# Patient Record
Sex: FEMALE | Race: WHITE | NOT HISPANIC OR LATINO | Employment: OTHER | URBAN - NONMETROPOLITAN AREA
[De-identification: names, ages, dates, MRNs, and addresses within clinical notes are randomized per-mention and may not be internally consistent; named-entity substitution may affect disease eponyms.]

---

## 2017-01-03 RX ORDER — ROSUVASTATIN CALCIUM 10 MG/1
TABLET, COATED ORAL
Qty: 30 TABLET | Refills: 0 | Status: SHIPPED | OUTPATIENT
Start: 2017-01-03 | End: 2017-01-12 | Stop reason: SDUPTHER

## 2017-01-04 RX ORDER — ERGOCALCIFEROL 1.25 MG/1
CAPSULE ORAL
Qty: 8 CAPSULE | Refills: 0 | OUTPATIENT
Start: 2017-01-04

## 2017-01-12 ENCOUNTER — OFFICE VISIT (OUTPATIENT)
Dept: FAMILY MEDICINE CLINIC | Facility: CLINIC | Age: 67
End: 2017-01-12

## 2017-01-12 ENCOUNTER — HOSPITAL ENCOUNTER (OUTPATIENT)
Dept: GENERAL RADIOLOGY | Facility: HOSPITAL | Age: 67
Discharge: HOME | End: 2017-01-12
Attending: INTERNAL MEDICINE

## 2017-01-12 VITALS
WEIGHT: 210 LBS | RESPIRATION RATE: 16 BRPM | OXYGEN SATURATION: 96 % | HEART RATE: 86 BPM | BODY MASS INDEX: 41.01 KG/M2 | SYSTOLIC BLOOD PRESSURE: 153 MMHG | DIASTOLIC BLOOD PRESSURE: 91 MMHG | TEMPERATURE: 98.5 F

## 2017-01-12 DIAGNOSIS — M54.50 LUMBOSACRAL PAIN: ICD-10-CM

## 2017-01-12 DIAGNOSIS — M54.6 CHRONIC MIDLINE THORACIC BACK PAIN: ICD-10-CM

## 2017-01-12 DIAGNOSIS — G89.29 CHRONIC MIDLINE THORACIC BACK PAIN: ICD-10-CM

## 2017-01-12 DIAGNOSIS — M25.50 ARTHRALGIA OF MULTIPLE JOINTS: ICD-10-CM

## 2017-01-12 DIAGNOSIS — I10 BENIGN ESSENTIAL HYPERTENSION: Primary | ICD-10-CM

## 2017-01-12 DIAGNOSIS — J20.9 ACUTE BRONCHITIS WITH BRONCHOSPASM: ICD-10-CM

## 2017-01-12 DIAGNOSIS — J01.01 ACUTE RECURRENT MAXILLARY SINUSITIS: ICD-10-CM

## 2017-01-12 DIAGNOSIS — E11.9 CONTROLLED TYPE 2 DIABETES MELLITUS WITHOUT COMPLICATION, WITHOUT LONG-TERM CURRENT USE OF INSULIN (HCC): ICD-10-CM

## 2017-01-12 DIAGNOSIS — H66.003 ACUTE SUPPURATIVE OTITIS MEDIA OF BOTH EARS WITHOUT SPONTANEOUS RUPTURE OF TYMPANIC MEMBRANES, RECURRENCE NOT SPECIFIED: ICD-10-CM

## 2017-01-12 PROCEDURE — 99214 OFFICE O/P EST MOD 30 MIN: CPT | Performed by: INTERNAL MEDICINE

## 2017-01-12 PROCEDURE — 96372 THER/PROPH/DIAG INJ SC/IM: CPT | Performed by: INTERNAL MEDICINE

## 2017-01-12 RX ORDER — AZITHROMYCIN 250 MG/1
TABLET, FILM COATED ORAL
Qty: 6 TABLET | Refills: 0 | Status: SHIPPED | OUTPATIENT
Start: 2017-01-12 | End: 2017-01-23

## 2017-01-12 RX ORDER — ROSUVASTATIN CALCIUM 10 MG/1
10 TABLET, COATED ORAL NIGHTLY
Qty: 90 TABLET | Refills: 3 | Status: SHIPPED | OUTPATIENT
Start: 2017-01-12 | End: 2018-02-20 | Stop reason: SDUPTHER

## 2017-01-12 RX ORDER — METHYLPREDNISOLONE SODIUM SUCCINATE 125 MG/2ML
125 INJECTION, POWDER, LYOPHILIZED, FOR SOLUTION INTRAMUSCULAR; INTRAVENOUS ONCE
Status: COMPLETED | OUTPATIENT
Start: 2017-01-12 | End: 2017-01-12

## 2017-01-12 RX ORDER — METHYLPREDNISOLONE 4 MG/1
TABLET ORAL
Qty: 21 TABLET | Refills: 0 | Status: SHIPPED | OUTPATIENT
Start: 2017-01-12 | End: 2017-05-16

## 2017-01-12 RX ADMIN — METHYLPREDNISOLONE SODIUM SUCCINATE 125 MG: 125 INJECTION, POWDER, LYOPHILIZED, FOR SOLUTION INTRAMUSCULAR; INTRAVENOUS at 11:18

## 2017-01-12 NOTE — PROGRESS NOTES
Subjective   Missy Mcgraw is a 66 y.o. female.     Chief Complaint   Patient presents with   • Cough   • Back Pain   • Leg Pain   • Joint Pain   • Earache   • Nasal Congestion       History of Present Illness   Patient complains of symptoms that started one week ago. Patient complains of pain between her shoulder blades. She also complains of bilateral leg pain.  She complains of back pain in the lumbar and thoracic area and multiple joints since the weather has become colder , patient is here complaining of sinus congestion, nasal discharge which started a few days ago.  patient  states now she has ear pain.    also complains of cough with sputum production which has been worsening since the past week.  patient has tried over-the-counter medication with no improvement in symptoms.  Patient is also to follow-up in the blood pressure.  She is been taking medication as prescribed.  She did not take it this morning is also to follow-up on her sugar which has been stable    The following portions of the patient's history were reviewed and updated as appropriate: allergies, current medications, past family history, past medical history, past social history, past surgical history and problem list.    Review of Systems   Constitutional: Negative for appetite change, fatigue and fever.   HENT: Positive for congestion, ear pain and sinus pressure. Negative for ear discharge and sore throat.    Eyes: Negative for pain and discharge.   Respiratory: Positive for cough and wheezing. Negative for chest tightness.    Cardiovascular: Negative for chest pain, palpitations and leg swelling.   Gastrointestinal: Negative for abdominal pain, blood in stool, constipation, diarrhea and nausea.   Endocrine: Negative for cold intolerance and heat intolerance.   Genitourinary: Negative for dysuria, flank pain and frequency.   Musculoskeletal: Positive for arthralgias and back pain. Negative for joint swelling.   Skin: Negative for color  change.   Allergic/Immunologic: Negative for environmental allergies and food allergies.   Neurological: Negative for dizziness, weakness, numbness and headaches.   Hematological: Negative for adenopathy. Does not bruise/bleed easily.   Psychiatric/Behavioral: Negative for behavioral problems and dysphoric mood. The patient is not nervous/anxious.          Current Outpatient Prescriptions:   •  aspirin (SANJU ASPIRIN EC LOW DOSE) 81 MG EC tablet, Take 1 tablet by mouth daily., Disp: , Rfl:   •  atenolol (TENORMIN) 25 MG tablet, Take 1 tablet by mouth daily., Disp: , Rfl:   •  Biotin 1 MG capsule, Take  by mouth., Disp: , Rfl:   •  carbidopa-levodopa CR (SINEMET CR)  MG per CR tablet, Take 1 tablet by mouth every night., Disp: , Rfl:   •  citalopram (CeleXA) 40 MG tablet, Take 1 tablet by mouth daily., Disp: , Rfl:   •  clobetasol (TEMOVATE) 0.05 % ointment, , Disp: , Rfl:   •  COCONUT OIL PO, Take  by mouth., Disp: , Rfl:   •  fluticasone (FLONASE) 50 MCG/ACT nasal spray, 1 spray into each nostril 2 (two) times a day., Disp: , Rfl:   •  gabapentin (NEURONTIN) 100 MG capsule, TAKE 1 CAPSULE 3 TIMES A   DAY, Disp: 270 capsule, Rfl: 0  •  glimepiride (AMARYL) 2 MG tablet, Take 1 tablet by mouth daily with breakfast., Disp: , Rfl:   •  imiquimod (ALDARA) 5 % cream, Apply  topically., Disp: , Rfl:   •  meclizine (ANTIVERT) 25 MG tablet, Take 1 tablet by mouth 3 (three) times a day as needed for dizziness., Disp: 90 tablet, Rfl: 0  •  moexipril-hydrochlorothiazide (UNIRETIC) 15-12.5 MG tablet per tablet, Take 1 tablet by mouth daily., Disp: , Rfl:   •  Multiple Vitamins-Minerals (KANNAN BASIC) tablet, Take  by mouth., Disp: , Rfl:   •  Omega-3 Krill Oil 1000 MG capsule, Take  by mouth., Disp: , Rfl:   •  pantoprazole (PROTONIX) 40 MG EC tablet, TAKE 1 TABLET DAILY, Disp: 90 tablet, Rfl: 3  •  rosuvastatin (CRESTOR) 10 MG tablet, Take 1 tablet by mouth Every Night., Disp: 90 tablet, Rfl: 3  •  SOOLANTRA 1 % cream, ,  Disp: , Rfl:   •  traZODone (DESYREL) 50 MG tablet, Take 1 tablet by mouth Every Night for 90 days., Disp: 90 tablet, Rfl: 3  •  triamcinolone (KENALOG) 0.1 % cream, , Disp: , Rfl:   •  vitamin B-12 (CYANOCOBALAMIN) 1000 MCG tablet, Take 1,000 mcg by mouth daily., Disp: , Rfl:   •  vitamin D (ERGOCALCIFEROL) 16906 UNITS capsule capsule, TAKE ONE CAPSULE BY MOUTH ONCE WEEKLY, Disp: 8 capsule, Rfl: 0  •  azithromycin (ZITHROMAX Z-PAM) 250 MG tablet, Take 2 tablets the first day, then 1 tablet daily for 4 days., Disp: 6 tablet, Rfl: 0  •  MethylPREDNISolone (MEDROL, PAM,) 4 MG tablet, Take as directed on package instructions., Disp: 21 tablet, Rfl: 0  No current facility-administered medications for this visit.     Objective     Blood pressure 153/91, pulse 86, temperature 98.5 °F (36.9 °C), temperature source Oral, resp. rate 16, weight 210 lb (95.3 kg), SpO2 96 %.    Physical Exam   Constitutional: She is oriented to person, place, and time. She appears well-developed and well-nourished. No distress.   HENT:   Head: Normocephalic and atraumatic.   Right Ear: External ear normal. Tympanic membrane is injected.   Left Ear: External ear normal. Tympanic membrane is injected.   Nose: Rhinorrhea present. Right sinus exhibits maxillary sinus tenderness. Left sinus exhibits maxillary sinus tenderness.   Mouth/Throat: Oropharynx is clear and moist.   Eyes: Conjunctivae and EOM are normal. Pupils are equal, round, and reactive to light.   Neck: Neck supple. No thyromegaly present.   Cardiovascular: Normal rate, regular rhythm and normal heart sounds.    Pulmonary/Chest: Effort normal. No respiratory distress. She has wheezes.   Abdominal: Soft. Bowel sounds are normal. She exhibits no distension. There is no tenderness. There is no rebound.   Musculoskeletal: She exhibits deformity. She exhibits no edema.   Lymphadenopathy:     She has no cervical adenopathy.   Neurological: She is alert and oriented to person, place, and  time.   No gross motor or sensory deficits   Skin: Skin is warm. She is not diaphoretic.   Psychiatric: She has a normal mood and affect.   Nursing note and vitals reviewed.      Results for orders placed or performed in visit on 11/03/16   Comprehensive metabolic panel   Result Value Ref Range    Glucose 117 (H) 70 - 100 mg/dL    BUN 16 9 - 23 mg/dL    Creatinine 0.70 0.60 - 1.30 mg/dL    Sodium 138 132 - 146 mmol/L    Potassium 4.5 3.5 - 5.5 mmol/L    Chloride 101 99 - 109 mmol/L    CO2 31.0 20.0 - 31.0 mmol/L    Calcium 10.0 8.7 - 10.4 mg/dL    Total Protein 7.1 5.7 - 8.2 g/dL    Albumin 4.10 3.20 - 4.80 g/dL    ALT (SGPT) 9 7 - 40 U/L    AST (SGOT) 15 0 - 33 U/L    Alkaline Phosphatase 78 25 - 100 U/L    Total Bilirubin 0.5 0.3 - 1.2 mg/dL    eGFR Non African Amer 85 >60 mL/min/1.73    Globulin 3.0 gm/dL    A/G Ratio 1.4 g/dL    BUN/Creatinine Ratio 22.9 7.0 - 25.0    Anion Gap 6.0 3.0 - 11.0 mmol/L   Lipid panel   Result Value Ref Range    Total Cholesterol 183 0 - 200 mg/dL    Triglycerides 225 (H) 0 - 150 mg/dL    HDL Cholesterol 56 40 - 60 mg/dL    LDL Cholesterol  97 0 - 130 mg/dL   Hemoglobin A1c   Result Value Ref Range    Hemoglobin A1C 6.20 (H) 4.00 - 6.00 %   Microalbumin / creatinine urine ratio   Result Value Ref Range    Creatinine, Urine 192.7 Not Estab. mg/dL    Microalbumin, Urine 68.5 Not Estab. ug/mL    Microalbumin/Creatinine Ratio Urine 35.5 (H) 0.0 - 30.0 mg/g creat   Vitamin D 25 hydroxy   Result Value Ref Range    25 Hydroxy, Vitamin D 24.9 ng/ml   CBC Auto Differential   Result Value Ref Range    WBC 9.05 3.50 - 10.80 10*3/mm3    RBC 5.01 3.89 - 5.14 10*6/mm3    Hemoglobin 14.5 11.5 - 15.5 g/dL    Hematocrit 43.6 34.5 - 44.0 %    MCV 87.0 80.0 - 99.0 fL    MCH 28.9 27.0 - 31.0 pg    MCHC 33.3 32.0 - 36.0 g/dL    RDW 13.0 11.3 - 14.5 %    RDW-SD 41.6 37.0 - 54.0 fl    MPV 12.2 (H) 6.0 - 12.0 fL    Platelets 231 150 - 450 10*3/mm3    Neutrophil % 66.3 41.0 - 71.0 %    Lymphocyte % 23.2 (L)  24.0 - 44.0 %    Monocyte % 7.3 0.0 - 12.0 %    Eosinophil % 2.1 0.0 - 3.0 %    Basophil % 0.3 0.0 - 1.0 %    Immature Grans % 0.8 (H) 0.0 - 0.6 %    Neutrophils, Absolute 6.00 1.50 - 8.30 10*3/mm3    Lymphocytes, Absolute 2.10 0.60 - 4.80 10*3/mm3    Monocytes, Absolute 0.66 0.00 - 1.00 10*3/mm3    Eosinophils, Absolute 0.19 0.10 - 0.30 10*3/mm3    Basophils, Absolute 0.03 0.00 - 0.20 10*3/mm3    Immature Grans, Absolute 0.07 (H) 0.00 - 0.03 10*3/mm3         Assessment/Plan   Missy was seen today for cough, back pain, leg pain, joint pain, earache and nasal congestion.    Diagnoses and all orders for this visit:    Benign essential hypertension    Controlled type 2 diabetes mellitus without complication, without long-term current use of insulin    Acute suppurative otitis media of both ears without spontaneous rupture of tympanic membranes, recurrence not specified    Acute recurrent maxillary sinusitis    Acute bronchitis with bronchospasm  -     XR Chest PA & Lateral; Future  -     methylPREDNISolone sodium succinate (SOLU-Medrol) injection 125 mg; Inject 2 mL into the shoulder, thigh, or buttocks 1 (One) Time.    Lumbosacral pain  -     XR Spine Lumbar 2 or 3 View; Future  -     methylPREDNISolone sodium succinate (SOLU-Medrol) injection 125 mg; Inject 2 mL into the shoulder, thigh, or buttocks 1 (One) Time.    Arthralgia of multiple joints  -     methylPREDNISolone sodium succinate (SOLU-Medrol) injection 125 mg; Inject 2 mL into the shoulder, thigh, or buttocks 1 (One) Time.    Chronic midline thoracic back pain  -     XR Spine Thoracic 2 View; Future  -     methylPREDNISolone sodium succinate (SOLU-Medrol) injection 125 mg; Inject 2 mL into the shoulder, thigh, or buttocks 1 (One) Time.    Other orders  -     rosuvastatin (CRESTOR) 10 MG tablet; Take 1 tablet by mouth Every Night.  -     azithromycin (ZITHROMAX Z-PAM) 250 MG tablet; Take 2 tablets the first day, then 1 tablet daily for 4 days.  -      MethylPREDNISolone (MEDROL, PAM,) 4 MG tablet; Take as directed on package instructions.      Discussion Summary:   1.Benign Essential Hypertension: will continue current medications, low sodium diet, patient to continue to monitor blood pressure.  2.  Diabetes Mellitus: patient is advised to continue all current medications and these have been reviewed today, current fasting blood sugar 135 , hba1c 6.2 ,  She will be getting steroids today which can increase her blood sugar level  3. Multiple joint pain:    im solumedrol And medrol Dose pack given and Informed that her sugar may go up secondary To the steroids  4. Thoracic pain : get xray today  And  im solumedrol And medrol Dose pack given and Informed that her sugar may go up secondary To the steroids  5. Lumbosacral pain : get xray today  And  im solumedrol And medrol Dose pack given and Informed that her sugar may go up secondary To the steroids  6. Acute bronchitis: will get chest xray and start oral antibiotics  7.  Acute suppurative otitis media: start oral antibiotics  8.acute maxillary sinusitis: start oral antibiotics         Emely Sheldon MD

## 2017-01-12 NOTE — MR AVS SNAPSHOT
Missy Mcgraw   1/12/2017 10:15 AM   Office Visit    Dept Phone:  [phone]   Encounter #:  [id number]    Provider:  Emely Sheldon MD   Department:  [facility] PRIMARY CARE                Your Full Care Plan              Today's Medication Changes          These changes are accurate as of: 1/12/17 11:26 AM.  If you have any questions, ask your nurse or doctor.               New Medication(s)Ordered:     azithromycin 250 MG tablet   Commonly known as:  ZITHROMAX Z-PAM   Take 2 tablets the first day, then 1 tablet daily for 4 days.   Started by:  Emely Sheldon MD       MethylPREDNISolone 4 MG tablet   Commonly known as:  MEDROL (PAM)   Take as directed on package instructions.   Started by:  Emely Sheldon MD         Medication(s)that have changed:     rosuvastatin 10 MG tablet   Commonly known as:  CRESTOR   Take 1 tablet by mouth Every Night.   What changed:  See the new instructions.   Changed by:  Emely Sheldon MD            Where to Get Your Medications      These medications were sent to [facility] [address] AT [facility] - [phone] [facility] [phone] FX  [address] [facility] [address]     Phone:  [phone]     rosuvastatin 10 MG tablet         These medications were sent to [address] AT [facility] - [phone] [facility] [phone] [address] [address]     Phone:  [phone]     azithromycin 250 MG tablet    MethylPREDNISolone 4 MG tablet                  Your Updated Medication List          This list is accurate as of: 1/12/17 11:26 AM.  Always use your most recent med list.                atenolol 25 MG tablet   Commonly known as:  TENORMIN       azithromycin 250 MG tablet   Commonly known as:  ZITHROMAX Z-PAM   Take 2 tablets the first day, then 1 tablet daily for 4 days.       SANJU ASPIRIN EC LOW  DOSE 81 MG EC tablet   Generic drug:  aspirin       Biotin 1 MG capsule       carbidopa-levodopa CR  MG per CR tablet   Commonly known as:  SINEMET CR       citalopram 40 MG tablet   Commonly known as:  CeleXA       clobetasol 0.05 % ointment   Commonly known as:  TEMOVATE       COCONUT OIL PO       fluticasone 50 MCG/ACT nasal spray   Commonly known as:  FLONASE       gabapentin 100 MG capsule   Commonly known as:  NEURONTIN   TAKE 1 CAPSULE 3 TIMES A   DAY       glimepiride 2 MG tablet   Commonly known as:  AMARYL       imiquimod 5 % cream   Commonly known as:  ALDARA       meclizine 25 MG tablet   Commonly known as:  ANTIVERT   Take 1 tablet by mouth 3 (three) times a day as needed for dizziness.       KANNAN BASIC tablet       MethylPREDNISolone 4 MG tablet   Commonly known as:  MEDROL (PAM)   Take as directed on package instructions.       moexipril-hydrochlorothiazide 15-12.5 MG tablet per tablet   Commonly known as:  UNIRETIC       Omega-3 Krill Oil 1000 MG capsule       pantoprazole 40 MG EC tablet   Commonly known as:  PROTONIX   TAKE 1 TABLET DAILY       rosuvastatin 10 MG tablet   Commonly known as:  CRESTOR   Take 1 tablet by mouth Every Night.       SOOLANTRA 1 % cream   Generic drug:  Ivermectin       traZODone 50 MG tablet   Commonly known as:  DESYREL   Take 1 tablet by mouth Every Night for 90 days.       triamcinolone 0.1 % cream   Commonly known as:  KENALOG       vitamin B-12 1000 MCG tablet   Commonly known as:  CYANOCOBALAMIN       vitamin D 91974 UNITS capsule capsule   Commonly known as:  ERGOCALCIFEROL   TAKE ONE CAPSULE BY MOUTH ONCE WEEKLY               You Were Diagnosed With        Codes Comments    Benign essential hypertension    -  Primary ICD-10-CM: I10  ICD-9-CM: 401.1     Controlled type 2 diabetes mellitus without complication, without long-term current use of insulin     ICD-10-CM: E11.9  ICD-9-CM: 250.00     Acute suppurative otitis media of both ears without spontaneous  rupture of tympanic membranes, recurrence not specified     ICD-10-CM: H66.003  ICD-9-CM: 382.00     Acute recurrent maxillary sinusitis     ICD-10-CM: J01.01  ICD-9-CM: 461.0     Acute bronchitis with bronchospasm     ICD-10-CM: J20.9  ICD-9-CM: 466.0     Lumbosacral pain     ICD-10-CM: M54.5, M54.89  ICD-9-CM: 724.2, 724.6     Arthralgia of multiple joints     ICD-10-CM: M25.50  ICD-9-CM: 719.49     Chronic midline thoracic back pain     ICD-10-CM: M54.6, G89.29  ICD-9-CM: 724.1, 338.29       Medications to be Given to You by a Medical Professional     Due       Frequency    2017 methylPREDNISolone sodium succinate (SOLU-Medrol) injection 125 mg  Once      Instructions     None    Patient Instructions History      Upcoming Appointments     Visit Type Date Time Department    OFFICE VISIT 2017 10:15 AM [facility]    OFFICE VISIT 3/8/2017  8:30 AM [facility]      [employer] Signup     [facility] [employer] allows you to send messages to your doctor, view your test results, renew your prescriptions, schedule appointments, and more. To sign up, go to [employer] and click on the Sign Up Now link in the New User? box. Enter your [employer] Activation Code exactly as it appears below along with the last four digits of your Social Security Number and your Date of Birth () to complete the sign-up process. If you do not sign up before the expiration date, you must request a new code.    [employer] Activation Code: [email or web address]  Expires: 2017  5:37 AM    If you have questions, you can email [email or web address] or call [phone] to talk to our [employer] staff. Remember, [employer] is NOT to be used for urgent needs. For medical emergencies, dial 911.               Other Info from Your Visit           Your Appointments     Mar 08, 2017  8:30 AM EST   Office Visit with Emely Sheldon MD   [facility] (--)    [address]  [place]  [place] [address] [phone]           Arrive 15 minutes prior to appointment.              Allergies     Atorvastatin      Fenofibrate      Fluoxetine      Gemfibrozil      Hydroxychloroquine      Metformin      Nsaids      Penicillins      Pravastatin      Sulfamethoxazole-trimethoprim        Reason for Visit     Cough     Shortness of Breath     Back Pain     Leg Pain     Joint Pain           Vital Signs     Blood Pressure Pulse Temperature Respirations Weight Oxygen Saturation    153/91 86 98.5 °F (36.9 °C) (Oral) 16 210 lb (95.3 kg) 96%    Body Mass Index Smoking Status                41.01 kg/m2 Never Smoker          Problems and Diagnoses Noted     Benign essential hypertension    Controlled type 2 diabetes mellitus without complication, without long-term current use of insulin        Middle ear infection        Acute maxillary sinusitis        Acute bronchitis with bronchospasm        Low back pain        Arthralgia of multiple joints        Chronic midline thoracic back pain

## 2017-01-13 DIAGNOSIS — M54.6 CHRONIC BILATERAL THORACIC BACK PAIN: Primary | ICD-10-CM

## 2017-01-13 DIAGNOSIS — M16.0 PRIMARY OSTEOARTHRITIS OF BOTH HIPS: Primary | ICD-10-CM

## 2017-01-13 DIAGNOSIS — G89.29 CHRONIC BILATERAL THORACIC BACK PAIN: Primary | ICD-10-CM

## 2017-01-16 RX ORDER — GABAPENTIN 100 MG/1
CAPSULE ORAL
Qty: 270 CAPSULE | Refills: 2 | Status: SHIPPED | OUTPATIENT
Start: 2017-01-16 | End: 2018-03-19

## 2017-01-20 ENCOUNTER — TRANSCRIBE ORDERS (OUTPATIENT)
Dept: ADMINISTRATIVE | Facility: HOSPITAL | Age: 67
End: 2017-01-20

## 2017-01-20 DIAGNOSIS — Z13.9 SCREENING: Primary | ICD-10-CM

## 2017-01-23 ENCOUNTER — OFFICE VISIT (OUTPATIENT)
Dept: FAMILY MEDICINE CLINIC | Facility: CLINIC | Age: 67
End: 2017-01-23

## 2017-01-23 VITALS
HEART RATE: 79 BPM | BODY MASS INDEX: 40.82 KG/M2 | DIASTOLIC BLOOD PRESSURE: 80 MMHG | SYSTOLIC BLOOD PRESSURE: 149 MMHG | RESPIRATION RATE: 16 BRPM | TEMPERATURE: 98 F | OXYGEN SATURATION: 97 % | WEIGHT: 209 LBS

## 2017-01-23 DIAGNOSIS — M54.6 CHRONIC BILATERAL THORACIC BACK PAIN: ICD-10-CM

## 2017-01-23 DIAGNOSIS — E11.9 DIABETES MELLITUS WITHOUT COMPLICATION (HCC): ICD-10-CM

## 2017-01-23 DIAGNOSIS — R10.11 RUQ ABDOMINAL PAIN: ICD-10-CM

## 2017-01-23 DIAGNOSIS — G89.29 CHRONIC BILATERAL THORACIC BACK PAIN: ICD-10-CM

## 2017-01-23 DIAGNOSIS — I10 BENIGN ESSENTIAL HYPERTENSION: Primary | ICD-10-CM

## 2017-01-23 PROCEDURE — 96372 THER/PROPH/DIAG INJ SC/IM: CPT | Performed by: INTERNAL MEDICINE

## 2017-01-23 PROCEDURE — 99213 OFFICE O/P EST LOW 20 MIN: CPT | Performed by: INTERNAL MEDICINE

## 2017-01-23 RX ORDER — METHYLPREDNISOLONE ACETATE 80 MG/ML
80 INJECTION, SUSPENSION INTRA-ARTICULAR; INTRALESIONAL; INTRAMUSCULAR; SOFT TISSUE ONCE
Status: COMPLETED | OUTPATIENT
Start: 2017-01-23 | End: 2017-01-23

## 2017-01-23 RX ADMIN — METHYLPREDNISOLONE ACETATE 80 MG: 80 INJECTION, SUSPENSION INTRA-ARTICULAR; INTRALESIONAL; INTRAMUSCULAR; SOFT TISSUE at 13:18

## 2017-01-23 NOTE — PROGRESS NOTES
Subjective   Missy Mcgraw is a 66 y.o. female.     Chief Complaint   Patient presents with   • Back Pain   • Hypertension   • Diabetes       History of Present Illness   Patient is here to follow-up on blood pressure.  She is been taking medication as prescribed.  She is also to follow-up on her sugar which has been stable.  She complains of pain in the right upper quadrant of her abdomen ,Patient complains of right upper back pain, as well as right side pain under her breast. Patient describes the pain in her back as a spasm, and the pain under her breast as a sharp, shooting pain.  She also complains of pain in the thoracic area and has MRI scheduled    The following portions of the patient's history were reviewed and updated as appropriate: allergies, current medications, past family history, past medical history, past social history, past surgical history and problem list.    Review of Systems   Constitutional: Negative for appetite change, fatigue and fever.   HENT: Negative for congestion, ear discharge, ear pain, sinus pressure and sore throat.    Eyes: Negative for pain and discharge.   Respiratory: Negative for cough, chest tightness, shortness of breath and wheezing.    Cardiovascular: Negative for chest pain, palpitations and leg swelling.   Gastrointestinal: Positive for abdominal pain. Negative for blood in stool, constipation, diarrhea and nausea.   Endocrine: Negative for cold intolerance and heat intolerance.   Genitourinary: Negative for dysuria, flank pain and frequency.   Musculoskeletal: Positive for back pain. Negative for joint swelling.   Skin: Negative for color change.   Allergic/Immunologic: Negative for environmental allergies and food allergies.   Neurological: Negative for dizziness, weakness, numbness and headaches.   Hematological: Negative for adenopathy. Does not bruise/bleed easily.   Psychiatric/Behavioral: Negative for behavioral problems and dysphoric mood. The patient is not  nervous/anxious.          Current Outpatient Prescriptions:   •  aspirin (SANJU ASPIRIN EC LOW DOSE) 81 MG EC tablet, Take 1 tablet by mouth daily., Disp: , Rfl:   •  atenolol (TENORMIN) 25 MG tablet, Take 1 tablet by mouth daily., Disp: , Rfl:   •  Biotin 1 MG capsule, Take  by mouth., Disp: , Rfl:   •  carbidopa-levodopa CR (SINEMET CR)  MG per CR tablet, Take 1 tablet by mouth every night., Disp: , Rfl:   •  citalopram (CeleXA) 40 MG tablet, Take 1 tablet by mouth daily., Disp: , Rfl:   •  clobetasol (TEMOVATE) 0.05 % ointment, , Disp: , Rfl:   •  COCONUT OIL PO, Take  by mouth., Disp: , Rfl:   •  fluticasone (FLONASE) 50 MCG/ACT nasal spray, 1 spray into each nostril 2 (two) times a day., Disp: , Rfl:   •  gabapentin (NEURONTIN) 100 MG capsule, TAKE 1 CAPSULE 3 TIMES A   DAY, Disp: 270 capsule, Rfl: 2  •  glimepiride (AMARYL) 2 MG tablet, Take 1 tablet by mouth daily with breakfast., Disp: , Rfl:   •  imiquimod (ALDARA) 5 % cream, Apply  topically., Disp: , Rfl:   •  meclizine (ANTIVERT) 25 MG tablet, Take 1 tablet by mouth 3 (three) times a day as needed for dizziness., Disp: 90 tablet, Rfl: 0  •  moexipril-hydrochlorothiazide (UNIRETIC) 15-12.5 MG tablet per tablet, Take 1 tablet by mouth daily., Disp: , Rfl:   •  Multiple Vitamins-Minerals (KANNAN BASIC) tablet, Take  by mouth., Disp: , Rfl:   •  pantoprazole (PROTONIX) 40 MG EC tablet, TAKE 1 TABLET DAILY, Disp: 90 tablet, Rfl: 3  •  rosuvastatin (CRESTOR) 10 MG tablet, Take 1 tablet by mouth Every Night., Disp: 90 tablet, Rfl: 3  •  SOOLANTRA 1 % cream, , Disp: , Rfl:   •  traZODone (DESYREL) 50 MG tablet, Take 1 tablet by mouth Every Night for 90 days., Disp: 90 tablet, Rfl: 3  •  triamcinolone (KENALOG) 0.1 % cream, , Disp: , Rfl:   •  vitamin B-12 (CYANOCOBALAMIN) 1000 MCG tablet, Take 1,000 mcg by mouth daily., Disp: , Rfl:   •  vitamin D (ERGOCALCIFEROL) 35580 UNITS capsule capsule, TAKE ONE CAPSULE BY MOUTH ONCE WEEKLY, Disp: 8 capsule, Rfl: 0  •   MethylPREDNISolone (MEDROL, PAM,) 4 MG tablet, Take as directed on package instructions., Disp: 21 tablet, Rfl: 0  •  Omega-3 Krill Oil 1000 MG capsule, Take  by mouth., Disp: , Rfl:     Objective     Blood pressure 149/80, pulse 79, temperature 98 °F (36.7 °C), temperature source Oral, resp. rate 16, weight 209 lb (94.8 kg), SpO2 97 %.    Physical Exam   Constitutional: She is oriented to person, place, and time. She appears well-developed and well-nourished. No distress.   HENT:   Head: Normocephalic and atraumatic.   Right Ear: External ear normal.   Left Ear: External ear normal.   Nose: Nose normal.   Mouth/Throat: Oropharynx is clear and moist.   Eyes: Conjunctivae and EOM are normal. Pupils are equal, round, and reactive to light.   Neck: Neck supple. No thyromegaly present.   Cardiovascular: Normal rate, regular rhythm and normal heart sounds.    Pulmonary/Chest: Effort normal and breath sounds normal. No respiratory distress.   Abdominal: Soft. Bowel sounds are normal. She exhibits no distension. There is tenderness. There is no rebound.   ruq   Musculoskeletal: She exhibits tenderness. She exhibits no edema.   Mid thoracic diffuse paraspinal tenderness   Lymphadenopathy:     She has no cervical adenopathy.   Neurological: She is alert and oriented to person, place, and time.   No gross motor or sensory deficits   Skin: Skin is warm. She is not diaphoretic.   Psychiatric: She has a normal mood and affect.   Nursing note and vitals reviewed.      Results for orders placed or performed in visit on 11/03/16   Comprehensive metabolic panel   Result Value Ref Range    Glucose 117 (H) 70 - 100 mg/dL    BUN 16 9 - 23 mg/dL    Creatinine 0.70 0.60 - 1.30 mg/dL    Sodium 138 132 - 146 mmol/L    Potassium 4.5 3.5 - 5.5 mmol/L    Chloride 101 99 - 109 mmol/L    CO2 31.0 20.0 - 31.0 mmol/L    Calcium 10.0 8.7 - 10.4 mg/dL    Total Protein 7.1 5.7 - 8.2 g/dL    Albumin 4.10 3.20 - 4.80 g/dL    ALT (SGPT) 9 7 - 40 U/L     AST (SGOT) 15 0 - 33 U/L    Alkaline Phosphatase 78 25 - 100 U/L    Total Bilirubin 0.5 0.3 - 1.2 mg/dL    eGFR Non African Amer 85 >60 mL/min/1.73    Globulin 3.0 gm/dL    A/G Ratio 1.4 g/dL    BUN/Creatinine Ratio 22.9 7.0 - 25.0    Anion Gap 6.0 3.0 - 11.0 mmol/L   Lipid panel   Result Value Ref Range    Total Cholesterol 183 0 - 200 mg/dL    Triglycerides 225 (H) 0 - 150 mg/dL    HDL Cholesterol 56 40 - 60 mg/dL    LDL Cholesterol  97 0 - 130 mg/dL   Hemoglobin A1c   Result Value Ref Range    Hemoglobin A1C 6.20 (H) 4.00 - 6.00 %   Microalbumin / creatinine urine ratio   Result Value Ref Range    Creatinine, Urine 192.7 Not Estab. mg/dL    Microalbumin, Urine 68.5 Not Estab. ug/mL    Microalbumin/Creatinine Ratio Urine 35.5 (H) 0.0 - 30.0 mg/g creat   Vitamin D 25 hydroxy   Result Value Ref Range    25 Hydroxy, Vitamin D 24.9 ng/ml   CBC Auto Differential   Result Value Ref Range    WBC 9.05 3.50 - 10.80 10*3/mm3    RBC 5.01 3.89 - 5.14 10*6/mm3    Hemoglobin 14.5 11.5 - 15.5 g/dL    Hematocrit 43.6 34.5 - 44.0 %    MCV 87.0 80.0 - 99.0 fL    MCH 28.9 27.0 - 31.0 pg    MCHC 33.3 32.0 - 36.0 g/dL    RDW 13.0 11.3 - 14.5 %    RDW-SD 41.6 37.0 - 54.0 fl    MPV 12.2 (H) 6.0 - 12.0 fL    Platelets 231 150 - 450 10*3/mm3    Neutrophil % 66.3 41.0 - 71.0 %    Lymphocyte % 23.2 (L) 24.0 - 44.0 %    Monocyte % 7.3 0.0 - 12.0 %    Eosinophil % 2.1 0.0 - 3.0 %    Basophil % 0.3 0.0 - 1.0 %    Immature Grans % 0.8 (H) 0.0 - 0.6 %    Neutrophils, Absolute 6.00 1.50 - 8.30 10*3/mm3    Lymphocytes, Absolute 2.10 0.60 - 4.80 10*3/mm3    Monocytes, Absolute 0.66 0.00 - 1.00 10*3/mm3    Eosinophils, Absolute 0.19 0.10 - 0.30 10*3/mm3    Basophils, Absolute 0.03 0.00 - 0.20 10*3/mm3    Immature Grans, Absolute 0.07 (H) 0.00 - 0.03 10*3/mm3         Assessment/Plan   Missy was seen today for back pain, hypertension and diabetes.    Diagnoses and all orders for this visit:    Benign essential hypertension    Diabetes mellitus  without complication    RUQ abdominal pain  -     US Gallbladder; Future  -     US Liver; Future  -     NM HIDA Scan With Pharmacological Intervention; Future    Chronic bilateral thoracic back pain  -     methylPREDNISolone acetate (DEPO-medrol) injection 80 mg; Inject 1 mL into the shoulder, thigh, or buttocks 1 (One) Time.     plan:  1.  Benign  Essential hypertension: Will continue current medication, low-sodium diet advised  2.Diabetes Mellitus: patient is advised to continue all current medications and these have been reviewed today  3.  Right upper quadrant abdominal pain: We will get HIDA scan and ultrasound of the liver and gallbladder  4.thoracic pain: Patient scheduled for MRI, IM Depo-Medrol given in office today             Emely Sheldon MD

## 2017-01-23 NOTE — MR AVS SNAPSHOT
Missy Mcgraw   1/23/2017 4:00 PM   Office Visit    Dept Phone:  [phone]   Encounter #:  [id number]    Provider:  Emely Sheldon MD   Department:  [facility]                Your Full Care Plan              Today's Medication Changes          These changes are accurate as of: 1/23/17  5:13 PM.  If you have any questions, ask your nurse or doctor.               Stop taking medication(s)listed here:     azithromycin 250 MG tablet   Commonly known as:  ZITHROMAX Z-PAM   Stopped by:  Emely Sheldon MD                      Your Updated Medication List          This list is accurate as of: 1/23/17  5:13 PM.  Always use your most recent med list.                atenolol 25 MG tablet   Commonly known as:  TENORMIN       SANJU ASPIRIN EC LOW DOSE 81 MG EC tablet   Generic drug:  aspirin       Biotin 1 MG capsule       carbidopa-levodopa CR  MG per CR tablet   Commonly known as:  SINEMET CR       citalopram 40 MG tablet   Commonly known as:  CeleXA       clobetasol 0.05 % ointment   Commonly known as:  TEMOVATE       COCONUT OIL PO       fluticasone 50 MCG/ACT nasal spray   Commonly known as:  FLONASE       gabapentin 100 MG capsule   Commonly known as:  NEURONTIN   TAKE 1 CAPSULE 3 TIMES A   DAY       glimepiride 2 MG tablet   Commonly known as:  AMARYL       imiquimod 5 % cream   Commonly known as:  ALDARA       meclizine 25 MG tablet   Commonly known as:  ANTIVERT   Take 1 tablet by mouth 3 (three) times a day as needed for dizziness.       KANNAN BASIC tablet       MethylPREDNISolone 4 MG tablet   Commonly known as:  MEDROL (PAM)   Take as directed on package instructions.       moexipril-hydrochlorothiazide 15-12.5 MG tablet per tablet   Commonly known as:  UNIRETIC       Omega-3 Krill Oil 1000 MG capsule       pantoprazole 40 MG EC tablet   Commonly known as:  PROTONIX   TAKE 1 TABLET DAILY       rosuvastatin 10 MG tablet   Commonly known as:  CRESTOR   Take  1 tablet by mouth Every Night.       SOOLANTRA 1 % cream   Generic drug:  Ivermectin       traZODone 50 MG tablet   Commonly known as:  DESYREL   Take 1 tablet by mouth Every Night for 90 days.       triamcinolone 0.1 % cream   Commonly known as:  KENALOG       vitamin B-12 1000 MCG tablet   Commonly known as:  CYANOCOBALAMIN       vitamin D 78962 UNITS capsule capsule   Commonly known as:  ERGOCALCIFEROL   TAKE ONE CAPSULE BY MOUTH ONCE WEEKLY               You Were Diagnosed With        Codes Comments    Benign essential hypertension    -  Primary ICD-10-CM: I10  ICD-9-CM: 401.1     Diabetes mellitus without complication     ICD-10-CM: E11.9  ICD-9-CM: 250.00     RUQ abdominal pain     ICD-10-CM: R10.11  ICD-9-CM: 789.01     Chronic bilateral thoracic back pain     ICD-10-CM: M54.6, G89.29  ICD-9-CM: 724.1, 338.29       Medications to be Given to You by a Medical Professional     Due       Frequency    (none) methylPREDNISolone acetate (DEPO-medrol) injection 80 mg  Once      Instructions     None    Patient Instructions History      Upcoming Appointments     Visit Type Date Time Department    OFFICE VISIT 2017  4:00 PM [facility]    MR [facility] TSPINE WO CONTRAST 2017  9:00 AM [facility] MRI    MAMMO [facility] SCREEN FLACA BILATERAL 2/10/2017  8:00 AM  [facility]    OFFICE VISIT 3/8/2017  8:30 AM [facility] R      MyChart Signup     [facility] [employer] allows you to send messages to your doctor, view your test results, renew your prescriptions, schedule appointments, and more. To sign up, go to [employer] and click on the Sign Up Now link in the New User? box. Enter your [employer] Activation Code exactly as it appears below along with the last four digits of your Social Security Number and your Date of Birth () to complete the sign-up process. If you do not sign up before the expiration date, you must request a new code.    [employer] Activation Code: [email or web address]  Expires:  2/6/2017  5:13 PM    If you have questions, you can email [email or web address] or call [phone] to talk to our [employer] staff. Remember, [email or web address] is NOT to be used for urgent needs. For medical emergencies, dial 911.               Other Info from Your Visit           Your Appointments     Jan 30, 2017  9:00 AM EST   MR v[facility]enzo tspine wo contrast with [facility] MRI 1   [facility] ([place])    [address] [address] [phone]           Arrive 30 minutes prior to exam time. Wear clothing without metal snaps, zippers, or other metalic artifacts. Do not wear jewelry.            Feb 10, 2017  8:00 AM EST   Mammo vincenzo screen geo bilateral with [facility] MAMM 1   [facility] MAMMO ([place])    [address] [address] [phone]           Bring any outside films/reports to your appointment. Do not apply any deodorant or powder prior to your appointment on the day of your exam.            Mar 08, 2017  8:30 AM EST   Office Visit with Emely Sheldon MD   [facility] (--)    [address]  [facility] [place] [address] [phone]           Arrive 15 minutes prior to appointment.              Allergies     Atorvastatin      Fenofibrate      Fluoxetine      Gemfibrozil      Hydroxychloroquine      Metformin      Nsaids      Penicillins      Pravastatin      Sulfamethoxazole-trimethoprim        Reason for Visit     Back Pain     Hypertension     Diabetes           Vital Signs     Blood Pressure Pulse Temperature Respirations Weight Oxygen Saturation    149/80 79 98 °F (36.7 °C) (Oral) 16 209 lb (94.8 kg) 97%    Body Mass Index Smoking Status                40.82 kg/m2 Never Smoker          Problems and Diagnoses Noted     Benign essential hypertension    Type II diabetes mellitus without control    Abdominal pain, right upper quadrant        Chronic bilateral thoracic back pain          Medications Administered      methylPREDNISolone acetate (DEPO-medrol) injection 80 mg

## 2017-01-27 ENCOUNTER — HOSPITAL ENCOUNTER (OUTPATIENT)
Dept: NUCLEAR MEDICINE | Facility: HOSPITAL | Age: 67
Discharge: HOME | End: 2017-01-27

## 2017-01-27 ENCOUNTER — HOSPITAL ENCOUNTER (OUTPATIENT)
Dept: ULTRASOUND IMAGING | Facility: HOSPITAL | Age: 67
Discharge: HOME | End: 2017-01-27
Admitting: INTERNAL MEDICINE

## 2017-01-27 DIAGNOSIS — R10.11 RUQ ABDOMINAL PAIN: ICD-10-CM

## 2017-01-27 PROCEDURE — A9537 TC99M MEBROFENIN: HCPCS | Performed by: INTERNAL MEDICINE

## 2017-01-27 PROCEDURE — 25010000002 SINCALIDE PER 5 MCG: Performed by: INTERNAL MEDICINE

## 2017-01-27 PROCEDURE — 0 TECHNETIUM TC 99M MEBROFENIN KIT: Performed by: INTERNAL MEDICINE

## 2017-01-27 PROCEDURE — 76705 ECHO EXAM OF ABDOMEN: CPT

## 2017-01-27 PROCEDURE — 78227 HEPATOBIL SYST IMAGE W/DRUG: CPT

## 2017-01-27 RX ORDER — KIT FOR THE PREPARATION OF TECHNETIUM TC 99M MEBROFENIN 45 MG/10ML
1 INJECTION, POWDER, LYOPHILIZED, FOR SOLUTION INTRAVENOUS
Status: COMPLETED | OUTPATIENT
Start: 2017-01-27 | End: 2017-01-27

## 2017-01-27 RX ADMIN — MEBROFENIN 1 DOSE: 45 INJECTION, POWDER, LYOPHILIZED, FOR SOLUTION INTRAVENOUS at 10:00

## 2017-01-27 RX ADMIN — SINCALIDE 1.9 MCG: 5 INJECTION, POWDER, LYOPHILIZED, FOR SOLUTION INTRAVENOUS at 11:10

## 2017-01-30 ENCOUNTER — HOSPITAL ENCOUNTER (OUTPATIENT)
Dept: MRI IMAGING | Facility: HOSPITAL | Age: 67
Discharge: HOME | End: 2017-01-30
Admitting: INTERNAL MEDICINE

## 2017-01-30 DIAGNOSIS — M54.6 CHRONIC BILATERAL THORACIC BACK PAIN: ICD-10-CM

## 2017-01-30 DIAGNOSIS — G89.29 CHRONIC BILATERAL THORACIC BACK PAIN: ICD-10-CM

## 2017-01-30 PROCEDURE — 72146 MRI CHEST SPINE W/O DYE: CPT

## 2017-02-06 RX ORDER — ROSUVASTATIN CALCIUM 10 MG/1
TABLET, COATED ORAL
Qty: 30 TABLET | Refills: 0 | Status: SHIPPED | OUTPATIENT
Start: 2017-02-06 | End: 2017-05-16

## 2017-02-10 ENCOUNTER — APPOINTMENT (OUTPATIENT)
Dept: MAMMOGRAPHY | Facility: HOSPITAL | Age: 67
End: 2017-02-10

## 2017-02-16 ENCOUNTER — TELEPHONE (OUTPATIENT)
Dept: FAMILY MEDICINE CLINIC | Facility: CLINIC | Age: 67
End: 2017-02-16

## 2017-02-16 NOTE — TELEPHONE ENCOUNTER
For both hips and lower back. She was referred to [facility] on 01/13 but the referral is over a month old and they need a new one. Informed that we can resend it.     ----- Message from Emely Sheldon MD sent at 2/16/2017 10:18 AM EST -----  Contact: PATIENT  For back pain ?  ----- Message -----     From: Virginia Hernandez MA     Sent: 2/15/2017  12:46 PM       To: Emely Sheldon MD        ----- Message -----     From: Lo Garcia     Sent: 2/15/2017  11:34 AM       To: Virginia Hernandez MA    PATIENT CALLED STATING THAT SHE NEEDS ANOTHER REFERRAL TO [facility] HERE IN [place]. PATIENTS PHONE NUMBER [phone]

## 2017-02-17 ENCOUNTER — HOSPITAL ENCOUNTER (OUTPATIENT)
Dept: MAMMOGRAPHY | Facility: HOSPITAL | Age: 67
Discharge: HOME | End: 2017-02-17
Admitting: INTERNAL MEDICINE

## 2017-02-17 DIAGNOSIS — Z13.9 SCREENING: ICD-10-CM

## 2017-02-17 DIAGNOSIS — M25.50 ARTHRALGIA OF MULTIPLE JOINTS: Primary | ICD-10-CM

## 2017-02-17 DIAGNOSIS — M54.50 LUMBOSACRAL PAIN: ICD-10-CM

## 2017-02-17 PROCEDURE — 77063 BREAST TOMOSYNTHESIS BI: CPT

## 2017-02-17 PROCEDURE — G0202 SCR MAMMO BI INCL CAD: HCPCS

## 2017-03-06 RX ORDER — GLIMEPIRIDE 2 MG/1
TABLET ORAL
Qty: 90 TABLET | Refills: 3 | Status: SHIPPED | OUTPATIENT
Start: 2017-03-06 | End: 2018-06-04 | Stop reason: SDUPTHER

## 2017-03-07 LAB
25(OH)D3+25(OH)D2 SERPL-MCNC: 29.8 NG/ML
ALBUMIN SERPL-MCNC: 4.6 G/DL (ref 3.5–5)
ALBUMIN/CREAT UR: 30.2 MG/G CREAT (ref 0–30)
ALBUMIN/GLOB SERPL: 1.6 G/DL (ref 1–2)
ALP SERPL-CCNC: 93 U/L (ref 38–126)
ALT SERPL-CCNC: 24 U/L (ref 13–69)
AST SERPL-CCNC: 17 U/L (ref 15–46)
BASOPHILS # BLD AUTO: 0.05 10*3/MM3 (ref 0–0.2)
BASOPHILS NFR BLD AUTO: 0.6 % (ref 0–2.5)
BILIRUB SERPL-MCNC: 0.8 MG/DL (ref 0.2–1.3)
BUN SERPL-MCNC: 19 MG/DL (ref 7–20)
BUN/CREAT SERPL: 21.1 (ref 7.1–23.5)
CALCIUM SERPL-MCNC: 9.9 MG/DL (ref 8.4–10.2)
CHLORIDE SERPL-SCNC: 99 MMOL/L (ref 98–107)
CHOLEST SERPL-MCNC: 182 MG/DL (ref 0–199)
CO2 SERPL-SCNC: 32 MMOL/L (ref 26–30)
CREAT SERPL-MCNC: 0.9 MG/DL (ref 0.6–1.3)
CREAT UR-MCNC: 60.6 MG/DL
EOSINOPHIL # BLD AUTO: 0.22 10*3/MM3 (ref 0–0.7)
EOSINOPHIL NFR BLD AUTO: 2.6 % (ref 0–7)
ERYTHROCYTE [DISTWIDTH] IN BLOOD BY AUTOMATED COUNT: 12.9 % (ref 11.5–14.5)
GLOBULIN SER CALC-MCNC: 2.9 GM/DL
GLUCOSE SERPL-MCNC: 135 MG/DL (ref 74–98)
HBA1C MFR BLD: 6.3 %
HCT VFR BLD AUTO: 46.2 % (ref 37–47)
HDLC SERPL-MCNC: 62 MG/DL (ref 40–60)
HGB BLD-MCNC: 14.7 G/DL (ref 12–16)
IMM GRANULOCYTES # BLD: 0.05 10*3/MM3 (ref 0–0.06)
IMM GRANULOCYTES NFR BLD: 0.6 % (ref 0–0.6)
LDLC SERPL CALC-MCNC: 69 MG/DL (ref 0–99)
LYMPHOCYTES # BLD AUTO: 2.39 10*3/MM3 (ref 0.6–3.4)
LYMPHOCYTES NFR BLD AUTO: 28.3 % (ref 10–50)
MCH RBC QN AUTO: 28.8 PG (ref 27–31)
MCHC RBC AUTO-ENTMCNC: 31.8 G/DL (ref 30–37)
MCV RBC AUTO: 90.6 FL (ref 81–99)
MICROALBUMIN UR-MCNC: 18.3 UG/ML
MONOCYTES # BLD AUTO: 0.65 10*3/MM3 (ref 0–0.9)
MONOCYTES NFR BLD AUTO: 7.7 % (ref 0–12)
NEUTROPHILS # BLD AUTO: 5.09 10*3/MM3 (ref 2–6.9)
NEUTROPHILS NFR BLD AUTO: 60.2 % (ref 37–80)
NRBC BLD AUTO-RTO: 0 /100 WBC (ref 0–0)
PLATELET # BLD AUTO: 223 10*3/MM3 (ref 130–400)
POTASSIUM SERPL-SCNC: 4.1 MMOL/L (ref 3.5–5.1)
PROT SERPL-MCNC: 7.5 G/DL (ref 6.3–8.2)
RBC # BLD AUTO: 5.1 10*6/MM3 (ref 4.2–5.4)
SODIUM SERPL-SCNC: 141 MMOL/L (ref 137–145)
TRIGL SERPL-MCNC: 257 MG/DL
VLDLC SERPL CALC-MCNC: 51.4 MG/DL
WBC # BLD AUTO: 8.45 10*3/MM3 (ref 4.8–10.8)

## 2017-03-08 ENCOUNTER — OFFICE VISIT (OUTPATIENT)
Dept: FAMILY MEDICINE CLINIC | Facility: CLINIC | Age: 67
End: 2017-03-08

## 2017-03-08 VITALS
HEART RATE: 68 BPM | SYSTOLIC BLOOD PRESSURE: 142 MMHG | DIASTOLIC BLOOD PRESSURE: 77 MMHG | RESPIRATION RATE: 16 BRPM | TEMPERATURE: 97.9 F | WEIGHT: 210 LBS | OXYGEN SATURATION: 94 % | BODY MASS INDEX: 41.01 KG/M2

## 2017-03-08 DIAGNOSIS — Z00.00 ROUTINE GENERAL MEDICAL EXAMINATION AT A HEALTH CARE FACILITY: Primary | ICD-10-CM

## 2017-03-08 PROCEDURE — 99396 PREV VISIT EST AGE 40-64: CPT | Performed by: INTERNAL MEDICINE

## 2017-03-08 RX ORDER — MOEXIPRIL HYDROCHLORIDE AND HYDROCHLOROTHIAZIDE 15; 12.5 MG/1; MG/1
1 TABLET, FILM COATED ORAL DAILY
Qty: 90 TABLET | Refills: 3 | Status: SHIPPED | OUTPATIENT
Start: 2017-03-08 | End: 2018-03-19 | Stop reason: ALTCHOICE

## 2017-03-08 RX ORDER — ATENOLOL 25 MG/1
25 TABLET ORAL DAILY
Qty: 90 TABLET | Refills: 3 | Status: SHIPPED | OUTPATIENT
Start: 2017-03-08 | End: 2018-06-04 | Stop reason: SDUPTHER

## 2017-03-08 NOTE — PROGRESS NOTES
Poli Mcgraw is a 66 y.o. female.     Chief Complaint   Patient presents with   • Annual Exam       History of Present Illness   Patient is here for a physical and needs paperwork to be completed  . she had a colonoscopy 2016 , mammogram 1/2017 and pap 2 years with dr dutton ,     Review of Systems   Constitutional: Negative for appetite change, fatigue and fever.   HENT: Negative for congestion, ear discharge, ear pain, sinus pressure and sore throat.    Eyes: Negative for pain and discharge.   Respiratory: Negative for cough, chest tightness, shortness of breath and wheezing.    Cardiovascular: Negative for chest pain, palpitations and leg swelling.   Gastrointestinal: Negative for abdominal pain, blood in stool, constipation, diarrhea and nausea.   Endocrine: Negative for cold intolerance and heat intolerance.   Genitourinary: Negative for dysuria, flank pain and frequency.   Musculoskeletal: Negative for back pain and joint swelling.   Skin: Negative for color change.   Allergic/Immunologic: Negative for environmental allergies and food allergies.   Neurological: Negative for dizziness, weakness, numbness and headaches.   Hematological: Negative for adenopathy. Does not bruise/bleed easily.   Psychiatric/Behavioral: Negative for behavioral problems and dysphoric mood. The patient is not nervous/anxious.        Past Medical History   Diagnosis Date   • Acute maxillary sinusitis    • Depression    • Esophagitis, reflux    • Fibromyositis    • H/O colonoscopy    • Herpes zoster    • History of mammogram    • Migraine headache    • Osteoporosis    • Pap smear for cervical cancer screening    • Restless legs syndrome    • Screening      DEXA body composition study   • Transient cerebral ischemia    • Vertigo        Past Surgical History   Procedure Laterality Date   • Appendectomy     • Shoulder arthroscopy Bilateral    • Knee surgery Right    • Laparoscopy diagnostic / biopsy / aspiration / lysis Right     • Colonoscopy  10/2016     Dr. Colmenares   • Endoscopy  10/2016     Dr. Colmenares       Family History   Problem Relation Age of Onset   • Heart disease Sister    • Arthritis Other    • Diabetes Other    • Cancer Other      lung   • Cancer Father      prostate,lung,colon        reports that she has never smoked. She has never used smokeless tobacco. She reports that she does not drink alcohol or use illicit drugs.    Allergies   Allergen Reactions   • Atorvastatin    • Fenofibrate    • Fluoxetine    • Gemfibrozil    • Hydroxychloroquine    • Metformin    • Nsaids    • Penicillins    • Pravastatin    • Sulfamethoxazole-Trimethoprim            Current Outpatient Prescriptions:   •  aspirin (SANJU ASPIRIN EC LOW DOSE) 81 MG EC tablet, Take 1 tablet by mouth daily., Disp: , Rfl:   •  atenolol (TENORMIN) 25 MG tablet, Take 1 tablet by mouth Daily., Disp: 90 tablet, Rfl: 3  •  Biotin 1 MG capsule, Take  by mouth., Disp: , Rfl:   •  carbidopa-levodopa CR (SINEMET CR)  MG per CR tablet, Take 1 tablet by mouth every night., Disp: , Rfl:   •  citalopram (CeleXA) 40 MG tablet, Take 1 tablet by mouth daily., Disp: , Rfl:   •  clobetasol (TEMOVATE) 0.05 % ointment, , Disp: , Rfl:   •  COCONUT OIL PO, Take  by mouth., Disp: , Rfl:   •  fluticasone (FLONASE) 50 MCG/ACT nasal spray, 1 spray into each nostril 2 (two) times a day., Disp: , Rfl:   •  gabapentin (NEURONTIN) 100 MG capsule, TAKE 1 CAPSULE 3 TIMES A   DAY, Disp: 270 capsule, Rfl: 2  •  glimepiride (AMARYL) 2 MG tablet, TAKE 1 TABLET DAILY WITH   BREAKFAST, Disp: 90 tablet, Rfl: 3  •  imiquimod (ALDARA) 5 % cream, Apply  topically., Disp: , Rfl:   •  meclizine (ANTIVERT) 25 MG tablet, Take 1 tablet by mouth 3 (three) times a day as needed for dizziness., Disp: 90 tablet, Rfl: 0  •  MethylPREDNISolone (MEDROL, PAM,) 4 MG tablet, Take as directed on package instructions., Disp: 21 tablet, Rfl: 0  •  moexipril-hydrochlorothiazide (UNIRETIC) 15-12.5 MG tablet per tablet, Take  1 tablet by mouth Daily., Disp: 90 tablet, Rfl: 3  •  Multiple Vitamins-Minerals (KANNAN BASIC) tablet, Take  by mouth., Disp: , Rfl:   •  Omega-3 Krill Oil 1000 MG capsule, Take  by mouth., Disp: , Rfl:   •  pantoprazole (PROTONIX) 40 MG EC tablet, TAKE 1 TABLET DAILY, Disp: 90 tablet, Rfl: 3  •  rosuvastatin (CRESTOR) 10 MG tablet, Take 1 tablet by mouth Every Night., Disp: 90 tablet, Rfl: 3  •  rosuvastatin (CRESTOR) 10 MG tablet, TAKE ONE TABLET BY MOUTH EVERY NIGHT AT BEDTIME, Disp: 30 tablet, Rfl: 0  •  SOOLANTRA 1 % cream, , Disp: , Rfl:   •  triamcinolone (KENALOG) 0.1 % cream, , Disp: , Rfl:   •  vitamin B-12 (CYANOCOBALAMIN) 1000 MCG tablet, Take 1,000 mcg by mouth daily., Disp: , Rfl:   •  vitamin D (ERGOCALCIFEROL) 18821 UNITS capsule capsule, TAKE ONE CAPSULE BY MOUTH ONCE WEEKLY, Disp: 8 capsule, Rfl: 0      Objective   Blood pressure 142/77, pulse 68, temperature 97.9 °F (36.6 °C), temperature source Oral, resp. rate 16, weight 210 lb (95.3 kg), SpO2 94 %.    Physical Exam   Constitutional: She is oriented to person, place, and time. She appears well-developed and well-nourished. No distress.   HENT:   Head: Normocephalic and atraumatic.   Right Ear: External ear normal.   Left Ear: External ear normal.   Nose: Nose normal.   Mouth/Throat: Oropharynx is clear and moist.   Eyes: Conjunctivae and EOM are normal. Pupils are equal, round, and reactive to light.   Neck: Neck supple. No thyromegaly present.   Cardiovascular: Normal rate, regular rhythm and normal heart sounds.    Pulmonary/Chest: Effort normal and breath sounds normal. No respiratory distress.   Abdominal: Soft. Bowel sounds are normal. She exhibits no distension. There is no tenderness. There is no rebound.   Musculoskeletal: Normal range of motion. She exhibits no edema.   Lymphadenopathy:     She has no cervical adenopathy.   Neurological: She is alert and oriented to person, place, and time.   No gross motor or sensory deficits   Skin:  Skin is warm. She is not diaphoretic.   Psychiatric: She has a normal mood and affect.   Nursing note and vitals reviewed.        Results for orders placed or performed in visit on 03/06/17   Comprehensive Metabolic Panel   Result Value Ref Range    Glucose 135 (H) 74 - 98 mg/dL    BUN 19 7 - 20 mg/dL    Creatinine 0.90 0.60 - 1.30 mg/dL    eGFR Non African Am 63 >60 mL/min/1.73    eGFR African Am 76 >60 mL/min/1.73    BUN/Creatinine Ratio 21.1 7.1 - 23.5    Sodium 141 137 - 145 mmol/L    Potassium 4.1 3.5 - 5.1 mmol/L    Chloride 99 98 - 107 mmol/L    Total CO2 32.0 (H) 26.0 - 30.0 mmol/L    Calcium 9.9 8.4 - 10.2 mg/dL    Total Protein 7.5 6.3 - 8.2 g/dL    Albumin 4.60 3.50 - 5.00 g/dL    Globulin 2.9 gm/dL    A/G Ratio 1.6 1.0 - 2.0 g/dL    Total Bilirubin 0.8 0.2 - 1.3 mg/dL    Alkaline Phosphatase 93 38 - 126 U/L    AST (SGOT) 17 15 - 46 U/L    ALT (SGPT) 24 13 - 69 U/L   Lipid Panel   Result Value Ref Range    Total Cholesterol 182 0 - 199 mg/dL    Triglycerides 257 (H) <150 mg/dL    HDL Cholesterol 62 (H) 40 - 60 mg/dL    VLDL Cholesterol 51.4 mg/dL    LDL Cholesterol  69 0 - 99 mg/dL   Microalbumin / Creatinine Urine Ratio   Result Value Ref Range    Creatinine, Urine 60.6 Not Estab. mg/dL    Microalbumin, Urine 18.3 Not Estab. ug/mL    Microalbumin/Creatinine Ratio Urine 30.2 (H) 0.0 - 30.0 mg/g creat   Hemoglobin A1c   Result Value Ref Range    Hemoglobin A1C 6.30 %   Vitamin D 25 Hydroxy   Result Value Ref Range    25 Hydroxy, Vitamin D 29.8 ng/mL   CBC & Differential   Result Value Ref Range    WBC 8.45 4.80 - 10.80 10*3/mm3    RBC 5.10 4.20 - 5.40 10*6/mm3    Hemoglobin 14.7 12.0 - 16.0 g/dL    Hematocrit 46.2 37.0 - 47.0 %    MCV 90.6 81.0 - 99.0 fL    MCH 28.8 27.0 - 31.0 pg    MCHC 31.8 30.0 - 37.0 g/dL    RDW 12.9 11.5 - 14.5 %    Platelets 223 130 - 400 10*3/mm3    Neutrophil Rel % 60.2 37.0 - 80.0 %    Lymphocyte Rel % 28.3 10.0 - 50.0 %    Monocyte Rel % 7.7 0.0 - 12.0 %    Eosinophil Rel % 2.6  0.0 - 7.0 %    Basophil Rel % 0.6 0.0 - 2.5 %    Neutrophils Absolute 5.09 2.00 - 6.90 10*3/mm3    Lymphocytes Absolute 2.39 0.60 - 3.40 10*3/mm3    Monocytes Absolute 0.65 0.00 - 0.90 10*3/mm3    Eosinophils Absolute 0.22 0.00 - 0.70 10*3/mm3    Basophils Absolute 0.05 0.00 - 0.20 10*3/mm3    Immature Granulocyte Rel % 0.6 0.0 - 0.6 %    Immature Grans Absolute 0.05 0.00 - 0.06 10*3/mm3    nRBC 0.0 0.0 - 0.0 /100 WBC         Assessment/Plan   Missy was seen today for annual exam.    Diagnoses and all orders for this visit:    Routine general medical examination at a health care facility    Other orders  -     moexipril-hydrochlorothiazide (UNIRETIC) 15-12.5 MG tablet per tablet; Take 1 tablet by mouth Daily.  -     atenolol (TENORMIN) 25 MG tablet; Take 1 tablet by mouth Daily.        1.physical: Physical exam conducted today, reviewed  lab work,   Impression: healthy adult Patient. Currently, patient eats a healthy diet. Screening lab work includes glucose, lipid profile and 25-hydroxyvitamin D. The risks and benefits of immunizations were discussed and immunizations are up to date. Advice and education were given regarding nutrition and aerobic exercise. Patient discussion: discussed with the patient.  Patient counseled on maintaining a healthy weight.  Physical activity and nutrition         Emely Sheldon MD

## 2017-03-17 ENCOUNTER — TELEPHONE (OUTPATIENT)
Dept: SURGERY | Facility: CLINIC | Age: 67
End: 2017-03-17

## 2017-03-17 NOTE — TELEPHONE ENCOUNTER
----- Message from Antony Coello sent at 3/17/2017  1:15 PM EDT -----    I contacted patient and she did not want to reschedule at this time.

## 2017-05-16 ENCOUNTER — OFFICE VISIT (OUTPATIENT)
Dept: FAMILY MEDICINE CLINIC | Facility: CLINIC | Age: 67
End: 2017-05-16

## 2017-05-16 VITALS
TEMPERATURE: 97.9 F | WEIGHT: 209 LBS | DIASTOLIC BLOOD PRESSURE: 71 MMHG | HEART RATE: 68 BPM | SYSTOLIC BLOOD PRESSURE: 143 MMHG | RESPIRATION RATE: 16 BRPM | BODY MASS INDEX: 40.82 KG/M2 | OXYGEN SATURATION: 97 %

## 2017-05-16 DIAGNOSIS — R42 DIZZINESS AND GIDDINESS: ICD-10-CM

## 2017-05-16 DIAGNOSIS — I10 BENIGN ESSENTIAL HYPERTENSION: Primary | ICD-10-CM

## 2017-05-16 DIAGNOSIS — R51.9 PERSISTENT HEADACHES: ICD-10-CM

## 2017-05-16 DIAGNOSIS — R06.02 SHORTNESS OF BREATH: ICD-10-CM

## 2017-05-16 PROCEDURE — 99214 OFFICE O/P EST MOD 30 MIN: CPT | Performed by: INTERNAL MEDICINE

## 2017-05-16 RX ORDER — TRAZODONE HYDROCHLORIDE 50 MG/1
TABLET ORAL
COMMUNITY
Start: 2017-05-04 | End: 2017-06-19 | Stop reason: HOSPADM

## 2017-05-16 RX ORDER — NAPROXEN SODIUM 220 MG
220 TABLET ORAL 2 TIMES DAILY PRN
COMMUNITY
End: 2020-02-05

## 2017-05-22 RX ORDER — CITALOPRAM 40 MG/1
40 TABLET ORAL DAILY
Qty: 90 TABLET | Refills: 3 | Status: SHIPPED | OUTPATIENT
Start: 2017-05-22 | End: 2018-06-04 | Stop reason: SDUPTHER

## 2017-05-30 ENCOUNTER — HOSPITAL ENCOUNTER (OUTPATIENT)
Dept: MRI IMAGING | Facility: HOSPITAL | Age: 67
Discharge: HOME | End: 2017-05-30
Admitting: INTERNAL MEDICINE

## 2017-05-30 DIAGNOSIS — R51.9 PERSISTENT HEADACHES: ICD-10-CM

## 2017-05-30 DIAGNOSIS — R42 DIZZINESS AND GIDDINESS: ICD-10-CM

## 2017-05-30 PROCEDURE — 70551 MRI BRAIN STEM W/O DYE: CPT

## 2017-06-02 ENCOUNTER — CONSULT (OUTPATIENT)
Dept: CARDIOLOGY | Facility: CLINIC | Age: 67
End: 2017-06-02

## 2017-06-02 VITALS
HEIGHT: 60 IN | DIASTOLIC BLOOD PRESSURE: 60 MMHG | SYSTOLIC BLOOD PRESSURE: 124 MMHG | OXYGEN SATURATION: 97 % | TEMPERATURE: 98 F | HEART RATE: 79 BPM | RESPIRATION RATE: 16 BRPM | WEIGHT: 209 LBS | BODY MASS INDEX: 41.03 KG/M2

## 2017-06-02 DIAGNOSIS — R06.02 SHORTNESS OF BREATH: ICD-10-CM

## 2017-06-02 DIAGNOSIS — I10 BENIGN ESSENTIAL HYPERTENSION: Primary | ICD-10-CM

## 2017-06-02 DIAGNOSIS — I20.8 ANGINAL EQUIVALENT (HCC): ICD-10-CM

## 2017-06-02 PROBLEM — I20.89 ANGINAL EQUIVALENT: Status: ACTIVE | Noted: 2017-06-02

## 2017-06-02 PROCEDURE — 93000 ELECTROCARDIOGRAM COMPLETE: CPT | Performed by: INTERNAL MEDICINE

## 2017-06-02 PROCEDURE — 99204 OFFICE O/P NEW MOD 45 MIN: CPT | Performed by: INTERNAL MEDICINE

## 2017-06-02 NOTE — PROGRESS NOTES
"    Subjective:     Encounter Date:06/02/2017      Patient ID: Missy Mcgraw is a 66 y.o. female.    Chief Complaint:Shortness of breath  HPI  This is a 66-year-old female patient who is been experiencing gradually worsening shortness of breath over the last 6 months.  Her shortness of breath occurs primarily with activity.  She indicates that if she lifts objects or walks for \"long distances\" she gets short of breath.  She indicates that she also experiences shortness of breath when doing moderate housework but not typically with activities of daily living such as bathing or dressing.  The shortness of breath is typically improved with rest over a few minutes.  There is no associated chest discomfort.  She denies exertional chest arm neck jaw shoulder or back discomfort.  She has no orthopnea or PND but has occasional swelling of her feet and ankles particularly if she's been on her feet for prolonged periods of time.  She also reports having dizziness for the last one to 2 years.  Her dizziness is essentially vertigo.  She describes a sensation of the room spinning which is worse if she tilts her head back and looks upward or if she turns her head in a certain position particularly with rapid motion of the head.  It also tends to occur if she changes posture rapidly.  She has been referred for vestibular rehabilitation which has shown some improvement.  She continues to use her head positioning exercises on occasion.  She reports that this does help.  She has no tinnitus but has noticed some decrease in her overall hearing.  She has had no syncopal episodes.  She has no personal history of myocardial infarction or coronary revascularization.  She has never done a stress test.  She thinks that she would be able to exercise on the treadmill for purposes of stress testing.  Her 12-lead electrocardiogram shows no ischemic ST-T wave changes or injury current.  There is no voltage criteria for left ventricular " hypertrophy or evidence of preexcitation.  She has a history of hypertension diabetes and hypercholesterolemia all of which are appropriately addressed.  She is significantly obese with a body mass index of 41.  She is a lifelong nonsmoker.  Her family history is negative for premature coronary disease.  The following portions of the patient's history were reviewed and updated as appropriate: allergies, current medications, past family history, past medical history, past social history, past surgical history and problem  Review of Systems   Constitution: Positive for diaphoresis and weakness. Negative for chills, decreased appetite, fever, malaise/fatigue, night sweats, weight gain and weight loss.   HENT: Positive for headaches. Negative for ear discharge, hearing loss, hoarse voice and nosebleeds.    Eyes: Negative for discharge, double vision, pain and photophobia.   Cardiovascular: Positive for dyspnea on exertion. Negative for chest pain, claudication, cyanosis, irregular heartbeat, leg swelling, near-syncope, orthopnea, palpitations, paroxysmal nocturnal dyspnea and syncope.   Respiratory: Positive for shortness of breath. Negative for cough, hemoptysis, sputum production and wheezing.    Endocrine: Negative for cold intolerance, heat intolerance, polydipsia, polyphagia and polyuria.   Hematologic/Lymphatic: Negative for adenopathy and bleeding problem. Does not bruise/bleed easily.   Skin: Negative for color change, flushing, itching and rash.   Musculoskeletal: Positive for back pain. Negative for muscle cramps, muscle weakness, myalgias, neck pain and stiffness.   Gastrointestinal: Positive for nausea. Negative for abdominal pain, bowel incontinence, diarrhea, hematemesis, hematochezia and vomiting.   Genitourinary: Negative for bladder incontinence, dysuria, frequency and nocturia.   Neurological: Positive for dizziness, focal weakness, light-headedness, loss of balance and vertigo. Negative for numbness,  paresthesias and seizures.   Psychiatric/Behavioral: Negative for altered mental status, hallucinations, memory loss and suicidal ideas.   Allergic/Immunologic: Negative for HIV exposure, hives and persistent infections.       Current Outpatient Prescriptions:   •  aspirin (SANJU ASPIRIN EC LOW DOSE) 81 MG EC tablet, Take 1 tablet by mouth daily., Disp: , Rfl:   •  atenolol (TENORMIN) 25 MG tablet, Take 1 tablet by mouth Daily., Disp: 90 tablet, Rfl: 3  •  carbidopa-levodopa CR (SINEMET CR)  MG per CR tablet, Take 1 tablet by mouth every night., Disp: , Rfl:   •  citalopram (CeleXA) 40 MG tablet, Take 1 tablet by mouth Daily., Disp: 90 tablet, Rfl: 3  •  clobetasol (TEMOVATE) 0.05 % ointment, , Disp: , Rfl:   •  COCONUT OIL PO, Take  by mouth., Disp: , Rfl:   •  fluticasone (FLONASE) 50 MCG/ACT nasal spray, 1 spray into each nostril 2 (two) times a day., Disp: , Rfl:   •  gabapentin (NEURONTIN) 100 MG capsule, TAKE 1 CAPSULE 3 TIMES A   DAY (Patient taking differently: TAKE 1 CAPSULE 3 TIMES A   DAY AS NEEDED), Disp: 270 capsule, Rfl: 2  •  glimepiride (AMARYL) 2 MG tablet, TAKE 1 TABLET DAILY WITH   BREAKFAST, Disp: 90 tablet, Rfl: 3  •  imiquimod (ALDARA) 5 % cream, Apply  topically., Disp: , Rfl:   •  meclizine (ANTIVERT) 25 MG tablet, Take 1 tablet by mouth 3 (three) times a day as needed for dizziness., Disp: 90 tablet, Rfl: 0  •  moexipril-hydrochlorothiazide (UNIRETIC) 15-12.5 MG tablet per tablet, Take 1 tablet by mouth Daily., Disp: 90 tablet, Rfl: 3  •  Multiple Vitamins-Minerals (KANNAN BASIC) tablet, Take  by mouth., Disp: , Rfl:   •  naproxen sodium (ALEVE) 220 MG tablet, Take 220 mg by mouth 2 (Two) Times a Day As Needed for Mild Pain (1-3)., Disp: , Rfl:   •  Omega-3 Krill Oil 1000 MG capsule, Take  by mouth., Disp: , Rfl:   •  pantoprazole (PROTONIX) 40 MG EC tablet, TAKE 1 TABLET DAILY, Disp: 90 tablet, Rfl: 3  •  rosuvastatin (CRESTOR) 10 MG tablet, Take 1 tablet by mouth Every Night., Disp: 90  "tablet, Rfl: 3  •  SOOLANTRA 1 % cream, , Disp: , Rfl:   •  traZODone (DESYREL) 50 MG tablet, , Disp: , Rfl:   •  triamcinolone (KENALOG) 0.1 % cream, , Disp: , Rfl:   •  vitamin B-12 (CYANOCOBALAMIN) 1000 MCG tablet, Take 1,000 mcg by mouth daily., Disp: , Rfl:   •  vitamin D (ERGOCALCIFEROL) 66876 UNITS capsule capsule, TAKE ONE CAPSULE BY MOUTH ONCE WEEKLY, Disp: 8 capsule, Rfl: 0     Objective:     Physical Exam   Constitutional: She is oriented to person, place, and time. She appears well-developed and well-nourished.   HENT:   Head: Normocephalic and atraumatic.   Mouth/Throat: Oropharynx is clear and moist.   Eyes: Conjunctivae and EOM are normal. Pupils are equal, round, and reactive to light. No scleral icterus.   Neck: Normal range of motion. Neck supple. No JVD present. No tracheal deviation present. No thyromegaly present.   Cardiovascular: Normal rate, regular rhythm, S1 normal, S2 normal, normal heart sounds, intact distal pulses and normal pulses.  PMI is not displaced.  Exam reveals no gallop and no friction rub.    No murmur heard.  Pulmonary/Chest: Effort normal and breath sounds normal. No respiratory distress. She has no wheezes. She has no rales.   Abdominal: Soft. Bowel sounds are normal. She exhibits no distension and no mass. There is no tenderness. There is no rebound and no guarding.   Musculoskeletal: Normal range of motion. She exhibits no edema or deformity.   Neurological: She is alert and oriented to person, place, and time. She displays normal reflexes. No cranial nerve deficit. Coordination normal.   Skin: Skin is warm and dry. No rash noted. No erythema.   Psychiatric: She has a normal mood and affect. Her behavior is normal. Thought content normal.     Blood pressure 124/60, pulse 79, temperature 98 °F (36.7 °C), resp. rate 16, height 60\" (152.4 cm), weight 209 lb (94.8 kg), SpO2 97 %.   Lab Review:       Assessment:         1. Benign essential hypertension  Excellent blood " pressure control.  - Adult Transthoracic Echo Complete    2. Shortness of breath  This is multifactorial in etiology.  Some is certainly related to her obesity and aerobic deconditioning.  She also has a history of hypertension and there may be an element of unrecognized hypertensive cardiac disease.  This could also represent an angina equivalent.  - Treadmill Stress Test  - Adult Transthoracic Echo Complete    3. Anginal equivalent  The patient's development of shortness of breath with activity which is relieved with rest and has been progressive over a time interval is highly suspicious for an angina equivalent.  In addition the patient has multiple risk factors for coronary artery disease.  She has never had a stress test.  - Treadmill Stress Test  - Adult Transthoracic Echo Complete    ECG 12 Lead  Date/Time: 6/2/2017 8:55 AM  Performed by: KYRIE RENE  Authorized by: KYRIE RENE   Rhythm: sinus rhythm  Rate: normal  QRS axis: normal  Clinical impression: normal ECG             Plan:       I have recommended a treadmill exercise stress test.  She has been counseled to hold her beta blocker the day before the stress test and the morning of the stress test.  She has been counseled regarding the importance of diet exercise and weight management.  I have also recommended an echocardiogram.  No change in her medication therapy is warranted at this time.  Further recommendations will be predicated on the outcome of her outpatient testing.  Her dizziness seems to represent classic positional vertigo.  She is instructed to continue using her [place] maneuvers as they were told to her in vestibular rehabilitation.  She is instructed that if her symptoms worsen to contact her ENT specialist.  The symptoms do not sound to be suspicious for a cardiac etiology.

## 2017-06-05 ENCOUNTER — TELEPHONE (OUTPATIENT)
Dept: CARDIOLOGY | Facility: CLINIC | Age: 67
End: 2017-06-05

## 2017-06-05 DIAGNOSIS — R07.9 CHEST PAIN IN ADULT: Primary | ICD-10-CM

## 2017-06-05 DIAGNOSIS — R06.00 DYSPNEA, UNSPECIFIED TYPE: ICD-10-CM

## 2017-06-05 NOTE — TELEPHONE ENCOUNTER
[place] [facility] [place] and Leg pain and is scheduled for a stress test on 6-12-17 here in the office.    What test would be best for her?  Mehgan Olivier MA

## 2017-06-07 ENCOUNTER — TELEPHONE (OUTPATIENT)
Dept: FAMILY MEDICINE CLINIC | Facility: CLINIC | Age: 67
End: 2017-06-07

## 2017-06-07 NOTE — TELEPHONE ENCOUNTER
Left message for patient to return call.     ----- Message from Lo Garcia sent at 6/6/2017  2:00 PM EDT -----  Contact: PATIENT  PATIENT CALLED REQUESTING A CALL BACK IN REGARDS TO HER BIOMETRIC SCREENING. PATIENTS PHONE NUMBER [phone]

## 2017-06-08 ENCOUNTER — PATIENT MESSAGE (OUTPATIENT)
Dept: FAMILY MEDICINE CLINIC | Facility: CLINIC | Age: 67
End: 2017-06-08

## 2017-06-12 LAB
BH CV ECHO MEAS - % IVS THICK: 30 %
BH CV ECHO MEAS - % LVPW THICK: 23.8 %
BH CV ECHO MEAS - AO MAX PG (FULL): 3.1 MMHG
BH CV ECHO MEAS - AO MAX PG: 6.6 MMHG
BH CV ECHO MEAS - AO MEAN PG (FULL): 2 MMHG
BH CV ECHO MEAS - AO MEAN PG: 4 MMHG
BH CV ECHO MEAS - AO ROOT AREA (BSA CORRECTED): 1.3
BH CV ECHO MEAS - AO ROOT AREA: 4.9 CM^2
BH CV ECHO MEAS - AO ROOT DIAM: 2.5 CM
BH CV ECHO MEAS - AO V2 MAX: 128.5 CM/SEC
BH CV ECHO MEAS - AO V2 MEAN: 93.9 CM/SEC
BH CV ECHO MEAS - AO V2 VTI: 24.6 CM
BH CV ECHO MEAS - AVA(I,A): 2.5 CM^2
BH CV ECHO MEAS - AVA(I,D): 2.5 CM^2
BH CV ECHO MEAS - AVA(V,A): 2.3 CM^2
BH CV ECHO MEAS - AVA(V,D): 2.3 CM^2
BH CV ECHO MEAS - BSA(HAYCOCK): 2.1 M^2
BH CV ECHO MEAS - BSA: 1.9 M^2
BH CV ECHO MEAS - BZI_BMI: 40.8 KILOGRAMS/M^2
BH CV ECHO MEAS - BZI_METRIC_HEIGHT: 152.4 CM
BH CV ECHO MEAS - BZI_METRIC_WEIGHT: 94.8 KG
BH CV ECHO MEAS - CONTRAST EF 4CH: 57.7 ML/M^2
BH CV ECHO MEAS - EDV(CUBED): 74.1 ML
BH CV ECHO MEAS - EDV(MOD-SP4): 156 ML
BH CV ECHO MEAS - EDV(TEICH): 78.6 ML
BH CV ECHO MEAS - EF(CUBED): 70.4 %
BH CV ECHO MEAS - EF(MOD-SP4): 57.7 %
BH CV ECHO MEAS - EF(TEICH): 62.4 %
BH CV ECHO MEAS - ESV(CUBED): 22 ML
BH CV ECHO MEAS - ESV(MOD-SP4): 66 ML
BH CV ECHO MEAS - ESV(TEICH): 29.6 ML
BH CV ECHO MEAS - FS: 33.3 %
BH CV ECHO MEAS - IVS/LVPW: 1.4
BH CV ECHO MEAS - IVSD: 1.2 CM
BH CV ECHO MEAS - IVSS: 2 CM
BH CV ECHO MEAS - LA DIMENSION: 3.6 CM
BH CV ECHO MEAS - LA/AO: 1.4
BH CV ECHO MEAS - LV DIASTOLIC VOL/BSA (35-75): 82 ML/M^2
BH CV ECHO MEAS - LV MASS(C)D: 194.8 GRAMS
BH CV ECHO MEAS - LV MASS(C)DI: 102.4 GRAMS/M^2
BH CV ECHO MEAS - LV MASS(C)S: 166.6 GRAMS
BH CV ECHO MEAS - LV MASS(C)SI: 87.6 GRAMS/M^2
BH CV ECHO MEAS - LV MAX PG: 3.5 MMHG
BH CV ECHO MEAS - LV MEAN PG: 2 MMHG
BH CV ECHO MEAS - LV SYSTOLIC VOL/BSA (12-30): 34.7 ML/M^2
BH CV ECHO MEAS - LV V1 MAX: 94 CM/SEC
BH CV ECHO MEAS - LV V1 MEAN: 54.3 CM/SEC
BH CV ECHO MEAS - LV V1 VTI: 19.8 CM
BH CV ECHO MEAS - LVIDD: 4.2 CM
BH CV ECHO MEAS - LVIDS: 2.8 CM
BH CV ECHO MEAS - LVLD AP4: 8.8 CM
BH CV ECHO MEAS - LVLS AP4: 7.2 CM
BH CV ECHO MEAS - LVOT AREA (M): 3.1 CM^2
BH CV ECHO MEAS - LVOT AREA: 3.1 CM^2
BH CV ECHO MEAS - LVOT DIAM: 2 CM
BH CV ECHO MEAS - LVPWD: 1.1 CM
BH CV ECHO MEAS - LVPWS: 1.3 CM
BH CV ECHO MEAS - MV A MAX VEL: 67.4 CM/SEC
BH CV ECHO MEAS - MV DEC SLOPE: 387 CM/SEC^2
BH CV ECHO MEAS - MV DEC TIME: 0.2 SEC
BH CV ECHO MEAS - MV E MAX VEL: 76.3 CM/SEC
BH CV ECHO MEAS - MV E/A: 1.1
BH CV ECHO MEAS - MV P1/2T MAX VEL: 76.3 CM/SEC
BH CV ECHO MEAS - MV P1/2T: 57.7 MSEC
BH CV ECHO MEAS - MVA P1/2T LCG: 2.9 CM^2
BH CV ECHO MEAS - MVA(P1/2T): 3.8 CM^2
BH CV ECHO MEAS - PA MAX PG: 2.6 MMHG
BH CV ECHO MEAS - PA V2 MAX: 81.1 CM/SEC
BH CV ECHO MEAS - RAP SYSTOLE: 10 MMHG
BH CV ECHO MEAS - RVSP: 23.1 MMHG
BH CV ECHO MEAS - SI(AO): 63.5 ML/M^2
BH CV ECHO MEAS - SI(CUBED): 27.4 ML/M^2
BH CV ECHO MEAS - SI(LVOT): 32.7 ML/M^2
BH CV ECHO MEAS - SI(MOD-SP4): 47.3 ML/M^2
BH CV ECHO MEAS - SI(TEICH): 25.8 ML/M^2
BH CV ECHO MEAS - SV(AO): 120.8 ML
BH CV ECHO MEAS - SV(CUBED): 52.1 ML
BH CV ECHO MEAS - SV(LVOT): 62.2 ML
BH CV ECHO MEAS - SV(MOD-SP4): 90 ML
BH CV ECHO MEAS - SV(TEICH): 49 ML
BH CV ECHO MEAS - TR MAX VEL: 181 CM/SEC
BH CV ECHO MEAS - TV MAX PG: 1.8 MMHG
BH CV ECHO MEAS - TV V2 MAX: 67.4 CM/SEC
BH CV VAS BP RIGHT ARM: NORMAL MMHG
LEFT ATRIUM VOLUME INDEX: 28 ML/M2
LV EF 2D ECHO EST: 60 %

## 2017-06-15 ENCOUNTER — HOSPITAL ENCOUNTER (OUTPATIENT)
Dept: NUCLEAR MEDICINE | Facility: HOSPITAL | Age: 67
Discharge: HOME | End: 2017-06-15
Attending: INTERNAL MEDICINE

## 2017-06-15 PROCEDURE — A9500 TC99M SESTAMIBI: HCPCS | Performed by: INTERNAL MEDICINE

## 2017-06-15 PROCEDURE — 0 TECHNETIUM SESTAMIBI: Performed by: INTERNAL MEDICINE

## 2017-06-15 PROCEDURE — 93017 CV STRESS TEST TRACING ONLY: CPT

## 2017-06-15 PROCEDURE — 93018 CV STRESS TEST I&R ONLY: CPT | Performed by: INTERNAL MEDICINE

## 2017-06-15 PROCEDURE — 78452 HT MUSCLE IMAGE SPECT MULT: CPT

## 2017-06-15 PROCEDURE — 78452 HT MUSCLE IMAGE SPECT MULT: CPT | Performed by: INTERNAL MEDICINE

## 2017-06-15 PROCEDURE — 25010000002 REGADENOSON 0.4 MG/5ML SOLUTION: Performed by: INTERNAL MEDICINE

## 2017-06-15 RX ADMIN — Medication 1 DOSE: at 09:15

## 2017-06-15 RX ADMIN — REGADENOSON 0.4 MG: 0.08 INJECTION, SOLUTION INTRAVENOUS at 09:15

## 2017-06-16 ENCOUNTER — HOSPITAL ENCOUNTER (OUTPATIENT)
Dept: GENERAL RADIOLOGY | Facility: HOSPITAL | Age: 67
Discharge: HOME | End: 2017-06-16
Attending: INTERNAL MEDICINE

## 2017-06-16 ENCOUNTER — HOSPITAL ENCOUNTER (OUTPATIENT)
Dept: NUCLEAR MEDICINE | Facility: HOSPITAL | Age: 67
Discharge: HOME | End: 2017-06-16
Attending: INTERNAL MEDICINE

## 2017-06-16 PROCEDURE — 0 TECHNETIUM SESTAMIBI: Performed by: INTERNAL MEDICINE

## 2017-06-16 PROCEDURE — A9500 TC99M SESTAMIBI: HCPCS | Performed by: INTERNAL MEDICINE

## 2017-06-16 RX ADMIN — Medication 1 DOSE: at 06:15

## 2017-06-17 LAB
BH CV NUCLEAR PRIOR STUDY: 3
BH CV STRESS COMMENTS STAGE 1: NORMAL
BH CV STRESS DOSE REGADENOSON STAGE 1: 0.4
BH CV STRESS DURATION MIN STAGE 1: 0
BH CV STRESS DURATION SEC STAGE 1: 15
BH CV STRESS PROTOCOL 1: NORMAL
BH CV STRESS RECOVERY BP: NORMAL MMHG
BH CV STRESS RECOVERY HR: 110 BPM
BH CV STRESS STAGE 1: 1
LV EF NUC BP: 65 %
MAXIMAL PREDICTED HEART RATE: 156 BPM
PERCENT MAX PREDICTED HR: 78.85 %
STRESS BASELINE BP: NORMAL MMHG
STRESS BASELINE HR: 87 BPM
STRESS PERCENT HR: 93 %
STRESS POST PEAK BP: NORMAL MMHG
STRESS POST PEAK HR: 123 BPM
STRESS TARGET HR: 133 BPM

## 2017-06-19 ENCOUNTER — OFFICE VISIT (OUTPATIENT)
Dept: CARDIOLOGY | Facility: CLINIC | Age: 67
End: 2017-06-19

## 2017-06-19 VITALS
DIASTOLIC BLOOD PRESSURE: 56 MMHG | RESPIRATION RATE: 16 BRPM | OXYGEN SATURATION: 98 % | BODY MASS INDEX: 41.19 KG/M2 | HEIGHT: 60 IN | SYSTOLIC BLOOD PRESSURE: 104 MMHG | WEIGHT: 209.8 LBS | HEART RATE: 84 BPM

## 2017-06-19 DIAGNOSIS — R06.02 SHORTNESS OF BREATH: ICD-10-CM

## 2017-06-19 DIAGNOSIS — I10 BENIGN ESSENTIAL HYPERTENSION: Primary | ICD-10-CM

## 2017-06-19 PROCEDURE — 99213 OFFICE O/P EST LOW 20 MIN: CPT | Performed by: INTERNAL MEDICINE

## 2017-06-19 RX ORDER — PANTOPRAZOLE SODIUM 40 MG/1
TABLET, DELAYED RELEASE ORAL
Qty: 90 TABLET | Refills: 3 | Status: SHIPPED | OUTPATIENT
Start: 2017-06-19 | End: 2018-06-04 | Stop reason: SDUPTHER

## 2017-06-19 NOTE — PROGRESS NOTES
Subjective:     Encounter Date:06/19/2017      Patient ID: Missy Mcgraw is a 66 y.o. female.    Chief Complaint:Shortness of breath, peripheral edema dizziness and hypertension  HPI  This is a 66-year-old female patient who recently underwent a battery of outpatient testing to evaluate a multitude of chest symptoms.  Since her initial evaluation her chest discomfort has resolved spontaneously.  The patient had a vasodilator nuclear stress test which showed no evidence of ischemia or infarction.  The patient underwent an echocardiogram which showed a normal ejection fraction and no regional wall motion abnormalities.  There was no significant valvular abnormalities evidence of pericardial effusion or pulmonary hypertension.  The patient reports having occasional shortness of breath which is no worse than her initial visit.  She reports some swelling in her feet and ankles if she is been sitting for prolonged periods of time.  She has no dizziness palpitations or syncope.  There is no orthopnea or PND.  The remainder of her past medical history, family history and social history are unchanged compared to her last visit.  The following portions of the patient's history were reviewed and updated as appropriate: allergies, current medications, past family history, past medical history, past social history, past surgical history and problem  Review of Systems   Constitution: Negative for chills, diaphoresis, fever, weakness, malaise/fatigue, night sweats, weight gain and weight loss.   HENT: Positive for headaches. Negative for ear discharge, hearing loss, hoarse voice and nosebleeds.    Eyes: Negative for discharge, double vision, pain and photophobia.   Cardiovascular: Positive for leg swelling. Negative for chest pain, claudication, cyanosis, dyspnea on exertion, irregular heartbeat, near-syncope, orthopnea, palpitations, paroxysmal nocturnal dyspnea and syncope.   Respiratory: Positive for shortness of breath.  Negative for cough, hemoptysis, sputum production and wheezing.    Endocrine: Negative for cold intolerance, heat intolerance, polydipsia, polyphagia and polyuria.   Hematologic/Lymphatic: Negative for adenopathy and bleeding problem. Does not bruise/bleed easily.   Skin: Negative for color change, flushing, itching and rash.   Musculoskeletal: Negative for muscle cramps, muscle weakness, myalgias and stiffness.   Gastrointestinal: Negative for abdominal pain, diarrhea, hematemesis, hematochezia, nausea and vomiting.   Genitourinary: Negative for dysuria, frequency and nocturia.   Neurological: Positive for dizziness. Negative for focal weakness, loss of balance, numbness, paresthesias and seizures.   Psychiatric/Behavioral: Negative for altered mental status, hallucinations and suicidal ideas.   Allergic/Immunologic: Negative for HIV exposure, hives and persistent infections.       Current Outpatient Prescriptions:   •  aspirin (SANJU ASPIRIN EC LOW DOSE) 81 MG EC tablet, Take 1 tablet by mouth daily., Disp: , Rfl:   •  atenolol (TENORMIN) 25 MG tablet, Take 1 tablet by mouth Daily., Disp: 90 tablet, Rfl: 3  •  carbidopa-levodopa CR (SINEMET CR)  MG per CR tablet, Take 1 tablet by mouth every night., Disp: , Rfl:   •  citalopram (CeleXA) 40 MG tablet, Take 1 tablet by mouth Daily. (Patient taking differently: Take 20 mg by mouth Daily.), Disp: 90 tablet, Rfl: 3  •  clobetasol (TEMOVATE) 0.05 % ointment, , Disp: , Rfl:   •  COCONUT OIL PO, Take  by mouth., Disp: , Rfl:   •  fluticasone (FLONASE) 50 MCG/ACT nasal spray, 1 spray into each nostril 2 (two) times a day., Disp: , Rfl:   •  gabapentin (NEURONTIN) 100 MG capsule, TAKE 1 CAPSULE 3 TIMES A   DAY (Patient taking differently: TAKE 1 CAPSULE  A   DAY), Disp: 270 capsule, Rfl: 2  •  glimepiride (AMARYL) 2 MG tablet, TAKE 1 TABLET DAILY WITH   BREAKFAST, Disp: 90 tablet, Rfl: 3  •  imiquimod (ALDARA) 5 % cream, Apply  topically., Disp: , Rfl:   •  meclizine  "(ANTIVERT) 25 MG tablet, Take 1 tablet by mouth 3 (three) times a day as needed for dizziness., Disp: 90 tablet, Rfl: 0  •  moexipril-hydrochlorothiazide (UNIRETIC) 15-12.5 MG tablet per tablet, Take 1 tablet by mouth Daily., Disp: 90 tablet, Rfl: 3  •  Multiple Vitamins-Minerals (KANNAN BASIC) tablet, Take  by mouth., Disp: , Rfl:   •  naproxen sodium (ALEVE) 220 MG tablet, Take 220 mg by mouth 2 (Two) Times a Day As Needed for Mild Pain (1-3)., Disp: , Rfl:   •  Omega-3 Krill Oil 1000 MG capsule, Take  by mouth., Disp: , Rfl:   •  pantoprazole (PROTONIX) 40 MG EC tablet, TAKE 1 TABLET DAILY, Disp: 90 tablet, Rfl: 3  •  rosuvastatin (CRESTOR) 10 MG tablet, Take 1 tablet by mouth Every Night., Disp: 90 tablet, Rfl: 3  •  SOOLANTRA 1 % cream, , Disp: , Rfl:   •  triamcinolone (KENALOG) 0.1 % cream, As Needed., Disp: , Rfl:   •  vitamin B-12 (CYANOCOBALAMIN) 1000 MCG tablet, Take 1,000 mcg by mouth daily., Disp: , Rfl:   •  vitamin D (ERGOCALCIFEROL) 56638 UNITS capsule capsule, TAKE ONE CAPSULE BY MOUTH ONCE WEEKLY, Disp: 8 capsule, Rfl: 0     Objective:     Physical Exam   Constitutional: She is oriented to person, place, and time. She appears well-developed and well-nourished.   HENT:   Head: Normocephalic and atraumatic.   Eyes: Conjunctivae are normal. No scleral icterus.   Cardiovascular: Normal rate, regular rhythm, normal heart sounds and intact distal pulses.  Exam reveals no gallop and no friction rub.    No murmur heard.  Pulmonary/Chest: Effort normal and breath sounds normal. No respiratory distress.   Abdominal: Soft. Bowel sounds are normal. There is no tenderness.   Musculoskeletal: She exhibits no edema.   Neurological: She is alert and oriented to person, place, and time.   Skin: Skin is warm and dry.   Psychiatric: She has a normal mood and affect. Her behavior is normal.     Blood pressure 104/56, pulse 84, resp. rate 16, height 60\" (152.4 cm), weight 209 lb 12.8 oz (95.2 kg), SpO2 98 %.   Lab " Review:       Assessment:         1. Benign essential hypertension  And blood pressure control.    2. Shortness of breath  No evidence of an ischemic etiology to her symptoms.  Vasodilator nuclear stress testing shows no evidence of ischemia or infarction.  Her echocardiogram shows evidence of hypertensive cardiac disease which is at least in part responsible for her shortness of breath.  Procedures     Plan:       No additional cardiovascular testing indicated at this time.  No changes to her medication profile have been made.  Diet exercise and weight management remain essential to her overall health.

## 2017-07-07 DIAGNOSIS — E11.65 UNCONTROLLED TYPE 2 DIABETES MELLITUS WITH CHRONIC KIDNEY DISEASE, WITHOUT LONG-TERM CURRENT USE OF INSULIN, UNSPECIFIED CKD STAGE: ICD-10-CM

## 2017-07-07 DIAGNOSIS — I10 ESSENTIAL HYPERTENSION, MALIGNANT: ICD-10-CM

## 2017-07-07 DIAGNOSIS — E78.2 MIXED HYPERLIPIDEMIA: Primary | ICD-10-CM

## 2017-07-07 DIAGNOSIS — E55.9 UNSPECIFIED VITAMIN D DEFICIENCY: ICD-10-CM

## 2017-07-07 DIAGNOSIS — E11.22 UNCONTROLLED TYPE 2 DIABETES MELLITUS WITH CHRONIC KIDNEY DISEASE, WITHOUT LONG-TERM CURRENT USE OF INSULIN, UNSPECIFIED CKD STAGE: ICD-10-CM

## 2017-07-08 LAB
25(OH)D3+25(OH)D2 SERPL-MCNC: 17.5 NG/ML
ALBUMIN SERPL-MCNC: 4.4 G/DL (ref 3.5–5)
ALBUMIN/CREAT UR: 56.3 MG/G CREAT (ref 0–30)
ALBUMIN/GLOB SERPL: 1.6 G/DL (ref 1–2)
ALP SERPL-CCNC: 78 U/L (ref 38–126)
ALT SERPL-CCNC: 26 U/L (ref 13–69)
AST SERPL-CCNC: 16 U/L (ref 15–46)
BASOPHILS # BLD AUTO: 0.06 10*3/MM3 (ref 0–0.2)
BASOPHILS NFR BLD AUTO: 0.7 % (ref 0–2.5)
BILIRUB SERPL-MCNC: 0.6 MG/DL (ref 0.2–1.3)
BUN SERPL-MCNC: 18 MG/DL (ref 7–20)
BUN/CREAT SERPL: 22.5 (ref 7.1–23.5)
CALCIUM SERPL-MCNC: 9.8 MG/DL (ref 8.4–10.2)
CHLORIDE SERPL-SCNC: 100 MMOL/L (ref 98–107)
CHOLEST SERPL-MCNC: 185 MG/DL (ref 0–199)
CO2 SERPL-SCNC: 28 MMOL/L (ref 26–30)
CREAT SERPL-MCNC: 0.8 MG/DL (ref 0.6–1.3)
CREAT UR-MCNC: 62.4 MG/DL
EOSINOPHIL # BLD AUTO: 0.43 10*3/MM3 (ref 0–0.7)
EOSINOPHIL NFR BLD AUTO: 5.2 % (ref 0–7)
ERYTHROCYTE [DISTWIDTH] IN BLOOD BY AUTOMATED COUNT: 12.7 % (ref 11.5–14.5)
GLOBULIN SER CALC-MCNC: 2.7 GM/DL
GLUCOSE SERPL-MCNC: 132 MG/DL (ref 74–98)
HBA1C MFR BLD: 6.3 %
HCT VFR BLD AUTO: 44.5 % (ref 37–47)
HDLC SERPL-MCNC: 52 MG/DL (ref 40–60)
HGB BLD-MCNC: 14.5 G/DL (ref 12–16)
IMM GRANULOCYTES # BLD: 0.05 10*3/MM3 (ref 0–0.06)
IMM GRANULOCYTES NFR BLD: 0.6 % (ref 0–0.6)
LDLC SERPL CALC-MCNC: 79 MG/DL (ref 0–99)
LYMPHOCYTES # BLD AUTO: 2.17 10*3/MM3 (ref 0.6–3.4)
LYMPHOCYTES NFR BLD AUTO: 26.2 % (ref 10–50)
MCH RBC QN AUTO: 29.4 PG (ref 27–31)
MCHC RBC AUTO-ENTMCNC: 32.6 G/DL (ref 30–37)
MCV RBC AUTO: 90.3 FL (ref 81–99)
MICROALBUMIN UR-MCNC: 35.1 UG/ML
MONOCYTES # BLD AUTO: 0.63 10*3/MM3 (ref 0–0.9)
MONOCYTES NFR BLD AUTO: 7.6 % (ref 0–12)
NEUTROPHILS # BLD AUTO: 4.93 10*3/MM3 (ref 2–6.9)
NEUTROPHILS NFR BLD AUTO: 59.7 % (ref 37–80)
NRBC BLD AUTO-RTO: 0 /100 WBC (ref 0–0)
PLATELET # BLD AUTO: 226 10*3/MM3 (ref 130–400)
POTASSIUM SERPL-SCNC: 4.2 MMOL/L (ref 3.5–5.1)
PROT SERPL-MCNC: 7.1 G/DL (ref 6.3–8.2)
RBC # BLD AUTO: 4.93 10*6/MM3 (ref 4.2–5.4)
SODIUM SERPL-SCNC: 141 MMOL/L (ref 137–145)
TRIGL SERPL-MCNC: 269 MG/DL
VLDLC SERPL CALC-MCNC: 53.8 MG/DL
WBC # BLD AUTO: 8.27 10*3/MM3 (ref 4.8–10.8)

## 2017-07-12 ENCOUNTER — OFFICE VISIT (OUTPATIENT)
Dept: FAMILY MEDICINE CLINIC | Facility: CLINIC | Age: 67
End: 2017-07-12

## 2017-07-12 VITALS
TEMPERATURE: 98.5 F | HEART RATE: 74 BPM | BODY MASS INDEX: 41.01 KG/M2 | DIASTOLIC BLOOD PRESSURE: 70 MMHG | OXYGEN SATURATION: 98 % | RESPIRATION RATE: 16 BRPM | SYSTOLIC BLOOD PRESSURE: 146 MMHG | WEIGHT: 210 LBS

## 2017-07-12 DIAGNOSIS — E78.00 PURE HYPERCHOLESTEROLEMIA: ICD-10-CM

## 2017-07-12 DIAGNOSIS — E11.9 CONTROLLED TYPE 2 DIABETES MELLITUS WITHOUT COMPLICATION, WITHOUT LONG-TERM CURRENT USE OF INSULIN (HCC): ICD-10-CM

## 2017-07-12 DIAGNOSIS — I10 BENIGN ESSENTIAL HYPERTENSION: Primary | ICD-10-CM

## 2017-07-12 DIAGNOSIS — M79.2 NEURALGIA: ICD-10-CM

## 2017-07-12 PROCEDURE — 99214 OFFICE O/P EST MOD 30 MIN: CPT | Performed by: INTERNAL MEDICINE

## 2017-07-12 RX ORDER — CARBIDOPA AND LEVODOPA 50; 200 MG/1; MG/1
1 TABLET, EXTENDED RELEASE ORAL NIGHTLY
Qty: 90 TABLET | Refills: 3 | Status: SHIPPED | OUTPATIENT
Start: 2017-07-12 | End: 2018-02-20 | Stop reason: SDUPTHER

## 2017-07-12 RX ORDER — CARBIDOPA AND LEVODOPA 50; 200 MG/1; MG/1
1 TABLET, EXTENDED RELEASE ORAL NIGHTLY
Qty: 30 TABLET | Refills: 0 | Status: SHIPPED | OUTPATIENT
Start: 2017-07-12 | End: 2017-07-12 | Stop reason: SDUPTHER

## 2017-07-12 NOTE — PROGRESS NOTES
Subjective   Missy Mcgraw is a 66 y.o. female.     Chief Complaint   Patient presents with   • Diabetes   • Hypertension   • Hyperlipidemia   • Numbness       History of Present Illness   Patient is here to follow up on the blood pressure and cholesterol and sugar And had lab work , the patient is doing better with the blood pressure , the medications are working and the patient has had no side effects and takes it as prescribed. The patient also needs refills on medications . The patient is also here to follow up on the cholesterol and is trying to follow a diet ,she is also here to follow up on stable neuropathy secondary to her diabetes and takes gabapentin 100mg po qhs    The following portions of the patient's history were reviewed and updated as appropriate: allergies, current medications, past family history, past medical history, past social history, past surgical history and problem list.    Review of Systems   Constitutional: Negative for appetite change, fatigue and fever.   HENT: Negative for congestion, ear discharge, ear pain, sinus pressure and sore throat.    Eyes: Negative for pain and discharge.   Respiratory: Negative for cough, chest tightness, shortness of breath and wheezing.    Cardiovascular: Negative for chest pain, palpitations and leg swelling.   Gastrointestinal: Negative for abdominal pain, blood in stool, constipation, diarrhea and nausea.   Endocrine: Negative for cold intolerance and heat intolerance.   Genitourinary: Negative for dysuria, flank pain and frequency.   Musculoskeletal: Negative for back pain and joint swelling.   Skin: Negative for color change.   Allergic/Immunologic: Negative for environmental allergies and food allergies.   Neurological: Positive for numbness. Negative for dizziness, weakness and headaches.   Hematological: Negative for adenopathy. Does not bruise/bleed easily.   Psychiatric/Behavioral: Negative for behavioral problems and dysphoric mood. The  patient is not nervous/anxious.          Current Outpatient Prescriptions:   •  aspirin (SANJU ASPIRIN EC LOW DOSE) 81 MG EC tablet, Take 1 tablet by mouth daily., Disp: , Rfl:   •  atenolol (TENORMIN) 25 MG tablet, Take 1 tablet by mouth Daily., Disp: 90 tablet, Rfl: 3  •  citalopram (CeleXA) 40 MG tablet, Take 1 tablet by mouth Daily. (Patient taking differently: Take 20 mg by mouth Daily.), Disp: 90 tablet, Rfl: 3  •  clobetasol (TEMOVATE) 0.05 % ointment, , Disp: , Rfl:   •  COCONUT OIL PO, Take  by mouth., Disp: , Rfl:   •  fluticasone (FLONASE) 50 MCG/ACT nasal spray, 1 spray into each nostril 2 (two) times a day., Disp: , Rfl:   •  gabapentin (NEURONTIN) 100 MG capsule, TAKE 1 CAPSULE 3 TIMES A   DAY (Patient taking differently: TAKE 1 CAPSULE  A   DAY), Disp: 270 capsule, Rfl: 2  •  glimepiride (AMARYL) 2 MG tablet, TAKE 1 TABLET DAILY WITH   BREAKFAST, Disp: 90 tablet, Rfl: 3  •  imiquimod (ALDARA) 5 % cream, Apply  topically., Disp: , Rfl:   •  meclizine (ANTIVERT) 25 MG tablet, Take 1 tablet by mouth 3 (three) times a day as needed for dizziness., Disp: 90 tablet, Rfl: 0  •  moexipril-hydrochlorothiazide (UNIRETIC) 15-12.5 MG tablet per tablet, Take 1 tablet by mouth Daily., Disp: 90 tablet, Rfl: 3  •  Multiple Vitamins-Minerals (KANNAN BASIC) tablet, Take  by mouth., Disp: , Rfl:   •  naproxen sodium (ALEVE) 220 MG tablet, Take 220 mg by mouth 2 (Two) Times a Day As Needed for Mild Pain (1-3)., Disp: , Rfl:   •  Omega-3 Krill Oil 1000 MG capsule, Take  by mouth., Disp: , Rfl:   •  pantoprazole (PROTONIX) 40 MG EC tablet, TAKE 1 TABLET DAILY, Disp: 90 tablet, Rfl: 3  •  rosuvastatin (CRESTOR) 10 MG tablet, Take 1 tablet by mouth Every Night., Disp: 90 tablet, Rfl: 3  •  SOOLANTRA 1 % cream, , Disp: , Rfl:   •  triamcinolone (KENALOG) 0.1 % cream, As Needed., Disp: , Rfl:   •  vitamin B-12 (CYANOCOBALAMIN) 1000 MCG tablet, Take 1,000 mcg by mouth daily., Disp: , Rfl:   •  carbidopa-levodopa CR (SINEMET CR)   MG per CR tablet, Take 1 tablet by mouth Every Night., Disp: 90 tablet, Rfl: 3    Objective     Blood pressure 146/70, pulse 74, temperature 98.5 °F (36.9 °C), temperature source Oral, resp. rate 16, weight 210 lb (95.3 kg), SpO2 98 %.    Physical Exam   Constitutional: She is oriented to person, place, and time. She appears well-developed and well-nourished. No distress.   HENT:   Head: Normocephalic and atraumatic.   Right Ear: External ear normal.   Left Ear: External ear normal.   Nose: Nose normal.   Mouth/Throat: Oropharynx is clear and moist.   Eyes: Conjunctivae and EOM are normal. Pupils are equal, round, and reactive to light.   Neck: Neck supple. No thyromegaly present.   Cardiovascular: Normal rate, regular rhythm and normal heart sounds.    Pulmonary/Chest: Effort normal and breath sounds normal. No respiratory distress.   Abdominal: Soft. Bowel sounds are normal. She exhibits no distension. There is no tenderness. There is no rebound.   Musculoskeletal: Normal range of motion. She exhibits no edema.   Lymphadenopathy:     She has no cervical adenopathy.   Neurological: She is alert and oriented to person, place, and time.   No gross motor or sensory deficits   Skin: Skin is warm. She is not diaphoretic.   Psychiatric: She has a normal mood and affect.   Nursing note and vitals reviewed.      Results for orders placed or performed in visit on 07/07/17   Comprehensive metabolic panel   Result Value Ref Range    Glucose 132 (H) 74 - 98 mg/dL    BUN 18 7 - 20 mg/dL    Creatinine 0.80 0.60 - 1.30 mg/dL    eGFR Non African Am 72 >60 mL/min/1.73    eGFR African Am 88 >60 mL/min/1.73    BUN/Creatinine Ratio 22.5 7.1 - 23.5    Sodium 141 137 - 145 mmol/L    Potassium 4.2 3.5 - 5.1 mmol/L    Chloride 100 98 - 107 mmol/L    Total CO2 28.0 26.0 - 30.0 mmol/L    Calcium 9.8 8.4 - 10.2 mg/dL    Total Protein 7.1 6.3 - 8.2 g/dL    Albumin 4.40 3.50 - 5.00 g/dL    Globulin 2.7 gm/dL    A/G Ratio 1.6 1.0 - 2.0  g/dL    Total Bilirubin 0.6 0.2 - 1.3 mg/dL    Alkaline Phosphatase 78 38 - 126 U/L    AST (SGOT) 16 15 - 46 U/L    ALT (SGPT) 26 13 - 69 U/L   Lipid panel   Result Value Ref Range    Total Cholesterol 185 0 - 199 mg/dL    Triglycerides 269 (H) <150 mg/dL    HDL Cholesterol 52 40 - 60 mg/dL    VLDL Cholesterol 53.8 mg/dL    LDL Cholesterol  79 0 - 99 mg/dL   Hemoglobin A1c   Result Value Ref Range    Hemoglobin A1C 6.30 %   Microalbumin / creatinine, urine ratio   Result Value Ref Range    Creatinine, Urine 62.4 Not Estab. mg/dL    Microalbumin, Urine 35.1 Not Estab. ug/mL    Microalbumin/Creatinine Ratio Urine 56.3 (H) 0.0 - 30.0 mg/g creat   Vitamin D 25 hydroxy   Result Value Ref Range    25 Hydroxy, Vitamin D 17.5 ng/mL   CBC w AUTO Differential   Result Value Ref Range    WBC 8.27 4.80 - 10.80 10*3/mm3    RBC 4.93 4.20 - 5.40 10*6/mm3    Hemoglobin 14.5 12.0 - 16.0 g/dL    Hematocrit 44.5 37.0 - 47.0 %    MCV 90.3 81.0 - 99.0 fL    MCH 29.4 27.0 - 31.0 pg    MCHC 32.6 30.0 - 37.0 g/dL    RDW 12.7 11.5 - 14.5 %    Platelets 226 130 - 400 10*3/mm3    Neutrophil Rel % 59.7 37.0 - 80.0 %    Lymphocyte Rel % 26.2 10.0 - 50.0 %    Monocyte Rel % 7.6 0.0 - 12.0 %    Eosinophil Rel % 5.2 0.0 - 7.0 %    Basophil Rel % 0.7 0.0 - 2.5 %    Neutrophils Absolute 4.93 2.00 - 6.90 10*3/mm3    Lymphocytes Absolute 2.17 0.60 - 3.40 10*3/mm3    Monocytes Absolute 0.63 0.00 - 0.90 10*3/mm3    Eosinophils Absolute 0.43 0.00 - 0.70 10*3/mm3    Basophils Absolute 0.06 0.00 - 0.20 10*3/mm3    Immature Granulocyte Rel % 0.6 0.0 - 0.6 %    Immature Grans Absolute 0.05 0.00 - 0.06 10*3/mm3    nRBC 0.0 0.0 - 0.0 /100 WBC         Assessment/Plan   Missy was seen today for diabetes, hypertension, hyperlipidemia and numbness.    Diagnoses and all orders for this visit:    Benign essential hypertension    Pure hypercholesterolemia    Controlled type 2 diabetes mellitus without complication, without long-term current use of  insulin    Neuralgia  -     Urine Drug Screen    Other orders  -     Discontinue: carbidopa-levodopa CR (SINEMET CR)  MG per CR tablet; Take 1 tablet by mouth Every Night.  -     carbidopa-levodopa CR (SINEMET CR)  MG per CR tablet; Take 1 tablet by mouth Every Night.        Discussion Summary:   1.Benign Essential Hypertension: will continue current medications, low sodium diet, patient to continue to monitor blood pressure.  2. Mixed hyperlipidemia: will continue current medications, cmp and fasting lipid panel reviewed today, total cholesterol 183, triglycerides 225 , hdl 56 , ldl  97, patient is advised to follow a low cholesterol diet  3.Diabetes Mellitus: patient is advised to continue all current medications and these have been reviewed today, current fasting blood sugar 132 , hba1c 6.3 , fasting sugar, urine microalbumin and hba1c to be obtained prior to return visit.  4 .neuralgia : labs reviewed, continue gabapentin         Emely Sheldon MD

## 2017-08-10 RX ORDER — CARBIDOPA AND LEVODOPA 50; 200 MG/1; MG/1
TABLET, EXTENDED RELEASE ORAL
Qty: 30 TABLET | Refills: 0 | OUTPATIENT
Start: 2017-08-10

## 2017-10-11 RX ORDER — CLOBETASOL PROPIONATE 0.5 MG/G
OINTMENT TOPICAL
Qty: 60 G | Refills: 5 | Status: SHIPPED | OUTPATIENT
Start: 2017-10-11 | End: 2018-11-21 | Stop reason: SDUPTHER

## 2017-10-26 ENCOUNTER — FLU SHOT (OUTPATIENT)
Dept: FAMILY MEDICINE CLINIC | Facility: CLINIC | Age: 67
End: 2017-10-26

## 2017-10-26 PROCEDURE — 90686 IIV4 VACC NO PRSV 0.5 ML IM: CPT | Performed by: INTERNAL MEDICINE

## 2017-10-26 PROCEDURE — 90471 IMMUNIZATION ADMIN: CPT | Performed by: INTERNAL MEDICINE

## 2017-11-10 RX ORDER — TRAZODONE HYDROCHLORIDE 50 MG/1
TABLET ORAL
Qty: 90 TABLET | Refills: 2 | Status: SHIPPED | OUTPATIENT
Start: 2017-11-10 | End: 2018-03-19 | Stop reason: SDDI

## 2017-11-27 DIAGNOSIS — E78.2 MIXED HYPERLIPIDEMIA: ICD-10-CM

## 2017-11-27 DIAGNOSIS — I10 BENIGN ESSENTIAL HYPERTENSION: Primary | ICD-10-CM

## 2017-11-27 DIAGNOSIS — E11.65 UNCONTROLLED TYPE 2 DIABETES MELLITUS WITH HYPERGLYCEMIA, WITHOUT LONG-TERM CURRENT USE OF INSULIN (HCC): ICD-10-CM

## 2017-11-27 LAB
ALBUMIN SERPL-MCNC: 4.3 G/DL (ref 3.5–5)
ALBUMIN/GLOB SERPL: 1.7 G/DL (ref 1–2)
ALP SERPL-CCNC: 95 U/L (ref 38–126)
ALT SERPL-CCNC: 25 U/L (ref 13–69)
AST SERPL-CCNC: 16 U/L (ref 15–46)
BASOPHILS # BLD AUTO: 0.06 10*3/MM3 (ref 0–0.2)
BASOPHILS NFR BLD AUTO: 0.8 % (ref 0–2.5)
BILIRUB SERPL-MCNC: 0.5 MG/DL (ref 0.2–1.3)
BUN SERPL-MCNC: 21 MG/DL (ref 7–20)
BUN/CREAT SERPL: 26.3 (ref 7.1–23.5)
CALCIUM SERPL-MCNC: 9.9 MG/DL (ref 8.4–10.2)
CHLORIDE SERPL-SCNC: 101 MMOL/L (ref 98–107)
CHOLEST SERPL-MCNC: 167 MG/DL (ref 0–199)
CO2 SERPL-SCNC: 29 MMOL/L (ref 26–30)
CREAT SERPL-MCNC: 0.8 MG/DL (ref 0.6–1.3)
EOSINOPHIL # BLD AUTO: 0.19 10*3/MM3 (ref 0–0.7)
EOSINOPHIL NFR BLD AUTO: 2.5 % (ref 0–7)
ERYTHROCYTE [DISTWIDTH] IN BLOOD BY AUTOMATED COUNT: 12.6 % (ref 11.5–14.5)
GFR SERPLBLD CREATININE-BSD FMLA CKD-EPI: 72 ML/MIN/1.73
GFR SERPLBLD CREATININE-BSD FMLA CKD-EPI: 88 ML/MIN/1.73
GLOBULIN SER CALC-MCNC: 2.6 GM/DL
GLUCOSE SERPL-MCNC: 137 MG/DL (ref 74–98)
HBA1C MFR BLD: 6.5 %
HCT VFR BLD AUTO: 43.4 % (ref 37–47)
HDLC SERPL-MCNC: 52 MG/DL (ref 40–60)
HGB BLD-MCNC: 14 G/DL (ref 12–16)
IMM GRANULOCYTES # BLD: 0.04 10*3/MM3 (ref 0–0.06)
IMM GRANULOCYTES NFR BLD: 0.5 % (ref 0–0.6)
LDLC SERPL CALC-MCNC: 77 MG/DL (ref 0–99)
LYMPHOCYTES # BLD AUTO: 2.29 10*3/MM3 (ref 0.6–3.4)
LYMPHOCYTES NFR BLD AUTO: 29.6 % (ref 10–50)
MCH RBC QN AUTO: 28.8 PG (ref 27–31)
MCHC RBC AUTO-ENTMCNC: 32.3 G/DL (ref 30–37)
MCV RBC AUTO: 89.3 FL (ref 81–99)
MONOCYTES # BLD AUTO: 0.61 10*3/MM3 (ref 0–0.9)
MONOCYTES NFR BLD AUTO: 7.9 % (ref 0–12)
NEUTROPHILS # BLD AUTO: 4.55 10*3/MM3 (ref 2–6.9)
NEUTROPHILS NFR BLD AUTO: 58.7 % (ref 37–80)
NRBC BLD AUTO-RTO: 0 /100 WBC (ref 0–0)
PLATELET # BLD AUTO: 210 10*3/MM3 (ref 130–400)
POTASSIUM SERPL-SCNC: 4.2 MMOL/L (ref 3.5–5.1)
PROT SERPL-MCNC: 6.9 G/DL (ref 6.3–8.2)
RBC # BLD AUTO: 4.86 10*6/MM3 (ref 4.2–5.4)
SODIUM SERPL-SCNC: 140 MMOL/L (ref 137–145)
TRIGL SERPL-MCNC: 190 MG/DL
VLDLC SERPL CALC-MCNC: 38 MG/DL
WBC # BLD AUTO: 7.74 10*3/MM3 (ref 4.8–10.8)

## 2017-11-29 ENCOUNTER — OFFICE VISIT (OUTPATIENT)
Dept: FAMILY MEDICINE CLINIC | Facility: CLINIC | Age: 67
End: 2017-11-29

## 2017-11-29 VITALS
SYSTOLIC BLOOD PRESSURE: 138 MMHG | TEMPERATURE: 98.2 F | HEART RATE: 80 BPM | OXYGEN SATURATION: 96 % | WEIGHT: 205 LBS | DIASTOLIC BLOOD PRESSURE: 63 MMHG | RESPIRATION RATE: 16 BRPM | BODY MASS INDEX: 40.04 KG/M2

## 2017-11-29 DIAGNOSIS — I10 BENIGN ESSENTIAL HYPERTENSION: Primary | ICD-10-CM

## 2017-11-29 DIAGNOSIS — E11.9 CONTROLLED TYPE 2 DIABETES MELLITUS WITHOUT COMPLICATION, WITHOUT LONG-TERM CURRENT USE OF INSULIN (HCC): ICD-10-CM

## 2017-11-29 DIAGNOSIS — M25.50 ARTHRALGIA OF MULTIPLE JOINTS: ICD-10-CM

## 2017-11-29 DIAGNOSIS — E78.00 PURE HYPERCHOLESTEROLEMIA: ICD-10-CM

## 2017-11-29 PROCEDURE — 99214 OFFICE O/P EST MOD 30 MIN: CPT | Performed by: INTERNAL MEDICINE

## 2017-11-29 RX ORDER — MELATONIN
1000 DAILY
COMMUNITY
End: 2022-01-13 | Stop reason: ALTCHOICE

## 2017-11-29 RX ORDER — LISINOPRIL AND HYDROCHLOROTHIAZIDE 20; 12.5 MG/1; MG/1
1 TABLET ORAL DAILY
COMMUNITY
End: 2018-06-04 | Stop reason: SDUPTHER

## 2017-11-29 NOTE — PROGRESS NOTES
Subjective   Missy Mcgraw is a 66 y.o. female.     Chief Complaint   Patient presents with   • Hypertension   • Hyperlipidemia   • Diabetes   • Joint Pain       History of Present Illness   Patient is here to follow-up on the blood pressure, takes her medications as prescribed. , the  [employer] is on back order-  She is currently on lisinopril/hctz, patient is also to follow-up on the cholesterol and sugar.  She had labwork done.  She complains of pain in several joints, especially her hips and thigh muscles . she had a mammogram  And flu shot    The following portions of the patient's history were reviewed and updated as appropriate: allergies, current medications, past family history, past medical history, past social history, past surgical history and problem list.    Review of Systems   Constitutional: Negative for appetite change, fatigue and fever.   HENT: Negative for congestion, ear discharge, ear pain, sinus pressure and sore throat.    Eyes: Negative for pain and discharge.   Respiratory: Negative for cough, chest tightness, shortness of breath and wheezing.    Cardiovascular: Negative for chest pain, palpitations and leg swelling.   Gastrointestinal: Negative for abdominal pain, blood in stool, constipation, diarrhea and nausea.   Endocrine: Negative for cold intolerance and heat intolerance.   Genitourinary: Negative for dysuria, flank pain and frequency.   Musculoskeletal: Positive for arthralgias and myalgias. Negative for back pain and joint swelling.   Skin: Negative for color change.   Allergic/Immunologic: Negative for environmental allergies and food allergies.   Neurological: Negative for dizziness, weakness, numbness and headaches.   Hematological: Negative for adenopathy. Does not bruise/bleed easily.   Psychiatric/Behavioral: Negative for behavioral problems and dysphoric mood. The patient is not nervous/anxious.          Current Outpatient Prescriptions:   •  aspirin (SANJU ASPIRIN EC LOW  DOSE) 81 MG EC tablet, Take 1 tablet by mouth daily., Disp: , Rfl:   •  atenolol (TENORMIN) 25 MG tablet, Take 1 tablet by mouth Daily., Disp: 90 tablet, Rfl: 3  •  carbidopa-levodopa CR (SINEMET CR)  MG per CR tablet, Take 1 tablet by mouth Every Night., Disp: 90 tablet, Rfl: 3  •  cholecalciferol (VITAMIN D3) 1000 units tablet, Take 1,000 Units by mouth Daily., Disp: , Rfl:   •  citalopram (CeleXA) 40 MG tablet, Take 1 tablet by mouth Daily. (Patient taking differently: Take 20 mg by mouth Daily.), Disp: 90 tablet, Rfl: 3  •  clobetasol (TEMOVATE) 0.05 % ointment, APPLY A THIN LAYER TWO TIMES A DAY FOR TWO WEEKS, THEN APPLY DAILY FOR ONE WEEK, THEN THREE TIMES WEEKLY, Disp: 60 g, Rfl: 5  •  COCONUT OIL PO, Take  by mouth., Disp: , Rfl:   •  gabapentin (NEURONTIN) 100 MG capsule, TAKE 1 CAPSULE 3 TIMES A   DAY (Patient taking differently: TAKE 1 CAPSULE  A   DAY), Disp: 270 capsule, Rfl: 2  •  glimepiride (AMARYL) 2 MG tablet, TAKE 1 TABLET DAILY WITH   BREAKFAST, Disp: 90 tablet, Rfl: 3  •  lisinopril-hydrochlorothiazide (PRINZIDE,ZESTORETIC) 20-12.5 MG per tablet, Take 1 tablet by mouth Daily., Disp: , Rfl:   •  moexipril-hydrochlorothiazide (UNIRETIC) 15-12.5 MG tablet per tablet, Take 1 tablet by mouth Daily., Disp: 90 tablet, Rfl: 3  •  Multiple Vitamins-Minerals (KANNAN BASIC) tablet, Take  by mouth., Disp: , Rfl:   •  naproxen sodium (ALEVE) 220 MG tablet, Take 220 mg by mouth 2 (Two) Times a Day As Needed for Mild Pain (1-3)., Disp: , Rfl:   •  Nutritional Supplements (JUICE PLUS FIBRE PO), Take  by mouth., Disp: , Rfl:   •  Omega-3 Krill Oil 1000 MG capsule, Take  by mouth., Disp: , Rfl:   •  pantoprazole (PROTONIX) 40 MG EC tablet, TAKE 1 TABLET DAILY, Disp: 90 tablet, Rfl: 3  •  rosuvastatin (CRESTOR) 10 MG tablet, Take 1 tablet by mouth Every Night., Disp: 90 tablet, Rfl: 3  •  fluticasone (FLONASE) 50 MCG/ACT nasal spray, 1 spray into each nostril 2 (two) times a day., Disp: , Rfl:   •  imiquimod  (ALDARA) 5 % cream, Apply  topically., Disp: , Rfl:   •  meclizine (ANTIVERT) 25 MG tablet, Take 1 tablet by mouth 3 (three) times a day as needed for dizziness., Disp: 90 tablet, Rfl: 0  •  SOOLANTRA 1 % cream, , Disp: , Rfl:   •  traZODone (DESYREL) 50 MG tablet, TAKE 1 TABLET BY MOUTH EVERY NIGHT, Disp: 90 tablet, Rfl: 2  •  triamcinolone (KENALOG) 0.1 % cream, As Needed., Disp: , Rfl:   •  vitamin B-12 (CYANOCOBALAMIN) 1000 MCG tablet, Take 1,000 mcg by mouth daily., Disp: , Rfl:     Objective     Blood pressure 138/63, pulse 80, temperature 98.2 °F (36.8 °C), temperature source Oral, resp. rate 16, weight 205 lb (93 kg), SpO2 96 %.    Physical Exam   Constitutional: She is oriented to person, place, and time. She appears well-developed and well-nourished. No distress.   HENT:   Head: Normocephalic and atraumatic.   Right Ear: External ear normal.   Left Ear: External ear normal.   Nose: Nose normal.   Mouth/Throat: Oropharynx is clear and moist.   Eyes: Conjunctivae and EOM are normal. Pupils are equal, round, and reactive to light.   Neck: Neck supple. No thyromegaly present.   Cardiovascular: Normal rate, regular rhythm and normal heart sounds.    Pulmonary/Chest: Effort normal and breath sounds normal. No respiratory distress.   Abdominal: Soft. Bowel sounds are normal. She exhibits no distension. There is no tenderness. There is no rebound.   Musculoskeletal: She exhibits tenderness and deformity. She exhibits no edema.   Lymphadenopathy:     She has no cervical adenopathy.   Neurological: She is alert and oriented to person, place, and time.   No gross motor or sensory deficits   Skin: Skin is warm. She is not diaphoretic.   Psychiatric: She has a normal mood and affect.   Nursing note and vitals reviewed.      Results for orders placed or performed in visit on 11/27/17   Comprehensive Metabolic Panel   Result Value Ref Range    Glucose 137 (H) 74 - 98 mg/dL    BUN 21 (H) 7 - 20 mg/dL    Creatinine 0.80  0.60 - 1.30 mg/dL    eGFR Non African Am 72 >60 mL/min/1.73    eGFR African Am 88 >60 mL/min/1.73    BUN/Creatinine Ratio 26.3 (H) 7.1 - 23.5    Sodium 140 137 - 145 mmol/L    Potassium 4.2 3.5 - 5.1 mmol/L    Chloride 101 98 - 107 mmol/L    Total CO2 29.0 26.0 - 30.0 mmol/L    Calcium 9.9 8.4 - 10.2 mg/dL    Total Protein 6.9 6.3 - 8.2 g/dL    Albumin 4.30 3.50 - 5.00 g/dL    Globulin 2.6 gm/dL    A/G Ratio 1.7 1.0 - 2.0 g/dL    Total Bilirubin 0.5 0.2 - 1.3 mg/dL    Alkaline Phosphatase 95 38 - 126 U/L    AST (SGOT) 16 15 - 46 U/L    ALT (SGPT) 25 13 - 69 U/L   Lipid Panel   Result Value Ref Range    Total Cholesterol 167 0 - 199 mg/dL    Triglycerides 190 (H) <150 mg/dL    HDL Cholesterol 52 40 - 60 mg/dL    VLDL Cholesterol 38 mg/dL    LDL Cholesterol  77 0 - 99 mg/dL   Hemoglobin A1c   Result Value Ref Range    Hemoglobin A1C 6.50 %   CBC & Differential   Result Value Ref Range    WBC 7.74 4.80 - 10.80 10*3/mm3    RBC 4.86 4.20 - 5.40 10*6/mm3    Hemoglobin 14.0 12.0 - 16.0 g/dL    Hematocrit 43.4 37.0 - 47.0 %    MCV 89.3 81.0 - 99.0 fL    MCH 28.8 27.0 - 31.0 pg    MCHC 32.3 30.0 - 37.0 g/dL    RDW 12.6 11.5 - 14.5 %    Platelets 210 130 - 400 10*3/mm3    Neutrophil Rel % 58.7 37.0 - 80.0 %    Lymphocyte Rel % 29.6 10.0 - 50.0 %    Monocyte Rel % 7.9 0.0 - 12.0 %    Eosinophil Rel % 2.5 0.0 - 7.0 %    Basophil Rel % 0.8 0.0 - 2.5 %    Neutrophils Absolute 4.55 2.00 - 6.90 10*3/mm3    Lymphocytes Absolute 2.29 0.60 - 3.40 10*3/mm3    Monocytes Absolute 0.61 0.00 - 0.90 10*3/mm3    Eosinophils Absolute 0.19 0.00 - 0.70 10*3/mm3    Basophils Absolute 0.06 0.00 - 0.20 10*3/mm3    Immature Granulocyte Rel % 0.5 0.0 - 0.6 %    Immature Grans Absolute 0.04 0.00 - 0.06 10*3/mm3    nRBC 0.0 0.0 - 0.0 /100 WBC         Assessment/Plan   Missy was seen today for hypertension, hyperlipidemia, diabetes and joint pain.    Diagnoses and all orders for this visit:    Benign essential hypertension    Pure  hypercholesterolemia    Controlled type 2 diabetes mellitus without complication, without long-term current use of insulin    Arthralgia of multiple joints  -     Ambulatory Referral to Rheumatology    Discussion Summary:   1.Benign Essential Hypertension: will continue current medications, low sodium diet, patient to continue to monitor blood pressure.  2. Mixed hyperlipidemia: will continue current medications, cmp and fasting lipid panel reviewed today, total cholesterol 167, triglycerides 190 , hdl 52 , ldl  77, patient is advised to follow a low cholesterol diet  3.Diabetes Mellitus: patient is advised to continue all current medications and these have been reviewed today, current fasting blood sugar 137 , hba1c 6.5 , fasting sugar, urine microalbumin and hba1c to be obtained prior to return visit.  4 . Multiple joint pain : refer to rheumatology             Emely Sheldon MD

## 2017-12-08 ENCOUNTER — PATIENT MESSAGE (OUTPATIENT)
Dept: FAMILY MEDICINE CLINIC | Facility: CLINIC | Age: 67
End: 2017-12-08

## 2017-12-11 ENCOUNTER — OFFICE VISIT (OUTPATIENT)
Dept: FAMILY MEDICINE CLINIC | Facility: CLINIC | Age: 67
End: 2017-12-11

## 2017-12-11 VITALS
HEART RATE: 70 BPM | SYSTOLIC BLOOD PRESSURE: 121 MMHG | DIASTOLIC BLOOD PRESSURE: 57 MMHG | OXYGEN SATURATION: 98 % | RESPIRATION RATE: 16 BRPM | WEIGHT: 208 LBS | TEMPERATURE: 98.8 F | BODY MASS INDEX: 40.62 KG/M2

## 2017-12-11 DIAGNOSIS — Z23 NEED FOR PNEUMOCOCCAL VACCINATION: ICD-10-CM

## 2017-12-11 DIAGNOSIS — Z00.00 ROUTINE GENERAL MEDICAL EXAMINATION AT A HEALTH CARE FACILITY: Primary | ICD-10-CM

## 2017-12-11 PROCEDURE — G0402 INITIAL PREVENTIVE EXAM: HCPCS | Performed by: INTERNAL MEDICINE

## 2017-12-11 PROCEDURE — 90732 PPSV23 VACC 2 YRS+ SUBQ/IM: CPT | Performed by: INTERNAL MEDICINE

## 2017-12-11 PROCEDURE — G0009 ADMIN PNEUMOCOCCAL VACCINE: HCPCS | Performed by: INTERNAL MEDICINE

## 2017-12-11 NOTE — PROGRESS NOTES
QUICK REFERENCE INFORMATION:  The ABCs of the Annual Wellness Visit    [facility]    HEALTH RISK ASSESSMENT    1950    Recent Hospitalizations:  No hospitalization(s) within the last year..      Current Medical Providers:  Patient Care Team:  Emely Sheldon MD as PCP - General      Smoking Status:  History   Smoking Status   • Never Smoker   Smokeless Tobacco   • Never Used       Alcohol Consumption:  History   Alcohol Use No       Depression Screen:   PHQ-2/PHQ-9 Depression Screening 12/11/2017   Little interest or pleasure in doing things 0   Feeling down, depressed, or hopeless 0   Total Score 0       Health Habits and Functional and Cognitive Screening:  Functional & Cognitive Status 12/11/2017   Do you have difficulty preparing food and eating? No   Do you have difficulty bathing yourself, getting dressed or grooming yourself? No   Do you have difficulty using the toilet? No   Do you have difficulty moving around from place to place? No   Do you have trouble with steps or getting out of a bed or a chair? No   In the past year have you fallen or experienced a near fall? Yes   Current Diet Unhealthy Diet   Dental Exam Up to date   Eye Exam Up to date   Exercise (times per week) 0 times per week   Current Exercise Activities Include None   Do you need help using the phone?  No   Are you deaf or do you have serious difficulty hearing?  No   Do you need help with transportation? No   Do you need help shopping? No   Do you need help preparing meals?  No   Do you need help with housework?  No   Do you need help with laundry? No   Do you need help taking your medications? No   Do you need help managing money? No   Have you felt unusual stress, anger or loneliness in the last month? No   Who do you live with? Alone   If you need help, do you have trouble finding someone available to you? No   Have you been bothered in the last four weeks by sexual problems? No   Do you have difficulty concentrating,  remembering or making decisions? Yes           Does the patient have evidence of cognitive impairment? No    Aspirin use counseling? Taking ASA appropriately as indicated      Recent Lab Results:  CMP:  Lab Results   Component Value Date     (H) 11/27/2017    BUN 21 (H) 11/27/2017    CREATININE 0.80 11/27/2017    EGFRIFNONA 72 11/27/2017    EGFRIFAFRI 88 11/27/2017    BCR 26.3 (H) 11/27/2017     11/27/2017    K 4.2 11/27/2017    CO2 29.0 11/27/2017    CALCIUM 9.9 11/27/2017    PROTENTOTREF 6.9 11/27/2017    ALBUMIN 4.30 11/27/2017    LABGLOBREF 2.6 11/27/2017    LABIL2 1.7 11/27/2017    BILITOT 0.5 11/27/2017    ALKPHOS 95 11/27/2017    AST 16 11/27/2017    ALT 25 11/27/2017     Lipid Panel:  Lab Results   Component Value Date    CHOL 183 11/03/2016    TRIG 190 (H) 11/27/2017    HDL 52 11/27/2017    VLDL 38 11/27/2017    LDLDIRECT 97 11/03/2016     HbA1c:  Lab Results   Component Value Date    HGBA1C 6.50 11/27/2017       Visual Acuity:  Dr. Woodson    Age-appropriate Screening Schedule:  Refer to the list below for future screening recommendations based on patient's age, sex and/or medical conditions. Orders for these recommended tests are listed in the plan section. The patient has been provided with a written plan.    Health Maintenance   Topic Date Due   • TDAP/TD VACCINES (1 - Tdap) 12/05/1971   • ZOSTER VACCINE  04/26/2016   • DXA SCAN  11/07/2016   • PNEUMOCOCCAL VACCINES (65+ LOW/MEDIUM RISK) (1 of 2 - PCV13) 12/05/2017   • PAP SMEAR  02/02/2018   • DIABETIC FOOT EXAM  03/08/2018   • DIABETIC EYE EXAM  03/08/2018   • HEMOGLOBIN A1C  05/27/2018   • URINE MICROALBUMIN  07/07/2018   • LIPID PANEL  11/27/2018   • MAMMOGRAM  02/17/2019   • COLONOSCOPY  10/18/2026   • INFLUENZA VACCINE  Completed        Subjective    Chief Complaint   Patient presents with   • Annual Exam       History of Present Illness    Missy Mcgraw is a 67 y.o. female an established patient presenting for a Welcome to Medicare  Visit.     The following portions of the patient's history were reviewed and updated as appropriate: allergies, current medications, past family history, past medical history, past social history, past surgical history and problem list.    Outpatient Medications Prior to Visit   Medication Sig Dispense Refill   • aspirin (SANJU ASPIRIN EC LOW DOSE) 81 MG EC tablet Take 1 tablet by mouth daily.     • atenolol (TENORMIN) 25 MG tablet Take 1 tablet by mouth Daily. 90 tablet 3   • carbidopa-levodopa CR (SINEMET CR)  MG per CR tablet Take 1 tablet by mouth Every Night. 90 tablet 3   • cholecalciferol (VITAMIN D3) 1000 units tablet Take 1,000 Units by mouth Daily.     • citalopram (CeleXA) 40 MG tablet Take 1 tablet by mouth Daily. (Patient taking differently: Take 20 mg by mouth Daily.) 90 tablet 3   • clobetasol (TEMOVATE) 0.05 % ointment APPLY A THIN LAYER TWO TIMES A DAY FOR TWO WEEKS, THEN APPLY DAILY FOR ONE WEEK, THEN THREE TIMES WEEKLY 60 g 5   • COCONUT OIL PO Take  by mouth.     • fluticasone (FLONASE) 50 MCG/ACT nasal spray 1 spray into each nostril 2 (two) times a day.     • gabapentin (NEURONTIN) 100 MG capsule TAKE 1 CAPSULE 3 TIMES A   DAY (Patient taking differently: TAKE 1 CAPSULE  A   DAY) 270 capsule 2   • glimepiride (AMARYL) 2 MG tablet TAKE 1 TABLET DAILY WITH   BREAKFAST 90 tablet 3   • imiquimod (ALDARA) 5 % cream Apply  topically.     • lisinopril-hydrochlorothiazide (PRINZIDE,ZESTORETIC) 20-12.5 MG per tablet Take 1 tablet by mouth Daily.     • meclizine (ANTIVERT) 25 MG tablet Take 1 tablet by mouth 3 (three) times a day as needed for dizziness. 90 tablet 0   • moexipril-hydrochlorothiazide (UNIRETIC) 15-12.5 MG tablet per tablet Take 1 tablet by mouth Daily. 90 tablet 3   • Multiple Vitamins-Minerals (KANNAN BASIC) tablet Take  by mouth.     • naproxen sodium (ALEVE) 220 MG tablet Take 220 mg by mouth 2 (Two) Times a Day As Needed for Mild Pain (1-3).     • Nutritional Supplements (JUICE  PLUS FIBRE PO) Take  by mouth.     • Omega-3 Krill Oil 1000 MG capsule Take  by mouth.     • pantoprazole (PROTONIX) 40 MG EC tablet TAKE 1 TABLET DAILY 90 tablet 3   • rosuvastatin (CRESTOR) 10 MG tablet Take 1 tablet by mouth Every Night. 90 tablet 3   • SOOLANTRA 1 % cream      • traZODone (DESYREL) 50 MG tablet TAKE 1 TABLET BY MOUTH EVERY NIGHT 90 tablet 2   • triamcinolone (KENALOG) 0.1 % cream As Needed.     • vitamin B-12 (CYANOCOBALAMIN) 1000 MCG tablet Take 1,000 mcg by mouth daily.       No facility-administered medications prior to visit.        Patient Active Problem List   Diagnosis   • Hay fever   • Benign essential hypertension   • Gastroesophageal reflux disease with esophagitis   • Major depressive disorder, recurrent episode   • Uncontrolled type 2 diabetes mellitus   • Abnormal liver enzymes   • Abnormal weight gain   • Cervicalgia   • Fibroid, uterine   • Hyperthyroidism   • Mixed hyperlipidemia   • Movement disorder   • Neuralgia   • Ulnar nerve palsy   • Vitamin B12 deficiency   • Vitamin D deficiency   • Osteoporosis   • Restless legs syndrome   • Shortness of breath   • Anginal equivalent       Advance Care Planning:  has an advance directive - a copy HAS NOT been provided. Have asked the patient to send this to us to add to record.    Identification of Risk Factors:  Risk factors include: weight , unhealthy diet, cardiovascular risk, inactivity and polypharmacy.    Review of Systems   Constitutional: Negative for appetite change, fatigue and fever.   HENT: Negative for congestion, ear discharge, ear pain, sinus pressure and sore throat.    Eyes: Negative for pain and discharge.   Respiratory: Negative for cough, chest tightness, shortness of breath and wheezing.    Cardiovascular: Negative for chest pain, palpitations and leg swelling.   Gastrointestinal: Negative for abdominal pain, blood in stool, constipation, diarrhea and nausea.   Endocrine: Negative for cold intolerance and heat  intolerance.   Genitourinary: Negative for dysuria, flank pain and frequency.   Musculoskeletal: Negative for back pain and joint swelling.   Skin: Negative for color change.   Allergic/Immunologic: Negative for environmental allergies and food allergies.   Neurological: Negative for dizziness, weakness, numbness and headaches.   Hematological: Negative for adenopathy. Does not bruise/bleed easily.   Psychiatric/Behavioral: Negative for behavioral problems and dysphoric mood. The patient is not nervous/anxious.        Compared to one year ago, the patient feels her physical health is the same.  Compared to one year ago, the patient feels her mental health is the same.    Objective    Physical Exam   Constitutional: She is oriented to person, place, and time. She appears well-developed and well-nourished. No distress.   HENT:   Head: Normocephalic and atraumatic.   Right Ear: External ear normal.   Left Ear: External ear normal.   Nose: Nose normal.   Mouth/Throat: Oropharynx is clear and moist.   Eyes: Conjunctivae and EOM are normal. Pupils are equal, round, and reactive to light.   Neck: Neck supple. No thyromegaly present.   Cardiovascular: Normal rate, regular rhythm and normal heart sounds.    Pulmonary/Chest: Effort normal and breath sounds normal. No respiratory distress.   Abdominal: Soft. Bowel sounds are normal. She exhibits no distension. There is no tenderness. There is no rebound.   Musculoskeletal: Normal range of motion. She exhibits no edema.   Lymphadenopathy:     She has no cervical adenopathy.   Neurological: She is alert and oriented to person, place, and time.   No gross motor or sensory deficits   Skin: Skin is warm. She is not diaphoretic.   Psychiatric: She has a normal mood and affect.   Nursing note and vitals reviewed.      Vitals:    12/11/17 1529   BP: 121/57   Pulse: 70   Resp: 16   Temp: 98.8 °F (37.1 °C)   TempSrc: Oral   SpO2: 98%   Weight: 94.3 kg (208 lb)       Body mass index is  40.62 kg/(m^2).  Discussed the patient's BMI with her. BMI is above normal parameters. Follow-up plan includes:  no follow-up required.    Procedure   Procedures       Assessment/Plan   Patient Self-Management and Personalized Health Advice  The patient has been provided with information about: diet, exercise, weight management, prevention of cardiac or vascular disease and the relationship between weight and GERD and preventive services including:   · Exercise counseling provided, Fall Risk assessment done, Nutrition counseling provided.    Visit Diagnoses:    ICD-10-CM ICD-9-CM   1. Routine general medical examination at a health care facility Z00.00 V70.0   2. Need for pneumococcal vaccination Z23 V03.82     1.physical: Physical exam conducted today, reviewed fasting lab work,   Impression: healthy adult Patient. Currently, patient eats a healthy diet. Screening lab work includes glucose, lipid profile and 25-hydroxyvitamin D. The risks and benefits of immunizations were discussed and immunizations are up to date. Advice and education were given regarding nutrition and aerobic exercise. Patient discussion: discussed with the patient. , Initial Annual Wellness Visit, with preventive exam as well as age and risk appropriate counseling completed.   Advance Directive Planning: paperwork and instructions were given to the patient.   Patient Discussion: plan discussed with the patient, follow-up visit needed in one year.   Medication Review and medication list updated   2.Need for pneumovax vaccine : given    pneumovax  vaccine in office today    No orders of the defined types were placed in this encounter.      Outpatient Encounter Prescriptions as of 12/11/2017   Medication Sig Dispense Refill   • aspirin (SANJU ASPIRIN EC LOW DOSE) 81 MG EC tablet Take 1 tablet by mouth daily.     • atenolol (TENORMIN) 25 MG tablet Take 1 tablet by mouth Daily. 90 tablet 3   • carbidopa-levodopa CR (SINEMET CR)  MG per CR  tablet Take 1 tablet by mouth Every Night. 90 tablet 3   • cholecalciferol (VITAMIN D3) 1000 units tablet Take 1,000 Units by mouth Daily.     • citalopram (CeleXA) 40 MG tablet Take 1 tablet by mouth Daily. (Patient taking differently: Take 20 mg by mouth Daily.) 90 tablet 3   • clobetasol (TEMOVATE) 0.05 % ointment APPLY A THIN LAYER TWO TIMES A DAY FOR TWO WEEKS, THEN APPLY DAILY FOR ONE WEEK, THEN THREE TIMES WEEKLY 60 g 5   • COCONUT OIL PO Take  by mouth.     • fluticasone (FLONASE) 50 MCG/ACT nasal spray 1 spray into each nostril 2 (two) times a day.     • gabapentin (NEURONTIN) 100 MG capsule TAKE 1 CAPSULE 3 TIMES A   DAY (Patient taking differently: TAKE 1 CAPSULE  A   DAY) 270 capsule 2   • glimepiride (AMARYL) 2 MG tablet TAKE 1 TABLET DAILY WITH   BREAKFAST 90 tablet 3   • imiquimod (ALDARA) 5 % cream Apply  topically.     • lisinopril-hydrochlorothiazide (PRINZIDE,ZESTORETIC) 20-12.5 MG per tablet Take 1 tablet by mouth Daily.     • meclizine (ANTIVERT) 25 MG tablet Take 1 tablet by mouth 3 (three) times a day as needed for dizziness. 90 tablet 0   • moexipril-hydrochlorothiazide (UNIRETIC) 15-12.5 MG tablet per tablet Take 1 tablet by mouth Daily. 90 tablet 3   • Multiple Vitamins-Minerals (KANNAN BASIC) tablet Take  by mouth.     • naproxen sodium (ALEVE) 220 MG tablet Take 220 mg by mouth 2 (Two) Times a Day As Needed for Mild Pain (1-3).     • Nutritional Supplements (JUICE PLUS FIBRE PO) Take  by mouth.     • Omega-3 Krill Oil 1000 MG capsule Take  by mouth.     • pantoprazole (PROTONIX) 40 MG EC tablet TAKE 1 TABLET DAILY 90 tablet 3   • rosuvastatin (CRESTOR) 10 MG tablet Take 1 tablet by mouth Every Night. 90 tablet 3   • SOOLANTRA 1 % cream      • traZODone (DESYREL) 50 MG tablet TAKE 1 TABLET BY MOUTH EVERY NIGHT 90 tablet 2   • triamcinolone (KENALOG) 0.1 % cream As Needed.     • vitamin B-12 (CYANOCOBALAMIN) 1000 MCG tablet Take 1,000 mcg by mouth daily.       Facility-Administered Encounter  Medications as of 12/11/2017   Medication Dose Route Frequency Provider Last Rate Last Dose   • pneumococcal polysaccharide 23-valent (PNEUMOVAX-23) vaccine 0.5 mL  0.5 mL Intramuscular Once Emely Sheldon MD           Reviewed use of high risk medication in the elderly: yes  Reviewed for potential of harmful drug interactions in the elderly: yes    Follow Up:  Return in 1 year (on 12/11/2018).     An After Visit Summary and PPPS with all of these plans were given to the patient.

## 2017-12-11 NOTE — PATIENT INSTRUCTIONS

## 2017-12-13 ENCOUNTER — CLINICAL SUPPORT (OUTPATIENT)
Dept: FAMILY MEDICINE CLINIC | Facility: CLINIC | Age: 67
End: 2017-12-13

## 2017-12-13 ENCOUNTER — TELEPHONE (OUTPATIENT)
Dept: FAMILY MEDICINE CLINIC | Facility: CLINIC | Age: 67
End: 2017-12-13

## 2017-12-13 DIAGNOSIS — R50.9 FEVER, UNSPECIFIED FEVER CAUSE: Primary | ICD-10-CM

## 2017-12-13 LAB
EXPIRATION DATE: NORMAL
FLUAV AG NPH QL: NEGATIVE
FLUBV AG NPH QL: NEGATIVE
INTERNAL CONTROL: NORMAL
Lab: NORMAL

## 2017-12-13 PROCEDURE — 87804 INFLUENZA ASSAY W/OPTIC: CPT | Performed by: INTERNAL MEDICINE

## 2017-12-13 RX ORDER — PROMETHAZINE HYDROCHLORIDE 25 MG/1
25 TABLET ORAL NIGHTLY PRN
Qty: 10 TABLET | Refills: 1 | Status: SHIPPED | OUTPATIENT
Start: 2017-12-13 | End: 2017-12-23

## 2017-12-13 RX ORDER — AZITHROMYCIN 250 MG/1
TABLET, FILM COATED ORAL
Qty: 6 TABLET | Refills: 0 | Status: SHIPPED | OUTPATIENT
Start: 2017-12-13 | End: 2018-03-19

## 2017-12-13 NOTE — TELEPHONE ENCOUNTER
Patient's flu test was negative but she needs something sent to [facility] and phenergan is not on her med list.

## 2018-02-20 ENCOUNTER — TELEPHONE (OUTPATIENT)
Dept: INTERNAL MEDICINE | Facility: CLINIC | Age: 68
End: 2018-02-20

## 2018-02-20 RX ORDER — ROSUVASTATIN CALCIUM 10 MG/1
10 TABLET, COATED ORAL NIGHTLY
Qty: 90 TABLET | Refills: 1 | Status: SHIPPED | OUTPATIENT
Start: 2018-02-20 | End: 2018-06-04 | Stop reason: SDUPTHER

## 2018-02-20 RX ORDER — CARBIDOPA AND LEVODOPA 50; 200 MG/1; MG/1
1 TABLET, EXTENDED RELEASE ORAL NIGHTLY
Qty: 90 TABLET | Refills: 1 | Status: SHIPPED | OUTPATIENT
Start: 2018-02-20 | End: 2018-06-04 | Stop reason: SDUPTHER

## 2018-02-20 NOTE — TELEPHONE ENCOUNTER
----- Message from Missy Mcgraw sent at 2/20/2018  1:11 PM EST -----  Regarding: Non-Urgent Medical Question  Contact: [phone]  I need two prescription re-fills called in to [facility]:   CARBIDOPA/LEVODOPA E.R. TABLET 50-200MG  ROSUVASTATIN CALCIUM TAB 10 MG

## 2018-02-28 ENCOUNTER — TRANSCRIBE ORDERS (OUTPATIENT)
Dept: MAMMOGRAPHY | Facility: HOSPITAL | Age: 68
End: 2018-02-28

## 2018-02-28 DIAGNOSIS — Z12.39 BREAST CANCER SCREENING: Primary | ICD-10-CM

## 2018-03-16 DIAGNOSIS — E78.2 HYPERLIPIDEMIA, MIXED: Primary | ICD-10-CM

## 2018-03-16 DIAGNOSIS — R73.01 IMPAIRED FASTING GLUCOSE: ICD-10-CM

## 2018-03-16 DIAGNOSIS — E05.90 HYPERTHYROIDISM: ICD-10-CM

## 2018-03-16 DIAGNOSIS — I10 BENIGN HYPERTENSION: ICD-10-CM

## 2018-03-16 LAB
ALBUMIN SERPL-MCNC: 4.5 G/DL (ref 3.5–5)
ALBUMIN/GLOB SERPL: 1.5 G/DL (ref 1–2)
ALP SERPL-CCNC: 107 U/L (ref 38–126)
ALT SERPL-CCNC: 15 U/L (ref 13–69)
AST SERPL-CCNC: 18 U/L (ref 15–46)
BILIRUB SERPL-MCNC: 0.6 MG/DL (ref 0.2–1.3)
BUN SERPL-MCNC: 19 MG/DL (ref 7–20)
BUN/CREAT SERPL: 23.8 (ref 7.1–23.5)
CALCIUM SERPL-MCNC: 10.6 MG/DL (ref 8.4–10.2)
CHLORIDE SERPL-SCNC: 99 MMOL/L (ref 98–107)
CO2 SERPL-SCNC: 29 MMOL/L (ref 26–30)
CREAT SERPL-MCNC: 0.8 MG/DL (ref 0.6–1.3)
ERYTHROCYTE [DISTWIDTH] IN BLOOD BY AUTOMATED COUNT: 12.9 % (ref 11.5–14.5)
GFR SERPLBLD CREATININE-BSD FMLA CKD-EPI: 72 ML/MIN/1.73
GFR SERPLBLD CREATININE-BSD FMLA CKD-EPI: 87 ML/MIN/1.73
GLOBULIN SER CALC-MCNC: 3.1 GM/DL
GLUCOSE SERPL-MCNC: 163 MG/DL (ref 74–98)
HBA1C MFR BLD: 6.7 %
HCT VFR BLD AUTO: 46.1 % (ref 37–47)
HGB BLD-MCNC: 15 G/DL (ref 12–16)
MCH RBC QN AUTO: 29.1 PG (ref 27–31)
MCHC RBC AUTO-ENTMCNC: 32.5 G/DL (ref 30–37)
MCV RBC AUTO: 89.5 FL (ref 81–99)
PLATELET # BLD AUTO: 254 10*3/MM3 (ref 130–400)
POTASSIUM SERPL-SCNC: 4.8 MMOL/L (ref 3.5–5.1)
PROT SERPL-MCNC: 7.6 G/DL (ref 6.3–8.2)
RBC # BLD AUTO: 5.15 10*6/MM3 (ref 4.2–5.4)
SODIUM SERPL-SCNC: 144 MMOL/L (ref 137–145)
TSH SERPL DL<=0.005 MIU/L-ACNC: 1.38 MIU/ML (ref 0.47–4.68)
WBC # BLD AUTO: 9.46 10*3/MM3 (ref 4.8–10.8)

## 2018-03-19 ENCOUNTER — OFFICE VISIT (OUTPATIENT)
Dept: INTERNAL MEDICINE | Facility: CLINIC | Age: 68
End: 2018-03-19

## 2018-03-19 VITALS
WEIGHT: 204 LBS | OXYGEN SATURATION: 98 % | SYSTOLIC BLOOD PRESSURE: 136 MMHG | TEMPERATURE: 98.5 F | HEART RATE: 60 BPM | RESPIRATION RATE: 16 BRPM | DIASTOLIC BLOOD PRESSURE: 60 MMHG | BODY MASS INDEX: 39.84 KG/M2

## 2018-03-19 DIAGNOSIS — E11.9 CONTROLLED TYPE 2 DIABETES MELLITUS WITHOUT COMPLICATION, WITHOUT LONG-TERM CURRENT USE OF INSULIN (HCC): ICD-10-CM

## 2018-03-19 DIAGNOSIS — E78.2 MIXED HYPERLIPIDEMIA: ICD-10-CM

## 2018-03-19 DIAGNOSIS — I10 BENIGN ESSENTIAL HYPERTENSION: Primary | ICD-10-CM

## 2018-03-19 PROCEDURE — 99214 OFFICE O/P EST MOD 30 MIN: CPT | Performed by: INTERNAL MEDICINE

## 2018-03-19 RX ORDER — PROMETHAZINE HYDROCHLORIDE 25 MG/1
25 TABLET ORAL EVERY 6 HOURS PRN
COMMUNITY
End: 2020-02-05

## 2018-03-19 RX ORDER — HYDROXYZINE HYDROCHLORIDE 25 MG/1
TABLET, FILM COATED ORAL
COMMUNITY
Start: 2018-02-28 | End: 2020-02-05

## 2018-03-19 NOTE — PROGRESS NOTES
Subjective   Missy Mcgraw is a 67 y.o. female.     Chief Complaint   Patient presents with   • Diabetes   • Hyperlipidemia   • Hypertension       History of Present Illness   Patient is here to follow up on the blood pressure and cholesterol and  had  lab work , the patient is doing better with the blood pressure ,  the medications are working and the patient has had no side effects and takes it as prescribed.   ,The patient also needs refills on medications .  The patient is also here to follow up on the cholesterol and is trying to follow a diet. Patient is also to follow-up on her sugar.  She needs refills on the medication . She stopped gabapentin, she is getting a mammogram next week    The following portions of the patient's history were reviewed and updated as appropriate: allergies, current medications, past family history, past medical history, past social history, past surgical history and problem list.    Review of Systems   Constitutional: Negative for appetite change, fatigue and fever.   HENT: Negative for congestion, ear discharge, ear pain, sinus pressure and sore throat.    Eyes: Negative for pain and discharge.   Respiratory: Negative for cough, chest tightness, shortness of breath and wheezing.    Cardiovascular: Negative for chest pain, palpitations and leg swelling.   Gastrointestinal: Negative for abdominal pain, blood in stool, constipation, diarrhea and nausea.   Endocrine: Negative for cold intolerance and heat intolerance.   Genitourinary: Negative for dysuria, flank pain and frequency.   Musculoskeletal: Negative for back pain and joint swelling.   Skin: Negative for color change.   Allergic/Immunologic: Negative for environmental allergies and food allergies.   Neurological: Negative for dizziness, weakness, numbness and headaches.   Hematological: Negative for adenopathy. Does not bruise/bleed easily.   Psychiatric/Behavioral: Negative for behavioral problems and dysphoric mood. The  patient is not nervous/anxious.          Current Outpatient Prescriptions:   •  aspirin (SANJU ASPIRIN EC LOW DOSE) 81 MG EC tablet, Take 1 tablet by mouth daily., Disp: , Rfl:   •  atenolol (TENORMIN) 25 MG tablet, Take 1 tablet by mouth Daily., Disp: 90 tablet, Rfl: 3  •  betamethasone valerate (VALISONE) 0.1 % cream, , Disp: , Rfl:   •  carbidopa-levodopa CR (SINEMET CR)  MG per CR tablet, Take 1 tablet by mouth Every Night., Disp: 90 tablet, Rfl: 1  •  cholecalciferol (VITAMIN D3) 1000 units tablet, Take 1,000 Units by mouth Daily., Disp: , Rfl:   •  citalopram (CeleXA) 40 MG tablet, Take 1 tablet by mouth Daily. (Patient taking differently: Take 20 mg by mouth Daily.), Disp: 90 tablet, Rfl: 3  •  clobetasol (TEMOVATE) 0.05 % ointment, APPLY A THIN LAYER TWO TIMES A DAY FOR TWO WEEKS, THEN APPLY DAILY FOR ONE WEEK, THEN THREE TIMES WEEKLY, Disp: 60 g, Rfl: 5  •  COCONUT OIL PO, Take  by mouth., Disp: , Rfl:   •  glimepiride (AMARYL) 2 MG tablet, TAKE 1 TABLET DAILY WITH   BREAKFAST, Disp: 90 tablet, Rfl: 3  •  hydrOXYzine (ATARAX) 25 MG tablet, , Disp: , Rfl:   •  imiquimod (ALDARA) 5 % cream, Apply  topically., Disp: , Rfl:   •  lisinopril-hydrochlorothiazide (PRINZIDE,ZESTORETIC) 20-12.5 MG per tablet, Take 1 tablet by mouth Daily., Disp: , Rfl:   •  meclizine (ANTIVERT) 25 MG tablet, Take 1 tablet by mouth 3 (three) times a day as needed for dizziness., Disp: 90 tablet, Rfl: 0  •  Multiple Vitamins-Minerals (KANNAN BASIC) tablet, Take  by mouth., Disp: , Rfl:   •  naproxen sodium (ALEVE) 220 MG tablet, Take 220 mg by mouth 2 (Two) Times a Day As Needed for Mild Pain (1-3)., Disp: , Rfl:   •  Nutritional Supplements (JUICE PLUS FIBRE PO), Take  by mouth., Disp: , Rfl:   •  Omega-3 Krill Oil 1000 MG capsule, Take  by mouth., Disp: , Rfl:   •  pantoprazole (PROTONIX) 40 MG EC tablet, TAKE 1 TABLET DAILY, Disp: 90 tablet, Rfl: 3  •  promethazine (PHENERGAN) 25 MG tablet, Take 25 mg by mouth Every 6 (Six)  Hours As Needed for Nausea or Vomiting., Disp: , Rfl:   •  rosuvastatin (CRESTOR) 10 MG tablet, Take 1 tablet by mouth Every Night., Disp: 90 tablet, Rfl: 1  •  SOOLANTRA 1 % cream, , Disp: , Rfl:   •  triamcinolone (KENALOG) 0.1 % cream, As Needed., Disp: , Rfl:   •  vitamin B-12 (CYANOCOBALAMIN) 1000 MCG tablet, Take 1,000 mcg by mouth daily., Disp: , Rfl:     Objective     Blood pressure 136/60, pulse 60, temperature 98.5 °F (36.9 °C), temperature source Oral, resp. rate 16, weight 92.5 kg (204 lb), SpO2 98 %.    Physical Exam   Constitutional: She is oriented to person, place, and time. She appears well-developed and well-nourished. No distress.   HENT:   Head: Normocephalic and atraumatic.   Right Ear: External ear normal.   Left Ear: External ear normal.   Nose: Nose normal.   Mouth/Throat: Oropharynx is clear and moist.   Eyes: Conjunctivae and EOM are normal. Pupils are equal, round, and reactive to light.   Neck: Neck supple. No thyromegaly present.   Cardiovascular: Normal rate, regular rhythm and normal heart sounds.    Pulmonary/Chest: Effort normal and breath sounds normal. No respiratory distress.   Abdominal: Soft. Bowel sounds are normal. She exhibits no distension. There is no tenderness. There is no rebound.   Musculoskeletal: Normal range of motion. She exhibits no edema.   Lymphadenopathy:     She has no cervical adenopathy.   Neurological: She is alert and oriented to person, place, and time.   No gross motor or sensory deficits   Skin: Skin is warm. She is not diaphoretic.   Psychiatric: She has a normal mood and affect.   Nursing note and vitals reviewed.      Results for orders placed or performed in visit on 03/16/18   CBC No Differential   Result Value Ref Range    WBC 9.46 4.80 - 10.80 10*3/mm3    RBC 5.15 4.20 - 5.40 10*6/mm3    Hemoglobin 15.0 12.0 - 16.0 g/dL    Hematocrit 46.1 37.0 - 47.0 %    MCV 89.5 81.0 - 99.0 fL    MCH 29.1 27.0 - 31.0 pg    MCHC 32.5 30.0 - 37.0 g/dL    RDW 12.9  11.5 - 14.5 %    Platelets 254 130 - 400 10*3/mm3   TSH   Result Value Ref Range    TSH 1.380 0.470 - 4.680 mIU/mL   Hemoglobin A1c   Result Value Ref Range    Hemoglobin A1C 6.70 %   Comprehensive Metabolic Panel   Result Value Ref Range    Glucose 163 (H) 74 - 98 mg/dL    BUN 19 7 - 20 mg/dL    Creatinine 0.80 0.60 - 1.30 mg/dL    eGFR Non African Am 72 >60 mL/min/1.73    eGFR African Am 87 >60 mL/min/1.73    BUN/Creatinine Ratio 23.8 (H) 7.1 - 23.5    Sodium 144 137 - 145 mmol/L    Potassium 4.8 3.5 - 5.1 mmol/L    Chloride 99 98 - 107 mmol/L    Total CO2 29.0 26.0 - 30.0 mmol/L    Calcium 10.6 (H) 8.4 - 10.2 mg/dL    Total Protein 7.6 6.3 - 8.2 g/dL    Albumin 4.50 3.50 - 5.00 g/dL    Globulin 3.1 gm/dL    A/G Ratio 1.5 1.0 - 2.0 g/dL    Total Bilirubin 0.6 0.2 - 1.3 mg/dL    Alkaline Phosphatase 107 38 - 126 U/L    AST (SGOT) 18 15 - 46 U/L    ALT (SGPT) 15 13 - 69 U/L         Assessment/Plan   Missy was seen today for diabetes, hyperlipidemia and hypertension.    Diagnoses and all orders for this visit:    Benign essential hypertension    Mixed hyperlipidemia  -     CBC & Differential  -     Comprehensive Metabolic Panel  -     Lipid Panel    Controlled type 2 diabetes mellitus without complication, without long-term current use of insulin  -     Hemoglobin A1c          plan:  1.Benign Essential Hypertension: will continue current medications   low sodium diet, patient to continue to monitor blood pressure.    2.  Diabetes mellitus  :  Reviewed   labs - cmp and hba1c   , diet and exercise counseled,  hba1c 6.7  3. Pure hypercholesterolemia: We will continue  low cholesterol diet advised,  Reviewed  fasting CMP and lipid panel today            Emely Sheldon MD

## 2018-03-29 ENCOUNTER — HOSPITAL ENCOUNTER (OUTPATIENT)
Dept: MAMMOGRAPHY | Facility: HOSPITAL | Age: 68
Discharge: HOME | End: 2018-03-29
Admitting: INTERNAL MEDICINE

## 2018-03-29 DIAGNOSIS — Z12.39 BREAST CANCER SCREENING: ICD-10-CM

## 2018-03-29 PROCEDURE — 77067 SCR MAMMO BI INCL CAD: CPT

## 2018-03-29 PROCEDURE — 77063 BREAST TOMOSYNTHESIS BI: CPT

## 2018-06-04 RX ORDER — GLIMEPIRIDE 2 MG/1
2 TABLET ORAL
Qty: 90 TABLET | Refills: 3 | Status: SHIPPED | OUTPATIENT
Start: 2018-06-04 | End: 2018-12-13

## 2018-06-04 RX ORDER — CARBIDOPA AND LEVODOPA 50; 200 MG/1; MG/1
1 TABLET, EXTENDED RELEASE ORAL NIGHTLY
Qty: 90 TABLET | Refills: 3 | Status: SHIPPED | OUTPATIENT
Start: 2018-06-04 | End: 2019-05-29 | Stop reason: SDUPTHER

## 2018-06-04 RX ORDER — LISINOPRIL AND HYDROCHLOROTHIAZIDE 20; 12.5 MG/1; MG/1
1 TABLET ORAL DAILY
Qty: 90 TABLET | Refills: 3 | Status: SHIPPED | OUTPATIENT
Start: 2018-06-04 | End: 2019-05-21 | Stop reason: SDUPTHER

## 2018-06-04 RX ORDER — PANTOPRAZOLE SODIUM 40 MG/1
40 TABLET, DELAYED RELEASE ORAL DAILY
Qty: 90 TABLET | Refills: 3 | Status: SHIPPED | OUTPATIENT
Start: 2018-06-04 | End: 2019-05-29 | Stop reason: SDUPTHER

## 2018-06-04 RX ORDER — ATENOLOL 25 MG/1
25 TABLET ORAL DAILY
Qty: 90 TABLET | Refills: 3 | Status: SHIPPED | OUTPATIENT
Start: 2018-06-04 | End: 2018-09-06 | Stop reason: DRUGHIGH

## 2018-06-04 RX ORDER — ROSUVASTATIN CALCIUM 10 MG/1
10 TABLET, COATED ORAL NIGHTLY
Qty: 90 TABLET | Refills: 3 | Status: SHIPPED | OUTPATIENT
Start: 2018-06-04 | End: 2018-12-13 | Stop reason: SDUPTHER

## 2018-06-04 RX ORDER — CITALOPRAM 40 MG/1
40 TABLET ORAL DAILY
Qty: 90 TABLET | Refills: 3 | Status: SHIPPED | OUTPATIENT
Start: 2018-06-04 | End: 2019-05-21 | Stop reason: SDUPTHER

## 2018-06-04 NOTE — TELEPHONE ENCOUNTER
Patient is out of pantoprazole and glimepiride, a 14 day supply needs to be sent to [facility] in [place].    The others selected plus the two from above [facility]. Also, Dr. Acevedo prescribed her the generic form of Claritin that she'd like to be sent as well.

## 2018-06-18 LAB
ALBUMIN SERPL-MCNC: 3.9 G/DL (ref 3.5–5)
ALBUMIN/GLOB SERPL: 1.4 G/DL (ref 1–2)
ALP SERPL-CCNC: 99 U/L (ref 38–126)
ALT SERPL-CCNC: 17 U/L (ref 13–69)
AST SERPL-CCNC: 18 U/L (ref 15–46)
BASOPHILS # BLD AUTO: 0.03 10*3/MM3 (ref 0–0.2)
BASOPHILS NFR BLD AUTO: 0.5 % (ref 0–2.5)
BILIRUB SERPL-MCNC: 0.9 MG/DL (ref 0.2–1.3)
BUN SERPL-MCNC: 32 MG/DL (ref 7–20)
BUN/CREAT SERPL: 45.7 (ref 7.1–23.5)
CALCIUM SERPL-MCNC: 9.7 MG/DL (ref 8.4–10.2)
CHLORIDE SERPL-SCNC: 102 MMOL/L (ref 98–107)
CHOLEST SERPL-MCNC: 111 MG/DL (ref 0–199)
CO2 SERPL-SCNC: 29 MMOL/L (ref 26–30)
CREAT SERPL-MCNC: 0.7 MG/DL (ref 0.6–1.3)
EOSINOPHIL # BLD AUTO: 0.14 10*3/MM3 (ref 0–0.7)
EOSINOPHIL NFR BLD AUTO: 2.1 % (ref 0–7)
ERYTHROCYTE [DISTWIDTH] IN BLOOD BY AUTOMATED COUNT: 11.9 % (ref 11.5–14.5)
GFR SERPLBLD CREATININE-BSD FMLA CKD-EPI: 102 ML/MIN/1.73
GFR SERPLBLD CREATININE-BSD FMLA CKD-EPI: 84 ML/MIN/1.73
GLOBULIN SER CALC-MCNC: 2.7 GM/DL
GLUCOSE SERPL-MCNC: 147 MG/DL (ref 74–98)
HBA1C MFR BLD: 6.6 %
HCT VFR BLD AUTO: 38.8 % (ref 37–47)
HDLC SERPL-MCNC: 38 MG/DL (ref 40–60)
HGB BLD-MCNC: 13.1 G/DL (ref 12–16)
IMM GRANULOCYTES # BLD: 0.04 10*3/MM3 (ref 0–0.06)
IMM GRANULOCYTES NFR BLD: 0.6 % (ref 0–0.6)
LDLC SERPL CALC-MCNC: 49 MG/DL (ref 0–99)
LYMPHOCYTES # BLD AUTO: 1.75 10*3/MM3 (ref 0.6–3.4)
LYMPHOCYTES NFR BLD AUTO: 26.6 % (ref 10–50)
MCH RBC QN AUTO: 29.6 PG (ref 27–31)
MCHC RBC AUTO-ENTMCNC: 33.8 G/DL (ref 30–37)
MCV RBC AUTO: 87.6 FL (ref 81–99)
MONOCYTES # BLD AUTO: 0.86 10*3/MM3 (ref 0–0.9)
MONOCYTES NFR BLD AUTO: 13.1 % (ref 0–12)
NEUTROPHILS # BLD AUTO: 3.77 10*3/MM3 (ref 2–6.9)
NEUTROPHILS NFR BLD AUTO: 57.1 % (ref 37–80)
NRBC BLD AUTO-RTO: 0 /100 WBC (ref 0–0)
PLATELET # BLD AUTO: 198 10*3/MM3 (ref 130–400)
POTASSIUM SERPL-SCNC: 4.4 MMOL/L (ref 3.5–5.1)
PROT SERPL-MCNC: 6.6 G/DL (ref 6.3–8.2)
RBC # BLD AUTO: 4.43 10*6/MM3 (ref 4.2–5.4)
SODIUM SERPL-SCNC: 139 MMOL/L (ref 137–145)
TRIGL SERPL-MCNC: 121 MG/DL
VLDLC SERPL CALC-MCNC: 24.2 MG/DL
WBC # BLD AUTO: 6.59 10*3/MM3 (ref 4.8–10.8)

## 2018-06-19 ENCOUNTER — OFFICE VISIT (OUTPATIENT)
Dept: CARDIOLOGY | Facility: CLINIC | Age: 68
End: 2018-06-19

## 2018-06-19 VITALS
WEIGHT: 199 LBS | OXYGEN SATURATION: 99 % | BODY MASS INDEX: 39.07 KG/M2 | RESPIRATION RATE: 16 BRPM | SYSTOLIC BLOOD PRESSURE: 120 MMHG | HEIGHT: 60 IN | HEART RATE: 85 BPM | DIASTOLIC BLOOD PRESSURE: 68 MMHG

## 2018-06-19 DIAGNOSIS — R06.02 SHORTNESS OF BREATH: ICD-10-CM

## 2018-06-19 DIAGNOSIS — I20.8 ANGINAL EQUIVALENT (HCC): Primary | ICD-10-CM

## 2018-06-19 DIAGNOSIS — I10 BENIGN ESSENTIAL HYPERTENSION: ICD-10-CM

## 2018-06-19 PROCEDURE — 99214 OFFICE O/P EST MOD 30 MIN: CPT | Performed by: INTERNAL MEDICINE

## 2018-06-19 RX ORDER — LORATADINE 10 MG/1
10 TABLET ORAL 2 TIMES DAILY
COMMUNITY

## 2018-06-19 NOTE — PROGRESS NOTES
Subjective:     Encounter Date:06/19/2018      Patient ID: Missy Mcgraw is a 67 y.o. female.    Chief Complaint:Shortness of breath  HPI  This is a 67-year-old female patient who presents to cardiology clinic for routine follow-up.  The patient indicates that since her last visit she is has developed shortness of breath occurring both at rest and with activity.  She describes a tightness in her central chest which radiates into her neck and jaw bilaterally.  This discomfort has a 5-6/10 in intensity and occurs on a variable basis.  It will generally last around 10 minutes.  The discomfort does not radiate.  There is no orthopnea PND or lower extremity edema.  She has no dizziness palpitations or syncope.  The following portions of the patient's history were reviewed and updated as appropriate: allergies, current medications, past family history, past medical history, past social history, past surgical history and problem  Review of Systems   Constitution: Negative for chills, diaphoresis, fever, weakness, malaise/fatigue, weight gain and weight loss.   HENT: Negative for ear discharge, hearing loss, hoarse voice and nosebleeds.    Eyes: Negative for discharge, double vision, pain and photophobia.   Cardiovascular: Positive for chest pain and dyspnea on exertion. Negative for claudication, cyanosis, irregular heartbeat, leg swelling, near-syncope, orthopnea, palpitations, paroxysmal nocturnal dyspnea and syncope.   Respiratory: Positive for shortness of breath. Negative for cough, hemoptysis, sputum production and wheezing.    Endocrine: Negative for cold intolerance, heat intolerance, polydipsia, polyphagia and polyuria.   Hematologic/Lymphatic: Negative for adenopathy and bleeding problem. Does not bruise/bleed easily.   Skin: Negative for color change, flushing, itching and rash.   Musculoskeletal: Negative for muscle cramps, muscle weakness, myalgias and stiffness.   Gastrointestinal: Negative for  abdominal pain, diarrhea, hematemesis, hematochezia, nausea and vomiting.   Genitourinary: Negative for dysuria, frequency and nocturia.   Neurological: Negative for focal weakness, loss of balance, numbness, paresthesias and seizures.   Psychiatric/Behavioral: Negative for altered mental status, hallucinations and suicidal ideas.   Allergic/Immunologic: Negative for HIV exposure, hives and persistent infections.           Current Outpatient Prescriptions:   •  aspirin (SANJU ASPIRIN EC LOW DOSE) 81 MG EC tablet, Take 1 tablet by mouth daily., Disp: , Rfl:   •  atenolol (TENORMIN) 25 MG tablet, Take 1 tablet by mouth Daily., Disp: 90 tablet, Rfl: 3  •  betamethasone valerate (VALISONE) 0.1 % cream, , Disp: , Rfl:   •  carbidopa-levodopa CR (SINEMET CR)  MG per CR tablet, Take 1 tablet by mouth Every Night., Disp: 90 tablet, Rfl: 3  •  cholecalciferol (VITAMIN D3) 1000 units tablet, Take 1,000 Units by mouth Daily., Disp: , Rfl:   •  citalopram (CeleXA) 40 MG tablet, Take 1 tablet by mouth Daily. (Patient taking differently: Take 20 mg by mouth Daily.), Disp: 90 tablet, Rfl: 3  •  clobetasol (TEMOVATE) 0.05 % ointment, APPLY A THIN LAYER TWO TIMES A DAY FOR TWO WEEKS, THEN APPLY DAILY FOR ONE WEEK, THEN THREE TIMES WEEKLY, Disp: 60 g, Rfl: 5  •  COCONUT OIL PO, Take  by mouth., Disp: , Rfl:   •  glimepiride (AMARYL) 2 MG tablet, Take 1 tablet by mouth Daily With Breakfast., Disp: 90 tablet, Rfl: 3  •  hydrOXYzine (ATARAX) 25 MG tablet, , Disp: , Rfl:   •  lisinopril-hydrochlorothiazide (PRINZIDE,ZESTORETIC) 20-12.5 MG per tablet, Take 1 tablet by mouth Daily., Disp: 90 tablet, Rfl: 3  •  loratadine (CLARITIN) 10 MG tablet, Take 10 mg by mouth 2 (Two) Times a Day., Disp: , Rfl:   •  meclizine (ANTIVERT) 25 MG tablet, Take 1 tablet by mouth 3 (three) times a day as needed for dizziness., Disp: 90 tablet, Rfl: 0  •  naproxen sodium (ALEVE) 220 MG tablet, Take 220 mg by mouth 2 (Two) Times a Day As Needed for Mild  Pain (1-3)., Disp: , Rfl:   •  Nutritional Supplements (JUICE PLUS FIBRE PO), Take  by mouth., Disp: , Rfl:   •  Omega-3 Krill Oil 1000 MG capsule, Take  by mouth., Disp: , Rfl:   •  pantoprazole (PROTONIX) 40 MG EC tablet, Take 1 tablet by mouth Daily., Disp: 90 tablet, Rfl: 3  •  promethazine (PHENERGAN) 25 MG tablet, Take 25 mg by mouth Every 6 (Six) Hours As Needed for Nausea or Vomiting., Disp: , Rfl:   •  rosuvastatin (CRESTOR) 10 MG tablet, Take 1 tablet by mouth Every Night., Disp: 90 tablet, Rfl: 3  •  SOOLANTRA 1 % cream, , Disp: , Rfl:   •  triamcinolone (KENALOG) 0.1 % cream, As Needed., Disp: , Rfl:   •  vitamin B-12 (CYANOCOBALAMIN) 1000 MCG tablet, Take 1,000 mcg by mouth daily., Disp: , Rfl:   •  imiquimod (ALDARA) 5 % cream, Apply  topically., Disp: , Rfl:   •  Multiple Vitamins-Minerals (KANNAN BASIC) tablet, Take  by mouth., Disp: , Rfl:      Objective:     Physical Exam   Constitutional: She is oriented to person, place, and time. She appears well-developed and well-nourished.   HENT:   Head: Normocephalic and atraumatic.   Mouth/Throat: Oropharynx is clear and moist.   Eyes: Conjunctivae and EOM are normal. Pupils are equal, round, and reactive to light. No scleral icterus.   Neck: Normal range of motion. Neck supple. No JVD present. No tracheal deviation present. No thyromegaly present.   Cardiovascular: Normal rate, regular rhythm, S1 normal, S2 normal, normal heart sounds, intact distal pulses and normal pulses.  PMI is not displaced.  Exam reveals no gallop and no friction rub.    No murmur heard.  Pulmonary/Chest: Effort normal and breath sounds normal. No respiratory distress. She has no wheezes. She has no rales.   Abdominal: Soft. Bowel sounds are normal. She exhibits no distension and no mass. There is no tenderness. There is no rebound and no guarding.   Musculoskeletal: Normal range of motion. She exhibits no edema or deformity.   Neurological: She is alert and oriented to person,  "place, and time. She displays normal reflexes. No cranial nerve deficit. Coordination normal.   Skin: Skin is warm and dry. No rash noted. No erythema.   Psychiatric: She has a normal mood and affect. Her behavior is normal. Thought content normal.     Blood pressure 120/68, pulse 85, resp. rate 16, height 152.4 cm (60\"), weight 90.3 kg (199 lb), SpO2 99 %.   Lab Review:       Assessment:         1. Anginal equivalent  Shortness of breath with activity that is relieved with rest is typical of angina pectoris.  The complaint of chest tightness radiating into her jaw bilaterally is worrisome.  It is been over a year since her last ischemic evaluation.  She is unable to do treadmill stress testing.  - Stress Test With Myocardial Perfusion Two Day  - Adult Transthoracic Echo Complete W/ Cont if Necessary Per Protocol    2. Benign essential hypertension  Excellent blood pressure control.  - Adult Transthoracic Echo Complete W/ Cont if Necessary Per Protocol    3. Shortness of breath  I am concerned this may be related to a cardiac issue which has developed over the last one year.  The patient is still considerably overweight but has lost 5 pounds.  There may be an element related to her obesity and aerobic deconditioning.  Fortunately she is a nonsmoker.  - Stress Test With Myocardial Perfusion Two Day  - Adult Transthoracic Echo Complete W/ Cont if Necessary Per Protocol    Procedures     Plan:       I have recommended a vasodilator nuclear stress test as well as an echocardiogram.  No changes to her medication therapy have been made at today's visit.  Further recommendations will be predicated on the results of her outpatient testing.         "

## 2018-06-20 ENCOUNTER — OFFICE VISIT (OUTPATIENT)
Dept: INTERNAL MEDICINE | Facility: CLINIC | Age: 68
End: 2018-06-20

## 2018-06-20 VITALS
RESPIRATION RATE: 17 BRPM | TEMPERATURE: 98.2 F | WEIGHT: 200 LBS | OXYGEN SATURATION: 98 % | HEART RATE: 83 BPM | DIASTOLIC BLOOD PRESSURE: 54 MMHG | SYSTOLIC BLOOD PRESSURE: 133 MMHG | BODY MASS INDEX: 39.06 KG/M2

## 2018-06-20 DIAGNOSIS — E78.2 MIXED HYPERLIPIDEMIA: ICD-10-CM

## 2018-06-20 DIAGNOSIS — I10 BENIGN ESSENTIAL HYPERTENSION: Primary | ICD-10-CM

## 2018-06-20 DIAGNOSIS — E11.9 CONTROLLED TYPE 2 DIABETES MELLITUS WITHOUT COMPLICATION, WITHOUT LONG-TERM CURRENT USE OF INSULIN (HCC): ICD-10-CM

## 2018-06-20 PROCEDURE — 99214 OFFICE O/P EST MOD 30 MIN: CPT | Performed by: INTERNAL MEDICINE

## 2018-06-20 NOTE — PROGRESS NOTES
Subjective   iMssy Mcgraw is a 67 y.o. female.     Chief Complaint   Patient presents with   • Diabetes   • Hyperlipidemia   • Hypertension       History of Present Illness   HPI: Patient is here to follow up on the blood pressure  The patient is taking the blood pressure medications as prescribed and has had no side effects. The patient also needs refills on medications . The patient is also here to follow up on the cholesterol and is trying to follow a diet. The patient is also here to follow on sugar and had lab work done .  She has an appointment to see cardiology with stress test next month  Diabetes   Pertinent negatives for hypoglycemia include no dizziness, nervousness/anxiousness or pallor. Pertinent negatives for diabetes include no chest pain, no fatigue and no weakness.   Hyperlipidemia   Pertinent negatives include no chest pain or shortness of breath.   Hypertension   Pertinent negatives include no chest pain, palpitations or shortness of breath.     The following portions of the patient's history were reviewed and updated as appropriate: allergies, current medications, past family history, past medical history, past social history, past surgical history and problem list.    Review of Systems   Constitutional: Negative for appetite change, fatigue and fever.   HENT: Negative for congestion, ear discharge, ear pain, sinus pressure and sore throat.    Eyes: Negative for pain and discharge.   Respiratory: Negative for cough, chest tightness, shortness of breath and wheezing.    Cardiovascular: Negative for chest pain, palpitations and leg swelling.   Gastrointestinal: Negative for abdominal pain, blood in stool, constipation, diarrhea and nausea.   Endocrine: Negative for cold intolerance and heat intolerance.   Genitourinary: Negative for dysuria, flank pain and frequency.   Musculoskeletal: Negative for back pain and joint swelling.   Skin: Negative for color change.   Allergic/Immunologic:  Negative for environmental allergies and food allergies.   Neurological: Negative for dizziness, weakness, numbness and headaches.   Hematological: Negative for adenopathy. Does not bruise/bleed easily.   Psychiatric/Behavioral: Negative for behavioral problems and dysphoric mood. The patient is not nervous/anxious.          Current Outpatient Prescriptions:   •  aspirin (SANJU ASPIRIN EC LOW DOSE) 81 MG EC tablet, Take 1 tablet by mouth daily., Disp: , Rfl:   •  atenolol (TENORMIN) 25 MG tablet, Take 1 tablet by mouth Daily., Disp: 90 tablet, Rfl: 3  •  betamethasone valerate (VALISONE) 0.1 % cream, , Disp: , Rfl:   •  carbidopa-levodopa CR (SINEMET CR)  MG per CR tablet, Take 1 tablet by mouth Every Night., Disp: 90 tablet, Rfl: 3  •  cholecalciferol (VITAMIN D3) 1000 units tablet, Take 1,000 Units by mouth Daily., Disp: , Rfl:   •  citalopram (CeleXA) 40 MG tablet, Take 1 tablet by mouth Daily. (Patient taking differently: Take 20 mg by mouth Daily.), Disp: 90 tablet, Rfl: 3  •  clobetasol (TEMOVATE) 0.05 % ointment, APPLY A THIN LAYER TWO TIMES A DAY FOR TWO WEEKS, THEN APPLY DAILY FOR ONE WEEK, THEN THREE TIMES WEEKLY, Disp: 60 g, Rfl: 5  •  COCONUT OIL PO, Take  by mouth., Disp: , Rfl:   •  glimepiride (AMARYL) 2 MG tablet, Take 1 tablet by mouth Daily With Breakfast., Disp: 90 tablet, Rfl: 3  •  hydrOXYzine (ATARAX) 25 MG tablet, , Disp: , Rfl:   •  imiquimod (ALDARA) 5 % cream, Apply  topically., Disp: , Rfl:   •  lisinopril-hydrochlorothiazide (PRINZIDE,ZESTORETIC) 20-12.5 MG per tablet, Take 1 tablet by mouth Daily., Disp: 90 tablet, Rfl: 3  •  loratadine (CLARITIN) 10 MG tablet, Take 10 mg by mouth 2 (Two) Times a Day., Disp: , Rfl:   •  meclizine (ANTIVERT) 25 MG tablet, Take 1 tablet by mouth 3 (three) times a day as needed for dizziness., Disp: 90 tablet, Rfl: 0  •  Multiple Vitamins-Minerals (KANNAN BASIC) tablet, Take  by mouth., Disp: , Rfl:   •  naproxen sodium (ALEVE) 220 MG tablet, Take 220  mg by mouth 2 (Two) Times a Day As Needed for Mild Pain (1-3)., Disp: , Rfl:   •  Nutritional Supplements (JUICE PLUS FIBRE PO), Take  by mouth., Disp: , Rfl:   •  Omega-3 Krill Oil 1000 MG capsule, Take  by mouth., Disp: , Rfl:   •  pantoprazole (PROTONIX) 40 MG EC tablet, Take 1 tablet by mouth Daily., Disp: 90 tablet, Rfl: 3  •  promethazine (PHENERGAN) 25 MG tablet, Take 25 mg by mouth Every 6 (Six) Hours As Needed for Nausea or Vomiting., Disp: , Rfl:   •  rosuvastatin (CRESTOR) 10 MG tablet, Take 1 tablet by mouth Every Night., Disp: 90 tablet, Rfl: 3  •  SOOLANTRA 1 % cream, , Disp: , Rfl:   •  triamcinolone (KENALOG) 0.1 % cream, As Needed., Disp: , Rfl:   •  vitamin B-12 (CYANOCOBALAMIN) 1000 MCG tablet, Take 1,000 mcg by mouth daily., Disp: , Rfl:     Objective     Blood pressure 133/54, pulse 83, temperature 98.2 °F (36.8 °C), temperature source Oral, resp. rate 17, weight 90.7 kg (200 lb), SpO2 98 %.    Physical Exam   Constitutional: She is oriented to person, place, and time. She appears well-developed and well-nourished. No distress.   HENT:   Head: Normocephalic and atraumatic.   Right Ear: External ear normal.   Left Ear: External ear normal.   Nose: Nose normal.   Mouth/Throat: Oropharynx is clear and moist.   Eyes: Conjunctivae and EOM are normal. Pupils are equal, round, and reactive to light.   Neck: Neck supple. No thyromegaly present.   Cardiovascular: Normal rate, regular rhythm and normal heart sounds.    Pulmonary/Chest: Effort normal and breath sounds normal. No respiratory distress.   Abdominal: Soft. Bowel sounds are normal. She exhibits no distension. There is no tenderness. There is no rebound.   Musculoskeletal: Normal range of motion. She exhibits no edema.   Lymphadenopathy:     She has no cervical adenopathy.   Neurological: She is alert and oriented to person, place, and time.   No gross motor or sensory deficits   Skin: Skin is warm. She is not diaphoretic.   Psychiatric: She has  a normal mood and affect.   Nursing note and vitals reviewed.      Results for orders placed or performed in visit on 03/19/18   Comprehensive Metabolic Panel   Result Value Ref Range    Glucose 147 (H) 74 - 98 mg/dL    BUN 32 (H) 7 - 20 mg/dL    Creatinine 0.70 0.60 - 1.30 mg/dL    eGFR Non African Am 84 >60 mL/min/1.73    eGFR African Am 102 >60 mL/min/1.73    BUN/Creatinine Ratio 45.7 (H) 7.1 - 23.5    Sodium 139 137 - 145 mmol/L    Potassium 4.4 3.5 - 5.1 mmol/L    Chloride 102 98 - 107 mmol/L    Total CO2 29.0 26.0 - 30.0 mmol/L    Calcium 9.7 8.4 - 10.2 mg/dL    Total Protein 6.6 6.3 - 8.2 g/dL    Albumin 3.90 3.50 - 5.00 g/dL    Globulin 2.7 gm/dL    A/G Ratio 1.4 1.0 - 2.0 g/dL    Total Bilirubin 0.9 0.2 - 1.3 mg/dL    Alkaline Phosphatase 99 38 - 126 U/L    AST (SGOT) 18 15 - 46 U/L    ALT (SGPT) 17 13 - 69 U/L   Lipid Panel   Result Value Ref Range    Total Cholesterol 111 0 - 199 mg/dL    Triglycerides 121 <150 mg/dL    HDL Cholesterol 38 (L) 40 - 60 mg/dL    VLDL Cholesterol 24.2 mg/dL    LDL Cholesterol  49 0 - 99 mg/dL   Hemoglobin A1c   Result Value Ref Range    Hemoglobin A1C 6.60 %   CBC & Differential   Result Value Ref Range    WBC 6.59 4.80 - 10.80 10*3/mm3    RBC 4.43 4.20 - 5.40 10*6/mm3    Hemoglobin 13.1 12.0 - 16.0 g/dL    Hematocrit 38.8 37.0 - 47.0 %    MCV 87.6 81.0 - 99.0 fL    MCH 29.6 27.0 - 31.0 pg    MCHC 33.8 30.0 - 37.0 g/dL    RDW 11.9 11.5 - 14.5 %    Platelets 198 130 - 400 10*3/mm3    Neutrophil Rel % 57.1 37.0 - 80.0 %    Lymphocyte Rel % 26.6 10.0 - 50.0 %    Monocyte Rel % 13.1 (H) 0.0 - 12.0 %    Eosinophil Rel % 2.1 0.0 - 7.0 %    Basophil Rel % 0.5 0.0 - 2.5 %    Neutrophils Absolute 3.77 2.00 - 6.90 10*3/mm3    Lymphocytes Absolute 1.75 0.60 - 3.40 10*3/mm3    Monocytes Absolute 0.86 0.00 - 0.90 10*3/mm3    Eosinophils Absolute 0.14 0.00 - 0.70 10*3/mm3    Basophils Absolute 0.03 0.00 - 0.20 10*3/mm3    Immature Granulocyte Rel % 0.6 0.0 - 0.6 %    Immature Grans  Absolute 0.04 0.00 - 0.06 10*3/mm3    nRBC 0.0 0.0 - 0.0 /100 WBC         Assessment/Plan   Missy was seen today for diabetes, hyperlipidemia and hypertension.    Diagnoses and all orders for this visit:    Benign essential hypertension    Mixed hyperlipidemia  -     CBC & Differential  -     Comprehensive Metabolic Panel  -     Lipid Panel    Controlled type 2 diabetes mellitus without complication, without long-term current use of insulin  -     Hemoglobin A1c  -     Microalbumin / Creatinine Urine Ratio - Urine, Clean Catch          Plan:  1.  Benign essential hypertension: Will continue current medication, low-sodium diet advised  2.mixed hyperlipidemia: reviewed  fasting CMP and lipid panel.  Diet and excise counseled,  On crestor 10 mg qhs , ldl 49  3. Diabetes mellitus  : reviewed  fasting CMP  and hba1c 6.6  , diet and exercise counseled , continue amaryl       Emely Sheldon MD

## 2018-06-26 LAB
BH CV ECHO MEAS - % IVS THICK: 50 %
BH CV ECHO MEAS - AO MAX PG (FULL): 3.2 MMHG
BH CV ECHO MEAS - AO MAX PG: 9 MMHG
BH CV ECHO MEAS - AO MEAN PG (FULL): 2 MMHG
BH CV ECHO MEAS - AO MEAN PG: 5 MMHG
BH CV ECHO MEAS - AO ROOT AREA: 5.3 CM^2
BH CV ECHO MEAS - AO ROOT DIAM: 2.6 CM
BH CV ECHO MEAS - AO V2 MAX: 151 CM/SEC
BH CV ECHO MEAS - AO V2 MEAN: 97.1 CM/SEC
BH CV ECHO MEAS - AO V2 VTI: 24.8 CM
BH CV ECHO MEAS - AVA(I,A): 3.4 CM^2
BH CV ECHO MEAS - AVA(I,D): 3.4 CM^2
BH CV ECHO MEAS - AVA(V,A): 2.8 CM^2
BH CV ECHO MEAS - AVA(V,D): 2.8 CM^2
BH CV ECHO MEAS - CONTRAST EF 4CH: 61.8 ML/M^2
BH CV ECHO MEAS - EDV(CUBED): 103.8 ML
BH CV ECHO MEAS - EDV(MOD-SP4): 131 ML
BH CV ECHO MEAS - EDV(TEICH): 102.4 ML
BH CV ECHO MEAS - EF(CUBED): 68.4 %
BH CV ECHO MEAS - EF(MOD-SP4): 61.8 %
BH CV ECHO MEAS - EF(TEICH): 60 %
BH CV ECHO MEAS - ESV(CUBED): 32.8 ML
BH CV ECHO MEAS - ESV(MOD-SP4): 50 ML
BH CV ECHO MEAS - ESV(TEICH): 41 ML
BH CV ECHO MEAS - FS: 31.9 %
BH CV ECHO MEAS - IVS/LVPW: 1.3
BH CV ECHO MEAS - IVSD: 0.8 CM
BH CV ECHO MEAS - IVSS: 1.5 CM
BH CV ECHO MEAS - LA DIMENSION(2D): 3.1 CM
BH CV ECHO MEAS - LV IVRT: 0.1 SEC
BH CV ECHO MEAS - LV MASS(C)D: 142.7 GRAMS
BH CV ECHO MEAS - LV MAX PG: 5.8 MMHG
BH CV ECHO MEAS - LV MEAN PG: 3 MMHG
BH CV ECHO MEAS - LV V1 MAX: 120 CM/SEC
BH CV ECHO MEAS - LV V1 MEAN: 83.5 CM/SEC
BH CV ECHO MEAS - LV V1 VTI: 24.4 CM
BH CV ECHO MEAS - LVIDD: 4.7 CM
BH CV ECHO MEAS - LVIDS: 3.2 CM
BH CV ECHO MEAS - LVLD AP4: 8.6 CM
BH CV ECHO MEAS - LVLS AP4: 7.9 CM
BH CV ECHO MEAS - LVOT AREA (M): 3.5 CM^2
BH CV ECHO MEAS - LVOT AREA: 3.5 CM^2
BH CV ECHO MEAS - LVOT DIAM: 2.1 CM
BH CV ECHO MEAS - LVPWD: 0.8 CM
BH CV ECHO MEAS - MV A MAX VEL: 67.4 CM/SEC
BH CV ECHO MEAS - MV DEC SLOPE: 577 CM/SEC^2
BH CV ECHO MEAS - MV DEC TIME: 0.2 SEC
BH CV ECHO MEAS - MV E MAX VEL: 99 CM/SEC
BH CV ECHO MEAS - MV E/A: 1.5
BH CV ECHO MEAS - MV MAX PG: 4.2 MMHG
BH CV ECHO MEAS - MV MEAN PG: 1 MMHG
BH CV ECHO MEAS - MV P1/2T MAX VEL: 105 CM/SEC
BH CV ECHO MEAS - MV P1/2T: 53.3 MSEC
BH CV ECHO MEAS - MV V2 MAX: 102 CM/SEC
BH CV ECHO MEAS - MV V2 MEAN: 53.5 CM/SEC
BH CV ECHO MEAS - MV V2 VTI: 21.2 CM
BH CV ECHO MEAS - MVA P1/2T LCG: 2.1 CM^2
BH CV ECHO MEAS - MVA(P1/2T): 4.1 CM^2
BH CV ECHO MEAS - MVA(VTI): 4 CM^2
BH CV ECHO MEAS - PA MAX PG: 2.5 MMHG
BH CV ECHO MEAS - PA V2 MAX: 79.8 CM/SEC
BH CV ECHO MEAS - RAP SYSTOLE: 10 MMHG
BH CV ECHO MEAS - RVSP: 36 MMHG
BH CV ECHO MEAS - SV(AO): 131.7 ML
BH CV ECHO MEAS - SV(CUBED): 71.1 ML
BH CV ECHO MEAS - SV(LVOT): 84.5 ML
BH CV ECHO MEAS - SV(MOD-SP4): 81 ML
BH CV ECHO MEAS - SV(TEICH): 61.4 ML
BH CV ECHO MEAS - TR MAX VEL: 190 CM/SEC
BH CV ECHO MEAS - TV MAX PG: 2 MMHG
BH CV ECHO MEAS - TV V2 MAX: 71.5 CM/SEC
LV EF 2D ECHO EST: 60 %

## 2018-07-09 ENCOUNTER — HOSPITAL ENCOUNTER (OUTPATIENT)
Dept: NUCLEAR MEDICINE | Facility: HOSPITAL | Age: 68
Discharge: HOME | End: 2018-07-09
Attending: INTERNAL MEDICINE

## 2018-07-09 PROCEDURE — 0 TECHNETIUM SESTAMIBI: Performed by: INTERNAL MEDICINE

## 2018-07-09 PROCEDURE — 25010000002 REGADENOSON 0.4 MG/5ML SOLUTION: Performed by: INTERNAL MEDICINE

## 2018-07-09 PROCEDURE — 78452 HT MUSCLE IMAGE SPECT MULT: CPT | Performed by: INTERNAL MEDICINE

## 2018-07-09 PROCEDURE — A9500 TC99M SESTAMIBI: HCPCS | Performed by: INTERNAL MEDICINE

## 2018-07-09 PROCEDURE — 78452 HT MUSCLE IMAGE SPECT MULT: CPT

## 2018-07-09 PROCEDURE — 93018 CV STRESS TEST I&R ONLY: CPT | Performed by: INTERNAL MEDICINE

## 2018-07-09 PROCEDURE — 93017 CV STRESS TEST TRACING ONLY: CPT

## 2018-07-09 RX ADMIN — REGADENOSON 0.4 MG: 0.08 INJECTION, SOLUTION INTRAVENOUS at 08:02

## 2018-07-09 RX ADMIN — TECHNETIUM TC 99M SESTAMIBI 1 DOSE: 1 INJECTION INTRAVENOUS at 08:02

## 2018-07-10 ENCOUNTER — HOSPITAL ENCOUNTER (OUTPATIENT)
Dept: NUCLEAR MEDICINE | Facility: HOSPITAL | Age: 68
Discharge: HOME | End: 2018-07-10
Attending: INTERNAL MEDICINE

## 2018-07-10 PROCEDURE — 0 TECHNETIUM SESTAMIBI: Performed by: INTERNAL MEDICINE

## 2018-07-10 PROCEDURE — A9500 TC99M SESTAMIBI: HCPCS | Performed by: INTERNAL MEDICINE

## 2018-07-10 RX ADMIN — TECHNETIUM TC 99M SESTAMIBI 1 DOSE: 1 INJECTION INTRAVENOUS at 07:29

## 2018-07-11 LAB
BH CV NUCLEAR PRIOR STUDY: 1
BH CV STRESS COMMENTS STAGE 1: ABNORMAL
BH CV STRESS DOSE REGADENOSON STAGE 1: 0.4
BH CV STRESS DURATION MIN STAGE 1: 0
BH CV STRESS DURATION SEC STAGE 1: 10
BH CV STRESS PROTOCOL 1: ABNORMAL
BH CV STRESS RECOVERY BP: ABNORMAL MMHG
BH CV STRESS RECOVERY HR: 117 BPM
BH CV STRESS STAGE 1: 1
LV EF NUC BP: 65 %
MAXIMAL PREDICTED HEART RATE: 155 BPM
PERCENT MAX PREDICTED HR: 91.61 %
STRESS BASELINE BP: ABNORMAL MMHG
STRESS BASELINE HR: 92 BPM
STRESS PERCENT HR: 108 %
STRESS POST PEAK BP: ABNORMAL MMHG
STRESS POST PEAK HR: 142 BPM
STRESS TARGET HR: 132 BPM

## 2018-07-31 ENCOUNTER — OFFICE VISIT (OUTPATIENT)
Dept: CARDIOLOGY | Facility: CLINIC | Age: 68
End: 2018-07-31

## 2018-07-31 VITALS
HEIGHT: 60 IN | DIASTOLIC BLOOD PRESSURE: 68 MMHG | OXYGEN SATURATION: 97 % | SYSTOLIC BLOOD PRESSURE: 136 MMHG | BODY MASS INDEX: 37.03 KG/M2 | HEART RATE: 89 BPM | WEIGHT: 188.6 LBS

## 2018-07-31 DIAGNOSIS — I10 BENIGN ESSENTIAL HYPERTENSION: Primary | ICD-10-CM

## 2018-07-31 DIAGNOSIS — R94.39 ABNORMAL NUCLEAR STRESS TEST: ICD-10-CM

## 2018-07-31 DIAGNOSIS — F33.0 MILD EPISODE OF RECURRENT MAJOR DEPRESSIVE DISORDER (HCC): ICD-10-CM

## 2018-07-31 DIAGNOSIS — I20.8 ANGINAL EQUIVALENT (HCC): ICD-10-CM

## 2018-07-31 DIAGNOSIS — R07.9 CHEST PAIN IN ADULT: ICD-10-CM

## 2018-07-31 DIAGNOSIS — R06.02 SHORTNESS OF BREATH: ICD-10-CM

## 2018-07-31 PROCEDURE — 99214 OFFICE O/P EST MOD 30 MIN: CPT | Performed by: INTERNAL MEDICINE

## 2018-07-31 NOTE — PROGRESS NOTES
Subjective:     Encounter Date:07/31/2018      Patient ID: Missy Mcgraw is a 67 y.o. female.    Chief Complaint:Exertional chest discomfort  HPI  This is a 67-year-old female patient who is been experiencing chest tightness and shortness of breath with activity that is relieved with rest.  The patient underwent an echocardiogram which showed no significant abnormalities. The echocardiogram showed no evidence of ventricular hypertrophy or cardiac chamber enlargement.  Left ventricular systolic and diastolic function was normal.  There was no evidence of valvular pathology of significance, pericardial disease, pulmonary hypertension or intracardiac shunting.  The left ventricular ejection fraction was normal and there were no regional wall motion abnormalities.  The patient also underwent a vasodilator nuclear stress test which indicated anterior and apical ischemia.  The patient indicates that she is continued to have exertional symptoms as previously described which have remained largely unchanged in frequency duration and intensity.  She also has ongoing shortness of breath with activity which is relieved with rest.  There is no orthopnea PND or lower extremity edema.  There is no dizziness palpitations or syncope.    The following portions of the patient's history were reviewed and updated as appropriate: allergies, current medications, past family history, past medical history, past social history, past surgical history and problem  Review of Systems   Constitution: Negative for chills, diaphoresis, fever, weakness, malaise/fatigue, weight gain and weight loss.   HENT: Negative for ear discharge, hearing loss, hoarse voice and nosebleeds.    Eyes: Negative for discharge, double vision, pain and photophobia.   Cardiovascular: Positive for chest pain and dyspnea on exertion. Negative for claudication, cyanosis, irregular heartbeat, leg swelling, near-syncope, orthopnea, palpitations, paroxysmal nocturnal  dyspnea and syncope.   Respiratory: Negative for cough, hemoptysis, sputum production and wheezing.    Endocrine: Negative for cold intolerance, heat intolerance, polydipsia, polyphagia and polyuria.   Hematologic/Lymphatic: Negative for adenopathy and bleeding problem. Does not bruise/bleed easily.   Skin: Negative for color change, flushing, itching and rash.   Musculoskeletal: Negative for muscle cramps, muscle weakness, myalgias and stiffness.   Gastrointestinal: Negative for abdominal pain, diarrhea, hematemesis, hematochezia, nausea and vomiting.   Genitourinary: Negative for dysuria, frequency and nocturia.   Neurological: Negative for focal weakness, loss of balance, numbness, paresthesias and seizures.   Psychiatric/Behavioral: Negative for altered mental status, hallucinations and suicidal ideas.   Allergic/Immunologic: Negative for HIV exposure, hives and persistent infections.           Current Outpatient Prescriptions:   •  aspirin (SANJU ASPIRIN EC LOW DOSE) 81 MG EC tablet, Take 1 tablet by mouth daily., Disp: , Rfl:   •  atenolol (TENORMIN) 25 MG tablet, Take 1 tablet by mouth Daily., Disp: 90 tablet, Rfl: 3  •  betamethasone valerate (VALISONE) 0.1 % cream, , Disp: , Rfl:   •  carbidopa-levodopa CR (SINEMET CR)  MG per CR tablet, Take 1 tablet by mouth Every Night., Disp: 90 tablet, Rfl: 3  •  cholecalciferol (VITAMIN D3) 1000 units tablet, Take 1,000 Units by mouth Daily., Disp: , Rfl:   •  citalopram (CeleXA) 40 MG tablet, Take 1 tablet by mouth Daily. (Patient taking differently: Take 20 mg by mouth Daily.), Disp: 90 tablet, Rfl: 3  •  clobetasol (TEMOVATE) 0.05 % ointment, APPLY A THIN LAYER TWO TIMES A DAY FOR TWO WEEKS, THEN APPLY DAILY FOR ONE WEEK, THEN THREE TIMES WEEKLY, Disp: 60 g, Rfl: 5  •  COCONUT OIL PO, Take  by mouth., Disp: , Rfl:   •  glimepiride (AMARYL) 2 MG tablet, Take 1 tablet by mouth Daily With Breakfast., Disp: 90 tablet, Rfl: 3  •  hydrOXYzine (ATARAX) 25 MG tablet,  , Disp: , Rfl:   •  imiquimod (ALDARA) 5 % cream, Apply  topically., Disp: , Rfl:   •  lisinopril-hydrochlorothiazide (PRINZIDE,ZESTORETIC) 20-12.5 MG per tablet, Take 1 tablet by mouth Daily., Disp: 90 tablet, Rfl: 3  •  loratadine (CLARITIN) 10 MG tablet, Take 10 mg by mouth 2 (Two) Times a Day., Disp: , Rfl:   •  meclizine (ANTIVERT) 25 MG tablet, Take 1 tablet by mouth 3 (three) times a day as needed for dizziness., Disp: 90 tablet, Rfl: 0  •  Multiple Vitamins-Minerals (KANNAN BASIC) tablet, Take  by mouth., Disp: , Rfl:   •  naproxen sodium (ALEVE) 220 MG tablet, Take 220 mg by mouth 2 (Two) Times a Day As Needed for Mild Pain (1-3)., Disp: , Rfl:   •  Nutritional Supplements (JUICE PLUS FIBRE PO), Take  by mouth., Disp: , Rfl:   •  Omega-3 Krill Oil 1000 MG capsule, Take  by mouth., Disp: , Rfl:   •  pantoprazole (PROTONIX) 40 MG EC tablet, Take 1 tablet by mouth Daily., Disp: 90 tablet, Rfl: 3  •  promethazine (PHENERGAN) 25 MG tablet, Take 25 mg by mouth Every 6 (Six) Hours As Needed for Nausea or Vomiting., Disp: , Rfl:   •  rosuvastatin (CRESTOR) 10 MG tablet, Take 1 tablet by mouth Every Night., Disp: 90 tablet, Rfl: 3  •  SOOLANTRA 1 % cream, , Disp: , Rfl:   •  triamcinolone (KENALOG) 0.1 % cream, As Needed., Disp: , Rfl:   •  vitamin B-12 (CYANOCOBALAMIN) 1000 MCG tablet, Take 1,000 mcg by mouth daily., Disp: , Rfl:      Objective:     Physical Exam   Constitutional: She is oriented to person, place, and time. She appears well-developed and well-nourished.   HENT:   Head: Normocephalic and atraumatic.   Mouth/Throat: Oropharynx is clear and moist.   Eyes: Pupils are equal, round, and reactive to light. Conjunctivae and EOM are normal. No scleral icterus.   Neck: Normal range of motion. Neck supple. No JVD present. No tracheal deviation present. No thyromegaly present.   Cardiovascular: Normal rate, regular rhythm, S1 normal, S2 normal, normal heart sounds, intact distal pulses and normal pulses.  PMI is  "not displaced.  Exam reveals no gallop and no friction rub.    No murmur heard.  Pulmonary/Chest: Effort normal and breath sounds normal. No respiratory distress. She has no wheezes. She has no rales.   Abdominal: Soft. Bowel sounds are normal. She exhibits no distension and no mass. There is no tenderness. There is no rebound and no guarding.   Musculoskeletal: Normal range of motion. She exhibits no edema or deformity.   Neurological: She is alert and oriented to person, place, and time. She displays normal reflexes. No cranial nerve deficit. Coordination normal.   Skin: Skin is warm and dry. No rash noted. No erythema.   Psychiatric: She has a normal mood and affect. Her behavior is normal. Thought content normal.     Blood pressure 136/68, pulse 89, height 152.4 cm (60\"), weight 85.5 kg (188 lb 9.6 oz), SpO2 97 %.   Lab Review:       Assessment:         1. Benign essential hypertension  Acceptable blood pressure control.  On the day of the patient's stress test her systolic blood pressure was greater than 200 mmHg.  She had not taken her blood pressure medication that morning.    2. Mild episode of recurrent major depressive disorder (CMS/HCC)  This is followed closely by her primary care provider.    3. Shortness of breath  Interval light of her abnormal nuclear stress test there is concern that her shortness of breath with activity represents an angina equivalent.  This could also be related to her body habitus and aerobic deconditioning.  I am also concerned that there is a component of hypertensive related cardiac disease.  Even though her echocardiogram does not show left ventricular hypertrophy or evidence of diastolic dysfunction, her extreme elevations in blood pressure could easily precipitate her current symptoms.    4. Chest pain in adult  Her chest discomfort with activity that is relieved with rest is consistent with the diagnosis of angina pectoris.    5. Anginal equivalent (CMS/HCC)  Shortness of " breath with activity that is relieved with rest is typical of angina pectoris.  This is particularly common in a middle-aged female patient.  - Case Request Cath Lab: Stent REBECCA coronary    6. Abnormal nuclear stress test  Nuclear stress testing suggested a large area of anterior and apical ischemia.  - Case Request Cath Lab: Stent REBECCA coronary    Procedures     Plan:       I have recommended coronary angiography with interventional standby.  The procedure has been explained in detail to the patient including risks benefits and alternatives.  The patient expresses understanding and a desire to proceed.  No changes in her medication therapy have been made at today's visit.  The patient has been congratulated on her weight loss and encouraged to continue these efforts.  The importance of diet exercise and weight management have been reinforced with the patient.  The patient has been counseled regarding the importance of dietary sodium restriction.  She has been given a list of specific foods and beverages that are high in sodium content with instructions to avoid these.  Further recommendations will be predicated on the results of her catheterization findings.

## 2018-08-01 ENCOUNTER — HOSPITAL ENCOUNTER (OUTPATIENT)
Facility: HOSPITAL | Age: 68
Setting detail: HOSPITAL OUTPATIENT SURGERY
Discharge: HOME | End: 2018-08-01
Attending: INTERNAL MEDICINE | Admitting: INTERNAL MEDICINE

## 2018-08-01 VITALS
TEMPERATURE: 97.1 F | WEIGHT: 188 LBS | DIASTOLIC BLOOD PRESSURE: 50 MMHG | BODY MASS INDEX: 36.91 KG/M2 | HEART RATE: 78 BPM | OXYGEN SATURATION: 98 % | HEIGHT: 60 IN | RESPIRATION RATE: 18 BRPM | SYSTOLIC BLOOD PRESSURE: 134 MMHG

## 2018-08-01 DIAGNOSIS — R94.39 ABNORMAL NUCLEAR STRESS TEST: ICD-10-CM

## 2018-08-01 DIAGNOSIS — I20.8 ANGINAL EQUIVALENT (HCC): ICD-10-CM

## 2018-08-01 LAB
ANION GAP SERPL CALCULATED.3IONS-SCNC: 12.1 MMOL/L (ref 10–20)
BASOPHILS # BLD AUTO: 0.03 10*3/MM3 (ref 0–0.2)
BASOPHILS NFR BLD AUTO: 0.3 % (ref 0–2.5)
BUN BLD-MCNC: 17 MG/DL (ref 7–20)
BUN/CREAT SERPL: 24.3 (ref 7.1–23.5)
CALCIUM SPEC-SCNC: 10.2 MG/DL (ref 8.4–10.2)
CHLORIDE SERPL-SCNC: 105 MMOL/L (ref 98–107)
CO2 SERPL-SCNC: 29 MMOL/L (ref 26–30)
CREAT BLD-MCNC: 0.7 MG/DL (ref 0.6–1.3)
DEPRECATED RDW RBC AUTO: 38.5 FL (ref 37–54)
EOSINOPHIL # BLD AUTO: 0.26 10*3/MM3 (ref 0–0.7)
EOSINOPHIL NFR BLD AUTO: 2.9 % (ref 0–7)
ERYTHROCYTE [DISTWIDTH] IN BLOOD BY AUTOMATED COUNT: 12.5 % (ref 11.5–14.5)
GFR SERPL CREATININE-BSD FRML MDRD: 84 ML/MIN/1.73
GLUCOSE BLD-MCNC: 122 MG/DL (ref 74–98)
HCT VFR BLD AUTO: 34 % (ref 37–47)
HGB BLD-MCNC: 11.1 G/DL (ref 12–16)
IMM GRANULOCYTES # BLD: 0.07 10*3/MM3 (ref 0–0.06)
IMM GRANULOCYTES NFR BLD: 0.8 % (ref 0–0.6)
LYMPHOCYTES # BLD AUTO: 2.14 10*3/MM3 (ref 0.6–3.4)
LYMPHOCYTES NFR BLD AUTO: 23.8 % (ref 10–50)
MCH RBC QN AUTO: 27.8 PG (ref 27–31)
MCHC RBC AUTO-ENTMCNC: 32.6 G/DL (ref 30–37)
MCV RBC AUTO: 85 FL (ref 81–99)
MONOCYTES # BLD AUTO: 1.01 10*3/MM3 (ref 0–0.9)
MONOCYTES NFR BLD AUTO: 11.2 % (ref 0–12)
NEUTROPHILS # BLD AUTO: 5.49 10*3/MM3 (ref 2–6.9)
NEUTROPHILS NFR BLD AUTO: 61 % (ref 37–80)
NRBC BLD MANUAL-RTO: 0 /100 WBC (ref 0–0)
PLATELET # BLD AUTO: 220 10*3/MM3 (ref 130–400)
PMV BLD AUTO: 11.4 FL (ref 6–12)
POTASSIUM BLD-SCNC: 4.1 MMOL/L (ref 3.5–5.1)
RBC # BLD AUTO: 4 10*6/MM3 (ref 4.2–5.4)
SODIUM BLD-SCNC: 142 MMOL/L (ref 137–145)
WBC NRBC COR # BLD: 9 10*3/MM3 (ref 4.8–10.8)

## 2018-08-01 PROCEDURE — 0 IOPAMIDOL PER 1 ML: Performed by: INTERNAL MEDICINE

## 2018-08-01 PROCEDURE — 25010000002 FENTANYL CITRATE (PF) 100 MCG/2ML SOLUTION: Performed by: INTERNAL MEDICINE

## 2018-08-01 PROCEDURE — 93005 ELECTROCARDIOGRAM TRACING: CPT | Performed by: INTERNAL MEDICINE

## 2018-08-01 PROCEDURE — 25010000002 MIDAZOLAM PER 1 MG: Performed by: INTERNAL MEDICINE

## 2018-08-01 PROCEDURE — 85025 COMPLETE CBC W/AUTO DIFF WBC: CPT | Performed by: INTERNAL MEDICINE

## 2018-08-01 PROCEDURE — C1769 GUIDE WIRE: HCPCS | Performed by: INTERNAL MEDICINE

## 2018-08-01 PROCEDURE — 25010000002 HEPARIN (PORCINE) PER 1000 UNITS: Performed by: INTERNAL MEDICINE

## 2018-08-01 PROCEDURE — 80048 BASIC METABOLIC PNL TOTAL CA: CPT | Performed by: INTERNAL MEDICINE

## 2018-08-01 PROCEDURE — 93454 CORONARY ARTERY ANGIO S&I: CPT | Performed by: INTERNAL MEDICINE

## 2018-08-01 PROCEDURE — 93010 ELECTROCARDIOGRAM REPORT: CPT | Performed by: INTERNAL MEDICINE

## 2018-08-01 PROCEDURE — C1894 INTRO/SHEATH, NON-LASER: HCPCS | Performed by: INTERNAL MEDICINE

## 2018-08-01 RX ORDER — LIDOCAINE HYDROCHLORIDE 10 MG/ML
INJECTION, SOLUTION INFILTRATION; PERINEURAL AS NEEDED
Status: DISCONTINUED | OUTPATIENT
Start: 2018-08-01 | End: 2018-08-01 | Stop reason: HOSPADM

## 2018-08-01 RX ORDER — SODIUM CHLORIDE 9 MG/ML
100 INJECTION, SOLUTION INTRAVENOUS CONTINUOUS
Status: DISCONTINUED | OUTPATIENT
Start: 2018-08-01 | End: 2018-08-02 | Stop reason: HOSPADM

## 2018-08-01 RX ORDER — SODIUM CHLORIDE 9 MG/ML
100 INJECTION, SOLUTION INTRAVENOUS CONTINUOUS
Status: CANCELLED | OUTPATIENT
Start: 2018-08-01

## 2018-08-01 RX ORDER — MIDAZOLAM HYDROCHLORIDE 1 MG/ML
INJECTION INTRAMUSCULAR; INTRAVENOUS AS NEEDED
Status: DISCONTINUED | OUTPATIENT
Start: 2018-08-01 | End: 2018-08-01 | Stop reason: HOSPADM

## 2018-08-01 RX ORDER — SODIUM CHLORIDE 9 MG/ML
INJECTION, SOLUTION INTRAVENOUS CONTINUOUS PRN
Status: COMPLETED | OUTPATIENT
Start: 2018-08-01 | End: 2018-08-01

## 2018-08-01 RX ORDER — FENTANYL CITRATE 50 UG/ML
INJECTION, SOLUTION INTRAMUSCULAR; INTRAVENOUS AS NEEDED
Status: DISCONTINUED | OUTPATIENT
Start: 2018-08-01 | End: 2018-08-01 | Stop reason: HOSPADM

## 2018-08-01 RX ADMIN — SODIUM CHLORIDE 100 ML/HR: 9 INJECTION, SOLUTION INTRAVENOUS at 06:38

## 2018-08-01 NOTE — INTERVAL H&P NOTE
H&P reviewed. The patient was examined and there are no changes to the H&P.      Addendum  Physical examination  Ear  Nose and throat: Normal gag reflex  Neck: Normal central venous pressure  Peripheral pulses: Left and right radial pulses are present with normal Hossein testing  Lungs: Clear to auscultation  Heart: Regular rate and rhythm.  Normal S1.  Normal S2.  No murmurs rubs or gallops.

## 2018-08-13 RX ORDER — MECLIZINE HYDROCHLORIDE 25 MG/1
25 TABLET ORAL 3 TIMES DAILY PRN
Qty: 90 TABLET | Refills: 0 | Status: SHIPPED | OUTPATIENT
Start: 2018-08-13 | End: 2018-08-13 | Stop reason: SDUPTHER

## 2018-08-13 RX ORDER — MECLIZINE HYDROCHLORIDE 25 MG/1
25 TABLET ORAL 3 TIMES DAILY PRN
Qty: 90 TABLET | Refills: 0 | Status: SHIPPED | OUTPATIENT
Start: 2018-08-13 | End: 2021-09-08 | Stop reason: SDUPTHER

## 2018-09-06 ENCOUNTER — OFFICE VISIT (OUTPATIENT)
Dept: CARDIOLOGY | Facility: CLINIC | Age: 68
End: 2018-09-06

## 2018-09-06 VITALS
HEIGHT: 60 IN | OXYGEN SATURATION: 98 % | BODY MASS INDEX: 35.14 KG/M2 | HEART RATE: 76 BPM | SYSTOLIC BLOOD PRESSURE: 140 MMHG | WEIGHT: 179 LBS | DIASTOLIC BLOOD PRESSURE: 80 MMHG

## 2018-09-06 DIAGNOSIS — R94.39 ABNORMAL NUCLEAR STRESS TEST: ICD-10-CM

## 2018-09-06 DIAGNOSIS — I10 BENIGN ESSENTIAL HYPERTENSION: Primary | ICD-10-CM

## 2018-09-06 DIAGNOSIS — I25.10 CORONARY ARTERY DISEASE INVOLVING NATIVE CORONARY ARTERY OF NATIVE HEART WITHOUT ANGINA PECTORIS: ICD-10-CM

## 2018-09-06 PROCEDURE — 99214 OFFICE O/P EST MOD 30 MIN: CPT | Performed by: INTERNAL MEDICINE

## 2018-09-06 RX ORDER — ATENOLOL 25 MG/1
25 TABLET ORAL
Qty: 180 TABLET | Refills: 4 | Status: SHIPPED | OUTPATIENT
Start: 2018-09-06 | End: 2018-09-06 | Stop reason: DRUGHIGH

## 2018-09-06 RX ORDER — ATENOLOL 25 MG/1
25 TABLET ORAL 2 TIMES DAILY
Qty: 180 TABLET | Refills: 4 | Status: SHIPPED | OUTPATIENT
Start: 2018-09-06 | End: 2019-02-08 | Stop reason: SDUPTHER

## 2018-09-06 RX ORDER — ATENOLOL 25 MG/1
50 TABLET ORAL 2 TIMES DAILY
Qty: 30 TABLET | Refills: 11 | Status: SHIPPED | OUTPATIENT
Start: 2018-09-06 | End: 2018-09-06

## 2018-09-06 RX ORDER — AMLODIPINE BESYLATE 2.5 MG/1
2.5 TABLET ORAL DAILY
Qty: 90 TABLET | Refills: 3 | Status: SHIPPED | OUTPATIENT
Start: 2018-09-06 | End: 2018-09-06

## 2018-09-06 NOTE — PROGRESS NOTES
Subjective:     Encounter Date:09/06/2018      Patient ID: Missy Mcgraw is a 67 y.o. female.    Chief Complaint: Hypertension  HPI  This is a 67-year-old female patient who follows up in cardiology clinic after having an uncomplicated coronary angiogram last month.  The patient had coronary angiography to follow-up in abnormal vasodilator nuclear stress test.  Her coronary angiograms demonstrated angiographically near normal coronary arteries.  Her ejection fraction is normal.  Her chest discomfort and shortness of breath have resolved spontaneously without specific intervention.  She is essentially asymptomatic from a cardiovascular standpoint.  The patient has no chest discomfort at rest or with activity.  There is no exertional chest arm neck jaw shoulder or back discomfort.  There is no orthopnea PND or lower extremity edema.  There is no dizziness palpitations or syncope.  She remains a nonsmoker.  She has brought her home blood pressure recordings to clinic today and she has significant hypertension with frequent systolic blood pressures between 150 mmHg and 180 mmHg.  She reports compliance with her medications.  The following portions of the patient's history were reviewed and updated as appropriate: allergies, current medications, past family history, past medical history, past social history, past surgical history and problem  Review of Systems   Constitution: Negative for chills, diaphoresis, fever, weakness, malaise/fatigue, weight gain and weight loss.   HENT: Negative for ear discharge, hearing loss, hoarse voice and nosebleeds.    Eyes: Negative for discharge, double vision, pain and photophobia.   Cardiovascular: Negative for chest pain, claudication, cyanosis, dyspnea on exertion, irregular heartbeat, leg swelling, near-syncope, orthopnea, palpitations, paroxysmal nocturnal dyspnea and syncope.   Respiratory: Negative for cough, hemoptysis, shortness of breath, sputum production and  wheezing.    Endocrine: Negative for cold intolerance, heat intolerance, polydipsia, polyphagia and polyuria.   Hematologic/Lymphatic: Negative for adenopathy and bleeding problem. Does not bruise/bleed easily.   Skin: Negative for color change, flushing, itching and rash.   Musculoskeletal: Negative for muscle cramps, muscle weakness, myalgias and stiffness.   Gastrointestinal: Negative for abdominal pain, diarrhea, hematemesis, hematochezia, nausea and vomiting.   Genitourinary: Negative for dysuria, frequency and nocturia.   Neurological: Negative for focal weakness, loss of balance, numbness, paresthesias and seizures.   Psychiatric/Behavioral: Negative for altered mental status, hallucinations and suicidal ideas.   Allergic/Immunologic: Negative for HIV exposure, hives and persistent infections.           Current Outpatient Prescriptions:   •  aspirin (SANJU ASPIRIN EC LOW DOSE) 81 MG EC tablet, Take 1 tablet by mouth daily., Disp: , Rfl:   •  atenolol (TENORMIN) 25 MG tablet, Take 1 tablet by mouth Daily., Disp: 90 tablet, Rfl: 3  •  betamethasone valerate (VALISONE) 0.1 % cream, , Disp: , Rfl:   •  carbidopa-levodopa CR (SINEMET CR)  MG per CR tablet, Take 1 tablet by mouth Every Night., Disp: 90 tablet, Rfl: 3  •  cholecalciferol (VITAMIN D3) 1000 units tablet, Take 1,000 Units by mouth Daily., Disp: , Rfl:   •  citalopram (CeleXA) 40 MG tablet, Take 1 tablet by mouth Daily. (Patient taking differently: Take 20 mg by mouth Daily.), Disp: 90 tablet, Rfl: 3  •  clobetasol (TEMOVATE) 0.05 % ointment, APPLY A THIN LAYER TWO TIMES A DAY FOR TWO WEEKS, THEN APPLY DAILY FOR ONE WEEK, THEN THREE TIMES WEEKLY, Disp: 60 g, Rfl: 5  •  COCONUT OIL PO, Take  by mouth., Disp: , Rfl:   •  glimepiride (AMARYL) 2 MG tablet, Take 1 tablet by mouth Daily With Breakfast., Disp: 90 tablet, Rfl: 3  •  hydrOXYzine (ATARAX) 25 MG tablet, , Disp: , Rfl:   •  imiquimod (ALDARA) 5 % cream, Apply  topically., Disp: , Rfl:   •   lisinopril-hydrochlorothiazide (PRINZIDE,ZESTORETIC) 20-12.5 MG per tablet, Take 1 tablet by mouth Daily., Disp: 90 tablet, Rfl: 3  •  loratadine (CLARITIN) 10 MG tablet, Take 10 mg by mouth 2 (Two) Times a Day., Disp: , Rfl:   •  meclizine (ANTIVERT) 25 MG tablet, Take 1 tablet by mouth 3 (Three) Times a Day As Needed for dizziness., Disp: 90 tablet, Rfl: 0  •  Multiple Vitamins-Minerals (KANNAN BASIC) tablet, Take  by mouth., Disp: , Rfl:   •  naproxen sodium (ALEVE) 220 MG tablet, Take 220 mg by mouth 2 (Two) Times a Day As Needed for Mild Pain (1-3)., Disp: , Rfl:   •  Nutritional Supplements (JUICE PLUS FIBRE PO), Take  by mouth., Disp: , Rfl:   •  Omega-3 Krill Oil 1000 MG capsule, Take  by mouth., Disp: , Rfl:   •  pantoprazole (PROTONIX) 40 MG EC tablet, Take 1 tablet by mouth Daily., Disp: 90 tablet, Rfl: 3  •  promethazine (PHENERGAN) 25 MG tablet, Take 25 mg by mouth Every 6 (Six) Hours As Needed for Nausea or Vomiting., Disp: , Rfl:   •  rosuvastatin (CRESTOR) 10 MG tablet, Take 1 tablet by mouth Every Night., Disp: 90 tablet, Rfl: 3  •  SOOLANTRA 1 % cream, , Disp: , Rfl:   •  triamcinolone (KENALOG) 0.1 % cream, As Needed., Disp: , Rfl:   •  vitamin B-12 (CYANOCOBALAMIN) 1000 MCG tablet, Take 1,000 mcg by mouth daily., Disp: , Rfl:      Objective:     Physical Exam   Constitutional: She is oriented to person, place, and time. She appears well-developed and well-nourished. No distress.   HENT:   Head: Normocephalic and atraumatic.   Mouth/Throat: Oropharynx is clear and moist.   Eyes: Pupils are equal, round, and reactive to light. Conjunctivae and EOM are normal. No scleral icterus.   Neck: Normal range of motion. Neck supple. No JVD present. No tracheal deviation present. No thyromegaly present.   Cardiovascular: Normal rate, regular rhythm, S1 normal, S2 normal, normal heart sounds, intact distal pulses and normal pulses.  PMI is not displaced.  Exam reveals no gallop and no friction rub.    No murmur  "heard.  Pulmonary/Chest: Effort normal and breath sounds normal. No respiratory distress. She has no wheezes. She has no rales.   Abdominal: Soft. Bowel sounds are normal. She exhibits no distension and no mass. There is no tenderness. There is no rebound and no guarding.   Musculoskeletal: Normal range of motion. She exhibits no edema or deformity.   Neurological: She is alert and oriented to person, place, and time. She displays normal reflexes. No cranial nerve deficit. Coordination normal.   Skin: Skin is warm and dry. No rash noted. She is not diaphoretic. No erythema.   Psychiatric: She has a normal mood and affect. Her behavior is normal. Thought content normal.     Blood pressure 140/80, pulse 76, height 152.4 cm (60\"), weight 81.2 kg (179 lb), SpO2 98 %.   Lab Review:       Assessment:         1. Benign essential hypertension  Blood pressure control is less than ideal.    2. Abnormal nuclear stress test  This appears to benefit is positive abnormality due to her body habitus.  I'll chest symptoms and potential angina equivalents have spontaneously resolved.    3. Coronary artery disease involving native coronary artery of native heart without angina pectoris  Angiographically near normal coronary arteries with evidence of minor coronary atherosclerotic plaque.    Procedures     Plan:       Emphasis on aggressive secondary risk factor modification.  The patient needs better blood pressure control.  To that end, I have recommended increasing her Toprol-XL to 25 mg orally twice per day.  If necessary she can start amlodipine 5 mg orally once per day.  She is instructed to continue her lisinopril and hydrochlorothiazide unchanged.  She has been congratulated on her weight loss and encouraged to continue these efforts.  She has been counseled regarding the importance of dietary sodium restriction.  She has been given a list of specific foods and beverages that are high in sodium content with instructions to " avoid these.  No additional cardiac testing is necessary from my standpoint.

## 2018-10-12 ENCOUNTER — FLU SHOT (OUTPATIENT)
Dept: INTERNAL MEDICINE | Facility: CLINIC | Age: 68
End: 2018-10-12

## 2018-10-12 PROCEDURE — 90662 IIV NO PRSV INCREASED AG IM: CPT | Performed by: INTERNAL MEDICINE

## 2018-10-12 PROCEDURE — G0008 ADMIN INFLUENZA VIRUS VAC: HCPCS | Performed by: INTERNAL MEDICINE

## 2018-11-21 ENCOUNTER — TELEPHONE (OUTPATIENT)
Dept: OBSTETRICS AND GYNECOLOGY | Facility: CLINIC | Age: 68
End: 2018-11-21

## 2018-11-21 RX ORDER — CLOBETASOL PROPIONATE 0.5 MG/G
OINTMENT TOPICAL 2 TIMES DAILY
Qty: 60 G | Refills: 0 | Status: SHIPPED | OUTPATIENT
Start: 2018-11-21 | End: 2021-12-09 | Stop reason: SDUPTHER

## 2018-11-21 NOTE — TELEPHONE ENCOUNTER
----- Message from Bee Dillard sent at 11/20/2018  4:14 PM EST -----  Contact: patient  Pt scheduled for an appt on 12/12/18. She has not been here since 10/2015. She needs a refill on clobetasol cream. She said that she received 3 large tubes the last time she filled and has not needed any since.     rickie dutton

## 2018-12-12 ENCOUNTER — OFFICE VISIT (OUTPATIENT)
Dept: OBSTETRICS AND GYNECOLOGY | Facility: CLINIC | Age: 68
End: 2018-12-12

## 2018-12-12 VITALS
WEIGHT: 173.8 LBS | DIASTOLIC BLOOD PRESSURE: 72 MMHG | SYSTOLIC BLOOD PRESSURE: 140 MMHG | BODY MASS INDEX: 34.12 KG/M2 | HEIGHT: 60 IN

## 2018-12-12 DIAGNOSIS — Z12.4 SCREENING FOR MALIGNANT NEOPLASM OF CERVIX: ICD-10-CM

## 2018-12-12 DIAGNOSIS — N95.2 VAGINAL ATROPHY: ICD-10-CM

## 2018-12-12 DIAGNOSIS — Z01.419 ENCOUNTER FOR GYNECOLOGICAL EXAMINATION WITHOUT ABNORMAL FINDING: Primary | ICD-10-CM

## 2018-12-12 PROCEDURE — G0101 CA SCREEN;PELVIC/BREAST EXAM: HCPCS | Performed by: PHYSICIAN ASSISTANT

## 2018-12-12 RX ORDER — HEPATITIS A VACCINE, INACTIVATED 50 [IU]/ML
INJECTION, SUSPENSION INTRAMUSCULAR
COMMUNITY
Start: 2018-10-26 | End: 2020-02-05

## 2018-12-12 RX ORDER — AMLODIPINE BESYLATE 2.5 MG/1
TABLET ORAL
COMMUNITY
Start: 2018-12-05 | End: 2019-09-23 | Stop reason: SDUPTHER

## 2018-12-12 NOTE — PROGRESS NOTES
Subjective   Chief Complaint   Patient presents with   • Gynecologic Exam     Last pap done in ; MMG done 3/29/18-WNL; C/O raw spot in vaginal area and using clobetosol cream       Missy Mcgraw is a 68 y.o. year old  presenting to be seen for her annual gynecological exam.   She had a normal screening mammogram 2018  She reports issues with vulvar irritation for a few days and has been using clobetasol cream previously prescribed per Dr. Dow which has improved irritation. Has not had to use clobetasol for quite some time  She is having vaginal dryness and irritation. Has some urinary frequency and urgency and occasional stress incontinence. No dysuria     She is taking vit D 3 daily.     Past Medical History:   Diagnosis Date   • Acute maxillary sinusitis    • Arthritis    • Cancer (CMS/HCC) Skin   • Cataract    • Depression    • Diabetes mellitus (CMS/HCC)    • Esophagitis, reflux    • Fibromyositis    • GERD (gastroesophageal reflux disease)    • H/O colonoscopy    • Herpes zoster    • History of mammogram    • Migraine headache    • Osteoporosis    • Pap smear for cervical cancer screening    • Restless legs syndrome    • Screening     DEXA body composition study   • Transient cerebral ischemia    • Urinary tract infection    • Vertigo         Current Outpatient Medications:   •  amLODIPine (NORVASC) 2.5 MG tablet, , Disp: , Rfl:   •  aspirin (SANJU ASPIRIN EC LOW DOSE) 81 MG EC tablet, Take 1 tablet by mouth daily., Disp: , Rfl:   •  atenolol (TENORMIN) 25 MG tablet, Take 1 tablet by mouth 2 (Two) Times a Day., Disp: 180 tablet, Rfl: 4  •  betamethasone valerate (VALISONE) 0.1 % cream, , Disp: , Rfl:   •  carbidopa-levodopa CR (SINEMET CR)  MG per CR tablet, Take 1 tablet by mouth Every Night., Disp: 90 tablet, Rfl: 3  •  cholecalciferol (VITAMIN D3) 1000 units tablet, Take 1,000 Units by mouth Daily., Disp: , Rfl:   •  citalopram (CeleXA) 40 MG tablet, Take 1 tablet by mouth Daily.  (Patient taking differently: Take 20 mg by mouth Daily.), Disp: 90 tablet, Rfl: 3  •  clobetasol (TEMOVATE) 0.05 % ointment, Apply  topically to the appropriate area as directed 2 (Two) Times a Day., Disp: 60 g, Rfl: 0  •  COCONUT OIL PO, Take  by mouth., Disp: , Rfl:   •  glimepiride (AMARYL) 2 MG tablet, Take 1 tablet by mouth Daily With Breakfast., Disp: 90 tablet, Rfl: 3  •  hydrOXYzine (ATARAX) 25 MG tablet, , Disp: , Rfl:   •  imiquimod (ALDARA) 5 % cream, Apply  topically., Disp: , Rfl:   •  lisinopril-hydrochlorothiazide (PRINZIDE,ZESTORETIC) 20-12.5 MG per tablet, Take 1 tablet by mouth Daily., Disp: 90 tablet, Rfl: 3  •  loratadine (CLARITIN) 10 MG tablet, Take 10 mg by mouth 2 (Two) Times a Day., Disp: , Rfl:   •  meclizine (ANTIVERT) 25 MG tablet, Take 1 tablet by mouth 3 (Three) Times a Day As Needed for dizziness., Disp: 90 tablet, Rfl: 0  •  Multiple Vitamins-Minerals (KANNAN BASIC) tablet, Take  by mouth., Disp: , Rfl:   •  naproxen sodium (ALEVE) 220 MG tablet, Take 220 mg by mouth 2 (Two) Times a Day As Needed for Mild Pain (1-3)., Disp: , Rfl:   •  Nutritional Supplements (JUICE PLUS FIBRE PO), Take  by mouth., Disp: , Rfl:   •  Omega-3 Krill Oil 1000 MG capsule, Take  by mouth., Disp: , Rfl:   •  pantoprazole (PROTONIX) 40 MG EC tablet, Take 1 tablet by mouth Daily., Disp: 90 tablet, Rfl: 3  •  rosuvastatin (CRESTOR) 10 MG tablet, Take 1 tablet by mouth Every Night., Disp: 90 tablet, Rfl: 3  •  SOOLANTRA 1 % cream, , Disp: , Rfl:   •  triamcinolone (KENALOG) 0.1 % cream, As Needed., Disp: , Rfl:   •  VAQTA 50 UNIT/ML injection, , Disp: , Rfl:   •  conjugated estrogens (PREMARIN) 0.625 MG/GM vaginal cream, Use 0.5  grams intravaginally 2 times per week to control symptoms., Disp: 30 g, Rfl: 3  •  promethazine (PHENERGAN) 25 MG tablet, Take 25 mg by mouth Every 6 (Six) Hours As Needed for Nausea or Vomiting., Disp: , Rfl:   •  vitamin B-12 (CYANOCOBALAMIN) 1000 MCG tablet, Take 1,000 mcg by mouth  "daily., Disp: , Rfl:    Allergies   Allergen Reactions   • Penicillins Other (See Comments)     Not sure of reaction   • Atorvastatin    • Fenofibrate    • Fluoxetine    • Gemfibrozil    • Hydroxychloroquine    • Metformin    • Nsaids    • Pravastatin    • Sulfamethoxazole-Trimethoprim       Past Surgical History:   Procedure Laterality Date   • APPENDECTOMY     • COLONOSCOPY  10/2016    Dr. Colmenares   • ENDOSCOPY  10/2016    Dr. Colmenares   • KNEE SURGERY Right    • LAPAROSCOPY DIAGNOSTIC / BIOPSY / ASPIRATION / LYSIS Right    • SHOULDER ARTHROSCOPY Bilateral       Social History     Socioeconomic History   • Marital status: Single     Spouse name: Not on file   • Number of children: Not on file   • Years of education: Not on file   • Highest education level: Not on file   Social Needs   • Financial resource strain: Not on file   • Food insecurity - worry: Not on file   • Food insecurity - inability: Not on file   • Transportation needs - medical: Not on file   • Transportation needs - non-medical: Not on file   Occupational History     Employer: UNKNOWN EMPLOYER   Tobacco Use   • Smoking status: Never Smoker   • Smokeless tobacco: Never Used   Substance and Sexual Activity   • Alcohol use: No   • Drug use: No   • Sexual activity: No   Other Topics Concern   • Not on file   Social History Narrative   • Not on file      Family History   Problem Relation Age of Onset   • Heart disease Sister    • Arthritis Other    • Diabetes Other    • Cancer Other         lung   • Cancer Father         prostate,lung,colon   • Kidney failure Mother        Review of Systems   Constitutional: Negative.    Gastrointestinal: Negative.    Genitourinary: Negative for difficulty urinating, dysuria, pelvic pain and vaginal bleeding.           Objective   /72   Ht 152.4 cm (60\")   Wt 78.8 kg (173 lb 12.8 oz)   Breastfeeding? No   BMI 33.94 kg/m²     Physical Exam   Constitutional: She appears well-developed and well-nourished. She is " cooperative.   Pulmonary/Chest: Right breast exhibits no inverted nipple, no mass, no nipple discharge, no skin change and no tenderness. Left breast exhibits no inverted nipple, no mass, no nipple discharge, no skin change and no tenderness.   Abdominal: Soft. Normal appearance. There is no tenderness.   Genitourinary: Uterus normal. There is no tenderness or lesion on the right labia. There is no tenderness or lesion on the left labia. Cervix exhibits no motion tenderness and no discharge. Right adnexum displays no mass and no tenderness. Left adnexum displays no mass and no tenderness.   Genitourinary Comments: Pap done  Vaginal tissue atrophic  No cystocele or rectocele demonstrated   Neurological: She is alert.   Skin: Skin is warm and dry.   Psychiatric: She has a normal mood and affect. Her behavior is normal.            Assessment and Plan  Missy was seen today for gynecologic exam.    Diagnoses and all orders for this visit:    Encounter for gynecological examination without abnormal finding    Screening for malignant neoplasm of cervix    Vaginal atrophy    Other orders  -     conjugated estrogens (PREMARIN) 0.625 MG/GM vaginal cream; Use 0.5  grams intravaginally 2 times per week to control symptoms.  -     Liquid-based Pap Smear, Screening; Future      Patient Instructions   Self breast exam monthly  Annual mammogram  Calcium 1200 mg daily and vit D 2000 mg daily  Regular exercise  Patient offered option of trying vaginal estrogen cream and she would like to try this  Premarin cream 0.5 gram at hs 2 x weekly  Follow up 3 months or prn              This note was electronically signed.    Brittany Calvin PA-C   December 12, 2018

## 2018-12-12 NOTE — PATIENT INSTRUCTIONS
Self breast exam monthly  Annual mammogram  Calcium 1200 mg daily and vit D 2000 mg daily  Regular exercise  Patient offered option of trying vaginal estrogen cream and she would like to try this  Premarin cream 0.5 gram at  2 x weekly  Follow up 3 months or prn

## 2018-12-13 ENCOUNTER — OFFICE VISIT (OUTPATIENT)
Dept: INTERNAL MEDICINE | Facility: CLINIC | Age: 68
End: 2018-12-13

## 2018-12-13 VITALS
WEIGHT: 174.75 LBS | BODY MASS INDEX: 34.13 KG/M2 | OXYGEN SATURATION: 98 % | DIASTOLIC BLOOD PRESSURE: 47 MMHG | SYSTOLIC BLOOD PRESSURE: 157 MMHG | TEMPERATURE: 98.9 F | RESPIRATION RATE: 17 BRPM | HEART RATE: 72 BPM

## 2018-12-13 DIAGNOSIS — Z23 NEED FOR VACCINATION: ICD-10-CM

## 2018-12-13 DIAGNOSIS — E78.2 MIXED HYPERLIPIDEMIA: ICD-10-CM

## 2018-12-13 DIAGNOSIS — M85.9 DISORDER OF BONE DENSITY AND STRUCTURE, UNSPECIFIED: ICD-10-CM

## 2018-12-13 DIAGNOSIS — I10 BENIGN ESSENTIAL HYPERTENSION: ICD-10-CM

## 2018-12-13 DIAGNOSIS — E11.9 CONTROLLED TYPE 2 DIABETES MELLITUS WITHOUT COMPLICATION, WITHOUT LONG-TERM CURRENT USE OF INSULIN (HCC): ICD-10-CM

## 2018-12-13 DIAGNOSIS — Z13.820 OSTEOPOROSIS SCREENING: ICD-10-CM

## 2018-12-13 DIAGNOSIS — R63.4 WEIGHT LOSS: ICD-10-CM

## 2018-12-13 DIAGNOSIS — Z00.00 ROUTINE GENERAL MEDICAL EXAMINATION AT A HEALTH CARE FACILITY: Primary | ICD-10-CM

## 2018-12-13 LAB
ALBUMIN SERPL-MCNC: 4.2 G/DL (ref 3.5–5)
ALBUMIN/CREAT UR: 88.9 MG/G CREAT (ref 0–30)
ALBUMIN/GLOB SERPL: 1.4 G/DL (ref 1–2)
ALP SERPL-CCNC: 127 U/L (ref 38–126)
ALT SERPL-CCNC: 24 U/L (ref 13–69)
AST SERPL-CCNC: 23 U/L (ref 15–46)
BASOPHILS # BLD AUTO: 0.05 10*3/MM3 (ref 0–0.2)
BASOPHILS NFR BLD AUTO: 0.5 % (ref 0–2.5)
BILIRUB SERPL-MCNC: 0.7 MG/DL (ref 0.2–1.3)
BUN SERPL-MCNC: 20 MG/DL (ref 7–20)
BUN/CREAT SERPL: 40 (ref 7.1–23.5)
CALCIUM SERPL-MCNC: 10.6 MG/DL (ref 8.4–10.2)
CHLORIDE SERPL-SCNC: 104 MMOL/L (ref 98–107)
CHOLEST SERPL-MCNC: 134 MG/DL (ref 0–199)
CO2 SERPL-SCNC: 28 MMOL/L (ref 26–30)
CREAT SERPL-MCNC: 0.5 MG/DL (ref 0.6–1.3)
CREAT UR-MCNC: 42.2 MG/DL
EOSINOPHIL # BLD AUTO: 0.23 10*3/MM3 (ref 0–0.7)
EOSINOPHIL NFR BLD AUTO: 2.3 % (ref 0–7)
ERYTHROCYTE [DISTWIDTH] IN BLOOD BY AUTOMATED COUNT: 13.7 % (ref 11.5–14.5)
GLOBULIN SER CALC-MCNC: 2.9 GM/DL
GLUCOSE SERPL-MCNC: 115 MG/DL (ref 74–98)
HBA1C MFR BLD: 5.6 %
HCT VFR BLD AUTO: 41.1 % (ref 37–47)
HDLC SERPL-MCNC: 60 MG/DL (ref 40–60)
HGB BLD-MCNC: 13.3 G/DL (ref 12–16)
IMM GRANULOCYTES # BLD: 0.04 10*3/MM3 (ref 0–0.06)
IMM GRANULOCYTES NFR BLD: 0.4 % (ref 0–0.6)
LDLC SERPL CALC-MCNC: 58 MG/DL (ref 0–99)
LYMPHOCYTES # BLD AUTO: 2.41 10*3/MM3 (ref 0.6–3.4)
LYMPHOCYTES NFR BLD AUTO: 24 % (ref 10–50)
MCH RBC QN AUTO: 27 PG (ref 27–31)
MCHC RBC AUTO-ENTMCNC: 32.4 G/DL (ref 30–37)
MCV RBC AUTO: 83.5 FL (ref 81–99)
MICROALBUMIN UR-MCNC: 37.5 UG/ML
MONOCYTES # BLD AUTO: 0.8 10*3/MM3 (ref 0–0.9)
MONOCYTES NFR BLD AUTO: 8 % (ref 0–12)
NEUTROPHILS # BLD AUTO: 6.5 10*3/MM3 (ref 2–6.9)
NEUTROPHILS NFR BLD AUTO: 64.8 % (ref 37–80)
NRBC BLD AUTO-RTO: 0 /100 WBC (ref 0–0)
PLATELET # BLD AUTO: 229 10*3/MM3 (ref 130–400)
POTASSIUM SERPL-SCNC: 4.2 MMOL/L (ref 3.5–5.1)
PROT SERPL-MCNC: 7.1 G/DL (ref 6.3–8.2)
RBC # BLD AUTO: 4.92 10*6/MM3 (ref 4.2–5.4)
SODIUM SERPL-SCNC: 140 MMOL/L (ref 137–145)
TRIGL SERPL-MCNC: 82 MG/DL
VLDLC SERPL CALC-MCNC: 16.4 MG/DL
WBC # BLD AUTO: 10.03 10*3/MM3 (ref 4.8–10.8)

## 2018-12-13 PROCEDURE — 99397 PER PM REEVAL EST PAT 65+ YR: CPT | Performed by: INTERNAL MEDICINE

## 2018-12-13 PROCEDURE — 90732 PPSV23 VACC 2 YRS+ SUBQ/IM: CPT | Performed by: INTERNAL MEDICINE

## 2018-12-13 PROCEDURE — G0402 INITIAL PREVENTIVE EXAM: HCPCS | Performed by: INTERNAL MEDICINE

## 2018-12-13 PROCEDURE — G0009 ADMIN PNEUMOCOCCAL VACCINE: HCPCS | Performed by: INTERNAL MEDICINE

## 2018-12-13 RX ORDER — ROSUVASTATIN CALCIUM 5 MG/1
5 TABLET, COATED ORAL NIGHTLY
Qty: 90 TABLET | Refills: 3 | Status: SHIPPED | OUTPATIENT
Start: 2018-12-13 | End: 2019-02-08 | Stop reason: SDUPTHER

## 2018-12-13 NOTE — PATIENT INSTRUCTIONS
Exercising to Stay Healthy  Exercising regularly is important. It has many health benefits, such as:  · Improving your overall fitness, flexibility, and endurance.  · Increasing your bone density.  · Helping with weight control.  · Decreasing your body fat.  · Increasing your muscle strength.  · Reducing stress and tension.  · Improving your overall health.    In order to become healthy and stay healthy, it is recommended that you do moderate-intensity and vigorous-intensity exercise. You can tell that you are exercising at a moderate intensity if you have a higher heart rate and faster breathing, but you are still able to hold a conversation. You can tell that you are exercising at a vigorous intensity if you are breathing much harder and faster and cannot hold a conversation while exercising.  How often should I exercise?  Choose an activity that you enjoy and set realistic goals. Your health care provider can help you to make an activity plan that works for you. Exercise regularly as directed by your health care provider. This may include:  · Doing resistance training twice each week, such as:  ? Push-ups.  ? Sit-ups.  ? Lifting weights.  ? Using resistance bands.  · Doing a given intensity of exercise for a given amount of time. Choose from these options:  ? 150 minutes of moderate-intensity exercise every week.  ? 75 minutes of vigorous-intensity exercise every week.  ? A mix of moderate-intensity and vigorous-intensity exercise every week.    Children, pregnant women, people who are out of shape, people who are overweight, and older adults may need to consult a health care provider for individual recommendations. If you have any sort of medical condition, be sure to consult your health care provider before starting a new exercise program.  What are some exercise ideas?  Some moderate-intensity exercise ideas include:  · Walking at a rate of 1 mile in 15  minutes.  · Biking.  · Hiking.  · Golfing.  · Dancing.    Some vigorous-intensity exercise ideas include:  · Walking at a rate of at least 4.5 miles per hour.  · Jogging or running at a rate of 5 miles per hour.  · Biking at a rate of at least 10 miles per hour.  · Lap swimming.  · Roller-skating or in-line skating.  · Cross-country skiing.  · Vigorous competitive sports, such as football, basketball, and soccer.  · Jumping rope.  · Aerobic dancing.    What are some everyday activities that can help me to get exercise?  · Yard work, such as:  ? Pushing a .  ? Raking and bagging leaves.  · Washing and waxing your car.  · Pushing a stroller.  · Shoveling snow.  · Gardening.  · Washing windows or floors.  How can I be more active in my day-to-day activities?  · Use the stairs instead of the elevator.  · Take a walk during your lunch break.  · If you drive, park your car farther away from work or school.  · If you take public transportation, get off one stop early and walk the rest of the way.  · Make all of your phone calls while standing up and walking around.  · Get up, stretch, and walk around every 30 minutes throughout the day.  What guidelines should I follow while exercising?  · Do not exercise so much that you hurt yourself, feel dizzy, or get very short of breath.  · Consult your health care provider before starting a new exercise program.  · Wear comfortable clothes and shoes with good support.  · Drink plenty of water while you exercise to prevent dehydration or heat stroke. Body water is lost during exercise and must be replaced.  · Work out until you breathe faster and your heart beats faster.  This information is not intended to replace advice given to you by your health care provider. Make sure you discuss any questions you have with your health care provider.  Document Released: 01/20/2012 Document Revised: 05/25/2017 Document Reviewed: 05/21/2015  [employer] Interactive Patient Education © 2018  [employer]    Medicare Wellness  Personal Prevention Plan of Service     Date of Office Visit:  2018  Encounter Provider:  Emely Sheldon MD  Place of Service:  [facility]  Patient Name: Missy Mcgraw  :  1950    As part of the Medicare Wellness portion of your visit today, we are providing you with this personalized preventive plan of services (PPPS). This plan is based upon recommendations of the [place] Preventive Services Task Force (USPSTF) and the Advisory Committee on Immunization Practices (ACIP).    This lists the preventive care services that should be considered, and provides dates of when you are due. Items listed as completed are up-to-date and do not require any further intervention.    Health Maintenance   Topic Date Due   • HEPATITIS A VACCINE ADULT (1 of 2) 1970   • TDAP/TD VACCINES (1 - Tdap) 1971   • ZOSTER VACCINE (1 of 2) 2002   • DXA SCAN  2016   • DIABETIC FOOT EXAM  2018   • DIABETIC EYE EXAM  2018   • URINE MICROALBUMIN  2018   • MEDICARE ANNUAL WELLNESS  2018   • PNEUMOCOCCAL VACCINES (65+ LOW/MEDIUM RISK) (2 of 2 - PCV13) 2018   • HEMOGLOBIN A1C  2018   • LIPID PANEL  2019   • MAMMOGRAM  2020   • COLONOSCOPY  10/18/2026   • HEPATITIS C SCREENING  Completed   • INFLUENZA VACCINE  Completed       No orders of the defined types were placed in this encounter.      Return in 1 year (on 2019).

## 2018-12-13 NOTE — PROGRESS NOTES
QUICK REFERENCE INFORMATION:  The ABCs of the Annual Wellness Visit    Initial Medicare Wellness Visit    HEALTH RISK ASSESSMENT    1950    Recent Hospitalizations:  No hospitalization(s) within the last year..        Current Medical Providers:  Patient Care Team:  Emely Sheldon MD as PCP - General        Smoking Status:  Social History     Tobacco Use   Smoking Status Never Smoker   Smokeless Tobacco Never Used       Alcohol Consumption:  Social History     Substance and Sexual Activity   Alcohol Use No       Depression Screen:   PHQ-2/PHQ-9 Depression Screening 12/13/2018   Little interest or pleasure in doing things 0   Feeling down, depressed, or hopeless 0   Total Score 0       Health Habits and Functional and Cognitive Screening:  Functional & Cognitive Status 12/13/2018   Do you have difficulty preparing food and eating? No   Do you have difficulty bathing yourself, getting dressed or grooming yourself? No   Do you have difficulty using the toilet? No   Do you have difficulty moving around from place to place? No   Do you have trouble with steps or getting out of a bed or a chair? Yes   In the past year have you fallen or experienced a near fall? Yes   Current Diet Unhealthy Diet   Dental Exam Not up to date   Eye Exam Up to date   Exercise (times per week) 0 times per week   Current Exercise Activities Include None   Do you need help using the phone?  No   Are you deaf or do you have serious difficulty hearing?  No   Do you need help with transportation? No   Do you need help shopping? No   Do you need help preparing meals?  No   Do you need help with housework?  No   Do you need help with laundry? No   Do you need help taking your medications? No   Do you need help managing money? No   Do you ever drive or ride in a car without wearing a seat belt? No   Have you felt unusual stress, anger or loneliness in the last month? No   Who do you live with? Alone   If you need help, do you have trouble finding  someone available to you? No   Have you been bothered in the last four weeks by sexual problems? No   Do you have difficulty concentrating, remembering or making decisions? No           Does the patient have evidence of cognitive impairment? No    Asiprin use counseling: Taking ASA appropriately as indicated      Recent Lab Results: reviewed    Visual Acuity:  No exam data present   Sees eye doctor       Age-appropriate Screening Schedule:  Refer to the list below for future screening recommendations based on patient's age, sex and/or medical conditions. Orders for these recommended tests are listed in the plan section. The patient has been provided with a written plan.    Health Maintenance   Topic Date Due   • TDAP/TD VACCINES (1 - Tdap) 12/05/1971   • ZOSTER VACCINE (1 of 2) 12/05/2002   • DXA SCAN  11/07/2016   • DIABETIC FOOT EXAM  03/08/2018   • DIABETIC EYE EXAM  03/08/2018   • HEMOGLOBIN A1C  06/12/2019   • LIPID PANEL  12/12/2019   • URINE MICROALBUMIN  12/12/2019   • PNEUMOCOCCAL VACCINES (65+ LOW/MEDIUM RISK) (2 of 2 - PCV13) 12/13/2019   • MAMMOGRAM  03/29/2020   • COLONOSCOPY  10/18/2026   • INFLUENZA VACCINE  Completed        Chief Complaint   Patient presents with   • Medicare Wellness-subsequent   • Hypertension   • Hyperlipidemia   • Diabetes   • Weight Loss       Subjective   History of Present Illness    Missy Mcgraw is a 68 y.o. female who presents for an Annual Wellness Visit.  Patient is here to follow up on the blood pressure  The patient is taking the blood pressure medications as prescribed and has had no side effects. The patient is also here to follow up on the cholesterol and is trying to follow a diet. The patient is also here to follow on sugar and  hadwork done . The patient also needs refills on medications .  She is noted to have lost weight , she needs  dexa scan and  pneumovax - previously  got before age 67   Hypertension   Pertinent negatives include no chest pain,  palpitations or shortness of breath.   Hyperlipidemia   Pertinent negatives include no chest pain or shortness of breath.   Diabetes   Pertinent negatives for hypoglycemia include no dizziness, nervousness/anxiousness or pallor. Pertinent negatives for diabetes include no chest pain, no shortness of breath  Patient is on acei/arb       The following portions of the patient's history were reviewed and updated as appropriate: allergies, current medications, past family history, past medical history, past social history, past surgical history and problem list.    Outpatient Medications Prior to Visit   Medication Sig Dispense Refill   • amLODIPine (NORVASC) 2.5 MG tablet      • aspirin (SANJU ASPIRIN EC LOW DOSE) 81 MG EC tablet Take 1 tablet by mouth daily.     • atenolol (TENORMIN) 25 MG tablet Take 1 tablet by mouth 2 (Two) Times a Day. 180 tablet 4   • betamethasone valerate (VALISONE) 0.1 % cream      • carbidopa-levodopa CR (SINEMET CR)  MG per CR tablet Take 1 tablet by mouth Every Night. 90 tablet 3   • cholecalciferol (VITAMIN D3) 1000 units tablet Take 1,000 Units by mouth Daily.     • citalopram (CeleXA) 40 MG tablet Take 1 tablet by mouth Daily. (Patient taking differently: Take 20 mg by mouth Daily.) 90 tablet 3   • clobetasol (TEMOVATE) 0.05 % ointment Apply  topically to the appropriate area as directed 2 (Two) Times a Day. 60 g 0   • COCONUT OIL PO Take  by mouth.     • conjugated estrogens (PREMARIN) 0.625 MG/GM vaginal cream Use 0.5  grams intravaginally 2 times per week to control symptoms. 30 g 3   • hydrOXYzine (ATARAX) 25 MG tablet      • imiquimod (ALDARA) 5 % cream Apply  topically.     • lisinopril-hydrochlorothiazide (PRINZIDE,ZESTORETIC) 20-12.5 MG per tablet Take 1 tablet by mouth Daily. 90 tablet 3   • loratadine (CLARITIN) 10 MG tablet Take 10 mg by mouth 2 (Two) Times a Day.     • meclizine (ANTIVERT) 25 MG tablet Take 1 tablet by mouth 3 (Three) Times a Day As Needed for dizziness.  90 tablet 0   • Multiple Vitamins-Minerals (KANNAN BASIC) tablet Take  by mouth.     • naproxen sodium (ALEVE) 220 MG tablet Take 220 mg by mouth 2 (Two) Times a Day As Needed for Mild Pain (1-3).     • Nutritional Supplements (JUICE PLUS FIBRE PO) Take  by mouth.     • Omega-3 Krill Oil 1000 MG capsule Take  by mouth.     • pantoprazole (PROTONIX) 40 MG EC tablet Take 1 tablet by mouth Daily. 90 tablet 3   • promethazine (PHENERGAN) 25 MG tablet Take 25 mg by mouth Every 6 (Six) Hours As Needed for Nausea or Vomiting.     • SOOLANTRA 1 % cream      • triamcinolone (KENALOG) 0.1 % cream As Needed.     • VAQTA 50 UNIT/ML injection      • vitamin B-12 (CYANOCOBALAMIN) 1000 MCG tablet Take 1,000 mcg by mouth daily.     • glimepiride (AMARYL) 2 MG tablet Take 1 tablet by mouth Daily With Breakfast. 90 tablet 3   • rosuvastatin (CRESTOR) 10 MG tablet Take 1 tablet by mouth Every Night. 90 tablet 3     No facility-administered medications prior to visit.        Patient Active Problem List   Diagnosis   • Hay fever   • Benign essential hypertension   • Gastroesophageal reflux disease with esophagitis   • Major depressive disorder, recurrent episode (CMS/HCC)   • Uncontrolled type 2 diabetes mellitus (CMS/HCC)   • Abnormal liver enzymes   • Abnormal weight gain   • Cervicalgia   • Fibroid, uterine   • Hyperthyroidism   • Mixed hyperlipidemia   • Movement disorder   • Neuralgia   • Ulnar nerve palsy   • Vitamin B12 deficiency   • Vitamin D deficiency   • Osteoporosis   • Restless legs syndrome   • Shortness of breath   • Anginal equivalent (CMS/HCC)   • Chest pain in adult   • Abnormal nuclear stress test   • Coronary artery disease involving native coronary artery of native heart without angina pectoris       Advance Care Planning:  has an advance directive - a copy HAS NOT been provided. Have asked the patient to send this to us to add to record.    Identification of Risk Factors:  Risk factors include: weight , unhealthy  diet, cardiovascular risk, inactivity and polypharmacy.    Review of Systems   Constitutional: Positive for unexpected weight change. Negative for appetite change, fatigue and fever.   HENT: Negative for congestion, ear discharge, ear pain, sinus pressure and sore throat.    Eyes: Negative for pain and discharge.   Respiratory: Negative for cough, chest tightness, shortness of breath and wheezing.    Cardiovascular: Negative for chest pain, palpitations and leg swelling.   Gastrointestinal: Negative for abdominal pain, blood in stool, constipation, diarrhea and nausea.   Endocrine: Negative for cold intolerance and heat intolerance.   Genitourinary: Negative for dysuria, flank pain and frequency.   Musculoskeletal: Negative for back pain and joint swelling.   Skin: Negative for color change.   Allergic/Immunologic: Negative for environmental allergies and food allergies.   Neurological: Negative for dizziness, weakness, numbness and headaches.   Hematological: Negative for adenopathy. Does not bruise/bleed easily.   Psychiatric/Behavioral: Negative for behavioral problems and dysphoric mood. The patient is not nervous/anxious.        Compared to one year ago, the patient feels her physical health is better.  Compared to one year ago, the patient feels her mental health is the same.    Objective     Physical Exam   Constitutional: She is oriented to person, place, and time. She appears well-developed and well-nourished. No distress.   HENT:   Head: Normocephalic and atraumatic.   Right Ear: External ear normal.   Left Ear: External ear normal.   Nose: Nose normal.   Mouth/Throat: Oropharynx is clear and moist.   Eyes: Conjunctivae and EOM are normal. Pupils are equal, round, and reactive to light.   Neck: Neck supple. No thyromegaly present.   Cardiovascular: Normal rate, regular rhythm and normal heart sounds.   Pulmonary/Chest: Effort normal and breath sounds normal. No respiratory distress.   Abdominal: Soft.  Bowel sounds are normal. She exhibits no distension. There is no tenderness. There is no rebound.   Musculoskeletal: Normal range of motion. She exhibits no edema.   Lymphadenopathy:     She has no cervical adenopathy.   Neurological: She is alert and oriented to person, place, and time.   No gross motor or sensory deficits   Skin: Skin is warm. She is not diaphoretic.   Psychiatric: She has a normal mood and affect.   Nursing note and vitals reviewed.      Vitals:    12/13/18 1530   BP: 157/47   Pulse: 72   Resp: 17   Temp: 98.9 °F (37.2 °C)   TempSrc: Temporal   SpO2: 98%   Weight: 79.3 kg (174 lb 12 oz)       Patient's Body mass index is 34.13 kg/m². BMI is within normal parameters. No follow-up required.      Assessment/Plan   Patient Self-Management and Personalized Health Advice  The patient has been provided with information about: diet, exercise, weight management and prevention of cardiac or vascular disease and preventive services including:   · Exercise counseling provided, Fall Risk assessment done, Nutrition counseling provided, Pneumococcal vaccine .    Visit Diagnoses:    ICD-10-CM ICD-9-CM   1. Routine general medical examination at a health care facility Z00.00 V70.0   2. Benign essential hypertension I10 401.1   3. Mixed hyperlipidemia E78.2 272.2   4. Controlled type 2 diabetes mellitus without complication, without long-term current use of insulin (CMS/[facility]) E11.9 250.00   5. Weight loss R63.4 783.21   6. Need for vaccination Z23 V05.9   7. Osteoporosis screening Z13.820 V82.81   8. Disorder of bone density and structure, unspecified  M85.9 733.90     Plan:  1.physical: Physical exam conducted today, reviewed fasting lab work,   Impression: healthy adult Patient. Currently, patient eats a healthy diet. Screening lab work includes glucose, lipid profile and cbc . The risks and benefits of immunizations were discussed and immunizations are up to date. Advice and education were given regarding  nutrition and aerobic exercise. Patient discussion: discussed with the patient.  Annual Wellness Visit  with preventive exam as well as age and risk appropriate counseling completed.   Advance Directive Planning: paperwork and instructions were given to the patient.   Patient Discussion: plan discussed with the patient, follow-up visit needed in one year.   Medication Review and medication list updated  2.  Benign essential hypertension: Will continue current medication, low-sodium diet advised  3.mixed hyperlipidemia:  reviewed  fasting CMP and lipid panel.  Diet and exercise counseled,     4.  Diabetes mellitus :   reviewed  fasting CMP  and hba1c   5.6 , diet and exercise counseled , stop medications  5. Weight loss : refer to surgery ,  Labs reviewed, get chest xray  6. Need for pneumovax : given today  7. Osteoporosis screening : order dexa scan         Orders Placed This Encounter   Procedures   • DEXA Bone Density Axial     Order Specific Question:   Reason for Exam:     Answer:   screening   • XR Chest PA & Lateral     Order Specific Question:   Reason for Exam:     Answer:   weight loss   • Ambulatory Referral to General Surgery     Referral Priority:   Routine     Referral Type:   Consultation     Referral Reason:   Patient Preference     Requested Specialty:   General Surgery     Number of Visits Requested:   1       Outpatient Encounter Medications as of 12/13/2018   Medication Sig Dispense Refill   • amLODIPine (NORVASC) 2.5 MG tablet      • aspirin (SANJU ASPIRIN EC LOW DOSE) 81 MG EC tablet Take 1 tablet by mouth daily.     • atenolol (TENORMIN) 25 MG tablet Take 1 tablet by mouth 2 (Two) Times a Day. 180 tablet 4   • betamethasone valerate (VALISONE) 0.1 % cream      • carbidopa-levodopa CR (SINEMET CR)  MG per CR tablet Take 1 tablet by mouth Every Night. 90 tablet 3   • cholecalciferol (VITAMIN D3) 1000 units tablet Take 1,000 Units by mouth Daily.     • citalopram (CeleXA) 40 MG tablet Take  1 tablet by mouth Daily. (Patient taking differently: Take 20 mg by mouth Daily.) 90 tablet 3   • clobetasol (TEMOVATE) 0.05 % ointment Apply  topically to the appropriate area as directed 2 (Two) Times a Day. 60 g 0   • COCONUT OIL PO Take  by mouth.     • conjugated estrogens (PREMARIN) 0.625 MG/GM vaginal cream Use 0.5  grams intravaginally 2 times per week to control symptoms. 30 g 3   • hydrOXYzine (ATARAX) 25 MG tablet      • imiquimod (ALDARA) 5 % cream Apply  topically.     • lisinopril-hydrochlorothiazide (PRINZIDE,ZESTORETIC) 20-12.5 MG per tablet Take 1 tablet by mouth Daily. 90 tablet 3   • loratadine (CLARITIN) 10 MG tablet Take 10 mg by mouth 2 (Two) Times a Day.     • meclizine (ANTIVERT) 25 MG tablet Take 1 tablet by mouth 3 (Three) Times a Day As Needed for dizziness. 90 tablet 0   • Multiple Vitamins-Minerals (KANNAN BASIC) tablet Take  by mouth.     • naproxen sodium (ALEVE) 220 MG tablet Take 220 mg by mouth 2 (Two) Times a Day As Needed for Mild Pain (1-3).     • Nutritional Supplements (JUICE PLUS FIBRE PO) Take  by mouth.     • Omega-3 Krill Oil 1000 MG capsule Take  by mouth.     • pantoprazole (PROTONIX) 40 MG EC tablet Take 1 tablet by mouth Daily. 90 tablet 3   • promethazine (PHENERGAN) 25 MG tablet Take 25 mg by mouth Every 6 (Six) Hours As Needed for Nausea or Vomiting.     • rosuvastatin (CRESTOR) 5 MG tablet Take 1 tablet by mouth Every Night. 90 tablet 3   • SOOLANTRA 1 % cream      • triamcinolone (KENALOG) 0.1 % cream As Needed.     • VAQTA 50 UNIT/ML injection      • vitamin B-12 (CYANOCOBALAMIN) 1000 MCG tablet Take 1,000 mcg by mouth daily.     • [DISCONTINUED] glimepiride (AMARYL) 2 MG tablet Take 1 tablet by mouth Daily With Breakfast. 90 tablet 3   • [DISCONTINUED] rosuvastatin (CRESTOR) 10 MG tablet Take 1 tablet by mouth Every Night. 90 tablet 3   • [] pneumococcal polysaccharide 23-valent (PNEUMOVAX-23) vaccine 0.5 mL        No facility-administered encounter  medications on file as of 12/13/2018.        Reviewed use of high risk medication in the elderly: yes  Reviewed for potential of harmful drug interactions in the elderly: yes    Follow Up:  Return in 1 year (on 12/13/2019).     An After Visit Summary and PPPS with all of these plans were given to the patient.

## 2018-12-18 ENCOUNTER — APPOINTMENT (OUTPATIENT)
Dept: BONE DENSITY | Facility: HOSPITAL | Age: 68
End: 2018-12-18

## 2018-12-18 DIAGNOSIS — Z12.4 SCREENING FOR MALIGNANT NEOPLASM OF CERVIX: ICD-10-CM

## 2018-12-18 PROCEDURE — 77080 DXA BONE DENSITY AXIAL: CPT

## 2018-12-18 NOTE — PROGRESS NOTES
Bone density result shows osteopenia , Continue to use weight bearing exercise like walking  5-7 days a week for 30 minutes per session. Please use a product like Citrical D or Caltrate D 600/400  twice a day with meals. (Total calcium is 1200 mg/day and total vitamin D3 should be at least 800 IU daily).  Please avoid caffeine and avoid alcohol if this applies.

## 2019-01-10 DIAGNOSIS — M25.571 RIGHT ANKLE PAIN, UNSPECIFIED CHRONICITY: Primary | ICD-10-CM

## 2019-01-11 DIAGNOSIS — G89.29 CHRONIC PAIN OF RIGHT ANKLE: Primary | ICD-10-CM

## 2019-01-11 DIAGNOSIS — M25.571 CHRONIC PAIN OF RIGHT ANKLE: Primary | ICD-10-CM

## 2019-01-18 ENCOUNTER — OFFICE VISIT (OUTPATIENT)
Dept: SURGERY | Facility: CLINIC | Age: 69
End: 2019-01-18

## 2019-01-18 VITALS
HEART RATE: 66 BPM | BODY MASS INDEX: 35.34 KG/M2 | TEMPERATURE: 97.7 F | HEIGHT: 60 IN | DIASTOLIC BLOOD PRESSURE: 72 MMHG | OXYGEN SATURATION: 98 % | WEIGHT: 180 LBS | SYSTOLIC BLOOD PRESSURE: 138 MMHG

## 2019-01-18 DIAGNOSIS — R63.4 UNINTENTIONAL WEIGHT LOSS: Primary | ICD-10-CM

## 2019-01-18 DIAGNOSIS — R10.13 EPIGASTRIC ABDOMINAL PAIN: ICD-10-CM

## 2019-01-18 DIAGNOSIS — K21.9 GASTROESOPHAGEAL REFLUX DISEASE, ESOPHAGITIS PRESENCE NOT SPECIFIED: ICD-10-CM

## 2019-01-18 PROBLEM — Z98.890 PONV (POSTOPERATIVE NAUSEA AND VOMITING): Status: ACTIVE | Noted: 2019-01-18

## 2019-01-18 PROBLEM — R11.2 PONV (POSTOPERATIVE NAUSEA AND VOMITING): Status: ACTIVE | Noted: 2019-01-18

## 2019-01-18 PROCEDURE — 99213 OFFICE O/P EST LOW 20 MIN: CPT | Performed by: SURGERY

## 2019-01-18 RX ORDER — POLYETHYLENE GLYCOL 3350 17 G/17G
238 POWDER, FOR SOLUTION ORAL ONCE
Qty: 238 G | Refills: 0 | Status: SHIPPED | OUTPATIENT
Start: 2019-01-18 | End: 2019-01-18

## 2019-01-18 RX ORDER — SODIUM CHLORIDE 9 MG/ML
50 INJECTION, SOLUTION INTRAVENOUS CONTINUOUS
Status: CANCELLED | OUTPATIENT
Start: 2019-01-18

## 2019-01-18 RX ORDER — BISACODYL 5 MG/1
TABLET, DELAYED RELEASE ORAL
Qty: 4 TABLET | Refills: 0 | Status: SHIPPED | OUTPATIENT
Start: 2019-01-18 | End: 2019-02-01 | Stop reason: HOSPADM

## 2019-01-18 NOTE — PROGRESS NOTES
Subjective   Missy Mcgraw is a 68 y.o. female.   Chief Complaint   Patient presents with   • Weight Loss       History of Present Illness   Ms. Mcgraw is a 69 yo female who presents for evaluation of unintentional weight loss.  She reports losing 32 lbs over the last 6 months without dieting.  She reports her highest weight at 202.  Her lowest weight was 170.  Today, she is 180lbs.  She describes being ill in June with subsequent loss of appetite.  This has improved somewhat and she has gain 10 lbs back.  She complains of reflux.  She has occasional epigastric cramping.  She is on protonix without complete control of her symptoms.  She denies blood in the stool or melena.  She denies abdominal pain.  She has a family history of colon cancer in her father.  Her last colonoscopy was in 2016 by Dr. Colmenares.       The following portions of the patient's history were reviewed and updated as appropriate: allergies, current medications, past family history, past medical history, past social history, past surgical history and problem list.     Patient Active Problem List   Diagnosis   • Hay fever   • Benign essential hypertension   • Gastroesophageal reflux disease with esophagitis   • Major depressive disorder, recurrent episode (CMS/HCC)   • Uncontrolled type 2 diabetes mellitus (CMS/HCC)   • Abnormal liver enzymes   • Abnormal weight gain   • Cervicalgia   • Fibroid, uterine   • Hyperthyroidism   • Mixed hyperlipidemia   • Movement disorder   • Neuralgia   • Ulnar nerve palsy   • Vitamin B12 deficiency   • Vitamin D deficiency   • Osteoporosis   • Restless legs syndrome   • Shortness of breath   • Anginal equivalent (CMS/HCC)   • Chest pain in adult   • Abnormal nuclear stress test   • Coronary artery disease involving native coronary artery of native heart without angina pectoris       Past Medical History:   Diagnosis Date   • Acute maxillary sinusitis    • Arthritis    • Cancer (CMS/HCC) Skin   • Cataract    •  Depression    • Diabetes mellitus (CMS/HCC)    • Esophagitis, reflux    • Fibromyositis    • GERD (gastroesophageal reflux disease)    • H/O colonoscopy    • Herpes zoster    • History of mammogram    • Migraine headache    • Osteoporosis    • Pap smear for cervical cancer screening    • Restless legs syndrome    • Screening     DEXA body composition study   • Transient cerebral ischemia    • Urinary tract infection    • Vertigo        Past Surgical History:   Procedure Laterality Date   • APPENDECTOMY     • CARDIAC CATHETERIZATION N/A 8/1/2018    Procedure: Coronary angiography;  Surgeon: Wali Núñez MD;  Location: [facility] INVASIVE LOCATION;  Service: Cardiology   • COLONOSCOPY  10/2016    Dr. Colmenares   • ENDOSCOPY  10/2016    Dr. Colmenares   • KNEE SURGERY Right    • LAPAROSCOPY DIAGNOSTIC / BIOPSY / ASPIRATION / LYSIS Right    • SHOULDER ARTHROSCOPY Bilateral        Medications:     Current Outpatient Medications:   •  amLODIPine (NORVASC) 2.5 MG tablet, , Disp: , Rfl:   •  aspirin (SANJU ASPIRIN EC LOW DOSE) 81 MG EC tablet, Take 1 tablet by mouth daily., Disp: , Rfl:   •  atenolol (TENORMIN) 25 MG tablet, Take 1 tablet by mouth 2 (Two) Times a Day., Disp: 180 tablet, Rfl: 4  •  betamethasone valerate (VALISONE) 0.1 % cream, , Disp: , Rfl:   •  carbidopa-levodopa CR (SINEMET CR)  MG per CR tablet, Take 1 tablet by mouth Every Night., Disp: 90 tablet, Rfl: 3  •  cholecalciferol (VITAMIN D3) 1000 units tablet, Take 1,000 Units by mouth Daily., Disp: , Rfl:   •  citalopram (CeleXA) 40 MG tablet, Take 1 tablet by mouth Daily. (Patient taking differently: Take 20 mg by mouth Daily.), Disp: 90 tablet, Rfl: 3  •  clobetasol (TEMOVATE) 0.05 % ointment, Apply  topically to the appropriate area as directed 2 (Two) Times a Day., Disp: 60 g, Rfl: 0  •  COCONUT OIL PO, Take  by mouth., Disp: , Rfl:   •  conjugated estrogens (PREMARIN) 0.625 MG/GM vaginal cream, Use 0.5  grams intravaginally 2 times per week to  control symptoms., Disp: 30 g, Rfl: 3  •  hydrOXYzine (ATARAX) 25 MG tablet, , Disp: , Rfl:   •  imiquimod (ALDARA) 5 % cream, Apply  topically., Disp: , Rfl:   •  lisinopril-hydrochlorothiazide (PRINZIDE,ZESTORETIC) 20-12.5 MG per tablet, Take 1 tablet by mouth Daily., Disp: 90 tablet, Rfl: 3  •  loratadine (CLARITIN) 10 MG tablet, Take 10 mg by mouth 2 (Two) Times a Day., Disp: , Rfl:   •  meclizine (ANTIVERT) 25 MG tablet, Take 1 tablet by mouth 3 (Three) Times a Day As Needed for dizziness., Disp: 90 tablet, Rfl: 0  •  Multiple Vitamins-Minerals (KANNAN BASIC) tablet, Take  by mouth., Disp: , Rfl:   •  naproxen sodium (ALEVE) 220 MG tablet, Take 220 mg by mouth 2 (Two) Times a Day As Needed for Mild Pain (1-3)., Disp: , Rfl:   •  Nutritional Supplements (JUICE PLUS FIBRE PO), Take  by mouth., Disp: , Rfl:   •  Omega-3 Krill Oil 1000 MG capsule, Take  by mouth., Disp: , Rfl:   •  pantoprazole (PROTONIX) 40 MG EC tablet, Take 1 tablet by mouth Daily., Disp: 90 tablet, Rfl: 3  •  promethazine (PHENERGAN) 25 MG tablet, Take 25 mg by mouth Every 6 (Six) Hours As Needed for Nausea or Vomiting., Disp: , Rfl:   •  rosuvastatin (CRESTOR) 5 MG tablet, Take 1 tablet by mouth Every Night., Disp: 90 tablet, Rfl: 3  •  SOOLANTRA 1 % cream, , Disp: , Rfl:   •  triamcinolone (KENALOG) 0.1 % cream, As Needed., Disp: , Rfl:   •  VAQTA 50 UNIT/ML injection, , Disp: , Rfl:   •  vitamin B-12 (CYANOCOBALAMIN) 1000 MCG tablet, Take 1,000 mcg by mouth daily., Disp: , Rfl:     Allergies:   Allergies   Allergen Reactions   • Penicillins Other (See Comments)     Not sure of reaction   • Atorvastatin    • Fenofibrate    • Fluoxetine    • Gemfibrozil    • Hydroxychloroquine    • Metformin    • Nsaids    • Pravastatin    • Sulfamethoxazole-Trimethoprim          Family History   Problem Relation Age of Onset   • Heart disease Sister    • Arthritis Other    • Diabetes Other    • Cancer Other         lung   • Cancer Father          "prostate,lung,colon   • Kidney failure Mother        Social History     Socioeconomic History   • Marital status: Single     Spouse name: Not on file   • Number of children: Not on file   • Years of education: Not on file   • Highest education level: Not on file   Occupational History     Employer: UNKNOWN EMPLOYER   Tobacco Use   • Smoking status: Never Smoker   • Smokeless tobacco: Never Used   Substance and Sexual Activity   • Alcohol use: No   • Drug use: No   • Sexual activity: No         Review of Systems   Constitutional: Positive for appetite change and unexpected weight change. Negative for chills and fever.   HENT: Negative for hearing loss, trouble swallowing and voice change.    Eyes: Negative for visual disturbance.   Respiratory: Negative for apnea, cough, chest tightness, shortness of breath and wheezing.    Cardiovascular: Negative for chest pain, palpitations and leg swelling.   Gastrointestinal: Positive for abdominal pain. Negative for abdominal distention, anal bleeding, blood in stool, constipation, diarrhea, nausea, rectal pain and vomiting.   Endocrine: Negative for cold intolerance and heat intolerance.   Genitourinary: Negative for difficulty urinating, dysuria and flank pain.   Musculoskeletal: Negative for back pain and gait problem.   Skin: Negative for color change, rash and wound.   Neurological: Negative for dizziness, syncope, speech difficulty, weakness, light-headedness, numbness and headaches.   Hematological: Negative for adenopathy. Does not bruise/bleed easily.   Psychiatric/Behavioral: Negative for confusion. The patient is not nervous/anxious.        Objective    /72   Pulse 66   Temp 97.7 °F (36.5 °C)   Ht 152.4 cm (60\")   Wt 81.6 kg (180 lb)   SpO2 98%   BMI 35.15 kg/m²     Physical Exam   Constitutional: She is oriented to person, place, and time. She appears well-developed and well-nourished.   HENT:   Head: Normocephalic and atraumatic.   Eyes: No scleral " icterus.   Neck: Neck supple.   Cardiovascular: Regular rhythm.   Pulmonary/Chest: Effort normal.   Abdominal: Soft. She exhibits no distension. There is no tenderness.   Neurological: She is alert and oriented to person, place, and time.   Skin: Skin is warm and dry.   Psychiatric: She has a normal mood and affect. Her behavior is normal.     Outside Records:  I have taken the opportunity to review the patient's available outside records in paper format, scanned format and those available on Care Everywhere    Results Review:  I have reviewed the entirety of the patient's clinical lab results.  I have also personally reviewed the relevant patient imaging.         Assessment/Plan   Missy was seen today for weight loss.    Diagnoses and all orders for this visit:    Unintentional weight loss  -     Case Request; Standing  -     sodium chloride 0.9 % infusion; Infuse 50 mL/hr into a venous catheter Continuous.  -     Case Request    Gastroesophageal reflux disease, esophagitis presence not specified  -     Case Request; Standing  -     sodium chloride 0.9 % infusion; Infuse 50 mL/hr into a venous catheter Continuous.  -     Case Request    Epigastric abdominal pain  -     Case Request; Standing  -     sodium chloride 0.9 % infusion; Infuse 50 mL/hr into a venous catheter Continuous.  -     Case Request    Other orders  -     Follow Anesthesia Guidelines / Standing Orders; Future  -     Provide NPO Instructions to Patient; Future  -     Follow Anesthesia Guidelines / Standing Orders; Standing  -     Verify NPO Status; Standing  -     Obtain Informed Consent; Standing  -     Verify / Perform Chlorhexidine Skin Prep if Indicated (If Not Already Completed); Standing  -     Verify Bowel Prep Was Successful; Standing  -     Give Tap Water Enema If Bowel Prep Insufficient; Standing  -     Notify Physician - Standard; Standing  -     polyethylene glycol (MIRALAX) powder; Take 238 g by mouth 1 (One) Time for 1 dose. Mix 238  grams of miralax with 64 oz garotade and drink at 4 pm day before colonoscopy  -     bisacodyl (DULCOLAX) 5 MG EC tablet; Take 4 tablets once by mouth at 1 pm on day before colonoscopy.        I have recommended EGD and colonoscopy for evaluation of unintentional weight loss, epigastric pain, and GERD.   We discussed EGD for diagnostic purposes..  We discussed the indications for upper endoscopy as well as the risks, benefits and alternatives to this procedure.   The patient was given an opportunity to ask questions.  The patient verbalized understanding of these recommendations and the plan of care. The patient is willing to proceed with endoscopy and has signed informed consent in the office today.  My office will arrange scheduling for the EGD procedure and pre-admission testing.      We also discussed colonoscopy for both colon cancer screening and diagnostic purposes.  We discussed the indications for colonoscopy as well as the risks, benefits and alternatives to this procedure. Risks including but not limited to perforation, bleeding,need for blood transfusion or emergent surgery ,and missed neoplasm were reviewed in detail with the patient. The necessary bowel preparation and pre-procedure clear liquid diet was explained in detail.  A written instructional handout was also provided.  Electronic prescriptions for miralax and dulcolax were sent to the patient's pharmacy.  The patient was given an opportunity to ask questions.  The patient verbalized understanding of these recommendations and the plan of care. The patient is willing to proceed with colonoscopy and has signed informed consent in the office today.  My office will arrange scheduling for the colonoscopy procedure and pre-admission testing.

## 2019-01-28 PROBLEM — R63.4 UNINTENTIONAL WEIGHT LOSS: Status: ACTIVE | Noted: 2019-01-28

## 2019-01-28 PROBLEM — R10.13 EPIGASTRIC ABDOMINAL PAIN: Status: ACTIVE | Noted: 2019-01-28

## 2019-01-28 PROBLEM — K21.9 GASTROESOPHAGEAL REFLUX DISEASE: Status: ACTIVE | Noted: 2019-01-28

## 2019-02-01 ENCOUNTER — HOSPITAL ENCOUNTER (OUTPATIENT)
Facility: HOSPITAL | Age: 69
Setting detail: HOSPITAL OUTPATIENT SURGERY
Discharge: HOME | End: 2019-02-01
Attending: SURGERY | Admitting: SURGERY

## 2019-02-01 ENCOUNTER — ANESTHESIA (OUTPATIENT)
Dept: GASTROENTEROLOGY | Facility: HOSPITAL | Age: 69
End: 2019-02-01

## 2019-02-01 ENCOUNTER — ANESTHESIA EVENT (OUTPATIENT)
Dept: GASTROENTEROLOGY | Facility: HOSPITAL | Age: 69
End: 2019-02-01

## 2019-02-01 VITALS
HEART RATE: 69 BPM | TEMPERATURE: 98.8 F | RESPIRATION RATE: 18 BRPM | DIASTOLIC BLOOD PRESSURE: 69 MMHG | SYSTOLIC BLOOD PRESSURE: 120 MMHG | OXYGEN SATURATION: 99 %

## 2019-02-01 DIAGNOSIS — R63.4 UNINTENTIONAL WEIGHT LOSS: ICD-10-CM

## 2019-02-01 DIAGNOSIS — R10.13 EPIGASTRIC ABDOMINAL PAIN: ICD-10-CM

## 2019-02-01 DIAGNOSIS — K21.9 GASTROESOPHAGEAL REFLUX DISEASE, ESOPHAGITIS PRESENCE NOT SPECIFIED: ICD-10-CM

## 2019-02-01 LAB — GLUCOSE BLDC GLUCOMTR-MCNC: 104 MG/DL (ref 70–130)

## 2019-02-01 PROCEDURE — 25010000002 ONDANSETRON PER 1 MG: Performed by: NURSE ANESTHETIST, CERTIFIED REGISTERED

## 2019-02-01 PROCEDURE — 82962 GLUCOSE BLOOD TEST: CPT

## 2019-02-01 PROCEDURE — G0105 COLORECTAL SCRN; HI RISK IND: HCPCS | Performed by: SURGERY

## 2019-02-01 PROCEDURE — 25010000002 PROPOFOL 200 MG/20ML EMULSION: Performed by: NURSE ANESTHETIST, CERTIFIED REGISTERED

## 2019-02-01 PROCEDURE — 43239 EGD BIOPSY SINGLE/MULTIPLE: CPT | Performed by: SURGERY

## 2019-02-01 RX ORDER — SODIUM CHLORIDE 9 MG/ML
50 INJECTION, SOLUTION INTRAVENOUS CONTINUOUS
Status: DISCONTINUED | OUTPATIENT
Start: 2019-02-01 | End: 2019-02-01 | Stop reason: HOSPADM

## 2019-02-01 RX ORDER — PROPOFOL 10 MG/ML
INJECTION, EMULSION INTRAVENOUS AS NEEDED
Status: DISCONTINUED | OUTPATIENT
Start: 2019-02-01 | End: 2019-02-01 | Stop reason: SURG

## 2019-02-01 RX ORDER — ONDANSETRON 2 MG/ML
INJECTION INTRAMUSCULAR; INTRAVENOUS AS NEEDED
Status: DISCONTINUED | OUTPATIENT
Start: 2019-02-01 | End: 2019-02-01 | Stop reason: SURG

## 2019-02-01 RX ORDER — MAGNESIUM HYDROXIDE 1200 MG/15ML
LIQUID ORAL AS NEEDED
Status: DISCONTINUED | OUTPATIENT
Start: 2019-02-01 | End: 2019-02-01 | Stop reason: HOSPADM

## 2019-02-01 RX ADMIN — SODIUM CHLORIDE 50 ML/HR: 9 INJECTION, SOLUTION INTRAVENOUS at 06:29

## 2019-02-01 RX ADMIN — ONDANSETRON 4 MG: 2 INJECTION INTRAMUSCULAR; INTRAVENOUS at 07:36

## 2019-02-01 RX ADMIN — PROPOFOL 50 MG: 10 INJECTION, EMULSION INTRAVENOUS at 07:57

## 2019-02-01 RX ADMIN — PROPOFOL 50 MG: 10 INJECTION, EMULSION INTRAVENOUS at 07:41

## 2019-02-01 RX ADMIN — LIDOCAINE HYDROCHLORIDE 60 MG: 20 INJECTION, SOLUTION INTRAVENOUS at 07:36

## 2019-02-01 RX ADMIN — SODIUM CHLORIDE: 9 INJECTION, SOLUTION INTRAVENOUS at 08:21

## 2019-02-01 RX ADMIN — PROPOFOL 50 MG: 10 INJECTION, EMULSION INTRAVENOUS at 08:11

## 2019-02-01 RX ADMIN — PROPOFOL 50 MG: 10 INJECTION, EMULSION INTRAVENOUS at 08:02

## 2019-02-01 RX ADMIN — PROPOFOL 50 MG: 10 INJECTION, EMULSION INTRAVENOUS at 07:47

## 2019-02-01 RX ADMIN — PROPOFOL 50 MG: 10 INJECTION, EMULSION INTRAVENOUS at 07:52

## 2019-02-01 RX ADMIN — PROPOFOL 100 MG: 10 INJECTION, EMULSION INTRAVENOUS at 07:36

## 2019-02-01 RX ADMIN — PROPOFOL 50 MG: 10 INJECTION, EMULSION INTRAVENOUS at 08:07

## 2019-02-01 NOTE — ANESTHESIA PREPROCEDURE EVALUATION
Anesthesia Evaluation     Patient summary reviewed and Nursing notes reviewed   history of anesthetic complications: PONV  NPO Solid Status: > 8 hours  NPO Liquid Status: > 8 hours           Airway   Mallampati: II  TM distance: >3 FB  Neck ROM: full  No difficulty expected  Dental - normal exam     Pulmonary - normal exam   (+) shortness of breath,   (-) not a smoker  Cardiovascular - normal exam    ECG reviewed    (+) hypertension well controlled 2 medications or greater, CAD, angina with exertion, hyperlipidemia,     ROS comment: EKG: NSR     Neuro/Psych  (+) TIA, headaches (migraines ), dizziness/light headedness (vertigo ), numbness (neuralgia), psychiatric history Depression,     GI/Hepatic/Renal/Endo    (+)  GERD poorly controlled,  liver disease history of elevated LFT, diabetes mellitus type 2 well controlled, hyperthyroidism    Musculoskeletal     (+) myalgias, neck pain,   Abdominal    Substance History      OB/GYN          Other   (+) arthritis     ROS/Med Hx Other: osteoporosis                   Anesthesia Plan    ASA 3     MAC   (Risks and benefits discussed including risk of aspiration, recall and dental damage. All patient questions answered. Will continue with POC.)  intravenous induction   Anesthetic plan, all risks, benefits, and alternatives have been provided, discussed and informed consent has been obtained with: patient.

## 2019-02-01 NOTE — ANESTHESIA POSTPROCEDURE EVALUATION
Patient: Missy Mcgraw    Procedure Summary     Date:  02/01/19 Room / Location:  [facility] ENDOSCOPY 3 / [facility] ENDOSCOPY    Anesthesia Start:  0730 Anesthesia Stop:  0820    Procedures:       COLONOSCOPY (N/A )      ESOPHAGOGASTRODUODENOSCOPY WITH COLD FORCEP BIOPSY AND POLYPECTOMY (N/A Esophagus) Diagnosis:       Unintentional weight loss      Gastroesophageal reflux disease, esophagitis presence not specified      Epigastric abdominal pain      (Unintentional weight loss [R63.4])      (Gastroesophageal reflux disease, esophagitis presence not specified [K21.9])      (Epigastric abdominal pain [R10.13])    Surgeon:  Marie De Leon MD Provider:  Daisha Kim CRNA    Anesthesia Type:  MAC ASA Status:  3          Anesthesia Type: MAC  Last vitals  BP   120/69 (02/01/19 0845)   Temp   98.8 °F (37.1 °C) (02/01/19 0822)   Pulse   69 (02/01/19 0845)   Resp   18 (02/01/19 0845)     SpO2   99 % (02/01/19 0845)     Post Anesthesia Care and Evaluation    Patient location during evaluation: PHASE II  Patient participation: complete - patient participated  Level of consciousness: awake and alert  Pain score: 0  Pain management: satisfactory to patient  Airway patency: patent  Anesthetic complications: No anesthetic complications  PONV Status: none  Cardiovascular status: acceptable and stable  Respiratory status: acceptable  Hydration status: acceptable

## 2019-02-05 LAB
LAB AP CASE REPORT: NORMAL
PATH REPORT.FINAL DX SPEC: NORMAL

## 2019-02-07 ENCOUNTER — PATIENT MESSAGE (OUTPATIENT)
Dept: INTERNAL MEDICINE | Facility: CLINIC | Age: 69
End: 2019-02-07

## 2019-02-08 RX ORDER — ROSUVASTATIN CALCIUM 5 MG/1
5 TABLET, COATED ORAL NIGHTLY
Qty: 90 TABLET | Refills: 2 | Status: SHIPPED | OUTPATIENT
Start: 2019-02-08 | End: 2019-12-15 | Stop reason: SDUPTHER

## 2019-02-08 RX ORDER — ATENOLOL 25 MG/1
25 TABLET ORAL 2 TIMES DAILY
Qty: 180 TABLET | Refills: 2 | Status: SHIPPED | OUTPATIENT
Start: 2019-02-08 | End: 2019-12-15 | Stop reason: SDUPTHER

## 2019-02-08 NOTE — TELEPHONE ENCOUNTER
From: Missy Mcgraw  To: mEely Sheldon MD  Sent: 2/7/2019 11:06 AM EST  Subject: Prescription Question    Good morning! I need a new prescription for rouvastatin 5 mg and atenolol 25 mg [facility] prescription canceled please.

## 2019-02-12 ENCOUNTER — OFFICE VISIT (OUTPATIENT)
Dept: SURGERY | Facility: CLINIC | Age: 69
End: 2019-02-12

## 2019-02-12 VITALS
OXYGEN SATURATION: 98 % | TEMPERATURE: 98.2 F | WEIGHT: 184.4 LBS | BODY MASS INDEX: 36.2 KG/M2 | HEIGHT: 60 IN | HEART RATE: 83 BPM | SYSTOLIC BLOOD PRESSURE: 168 MMHG | DIASTOLIC BLOOD PRESSURE: 98 MMHG

## 2019-02-12 DIAGNOSIS — Z80.0 FAMILY HISTORY OF COLON CANCER IN FATHER: ICD-10-CM

## 2019-02-12 DIAGNOSIS — K29.70 GASTRITIS DETERMINED BY BIOPSY: ICD-10-CM

## 2019-02-12 DIAGNOSIS — K21.00 GASTROESOPHAGEAL REFLUX DISEASE WITH ESOPHAGITIS: Primary | ICD-10-CM

## 2019-02-12 PROCEDURE — 99212 OFFICE O/P EST SF 10 MIN: CPT | Performed by: SURGERY

## 2019-02-12 NOTE — PROGRESS NOTES
Subjective   Missy Mcgraw is a 68 y.o. female.   Chief Complaint   Patient presents with   • Follow-up     follow up egd/colon       History of Present Illness   Ms. Mcgraw returns the office today to discuss the results of her recent EGD and colonoscopy.  EGD revealed multiple small polyps within the gastric body.  Erythematous mucosa was found within the gastric antrum, and was biopsied.  The duodenum was normal.  The Z line was also normal.  There were no endoscopic abnormalities of the esophagus.  Colonoscopy revealed nonbleeding internal hemorrhoids, and no other abnormalities.  Biopsies of the gastric antrum revealed mild, chronic inactive gastritis without evidence of H. pylori.  Several of the gastric polyps, which were removed, revealed fundic type mucosa with chronic inactive gastritis as well as features suggestive of proton pump inhibitor effect versus early fundic gland polyps.  Distal esophageal biopsies showed mild, nonspecific reactive changes.  Duodenal biopsies were normal.  She continues to take a daily proton pump inhibitor.  She does feel that her symptoms have improved.      The following portions of the patient's history were reviewed and updated as appropriate: allergies, current medications, past family history, past medical history, past social history, past surgical history and problem list.    Review of Systems   Constitutional: Negative for chills, fever and unexpected weight change.   HENT: Positive for trouble swallowing. Negative for hearing loss and voice change.    Eyes: Negative for visual disturbance.   Respiratory: Negative for apnea, cough, chest tightness, shortness of breath and wheezing.    Cardiovascular: Negative for chest pain, palpitations and leg swelling.   Gastrointestinal: Negative for abdominal distention, abdominal pain (epigastric pain), anal bleeding, blood in stool, constipation, diarrhea, nausea, rectal pain and vomiting.   Endocrine: Negative for cold  "intolerance and heat intolerance.   Genitourinary: Negative for difficulty urinating, dysuria and flank pain.   Musculoskeletal: Negative for back pain and gait problem.   Skin: Negative for color change, rash and wound.   Neurological: Negative for dizziness, syncope, speech difficulty, weakness, light-headedness, numbness and headaches.   Hematological: Negative for adenopathy. Does not bruise/bleed easily.   Psychiatric/Behavioral: Negative for confusion. The patient is not nervous/anxious.        Objective    /98   Pulse 83   Temp 98.2 °F (36.8 °C)   Ht 152.4 cm (60\")   Wt 83.6 kg (184 lb 6.4 oz)   SpO2 98%   BMI 36.01 kg/m²     Physical Exam   Constitutional: She is oriented to person, place, and time. She appears well-developed and well-nourished.   HENT:   Head: Normocephalic and atraumatic.   Eyes: No scleral icterus.   Neck: Neck supple.   Cardiovascular: Regular rhythm.   Pulmonary/Chest: Effort normal.   Abdominal: Soft. She exhibits no distension. There is no tenderness.   Neurological: She is alert and oriented to person, place, and time.   Skin: Skin is warm and dry.   Psychiatric: She has a normal mood and affect. Her behavior is normal.     Pathology: Reviewed.      Assessment/Plan   Missy was seen today for follow-up.    Diagnoses and all orders for this visit:    Gastroesophageal reflux disease with esophagitis    Gastritis determined by biopsy    Family history of colon cancer in father        I discussed the results in detail with the patient in the office today.  We reviewed the endoscopic photographs.  I have recommended that she continue with proton pump inhibitor therapy.  We also discussed antireflux precautions as detailed below.  All of her questions were answered to her satisfaction.  I did recommend repeat surveillance colonoscopy in 5 years given her family history.  She may follow-up with me as needed.    Reflux precautions    Trigger foods and drinks to avoid: Fatty " foods, alcohol, chocolate, coffee, tea, caffeinated soft drinks (decaffeinated coffee still has some caffeine), peppermint and spearmint, spices and vinegar, citrus fruits and juices, tomatoes and tomato sauces, and smoking. Other antireflux measures include weight reduction if overweight, avoidance of tight clothing around the abdomen, elevate the head of the bed to 45 degrees (May use a bed wedge which is placed between the mattress and boxsprings or blocks under the head of the bed), don't drink or eat for 2 hours before going to bed and avoid lying down immediately after meals.

## 2019-02-18 DIAGNOSIS — M25.571 ARTHRALGIA OF RIGHT ANKLE: Primary | ICD-10-CM

## 2019-02-19 ENCOUNTER — OFFICE VISIT (OUTPATIENT)
Dept: ORTHOPEDIC SURGERY | Facility: CLINIC | Age: 69
End: 2019-02-19

## 2019-02-19 VITALS — WEIGHT: 184 LBS | BODY MASS INDEX: 36.12 KG/M2 | RESPIRATION RATE: 18 BRPM | HEIGHT: 60 IN

## 2019-02-19 DIAGNOSIS — M25.571 ARTHRALGIA OF RIGHT ANKLE: Primary | ICD-10-CM

## 2019-02-19 PROCEDURE — 99204 OFFICE O/P NEW MOD 45 MIN: CPT | Performed by: ORTHOPAEDIC SURGERY

## 2019-02-19 NOTE — PROGRESS NOTES
"Subjective   Patient ID: Missy Mcgraw is a 68 y.o. female  Pain of the Right Ankle (Patient is here today for right ankle pain, she states the ankle has hurt since the summer but she does not recall any injury to the ankle. She says it feels like there is a catch in it. She denies any pain today but her pain can be 2/10.)             History of Present Illness  Anteromedial right ankle mechanical symptoms of snapping denies history of recent injury or fall, used to play second base in softball, has generalized arthritis feels relief at times taking Aleve.          Review of Systems   Constitutional: Negative for fever.   HENT: Negative for voice change.    Eyes: Negative for visual disturbance.   Respiratory: Negative for shortness of breath.    Cardiovascular: Negative for chest pain.   Gastrointestinal: Negative for abdominal distention and abdominal pain.   Genitourinary: Negative for dysuria.   Musculoskeletal: Positive for arthralgias. Negative for gait problem and joint swelling.   Skin: Negative for rash.   Neurological: Negative for speech difficulty.   Hematological: Does not bruise/bleed easily.   Psychiatric/Behavioral: Negative for confusion.       Past Medical History:   Diagnosis Date   • Acute maxillary sinusitis    • Arthritis    • Cancer (CMS/HCC) Skin   • Cataract    • Depression    • Diabetes mellitus (CMS/HCC)    • Esophagitis, reflux    • Fibromyositis    • GERD (gastroesophageal reflux disease)    • H/O colonoscopy    • Herpes zoster    • History of mammogram    • Migraine headache    • Osteoporosis    • Pap smear for cervical cancer screening    • PONV (postoperative nausea and vomiting)    • Restless legs syndrome    • Screening     DEXA body composition study   • TIA (transient ischemic attack)     \"at least 10 years ago\"   • Transient cerebral ischemia    • Urinary tract infection    • Vertigo         Past Surgical History:   Procedure Laterality Date   • APPENDECTOMY     • CARDIAC " CATHETERIZATION N/A 8/1/2018    Procedure: Coronary angiography;  Surgeon: Wali Núñez MD;  Location: [facility] CATH INVASIVE LOCATION;  Service: Cardiology   • COLONOSCOPY  10/2016    Dr. Colmenares   • COLONOSCOPY N/A 2/1/2019    Procedure: COLONOSCOPY;  Surgeon: Marie De Leon MD;  Location: [facility] ENDOSCOPY;  Service: Gastroenterology   • ENDOSCOPY  10/2016    Dr. Colmenares   • ENDOSCOPY N/A 2/1/2019    Procedure: ESOPHAGOGASTRODUODENOSCOPY WITH COLD FORCEP BIOPSY AND POLYPECTOMY;  Surgeon: Marie De Leon MD;  Location: [facility] ENDOSCOPY;  Service: Gastroenterology   • KNEE SURGERY Right    • LAPAROSCOPY DIAGNOSTIC / BIOPSY / ASPIRATION / LYSIS Right    • SHOULDER ARTHROSCOPY Bilateral        Family History   Problem Relation Age of Onset   • Heart disease Sister    • Arthritis Other    • Diabetes Other    • Cancer Other         lung   • Cancer Father         prostate,lung,colon   • Kidney failure Mother        Social History     Socioeconomic History   • Marital status: Single     Spouse name: Not on file   • Number of children: Not on file   • Years of education: Not on file   • Highest education level: Not on file   Social Needs   • Financial resource strain: Not on file   • Food insecurity - worry: Not on file   • Food insecurity - inability: Not on file   • Transportation needs - medical: Not on file   • Transportation needs - non-medical: Not on file   Occupational History     Employer: UNKNOWN EMPLOYER   Tobacco Use   • Smoking status: Never Smoker   • Smokeless tobacco: Never Used   Substance and Sexual Activity   • Alcohol use: No   • Drug use: No   • Sexual activity: No   Other Topics Concern   • Not on file   Social History Narrative   • Not on file       I have reviewed all of the above social hx, family hx, surgical hx, medications, allergies & ROS and confirm that it is accurate.    Allergies   Allergen Reactions   • Penicillins Other (See Comments)     Not sure of reaction (childhood)   •  Atorvastatin Unknown (See Comments)     Pt cannot remember   • Fenofibrate Unknown (See Comments)     Pt cannot remember   • Fluoxetine Unknown (See Comments)     Pt cannot remember   • Gemfibrozil Unknown (See Comments)     Pt cannot remember   • Hydroxychloroquine    • Metformin Diarrhea   • Nsaids GI Intolerance   • Pravastatin Unknown (See Comments)     Pt cannot remember   • Sulfamethoxazole-Trimethoprim Unknown (See Comments)     Pt cannot remember         Current Outpatient Medications:   •  amLODIPine (NORVASC) 2.5 MG tablet, , Disp: , Rfl:   •  aspirin (SANJU ASPIRIN EC LOW DOSE) 81 MG EC tablet, Take 1 tablet by mouth daily., Disp: , Rfl:   •  atenolol (TENORMIN) 25 MG tablet, Take 1 tablet by mouth 2 (Two) Times a Day., Disp: 180 tablet, Rfl: 2  •  betamethasone valerate (VALISONE) 0.1 % cream, , Disp: , Rfl:   •  carbidopa-levodopa CR (SINEMET CR)  MG per CR tablet, Take 1 tablet by mouth Every Night., Disp: 90 tablet, Rfl: 3  •  cholecalciferol (VITAMIN D3) 1000 units tablet, Take 1,000 Units by mouth Daily., Disp: , Rfl:   •  citalopram (CeleXA) 40 MG tablet, Take 1 tablet by mouth Daily. (Patient taking differently: Take 20 mg by mouth Daily.), Disp: 90 tablet, Rfl: 3  •  clobetasol (TEMOVATE) 0.05 % ointment, Apply  topically to the appropriate area as directed 2 (Two) Times a Day., Disp: 60 g, Rfl: 0  •  COCONUT OIL PO, Take  by mouth., Disp: , Rfl:   •  conjugated estrogens (PREMARIN) 0.625 MG/GM vaginal cream, Use 0.5  grams intravaginally 2 times per week to control symptoms., Disp: 30 g, Rfl: 3  •  hydrOXYzine (ATARAX) 25 MG tablet, , Disp: , Rfl:   •  imiquimod (ALDARA) 5 % cream, Apply  topically., Disp: , Rfl:   •  lisinopril-hydrochlorothiazide (PRINZIDE,ZESTORETIC) 20-12.5 MG per tablet, Take 1 tablet by mouth Daily., Disp: 90 tablet, Rfl: 3  •  loratadine (CLARITIN) 10 MG tablet, Take 10 mg by mouth 2 (Two) Times a Day., Disp: , Rfl:   •  meclizine (ANTIVERT) 25 MG tablet, Take 1  "tablet by mouth 3 (Three) Times a Day As Needed for dizziness., Disp: 90 tablet, Rfl: 0  •  Multiple Vitamins-Minerals (KANNAN BASIC) tablet, Take  by mouth., Disp: , Rfl:   •  naproxen sodium (ALEVE) 220 MG tablet, Take 220 mg by mouth 2 (Two) Times a Day As Needed for Mild Pain (1-3)., Disp: , Rfl:   •  Nutritional Supplements (JUICE PLUS FIBRE PO), Take  by mouth., Disp: , Rfl:   •  Omega-3 Krill Oil 1000 MG capsule, Take  by mouth., Disp: , Rfl:   •  pantoprazole (PROTONIX) 40 MG EC tablet, Take 1 tablet by mouth Daily., Disp: 90 tablet, Rfl: 3  •  promethazine (PHENERGAN) 25 MG tablet, Take 25 mg by mouth Every 6 (Six) Hours As Needed for Nausea or Vomiting., Disp: , Rfl:   •  rosuvastatin (CRESTOR) 5 MG tablet, Take 1 tablet by mouth Every Night., Disp: 90 tablet, Rfl: 2  •  SOOLANTRA 1 % cream, , Disp: , Rfl:   •  triamcinolone (KENALOG) 0.1 % cream, As Needed., Disp: , Rfl:   •  VAQTA 50 UNIT/ML injection, , Disp: , Rfl:   •  vitamin B-12 (CYANOCOBALAMIN) 1000 MCG tablet, Take 1,000 mcg by mouth daily., Disp: , Rfl:     Objective   Resp 18   Ht 152.4 cm (60\")   Wt 83.5 kg (184 lb)   BMI 35.94 kg/m²    Physical Exam  Constitutional: Patient is oriented to person, place, and time. Patient appears well-developed and well-nourished.   HENT:Head: Normocephalic and atraumatic.   Eyes: EOM are normal. Pupils are equal, round, and reactive to light.   Neck: Normal range of motion. Neck supple.   Cardiovascular: Normal rate.    Pulmonary/Chest: Effort normal and breath sounds normal.   Abdominal: Soft.   Neurological: Patient is alert and oriented to person, place, and time.   Skin: Skin is warm and dry.   Psychiatric: Patient has a normal mood and affect.   Nursing note and vitals reviewed.       [unfilled]   Right ankle full range of motion very minimal tenderness at the anteromedial tibiotalar area, no mechanical instability no tenderness of the perineal posterior tibial or Achilles tendon areas.  Fore- foot " neurovascularly intact with normal longitudinal alignment no midfoot tenderness or swelling.    Assessment/Plan   Review of Radiographic Studies:    Radiographic images today of affected area I personally viewed and showed no sign of acute fracture or dislocation.      Procedures     Missy was seen today for pain.    Diagnoses and all orders for this visit:    Arthralgia of right ankle  -     Ambulatory Referral to Physical Therapy Evaluate and treat       Physical therapy referral given      Recommendations/Plan:   Work/Activity Status: May perform usual activities as tolerated    Patient agreeable to call or return sooner for any concerns.             Impression:  Generalized osteoarthritis, anteromedial right ankle pain possible impingement and/or osteochondral issue related to osteoarthritis of the ankle  Plan:  PT referral recheck 6 weeks if not improved at that time order MR right ankle

## 2019-02-25 PROBLEM — Z80.0 FAMILY HISTORY OF COLON CANCER IN FATHER: Status: ACTIVE | Noted: 2019-02-25

## 2019-02-25 PROBLEM — K29.70 GASTRITIS DETERMINED BY BIOPSY: Status: ACTIVE | Noted: 2019-02-25

## 2019-03-12 ENCOUNTER — OFFICE VISIT (OUTPATIENT)
Dept: OBSTETRICS AND GYNECOLOGY | Facility: CLINIC | Age: 69
End: 2019-03-12

## 2019-03-12 VITALS
DIASTOLIC BLOOD PRESSURE: 74 MMHG | BODY MASS INDEX: 37.26 KG/M2 | HEIGHT: 60 IN | WEIGHT: 189.8 LBS | SYSTOLIC BLOOD PRESSURE: 130 MMHG

## 2019-03-12 DIAGNOSIS — N95.2 VAGINAL ATROPHY: Primary | ICD-10-CM

## 2019-03-12 PROCEDURE — 99212 OFFICE O/P EST SF 10 MIN: CPT | Performed by: PHYSICIAN ASSISTANT

## 2019-03-12 NOTE — PROGRESS NOTES
"Subjective   Chief Complaint   Patient presents with   • Follow-up     Following up on premarin cream and raw spot in vagina. Patient states the area does well while using premarin cream       Missy Mcgraw is a 68 y.o. year old  presenting to be seen for follow up vaginal atrophy and irritation  She was seen in December for annual exam and had complaints of vulvar and vaginal irritation itching and dryness.  She had been previously diagnosed with lichen sclerosis and had been treated with clobetasol in the past.  She reports using the clobetasol typically does resolve her external vulvar irritation and itching.  At her previous visit she was noted to have some extensive vaginal atrophy and dryness.  She had not previously been using any vaginal estrogen cream but did desire a trial of this at her last visit.  She reports she is doing better using the Premarin cream and has been consistent with the Premarin cream twice weekly.  She reports she is comfortable with the current regimen and desires to continue at this time.  Past Medical History:   Diagnosis Date   • Acute maxillary sinusitis    • Arthritis    • Cancer (CMS/HCC) Skin   • Cataract    • Depression    • Diabetes mellitus (CMS/HCC)    • Esophagitis, reflux    • Fibromyositis    • GERD (gastroesophageal reflux disease)    • H/O colonoscopy    • Herpes zoster    • History of mammogram    • Migraine headache    • Osteoporosis    • Pap smear for cervical cancer screening    • PONV (postoperative nausea and vomiting)    • Restless legs syndrome    • Screening     DEXA body composition study   • TIA (transient ischemic attack)     \"at least 10 years ago\"   • Transient cerebral ischemia    • Urinary tract infection    • Vertigo         Current Outpatient Medications:   •  amLODIPine (NORVASC) 2.5 MG tablet, , Disp: , Rfl:   •  aspirin (SANJU ASPIRIN EC LOW DOSE) 81 MG EC tablet, Take 1 tablet by mouth daily., Disp: , Rfl:   •  atenolol (TENORMIN) 25 MG " tablet, Take 1 tablet by mouth 2 (Two) Times a Day., Disp: 180 tablet, Rfl: 2  •  betamethasone valerate (VALISONE) 0.1 % cream, , Disp: , Rfl:   •  carbidopa-levodopa CR (SINEMET CR)  MG per CR tablet, Take 1 tablet by mouth Every Night., Disp: 90 tablet, Rfl: 3  •  cholecalciferol (VITAMIN D3) 1000 units tablet, Take 1,000 Units by mouth Daily., Disp: , Rfl:   •  citalopram (CeleXA) 40 MG tablet, Take 1 tablet by mouth Daily. (Patient taking differently: Take 20 mg by mouth Daily.), Disp: 90 tablet, Rfl: 3  •  clobetasol (TEMOVATE) 0.05 % ointment, Apply  topically to the appropriate area as directed 2 (Two) Times a Day., Disp: 60 g, Rfl: 0  •  COCONUT OIL PO, Take  by mouth., Disp: , Rfl:   •  conjugated estrogens (PREMARIN) 0.625 MG/GM vaginal cream, Use 0.5  grams intravaginally 2 times per week to control symptoms., Disp: 30 g, Rfl: 3  •  hydrOXYzine (ATARAX) 25 MG tablet, , Disp: , Rfl:   •  imiquimod (ALDARA) 5 % cream, Apply  topically., Disp: , Rfl:   •  lisinopril-hydrochlorothiazide (PRINZIDE,ZESTORETIC) 20-12.5 MG per tablet, Take 1 tablet by mouth Daily., Disp: 90 tablet, Rfl: 3  •  loratadine (CLARITIN) 10 MG tablet, Take 10 mg by mouth 2 (Two) Times a Day., Disp: , Rfl:   •  meclizine (ANTIVERT) 25 MG tablet, Take 1 tablet by mouth 3 (Three) Times a Day As Needed for dizziness., Disp: 90 tablet, Rfl: 0  •  Multiple Vitamins-Minerals (KANNAN BASIC) tablet, Take  by mouth., Disp: , Rfl:   •  naproxen sodium (ALEVE) 220 MG tablet, Take 220 mg by mouth 2 (Two) Times a Day As Needed for Mild Pain (1-3)., Disp: , Rfl:   •  Nutritional Supplements (JUICE PLUS FIBRE PO), Take  by mouth., Disp: , Rfl:   •  Omega-3 Krill Oil 1000 MG capsule, Take  by mouth., Disp: , Rfl:   •  pantoprazole (PROTONIX) 40 MG EC tablet, Take 1 tablet by mouth Daily., Disp: 90 tablet, Rfl: 3  •  promethazine (PHENERGAN) 25 MG tablet, Take 25 mg by mouth Every 6 (Six) Hours As Needed for Nausea or Vomiting., Disp: , Rfl:   •   rosuvastatin (CRESTOR) 5 MG tablet, Take 1 tablet by mouth Every Night., Disp: 90 tablet, Rfl: 2  •  SOOLANTRA 1 % cream, , Disp: , Rfl:   •  triamcinolone (KENALOG) 0.1 % cream, As Needed., Disp: , Rfl:   •  VAQTA 50 UNIT/ML injection, , Disp: , Rfl:   •  vitamin B-12 (CYANOCOBALAMIN) 1000 MCG tablet, Take 1,000 mcg by mouth daily., Disp: , Rfl:    Allergies   Allergen Reactions   • Penicillins Other (See Comments)     Not sure of reaction (childhood)   • Atorvastatin Unknown (See Comments)     Pt cannot remember   • Fenofibrate Unknown (See Comments)     Pt cannot remember   • Fluoxetine Unknown (See Comments)     Pt cannot remember   • Gemfibrozil Unknown (See Comments)     Pt cannot remember   • Hydroxychloroquine    • Metformin Diarrhea   • Nsaids GI Intolerance   • Pravastatin Unknown (See Comments)     Pt cannot remember   • Sulfamethoxazole-Trimethoprim Unknown (See Comments)     Pt cannot remember      Past Surgical History:   Procedure Laterality Date   • APPENDECTOMY     • CARDIAC CATHETERIZATION N/A 8/1/2018    Procedure: Coronary angiography;  Surgeon: Wali Núñez MD;  Location: [facility] CATH INVASIVE LOCATION;  Service: Cardiology   • COLONOSCOPY  10/2016    Dr. Colmenares   • COLONOSCOPY N/A 2/1/2019    Procedure: COLONOSCOPY;  Surgeon: Marie De Leon MD;  Location: [facility] ENDOSCOPY;  Service: Gastroenterology   • ENDOSCOPY  10/2016    Dr. Colmenares   • ENDOSCOPY N/A 2/1/2019    Procedure: ESOPHAGOGASTRODUODENOSCOPY WITH COLD FORCEP BIOPSY AND POLYPECTOMY;  Surgeon: Marie De Leon MD;  Location: [facility] ENDOSCOPY;  Service: Gastroenterology   • KNEE SURGERY Right    • LAPAROSCOPY DIAGNOSTIC / BIOPSY / ASPIRATION / LYSIS Right    • SHOULDER ARTHROSCOPY Bilateral       Social History     Socioeconomic History   • Marital status: Single     Spouse name: Not on file   • Number of children: Not on file   • Years of education: Not on file   • Highest education level: Not on file   Social Needs   •  "Financial resource strain: Not on file   • Food insecurity - worry: Not on file   • Food insecurity - inability: Not on file   • Transportation needs - medical: Not on file   • Transportation needs - non-medical: Not on file   Occupational History     Employer: UNKNOWN EMPLOYER   Tobacco Use   • Smoking status: Never Smoker   • Smokeless tobacco: Never Used   Substance and Sexual Activity   • Alcohol use: No   • Drug use: No   • Sexual activity: No   Other Topics Concern   • Not on file   Social History Narrative   • Not on file      Family History   Problem Relation Age of Onset   • Heart disease Sister    • Arthritis Other    • Diabetes Other    • Cancer Other         lung   • Cancer Father         prostate,lung,colon   • Kidney failure Mother        Review of Systems   Constitutional: Negative.    Gastrointestinal: Negative.    Genitourinary: Negative for difficulty urinating, dysuria, pelvic pain and vaginal bleeding.           Objective   /74   Ht 152.4 cm (60\")   Wt 86.1 kg (189 lb 12.8 oz)   Breastfeeding? No   BMI 37.07 kg/m²     Physical Exam   Constitutional: She appears well-developed and well-nourished. She is cooperative. No distress.   Genitourinary: There is no tenderness or lesion on the right labia. There is no tenderness or lesion on the left labia.   Genitourinary Comments: Vaginal tissue still somewhat atrophic in appearance however tissue has improved and is much more lubricated     Skin: Skin is warm and dry.   Psychiatric: She has a normal mood and affect. Her behavior is normal.            Assessment and Plan  Missy was seen today for follow-up.    Diagnoses and all orders for this visit:    Vaginal atrophy      Patient Instructions   Continue premarin cream 2 x weekly  May use clobetasol prn if she thinks lichen sclerosis is flaring up             This note was electronically signed.    Brittany Calvin PA-C   March 13, 2019  "

## 2019-03-13 NOTE — PATIENT INSTRUCTIONS
Continue premarin cream 2 x weekly  May use clobetasol prn if she thinks lichen sclerosis is flaring up

## 2019-04-02 ENCOUNTER — OFFICE VISIT (OUTPATIENT)
Dept: ORTHOPEDIC SURGERY | Facility: CLINIC | Age: 69
End: 2019-04-02

## 2019-04-02 VITALS — HEIGHT: 60 IN | BODY MASS INDEX: 37.5 KG/M2 | RESPIRATION RATE: 18 BRPM | WEIGHT: 191 LBS

## 2019-04-02 DIAGNOSIS — M17.10 ARTHRITIS OF KNEE: ICD-10-CM

## 2019-04-02 DIAGNOSIS — M25.562 ARTHRALGIA OF LEFT KNEE: ICD-10-CM

## 2019-04-02 DIAGNOSIS — M25.571 ARTHRALGIA OF RIGHT ANKLE: Primary | ICD-10-CM

## 2019-04-02 PROCEDURE — 20610 DRAIN/INJ JOINT/BURSA W/O US: CPT | Performed by: ORTHOPAEDIC SURGERY

## 2019-04-02 PROCEDURE — 99214 OFFICE O/P EST MOD 30 MIN: CPT | Performed by: ORTHOPAEDIC SURGERY

## 2019-04-02 RX ORDER — TRIAMCINOLONE ACETONIDE 40 MG/ML
40 INJECTION, SUSPENSION INTRA-ARTICULAR; INTRAMUSCULAR
Status: COMPLETED | OUTPATIENT
Start: 2019-04-02 | End: 2019-04-02

## 2019-04-02 RX ORDER — LIDOCAINE HYDROCHLORIDE 10 MG/ML
2 INJECTION, SOLUTION INFILTRATION; PERINEURAL
Status: COMPLETED | OUTPATIENT
Start: 2019-04-02 | End: 2019-04-02

## 2019-04-02 RX ADMIN — TRIAMCINOLONE ACETONIDE 40 MG: 40 INJECTION, SUSPENSION INTRA-ARTICULAR; INTRAMUSCULAR at 09:38

## 2019-04-02 RX ADMIN — LIDOCAINE HYDROCHLORIDE 2 ML: 10 INJECTION, SOLUTION INFILTRATION; PERINEURAL at 09:38

## 2019-04-02 NOTE — PROGRESS NOTES
"Subjective   Patient ID: Missy Mcgraw is a 68 y.o. female  Follow-up and Pain of the Right Ankle (Patient is here today for a follow up on her right ankle, she states she has not been able to do physical therapist yet. She states her pain level is 5/10 when it hurts.)             History of Present Illness  Here for follow-up of her right ankle but also complaining of sharp left posterior lateral knee pain with weightbearing stiffness and loss of motion chronic does recall injuring the knee many years ago has not had x-rays of the left knee in many years.  Is followed by rheumatology offered a hip injection for her left posterior hip pain also has history of sciatica.  She does not feel her left knee pain is related to her hip or back pain.      Review of Systems   Constitutional: Negative for fever.   HENT: Negative for voice change.    Eyes: Negative for visual disturbance.   Respiratory: Negative for shortness of breath.    Cardiovascular: Negative for chest pain.   Gastrointestinal: Negative for abdominal distention and abdominal pain.   Genitourinary: Negative for dysuria.   Musculoskeletal: Positive for arthralgias. Negative for gait problem and joint swelling.   Skin: Negative for rash.   Neurological: Negative for speech difficulty.   Hematological: Does not bruise/bleed easily.   Psychiatric/Behavioral: Negative for confusion.       Past Medical History:   Diagnosis Date   • Acute maxillary sinusitis    • Arthritis    • Cancer (CMS/HCC) Skin   • Cataract    • Depression    • Diabetes mellitus (CMS/HCC)    • Esophagitis, reflux    • Fibromyositis    • GERD (gastroesophageal reflux disease)    • H/O colonoscopy    • Herpes zoster    • History of mammogram    • Migraine headache    • Osteoporosis    • Pap smear for cervical cancer screening    • PONV (postoperative nausea and vomiting)    • Restless legs syndrome    • Screening     DEXA body composition study   • TIA (transient ischemic attack)     \"at " "least 10 years ago\"   • Transient cerebral ischemia    • Urinary tract infection    • Vertigo         Past Surgical History:   Procedure Laterality Date   • APPENDECTOMY     • CARDIAC CATHETERIZATION N/A 8/1/2018    Procedure: Coronary angiography;  Surgeon: Wali Núñez MD;  Location: [facility] CATH INVASIVE LOCATION;  Service: Cardiology   • COLONOSCOPY  10/2016    Dr. Colmenares   • COLONOSCOPY N/A 2/1/2019    Procedure: COLONOSCOPY;  Surgeon: Marie De Leon MD;  Location: [facility] ENDOSCOPY;  Service: Gastroenterology   • ENDOSCOPY  10/2016    Dr. Colmenares   • ENDOSCOPY N/A 2/1/2019    Procedure: ESOPHAGOGASTRODUODENOSCOPY WITH COLD FORCEP BIOPSY AND POLYPECTOMY;  Surgeon: Marie De Leon MD;  Location: [facility] ENDOSCOPY;  Service: Gastroenterology   • KNEE SURGERY Right    • LAPAROSCOPY DIAGNOSTIC / BIOPSY / ASPIRATION / LYSIS Right    • SHOULDER ARTHROSCOPY Bilateral        Family History   Problem Relation Age of Onset   • Heart disease Sister    • Arthritis Other    • Diabetes Other    • Cancer Other         lung   • Cancer Father         prostate,lung,colon   • Kidney failure Mother        Social History     Socioeconomic History   • Marital status: Single     Spouse name: Not on file   • Number of children: Not on file   • Years of education: Not on file   • Highest education level: Not on file   Occupational History     Employer: UNKNOWN EMPLOYER   Tobacco Use   • Smoking status: Never Smoker   • Smokeless tobacco: Never Used   Substance and Sexual Activity   • Alcohol use: No   • Drug use: No   • Sexual activity: No       I have reviewed all of the above social hx, family hx, surgical hx, medications, allergies & ROS and confirm that it is accurate.    Allergies   Allergen Reactions   • Penicillins Other (See Comments)     Not sure of reaction (childhood)   • Atorvastatin Unknown (See Comments)     Pt cannot remember   • Fenofibrate Unknown (See Comments)     Pt cannot remember   • Fluoxetine Unknown (See " Comments)     Pt cannot remember   • Gemfibrozil Unknown (See Comments)     Pt cannot remember   • Hydroxychloroquine    • Metformin Diarrhea   • Nsaids GI Intolerance   • Pravastatin Unknown (See Comments)     Pt cannot remember   • Sulfamethoxazole-Trimethoprim Unknown (See Comments)     Pt cannot remember         Current Outpatient Medications:   •  amLODIPine (NORVASC) 2.5 MG tablet, , Disp: , Rfl:   •  aspirin (SANJU ASPIRIN EC LOW DOSE) 81 MG EC tablet, Take 1 tablet by mouth daily., Disp: , Rfl:   •  atenolol (TENORMIN) 25 MG tablet, Take 1 tablet by mouth 2 (Two) Times a Day., Disp: 180 tablet, Rfl: 2  •  betamethasone valerate (VALISONE) 0.1 % cream, , Disp: , Rfl:   •  carbidopa-levodopa CR (SINEMET CR)  MG per CR tablet, Take 1 tablet by mouth Every Night., Disp: 90 tablet, Rfl: 3  •  cholecalciferol (VITAMIN D3) 1000 units tablet, Take 1,000 Units by mouth Daily., Disp: , Rfl:   •  citalopram (CeleXA) 40 MG tablet, Take 1 tablet by mouth Daily. (Patient taking differently: Take 20 mg by mouth Daily.), Disp: 90 tablet, Rfl: 3  •  clobetasol (TEMOVATE) 0.05 % ointment, Apply  topically to the appropriate area as directed 2 (Two) Times a Day., Disp: 60 g, Rfl: 0  •  COCONUT OIL PO, Take  by mouth., Disp: , Rfl:   •  conjugated estrogens (PREMARIN) 0.625 MG/GM vaginal cream, Use 0.5  grams intravaginally 2 times per week to control symptoms., Disp: 30 g, Rfl: 3  •  hydrOXYzine (ATARAX) 25 MG tablet, , Disp: , Rfl:   •  imiquimod (ALDARA) 5 % cream, Apply  topically., Disp: , Rfl:   •  lisinopril-hydrochlorothiazide (PRINZIDE,ZESTORETIC) 20-12.5 MG per tablet, Take 1 tablet by mouth Daily., Disp: 90 tablet, Rfl: 3  •  loratadine (CLARITIN) 10 MG tablet, Take 10 mg by mouth 2 (Two) Times a Day., Disp: , Rfl:   •  meclizine (ANTIVERT) 25 MG tablet, Take 1 tablet by mouth 3 (Three) Times a Day As Needed for dizziness., Disp: 90 tablet, Rfl: 0  •  Multiple Vitamins-Minerals (KANNAN BASIC) tablet, Take  by  "mouth., Disp: , Rfl:   •  naproxen sodium (ALEVE) 220 MG tablet, Take 220 mg by mouth 2 (Two) Times a Day As Needed for Mild Pain (1-3)., Disp: , Rfl:   •  Nutritional Supplements (JUICE PLUS FIBRE PO), Take  by mouth., Disp: , Rfl:   •  Omega-3 Krill Oil 1000 MG capsule, Take  by mouth., Disp: , Rfl:   •  pantoprazole (PROTONIX) 40 MG EC tablet, Take 1 tablet by mouth Daily., Disp: 90 tablet, Rfl: 3  •  promethazine (PHENERGAN) 25 MG tablet, Take 25 mg by mouth Every 6 (Six) Hours As Needed for Nausea or Vomiting., Disp: , Rfl:   •  rosuvastatin (CRESTOR) 5 MG tablet, Take 1 tablet by mouth Every Night., Disp: 90 tablet, Rfl: 2  •  SOOLANTRA 1 % cream, , Disp: , Rfl:   •  triamcinolone (KENALOG) 0.1 % cream, As Needed., Disp: , Rfl:   •  VAQTA 50 UNIT/ML injection, , Disp: , Rfl:   •  vitamin B-12 (CYANOCOBALAMIN) 1000 MCG tablet, Take 1,000 mcg by mouth daily., Disp: , Rfl:     Objective   Resp 18   Ht 152.4 cm (60\")   Wt 86.6 kg (191 lb)   BMI 37.30 kg/m²    Physical Exam  Constitutional: Patient is oriented to person, place, and time. Patient appears well-developed and well-nourished.   HENT:Head: Normocephalic and atraumatic.   Eyes: EOM are normal. Pupils are equal, round, and reactive to light.   Neck: Normal range of motion. Neck supple.   Cardiovascular: Normal rate.    Pulmonary/Chest: Effort normal and breath sounds normal.   Abdominal: Soft.   Neurological: Patient is alert and oriented to person, place, and time.   Skin: Skin is warm and dry.   Psychiatric: Patient has a normal mood and affect.   Nursing note and vitals reviewed.       [unfilled]   Left knee: Loss of extension 10 degrees flexion only to 70 with pain at the posterolateral area slight effusion positive Keri's finding negative Lockman sign no instability with varus valgus stress.  Extensor mechanism intact nontender minimal patellofemoral crepitus, calf is supple, no palpable Baker's cyst.    .    Assessment/Plan   Review of " Radiographic Studies:    Radiographic images today of affected area I personally viewed and showed no sign of acute fracture or dislocation.      Large Joint Arthrocentesis: L knee  Date/Time: 4/2/2019 9:38 AM  Consent given by: patient  Site marked: site marked  Timeout: Immediately prior to procedure a time out was called to verify the correct patient, procedure, equipment, support staff and site/side marked as required   Supporting Documentation  Indications: pain   Procedure Details  Location: knee - L knee  Preparation: Patient was prepped and draped in the usual sterile fashion  Needle size: 22 G  Medications administered: 40 mg triamcinolone acetonide 40 MG/ML; 2 mL lidocaine 1 %  Patient tolerance: patient tolerated the procedure well with no immediate complications              Orthopedic activities reviewed and patient expressed appreciation and Risk, benefits, and merits of treatment alternatives reviewed with the patient and questions answered      Recommendations/Plan:   Work/Activity Status: May perform usual activities as tolerated    Patient agreeable to call or return sooner for any concerns.      I reviewed the patient's x-rays in detail and explained the nature of the condition using models and descriptive illustrations demonstrated the presence of arthritis, its treatment options were reviewed, surgical and nonsurgical treatment options were reviewed discussed and explained along with risks and complications of each and natural history.  The patient had opportunity to ask questions which were all answered satisfactorily.    Impression:  Osteoarthritis left worse than right knees, right ankle osteoarthritis, left hip pain history of sciatica  Plan:  Therapy progressive mobilization and strengthening home exercise recheck 3 months repeat steroid injection if necessary

## 2019-04-10 ENCOUNTER — TREATMENT (OUTPATIENT)
Dept: PHYSICAL THERAPY | Facility: CLINIC | Age: 69
End: 2019-04-10

## 2019-04-10 DIAGNOSIS — M17.12 OSTEOARTHRITIS OF LEFT KNEE, UNSPECIFIED OSTEOARTHRITIS TYPE: Primary | ICD-10-CM

## 2019-04-10 DIAGNOSIS — M25.571 CHRONIC PAIN OF RIGHT ANKLE: ICD-10-CM

## 2019-04-10 DIAGNOSIS — M25.552 PAIN OF LEFT HIP JOINT: ICD-10-CM

## 2019-04-10 DIAGNOSIS — G89.29 CHRONIC PAIN OF RIGHT ANKLE: ICD-10-CM

## 2019-04-10 PROCEDURE — 97110 THERAPEUTIC EXERCISES: CPT | Performed by: PHYSICAL THERAPIST

## 2019-04-10 PROCEDURE — 97162 PT EVAL MOD COMPLEX 30 MIN: CPT | Performed by: PHYSICAL THERAPIST

## 2019-04-10 NOTE — PROGRESS NOTES
Physical Therapy Initial Evaluation and Plan of Care      Patient: Missy Mcgraw   : 1950  Diagnosis/ICD-10 Code:  No primary diagnosis found.  Referring practitioner: Damien Irving MD    Subjective Evaluation    History of Present Illness  Mechanism of injury: R ankle and L knee pain.    R ankle catching and getting stuck occasionally.  This has been happening for year.  Catching and pain on the anterior medial side of the ankle.      L knee OA for years.  She has recently had elevation of pain in the L knee and she has a history of sciatica in the L LE.  The Sciatica in the L LE has been for years.           Patient Occupation: Retired,  . Pain  Current pain ratin  At worst pain ratin  Location: L hip and knee,  R ankle.   Aggravating factors: stairs, ambulation and repetitive movement (inclines and driving)  Progression: worsening    Treatments  Previous treatment: physical therapy (PT 3 years ago.  L Hip. )  Patient Goals  Patient goal: Driving,  walking.             Objective       Palpation     Right   Hypertonic in the anterior tibialis, gluteus tadeo, gluteus medius and piriformis. Tenderness of the anterior tibialis, gluteus tadeo, gluteus medius and piriformis.     Right Ankle/Foot Comments  Right anterior tibialis: Tendon  .     Tenderness     Right Hip   Tenderness in the greater trochanter.     Active Range of Motion   Left Hip   Flexion: 95 (L posterior hip pain and guarding.) degrees with pain  Left Knee   Flexion: 108 (pain in the knee and hip.  but more the hip with ROM testing) degrees with pain  Extension: with pain    Right Knee   Flexion: 122 degrees     Passive Range of Motion     Right Ankle/Foot    Dorsiflexion (ke): with pain  Dorsiflexion (kf): with pain     Additional Passive Range of Motion Details  L HS Length minimally restricted.       Limited DF due to pain in the anterior medial ankle.     Joint Play     Right Ankle/Foot  Hypomobile in the  talocrural joint and subtalar joint.     Additional Joint Play Details  Guarded       Strength/Myotome Testing     Left Hip   Planes of Motion   Extension: 3  Abduction: 2+ (pain reproduced)    Right Hip   Planes of Motion   Extension: 4  Abduction: 4+    Left Knee   Flexion: 3  Extension: 3+  Quadriceps contraction: fair    Right Ankle/Foot   Dorsiflexion: 3+ (pain noted)         Assessment & Plan     Assessment  Impairments: abnormal gait, abnormal muscle tone, abnormal or restricted ROM, activity intolerance, impaired balance, impaired physical strength, lacks appropriate home exercise program and pain with function  Assessment details: Patient is a 68 year old female who comes to physical therapy with L knee and R ankle pain.  Upon further evaluation she seems to be having greater trochanteric bursitis and hip weakness that is causing her L knee pain and lumbar spine pain.  She also has some R ankle stiffness and tendon irritation on the anterior R ankle.     The patient currently has pain, decreased ROM, decreased strength, and inability to perform all essential functional activities. Pt will benefit from skilled PT services to address the above issues.     Prognosis: good  Prognosis details:   SHORT TERM GOALS:  2 weeks       1. Pt independent with HEP  2. Pt to demonstrate frank hip strength 4/5 or greater to improve stability with ambulation  3. Pt to report being able to walk for 10 minutes without increasing pain in the left knee    LONG TERM GOALS:   6 weeks  1. Pt to demonstrate ability to perform full functional squat with good form and control of the knees and without increasing pain  2. Pt to demonstrate frank hip strength to 4+/5 or greater to improve safety with ambulation on uneven surfaces  3. Pt to return to work full duty without increased pain in the left knee   4. Pt to demonstrate ability to perform step up/down 8 inch step x10 safely and without pain in the left knee   Functional Limitations:  carrying objects, walking, uncomfortable because of pain, standing and unable to perform repetitive tasks  Plan  Therapy options: will be seen for skilled physical therapy services  Planned modality interventions: cryotherapy, electrical stimulation/Russian stimulation, thermotherapy (hydrocollator packs) and ultrasound  Planned therapy interventions: stretching, strengthening, soft tissue mobilization, manual therapy, motor coordination training, neuromuscular re-education, ADL retraining, balance/weight-bearing training, flexibility, functional ROM exercises, gait training and home exercise program  Treatment plan discussed with: patient  Plan details: Pt will be seen 2-3 times per week for skilled PT.         Manual Therapy:    5     mins  36063;  Therapeutic Exercise:    12     mins  69286;     Neuromuscular Susi:        mins  96652;    Therapeutic Activity:          mins  57124;     Gait Training:           mins  40697;     Ultrasound:          mins  70149;    Electrical Stimulation:         mins  72408 ( );  Dry Needling          mins self-pay    Timed Treatment:   17   mins   Total Treatment:     49   mins    PT SIGNATURE: Michel Sawant, PT   DATE TREATMENT INITIATED: 4/10/2019    Medicare Initial Certification  Certification Period: 7/9/2019  I certify that the therapy services are furnished while this patient is under my care.  The services outlined above are required by this patient, and will be reviewed every 90 days.     PHYSICIAN: Damien Irving MD      DATE:     Please sign and return via fax to  .. Thank you, [facility].

## 2019-04-12 ENCOUNTER — TREATMENT (OUTPATIENT)
Dept: PHYSICAL THERAPY | Facility: CLINIC | Age: 69
End: 2019-04-12

## 2019-04-12 DIAGNOSIS — M25.571 CHRONIC PAIN OF RIGHT ANKLE: ICD-10-CM

## 2019-04-12 DIAGNOSIS — M25.552 PAIN OF LEFT HIP JOINT: ICD-10-CM

## 2019-04-12 DIAGNOSIS — M17.12 OSTEOARTHRITIS OF LEFT KNEE, UNSPECIFIED OSTEOARTHRITIS TYPE: Primary | ICD-10-CM

## 2019-04-12 DIAGNOSIS — G89.29 CHRONIC PAIN OF RIGHT ANKLE: ICD-10-CM

## 2019-04-12 PROCEDURE — 97140 MANUAL THERAPY 1/> REGIONS: CPT | Performed by: PHYSICAL THERAPIST

## 2019-04-12 PROCEDURE — 97110 THERAPEUTIC EXERCISES: CPT | Performed by: PHYSICAL THERAPIST

## 2019-04-12 NOTE — PROGRESS NOTES
Physical Therapy Daily Progress Note      Visit # : 2    Missy Mcgraw reports 0/10 pain today at rest.  Pt with soreness after last PT session.         Objective Pt presents to PT today with no distress noted at rest.     L hip and knee flexion limited actively to tightness and discomfort.     L hip MM guarding noted with PROM and AROM.       See Exercise, Manual, and Modality Logs for complete treatment.     Assessment/Plan  Pt with L hip the primary area of pain and irritation today.  She is doing well at rest and with normal walking.       Progress per Plan of Care  Check hip response to ice and exercises.            Manual Therapy:    11     mins  58563;  Therapeutic Exercise:    18     mins  43970;     Neuromuscular Susi:        mins  29338;    Therapeutic Activity:          mins  72655;     Gait Training:        ___  mins  39068;     Ultrasound:          mins  51124;    Electrical Stimulation:         mins  67086 ( );  Dry Needling          mins self-pay    Timed Treatment:   29   mins   Total Treatment:     61   mins    Michel Sawant, PT  Physical Therapist

## 2019-04-15 ENCOUNTER — TREATMENT (OUTPATIENT)
Dept: PHYSICAL THERAPY | Facility: CLINIC | Age: 69
End: 2019-04-15

## 2019-04-15 DIAGNOSIS — G89.29 CHRONIC PAIN OF RIGHT ANKLE: ICD-10-CM

## 2019-04-15 DIAGNOSIS — M25.571 CHRONIC PAIN OF RIGHT ANKLE: ICD-10-CM

## 2019-04-15 DIAGNOSIS — M25.552 PAIN OF LEFT HIP JOINT: ICD-10-CM

## 2019-04-15 DIAGNOSIS — M17.12 OSTEOARTHRITIS OF LEFT KNEE, UNSPECIFIED OSTEOARTHRITIS TYPE: Primary | ICD-10-CM

## 2019-04-15 PROCEDURE — 97110 THERAPEUTIC EXERCISES: CPT | Performed by: PHYSICAL THERAPIST

## 2019-04-15 PROCEDURE — 97140 MANUAL THERAPY 1/> REGIONS: CPT | Performed by: PHYSICAL THERAPIST

## 2019-04-15 NOTE — PROGRESS NOTES
Physical Therapy Daily Progress Note      Visit # : 3    Missy Mcgraw reports 0/10 pain today at rest.  Pt reports she is doing better overall.  Less soreness noted in the hip with the use of the ice.         Objective Pt presents to PT today with no distress noted.     L hip PROM less guarded and less painful once relaxed.       See Exercise, Manual, and Modality Logs for complete treatment.     Assessment/Plan  Pt is having less pain overall.  She still has some guarding and pain in the hip with activity but normal function is pain free.        Progress per Plan of Care             Manual Therapy:    11     mins  64183;  Therapeutic Exercise:    14     mins  01714;     Neuromuscular Susi:        mins  44661;    Therapeutic Activity:          mins  99217;     Gait Training:        ___  mins  61764;     Ultrasound:          mins  92880;    Electrical Stimulation:         mins  24001 ( );  Dry Needling          mins self-pay    Timed Treatment:   25   mins   Total Treatment:     54   mins    Michel Sawant, PT  Physical Therapist

## 2019-04-17 ENCOUNTER — TREATMENT (OUTPATIENT)
Dept: PHYSICAL THERAPY | Facility: CLINIC | Age: 69
End: 2019-04-17

## 2019-04-17 DIAGNOSIS — G89.29 CHRONIC PAIN OF RIGHT ANKLE: ICD-10-CM

## 2019-04-17 DIAGNOSIS — M25.552 PAIN OF LEFT HIP JOINT: ICD-10-CM

## 2019-04-17 DIAGNOSIS — M25.571 CHRONIC PAIN OF RIGHT ANKLE: ICD-10-CM

## 2019-04-17 DIAGNOSIS — M17.12 OSTEOARTHRITIS OF LEFT KNEE, UNSPECIFIED OSTEOARTHRITIS TYPE: Primary | ICD-10-CM

## 2019-04-17 PROCEDURE — 97140 MANUAL THERAPY 1/> REGIONS: CPT | Performed by: PHYSICAL THERAPIST

## 2019-04-17 PROCEDURE — 97110 THERAPEUTIC EXERCISES: CPT | Performed by: PHYSICAL THERAPIST

## 2019-05-21 RX ORDER — CITALOPRAM 40 MG/1
40 TABLET ORAL DAILY
Qty: 90 TABLET | Refills: 1 | Status: SHIPPED | OUTPATIENT
Start: 2019-05-21 | End: 2019-05-29 | Stop reason: SDUPTHER

## 2019-05-21 RX ORDER — LISINOPRIL AND HYDROCHLOROTHIAZIDE 20; 12.5 MG/1; MG/1
1 TABLET ORAL DAILY
Qty: 90 TABLET | Refills: 3 | Status: SHIPPED | OUTPATIENT
Start: 2019-05-21 | End: 2020-04-15 | Stop reason: SDUPTHER

## 2019-05-28 DIAGNOSIS — E11.65 UNCONTROLLED TYPE 2 DIABETES MELLITUS WITH HYPERGLYCEMIA (HCC): ICD-10-CM

## 2019-05-28 DIAGNOSIS — E78.2 MIXED HYPERLIPIDEMIA: Primary | ICD-10-CM

## 2019-05-29 ENCOUNTER — OFFICE VISIT (OUTPATIENT)
Dept: INTERNAL MEDICINE | Facility: CLINIC | Age: 69
End: 2019-05-29

## 2019-05-29 VITALS
TEMPERATURE: 98.3 F | WEIGHT: 198 LBS | HEART RATE: 65 BPM | SYSTOLIC BLOOD PRESSURE: 138 MMHG | OXYGEN SATURATION: 99 % | BODY MASS INDEX: 38.67 KG/M2 | RESPIRATION RATE: 16 BRPM | DIASTOLIC BLOOD PRESSURE: 64 MMHG

## 2019-05-29 DIAGNOSIS — E78.2 MIXED HYPERLIPIDEMIA: ICD-10-CM

## 2019-05-29 DIAGNOSIS — E11.9 CONTROLLED TYPE 2 DIABETES MELLITUS WITHOUT COMPLICATION, WITHOUT LONG-TERM CURRENT USE OF INSULIN (HCC): ICD-10-CM

## 2019-05-29 DIAGNOSIS — I10 BENIGN ESSENTIAL HYPERTENSION: Primary | ICD-10-CM

## 2019-05-29 DIAGNOSIS — F41.9 ANXIETY: ICD-10-CM

## 2019-05-29 DIAGNOSIS — F33.0 MILD EPISODE OF RECURRENT MAJOR DEPRESSIVE DISORDER (HCC): ICD-10-CM

## 2019-05-29 LAB
ALBUMIN SERPL-MCNC: 3.9 G/DL (ref 3.5–5)
ALBUMIN/CREAT UR: 105.9 MG/G CREAT (ref 0–30)
ALBUMIN/GLOB SERPL: 1.4 G/DL (ref 1–2)
ALP SERPL-CCNC: 128 U/L (ref 38–126)
ALT SERPL-CCNC: 20 U/L (ref 13–69)
AST SERPL-CCNC: 18 U/L (ref 15–46)
BASOPHILS # BLD AUTO: 0.06 10*3/MM3 (ref 0–0.2)
BASOPHILS NFR BLD AUTO: 0.7 % (ref 0–1.5)
BILIRUB SERPL-MCNC: 0.6 MG/DL (ref 0.2–1.3)
BUN SERPL-MCNC: 21 MG/DL (ref 7–20)
BUN/CREAT SERPL: 35 (ref 7.1–23.5)
CALCIUM SERPL-MCNC: 9.8 MG/DL (ref 8.4–10.2)
CHLORIDE SERPL-SCNC: 102 MMOL/L (ref 98–107)
CHOLEST SERPL-MCNC: 183 MG/DL (ref 0–199)
CO2 SERPL-SCNC: 31 MMOL/L (ref 26–30)
CREAT SERPL-MCNC: 0.6 MG/DL (ref 0.6–1.3)
CREAT UR-MCNC: 50.7 MG/DL
EOSINOPHIL # BLD AUTO: 0.22 10*3/MM3 (ref 0–0.4)
EOSINOPHIL NFR BLD AUTO: 2.7 % (ref 0.3–6.2)
ERYTHROCYTE [DISTWIDTH] IN BLOOD BY AUTOMATED COUNT: 12.7 % (ref 12.3–15.4)
GLOBULIN SER CALC-MCNC: 2.7 GM/DL
GLUCOSE SERPL-MCNC: 131 MG/DL (ref 74–98)
HBA1C MFR BLD: 6.2 % (ref 4.8–5.6)
HCT VFR BLD AUTO: 42.1 % (ref 34–46.6)
HDLC SERPL-MCNC: 69 MG/DL (ref 40–60)
HGB BLD-MCNC: 13.9 G/DL (ref 12–15.9)
IMM GRANULOCYTES # BLD AUTO: 0.07 10*3/MM3 (ref 0–0.05)
IMM GRANULOCYTES NFR BLD AUTO: 0.9 % (ref 0–0.5)
LDLC SERPL CALC-MCNC: 85 MG/DL (ref 0–99)
LYMPHOCYTES # BLD AUTO: 2.28 10*3/MM3 (ref 0.7–3.1)
LYMPHOCYTES NFR BLD AUTO: 27.8 % (ref 19.6–45.3)
MCH RBC QN AUTO: 29.4 PG (ref 26.6–33)
MCHC RBC AUTO-ENTMCNC: 33 G/DL (ref 31.5–35.7)
MCV RBC AUTO: 89 FL (ref 79–97)
MICROALBUMIN UR-MCNC: 53.7 UG/ML
MONOCYTES # BLD AUTO: 0.7 10*3/MM3 (ref 0.1–0.9)
MONOCYTES NFR BLD AUTO: 8.5 % (ref 5–12)
NEUTROPHILS # BLD AUTO: 4.86 10*3/MM3 (ref 1.7–7)
NEUTROPHILS NFR BLD AUTO: 59.4 % (ref 42.7–76)
NRBC BLD AUTO-RTO: 0 /100 WBC (ref 0–0.2)
PLATELET # BLD AUTO: 193 10*3/MM3 (ref 140–450)
POTASSIUM SERPL-SCNC: 4.5 MMOL/L (ref 3.5–5.1)
PROT SERPL-MCNC: 6.6 G/DL (ref 6.3–8.2)
RBC # BLD AUTO: 4.73 10*6/MM3 (ref 3.77–5.28)
SODIUM SERPL-SCNC: 139 MMOL/L (ref 137–145)
TRIGL SERPL-MCNC: 143 MG/DL
VLDLC SERPL CALC-MCNC: 28.6 MG/DL
WBC # BLD AUTO: 8.19 10*3/MM3 (ref 3.4–10.8)

## 2019-05-29 PROCEDURE — 99214 OFFICE O/P EST MOD 30 MIN: CPT | Performed by: INTERNAL MEDICINE

## 2019-05-29 RX ORDER — PANTOPRAZOLE SODIUM 40 MG/1
40 TABLET, DELAYED RELEASE ORAL DAILY
Qty: 90 TABLET | Refills: 3 | Status: SHIPPED | OUTPATIENT
Start: 2019-05-29 | End: 2020-04-15 | Stop reason: SDUPTHER

## 2019-05-29 RX ORDER — CITALOPRAM 40 MG/1
40 TABLET ORAL DAILY
Qty: 90 TABLET | Refills: 3 | Status: SHIPPED | OUTPATIENT
Start: 2019-05-29 | End: 2020-04-15 | Stop reason: SDUPTHER

## 2019-05-29 RX ORDER — CARBIDOPA AND LEVODOPA 50; 200 MG/1; MG/1
1 TABLET, EXTENDED RELEASE ORAL NIGHTLY
Qty: 90 TABLET | Refills: 3 | Status: SHIPPED | OUTPATIENT
Start: 2019-05-29 | End: 2020-04-15 | Stop reason: SDUPTHER

## 2019-05-29 NOTE — PROGRESS NOTES
Subjective   Missy Mcgraw is a 68 y.o. female.     Chief Complaint   Patient presents with   • Hypertension   • Hyperlipidemia   • Diabetes   • Anxiety   • Depression   • GI Problem       History of Present Illness   HPI: Patient is here to follow up on the blood pressure  The patient is taking the blood pressure medications as prescribed and has had no side effects. The patient is also here to follow up on the cholesterol and is trying to follow a diet. The patient is also here to follow on sugar and   Had  lab work done . The patient also  Complains of anxiety and depression and needs refills on medications .  She lost nearly 40 lbs and gained 14 lbs, she complains of reflux  Hypertension   Pertinent negatives include no chest pain, palpitations or shortness of breath.   Hyperlipidemia   Pertinent negatives include no chest pain or shortness of breath.   Diabetes   Pertinent negatives for hypoglycemia include no dizziness, nervousness/anxiousness or pallor. Pertinent negatives for diabetes include no chest pain, no shortness of breath  Patient is on acei/arb       The following portions of the patient's history were reviewed and updated as appropriate: allergies, current medications, past family history, past medical history, past social history, past surgical history and problem list.    Review of Systems   Constitutional: Negative for appetite change, fatigue and fever.   HENT: Negative for congestion, ear discharge, ear pain, sinus pressure and sore throat.    Eyes: Negative for pain and discharge.   Respiratory: Negative for cough, chest tightness, shortness of breath and wheezing.    Cardiovascular: Negative for chest pain, palpitations and leg swelling.   Gastrointestinal: Negative for abdominal pain, blood in stool, constipation, diarrhea and nausea.        Gerd   Endocrine: Negative for cold intolerance and heat intolerance.   Genitourinary: Negative for dysuria, flank pain and frequency.    Musculoskeletal: Negative for back pain and joint swelling.   Skin: Negative for color change.   Allergic/Immunologic: Negative for environmental allergies and food allergies.   Neurological: Negative for dizziness, weakness, numbness and headaches.   Hematological: Negative for adenopathy. Does not bruise/bleed easily.   Psychiatric/Behavioral: Positive for dysphoric mood. Negative for behavioral problems. The patient is nervous/anxious.          Current Outpatient Medications:   •  amLODIPine (NORVASC) 2.5 MG tablet, , Disp: , Rfl:   •  aspirin (SANJU ASPIRIN EC LOW DOSE) 81 MG EC tablet, Take 1 tablet by mouth daily., Disp: , Rfl:   •  atenolol (TENORMIN) 25 MG tablet, Take 1 tablet by mouth 2 (Two) Times a Day., Disp: 180 tablet, Rfl: 2  •  betamethasone valerate (VALISONE) 0.1 % cream, , Disp: , Rfl:   •  carbidopa-levodopa CR (SINEMET CR)  MG per CR tablet, Take 1 tablet by mouth Every Night., Disp: 90 tablet, Rfl: 3  •  cholecalciferol (VITAMIN D3) 1000 units tablet, Take 1,000 Units by mouth Daily., Disp: , Rfl:   •  citalopram (CeleXA) 40 MG tablet, Take 1 tablet by mouth Daily., Disp: 90 tablet, Rfl: 3  •  clobetasol (TEMOVATE) 0.05 % ointment, Apply  topically to the appropriate area as directed 2 (Two) Times a Day., Disp: 60 g, Rfl: 0  •  COCONUT OIL PO, Take  by mouth., Disp: , Rfl:   •  conjugated estrogens (PREMARIN) 0.625 MG/GM vaginal cream, Use 0.5  grams intravaginally 2 times per week to control symptoms., Disp: 30 g, Rfl: 3  •  hydrOXYzine (ATARAX) 25 MG tablet, , Disp: , Rfl:   •  imiquimod (ALDARA) 5 % cream, Apply  topically., Disp: , Rfl:   •  lisinopril-hydrochlorothiazide (PRINZIDE,ZESTORETIC) 20-12.5 MG per tablet, Take 1 tablet by mouth Daily., Disp: 90 tablet, Rfl: 3  •  loratadine (CLARITIN) 10 MG tablet, Take 10 mg by mouth 2 (Two) Times a Day., Disp: , Rfl:   •  meclizine (ANTIVERT) 25 MG tablet, Take 1 tablet by mouth 3 (Three) Times a Day As Needed for dizziness., Disp: 90  tablet, Rfl: 0  •  Multiple Vitamins-Minerals (KANNAN BASIC) tablet, Take  by mouth., Disp: , Rfl:   •  naproxen sodium (ALEVE) 220 MG tablet, Take 220 mg by mouth 2 (Two) Times a Day As Needed for Mild Pain (1-3)., Disp: , Rfl:   •  Nutritional Supplements (JUICE PLUS FIBRE PO), Take  by mouth., Disp: , Rfl:   •  Omega-3 Krill Oil 1000 MG capsule, Take  by mouth., Disp: , Rfl:   •  pantoprazole (PROTONIX) 40 MG EC tablet, Take 1 tablet by mouth Daily., Disp: 90 tablet, Rfl: 3  •  promethazine (PHENERGAN) 25 MG tablet, Take 25 mg by mouth Every 6 (Six) Hours As Needed for Nausea or Vomiting., Disp: , Rfl:   •  rosuvastatin (CRESTOR) 5 MG tablet, Take 1 tablet by mouth Every Night., Disp: 90 tablet, Rfl: 2  •  SOOLANTRA 1 % cream, , Disp: , Rfl:   •  triamcinolone (KENALOG) 0.1 % cream, As Needed., Disp: , Rfl:   •  VAQTA 50 UNIT/ML injection, , Disp: , Rfl:   •  vitamin B-12 (CYANOCOBALAMIN) 1000 MCG tablet, Take 1,000 mcg by mouth daily., Disp: , Rfl:     Objective     Blood pressure 138/64, pulse 65, temperature 98.3 °F (36.8 °C), resp. rate 16, weight 89.8 kg (198 lb), SpO2 99 %, not currently breastfeeding.    Physical Exam   Constitutional: She is oriented to person, place, and time. She appears well-developed and well-nourished. No distress.   HENT:   Head: Normocephalic and atraumatic.   Right Ear: External ear normal.   Left Ear: External ear normal.   Nose: Nose normal.   Mouth/Throat: Oropharynx is clear and moist.   Eyes: Conjunctivae and EOM are normal. Pupils are equal, round, and reactive to light.   Neck: Neck supple. No thyromegaly present.   Cardiovascular: Normal rate, regular rhythm and normal heart sounds.   Pulmonary/Chest: Effort normal and breath sounds normal. No respiratory distress.   Abdominal: Soft. Bowel sounds are normal. She exhibits no distension. There is no tenderness. There is no rebound.   Musculoskeletal: Normal range of motion. She exhibits no edema.   Lymphadenopathy:     She has  no cervical adenopathy.   Neurological: She is alert and oriented to person, place, and time.   No gross motor or sensory deficits   Skin: Skin is warm. She is not diaphoretic.   Psychiatric: She has a normal mood and affect.   Nursing note and vitals reviewed.    Patient's Body mass index is 38.67 kg/m². BMI is above normal parameters. Recommendations include: educational material, exercise counseling and nutrition counseling.      Results for orders placed or performed in visit on 05/28/19   Comprehensive Metabolic Panel   Result Value Ref Range    Glucose 131 (H) 74 - 98 mg/dL    BUN 21 (H) 7 - 20 mg/dL    Creatinine 0.60 0.60 - 1.30 mg/dL    eGFR Non African Am 100 >60 mL/min/1.73    eGFR African Am 121 >60 mL/min/1.73    BUN/Creatinine Ratio 35.0 (H) 7.1 - 23.5    Sodium 139 137 - 145 mmol/L    Potassium 4.5 3.5 - 5.1 mmol/L    Chloride 102 98 - 107 mmol/L    Total CO2 31.0 (H) 26.0 - 30.0 mmol/L    Calcium 9.8 8.4 - 10.2 mg/dL    Total Protein 6.6 6.3 - 8.2 g/dL    Albumin 3.90 3.50 - 5.00 g/dL    Globulin 2.7 gm/dL    A/G Ratio 1.4 1.0 - 2.0 g/dL    Total Bilirubin 0.6 0.2 - 1.3 mg/dL    Alkaline Phosphatase 128 (H) 38 - 126 U/L    AST (SGOT) 18 15 - 46 U/L    ALT (SGPT) 20 13 - 69 U/L   Lipid Panel   Result Value Ref Range    Total Cholesterol 183 0 - 199 mg/dL    Triglycerides 143 <150 mg/dL    HDL Cholesterol 69 (H) 40 - 60 mg/dL    VLDL Cholesterol 28.6 mg/dL    LDL Cholesterol  85 0 - 99 mg/dL   Hemoglobin A1c   Result Value Ref Range    Hemoglobin A1C 6.20 (H) 4.80 - 5.60 %   Microalbumin / Creatinine Urine Ratio - Urine, Clean Catch   Result Value Ref Range    Creatinine, Urine      Microalbumin, Urine      Microalbumin/Creatinine Ratio     CBC & Differential   Result Value Ref Range    WBC 8.19 3.40 - 10.80 10*3/mm3    RBC 4.73 3.77 - 5.28 10*6/mm3    Hemoglobin 13.9 12.0 - 15.9 g/dL    Hematocrit 42.1 34.0 - 46.6 %    MCV 89.0 79.0 - 97.0 fL    MCH 29.4 26.6 - 33.0 pg    MCHC 33.0 31.5 - 35.7 g/dL     RDW 12.7 12.3 - 15.4 %    Platelets 193 140 - 450 10*3/mm3    Neutrophil Rel % 59.4 42.7 - 76.0 %    Lymphocyte Rel % 27.8 19.6 - 45.3 %    Monocyte Rel % 8.5 5.0 - 12.0 %    Eosinophil Rel % 2.7 0.3 - 6.2 %    Basophil Rel % 0.7 0.0 - 1.5 %    Neutrophils Absolute 4.86 1.70 - 7.00 10*3/mm3    Lymphocytes Absolute 2.28 0.70 - 3.10 10*3/mm3    Monocytes Absolute 0.70 0.10 - 0.90 10*3/mm3    Eosinophils Absolute 0.22 0.00 - 0.40 10*3/mm3    Basophils Absolute 0.06 0.00 - 0.20 10*3/mm3    Immature Granulocyte Rel % 0.9 (H) 0.0 - 0.5 %    Immature Grans Absolute 0.07 (H) 0.00 - 0.05 10*3/mm3    nRBC 0.0 0.0 - 0.2 /100 WBC         Assessment/Plan   Missy was seen today for hypertension, hyperlipidemia, diabetes, anxiety, depression and gi problem.    Diagnoses and all orders for this visit:    Benign essential hypertension    Mixed hyperlipidemia  -     CBC & Differential  -     Comprehensive Metabolic Panel  -     Lipid Panel    Controlled type 2 diabetes mellitus without complication, without long-term current use of insulin (CMS/[facility])  -     Hemoglobin A1c  -     Microalbumin / Creatinine Urine Ratio - Urine, Clean Catch    Anxiety    Mild episode of recurrent major depressive disorder (CMS/[facility])    Other orders  -     carbidopa-levodopa CR (SINEMET CR)  MG per CR tablet; Take 1 tablet by mouth Every Night.  -     pantoprazole (PROTONIX) 40 MG EC tablet; Take 1 tablet by mouth Daily.  -     citalopram (CeleXA) 40 MG tablet; Take 1 tablet by mouth Daily.        Plan:  1.  Benign essential hypertension: Will continue current medication, low-sodium diet advised  2.mixed hyperlipidemia:   Reviewed  fasting CMP and lipid panel.  Diet and exercise counseled,  Will continue current medications  3. Diabetes  Mellitus  :    Reviewed  fasting CMP  and hba1c  6.2 , diet and exercise counseled , Will continue current medications   4. gerd : refilled pantoprazole  5. Anxiety : refilled celexa  6. Major depression : refilled  meera Sheldon MD

## 2019-07-22 ENCOUNTER — TRANSCRIBE ORDERS (OUTPATIENT)
Dept: ADMINISTRATIVE | Facility: HOSPITAL | Age: 69
End: 2019-07-22

## 2019-07-22 DIAGNOSIS — Z12.39 ENCOUNTER FOR SCREENING FOR MALIGNANT NEOPLASM OF BREAST: Primary | ICD-10-CM

## 2019-07-24 ENCOUNTER — HOSPITAL ENCOUNTER (OUTPATIENT)
Dept: GENERAL RADIOLOGY | Facility: HOSPITAL | Age: 69
Discharge: HOME | End: 2019-07-24
Admitting: INTERNAL MEDICINE

## 2019-07-24 ENCOUNTER — OFFICE VISIT (OUTPATIENT)
Dept: INTERNAL MEDICINE | Facility: CLINIC | Age: 69
End: 2019-07-24

## 2019-07-24 VITALS
RESPIRATION RATE: 16 BRPM | TEMPERATURE: 97.7 F | WEIGHT: 198 LBS | HEIGHT: 60 IN | OXYGEN SATURATION: 97 % | DIASTOLIC BLOOD PRESSURE: 82 MMHG | BODY MASS INDEX: 38.87 KG/M2 | SYSTOLIC BLOOD PRESSURE: 147 MMHG | HEART RATE: 63 BPM

## 2019-07-24 DIAGNOSIS — M79.2 NEURALGIA: ICD-10-CM

## 2019-07-24 DIAGNOSIS — M54.50 LUMBOSACRAL PAIN: ICD-10-CM

## 2019-07-24 DIAGNOSIS — M62.830 MUSCLE SPASM OF BACK: ICD-10-CM

## 2019-07-24 DIAGNOSIS — I10 BENIGN ESSENTIAL HYPERTENSION: Primary | ICD-10-CM

## 2019-07-24 PROCEDURE — 96372 THER/PROPH/DIAG INJ SC/IM: CPT | Performed by: INTERNAL MEDICINE

## 2019-07-24 PROCEDURE — 99214 OFFICE O/P EST MOD 30 MIN: CPT | Performed by: INTERNAL MEDICINE

## 2019-07-24 PROCEDURE — 72100 X-RAY EXAM L-S SPINE 2/3 VWS: CPT

## 2019-07-24 RX ORDER — BACLOFEN 10 MG/1
10 TABLET ORAL NIGHTLY PRN
Qty: 30 TABLET | Refills: 3 | Status: SHIPPED | OUTPATIENT
Start: 2019-07-24 | End: 2019-07-26

## 2019-07-24 RX ORDER — METHYLPREDNISOLONE SODIUM SUCCINATE 125 MG/2ML
125 INJECTION, POWDER, LYOPHILIZED, FOR SOLUTION INTRAMUSCULAR; INTRAVENOUS ONCE
Status: COMPLETED | OUTPATIENT
Start: 2019-07-24 | End: 2019-07-24

## 2019-07-24 RX ADMIN — METHYLPREDNISOLONE SODIUM SUCCINATE 125 MG: 125 INJECTION, POWDER, LYOPHILIZED, FOR SOLUTION INTRAMUSCULAR; INTRAVENOUS at 12:38

## 2019-07-24 NOTE — PROGRESS NOTES
Subjective   Missy Mcgraw is a 68 y.o. female.     Chief Complaint   Patient presents with   • Back Pain     left side   • Hypertension   • Spasms   • Pain       History of Present Illness   HPI: Patient is here to follow up on the blood pressure  The patient is taking the blood pressure medications as prescribed and has had no side effects.   The patient is also here complaining of left-sided back pain which has worsened in the past few days, which radiates down her left leg  With muscle spasm and pain / numbness, she states she was sitting on the lawnmower for several hours yesterday after which her symptoms have worsened  ,now she states she cannot sleep and  Sit , she prefers to stand due to the pain , she sees orthopedist at [facility] in [place] and would prefer to see the specialist for her back there too    The following portions of the patient's history were reviewed and updated as appropriate: allergies, current medications, past family history, past medical history, past social history, past surgical history and problem list.    Review of Systems   Constitutional: Negative for appetite change, fatigue and fever.   HENT: Negative for congestion, ear discharge, ear pain, sinus pressure and sore throat.    Eyes: Negative for pain and discharge.   Respiratory: Negative for cough, chest tightness, shortness of breath and wheezing.    Cardiovascular: Negative for chest pain, palpitations and leg swelling.   Gastrointestinal: Negative for abdominal pain, blood in stool, constipation, diarrhea and nausea.   Endocrine: Negative for cold intolerance and heat intolerance.   Genitourinary: Negative for dysuria, flank pain and frequency.   Musculoskeletal: Positive for back pain. Negative for joint swelling.        Spasm   Skin: Negative for color change.   Allergic/Immunologic: Negative for environmental allergies and food allergies.   Neurological: Positive for numbness. Negative for dizziness,  weakness and headaches.   Hematological: Negative for adenopathy. Does not bruise/bleed easily.   Psychiatric/Behavioral: Negative for behavioral problems and dysphoric mood. The patient is not nervous/anxious.          Current Outpatient Medications:   •  amLODIPine (NORVASC) 2.5 MG tablet, , Disp: , Rfl:   •  aspirin (SANJU ASPIRIN EC LOW DOSE) 81 MG EC tablet, Take 1 tablet by mouth daily., Disp: , Rfl:   •  atenolol (TENORMIN) 25 MG tablet, Take 1 tablet by mouth 2 (Two) Times a Day., Disp: 180 tablet, Rfl: 2  •  betamethasone valerate (VALISONE) 0.1 % cream, , Disp: , Rfl:   •  carbidopa-levodopa CR (SINEMET CR)  MG per CR tablet, Take 1 tablet by mouth Every Night., Disp: 90 tablet, Rfl: 3  •  cholecalciferol (VITAMIN D3) 1000 units tablet, Take 1,000 Units by mouth Daily., Disp: , Rfl:   •  citalopram (CeleXA) 40 MG tablet, Take 1 tablet by mouth Daily., Disp: 90 tablet, Rfl: 3  •  clobetasol (TEMOVATE) 0.05 % ointment, Apply  topically to the appropriate area as directed 2 (Two) Times a Day., Disp: 60 g, Rfl: 0  •  COCONUT OIL PO, Take  by mouth., Disp: , Rfl:   •  conjugated estrogens (PREMARIN) 0.625 MG/GM vaginal cream, Use 0.5  grams intravaginally 2 times per week to control symptoms., Disp: 30 g, Rfl: 3  •  diclofenac (VOLTAREN) 50 MG EC tablet, Take 1 tablet by mouth 2 (Two) Times a Day., Disp: 30 tablet, Rfl: 0  •  diclofenac-misoprostol (ARTHROTEC 50) 50-0.2 MG EC tablet, Take 1 tablet by mouth 2 (Two) Times a Day., Disp: , Rfl:   •  hydrOXYzine (ATARAX) 25 MG tablet, , Disp: , Rfl:   •  imiquimod (ALDARA) 5 % cream, Apply  topically., Disp: , Rfl:   •  lisinopril-hydrochlorothiazide (PRINZIDE,ZESTORETIC) 20-12.5 MG per tablet, Take 1 tablet by mouth Daily., Disp: 90 tablet, Rfl: 3  •  loratadine (CLARITIN) 10 MG tablet, Take 10 mg by mouth 2 (Two) Times a Day., Disp: , Rfl:   •  meclizine (ANTIVERT) 25 MG tablet, Take 1 tablet by mouth 3 (Three) Times a Day As Needed for dizziness., Disp: 90  "tablet, Rfl: 0  •  methylPREDNISolone (MEDROL, PAM,) 4 MG tablet, Take as directed on package instructions., Disp: 21 tablet, Rfl: 0  •  Multiple Vitamins-Minerals (KANNAN BASIC) tablet, Take  by mouth., Disp: , Rfl:   •  naproxen sodium (ALEVE) 220 MG tablet, Take 220 mg by mouth 2 (Two) Times a Day As Needed for Mild Pain (1-3)., Disp: , Rfl:   •  Nutritional Supplements (JUICE PLUS FIBRE PO), Take  by mouth., Disp: , Rfl:   •  Omega-3 Krill Oil 1000 MG capsule, Take  by mouth., Disp: , Rfl:   •  pantoprazole (PROTONIX) 40 MG EC tablet, Take 1 tablet by mouth Daily., Disp: 90 tablet, Rfl: 3  •  promethazine (PHENERGAN) 25 MG tablet, Take 25 mg by mouth Every 6 (Six) Hours As Needed for Nausea or Vomiting., Disp: , Rfl:   •  rosuvastatin (CRESTOR) 5 MG tablet, Take 1 tablet by mouth Every Night., Disp: 90 tablet, Rfl: 2  •  SOOLANTRA 1 % cream, , Disp: , Rfl:   •  triamcinolone (KENALOG) 0.1 % cream, As Needed., Disp: , Rfl:   •  VAQTA 50 UNIT/ML injection, , Disp: , Rfl:   •  vitamin B-12 (CYANOCOBALAMIN) 1000 MCG tablet, Take 1,000 mcg by mouth daily., Disp: , Rfl:   •  baclofen (LIORESAL) 10 MG tablet, Take 1 tablet by mouth At Night As Needed for Muscle Spasms., Disp: 30 tablet, Rfl: 3    Objective     Blood pressure 147/82, pulse 63, temperature 97.7 °F (36.5 °C), resp. rate 16, height 152.4 cm (60\"), weight 89.8 kg (198 lb), SpO2 97 %, not currently breastfeeding.    Physical Exam   Constitutional: She is oriented to person, place, and time. She appears well-developed and well-nourished. No distress.   HENT:   Head: Normocephalic and atraumatic.   Right Ear: External ear normal.   Left Ear: External ear normal.   Nose: Nose normal.   Mouth/Throat: Oropharynx is clear and moist.   Eyes: Conjunctivae and EOM are normal. Pupils are equal, round, and reactive to light.   Neck: Neck supple. No thyromegaly present.   Cardiovascular: Normal rate, regular rhythm and normal heart sounds.   Pulmonary/Chest: Effort normal " and breath sounds normal. No respiratory distress.   Abdominal: Soft. Bowel sounds are normal. She exhibits no distension. There is no tenderness. There is no rebound.   Musculoskeletal: Normal range of motion. She exhibits tenderness. She exhibits no edema.   Left lumbar paraspinal   Lymphadenopathy:     She has no cervical adenopathy.   Neurological: She is alert and oriented to person, place, and time.   No gross motor or sensory deficits   Skin: Skin is warm. She is not diaphoretic.   Psychiatric: She has a normal mood and affect.   Nursing note and vitals reviewed.    Patient's Body mass index is 38.67 kg/m². BMI is within normal parameters. No follow-up required..      Results for orders placed or performed in visit on 05/28/19   Comprehensive Metabolic Panel   Result Value Ref Range    Glucose 131 (H) 74 - 98 mg/dL    BUN 21 (H) 7 - 20 mg/dL    Creatinine 0.60 0.60 - 1.30 mg/dL    eGFR Non African Am 100 >60 mL/min/1.73    eGFR African Am 121 >60 mL/min/1.73    BUN/Creatinine Ratio 35.0 (H) 7.1 - 23.5    Sodium 139 137 - 145 mmol/L    Potassium 4.5 3.5 - 5.1 mmol/L    Chloride 102 98 - 107 mmol/L    Total CO2 31.0 (H) 26.0 - 30.0 mmol/L    Calcium 9.8 8.4 - 10.2 mg/dL    Total Protein 6.6 6.3 - 8.2 g/dL    Albumin 3.90 3.50 - 5.00 g/dL    Globulin 2.7 gm/dL    A/G Ratio 1.4 1.0 - 2.0 g/dL    Total Bilirubin 0.6 0.2 - 1.3 mg/dL    Alkaline Phosphatase 128 (H) 38 - 126 U/L    AST (SGOT) 18 15 - 46 U/L    ALT (SGPT) 20 13 - 69 U/L   Lipid Panel   Result Value Ref Range    Total Cholesterol 183 0 - 199 mg/dL    Triglycerides 143 <150 mg/dL    HDL Cholesterol 69 (H) 40 - 60 mg/dL    VLDL Cholesterol 28.6 mg/dL    LDL Cholesterol  85 0 - 99 mg/dL   Hemoglobin A1c   Result Value Ref Range    Hemoglobin A1C 6.20 (H) 4.80 - 5.60 %   Microalbumin / Creatinine Urine Ratio - Urine, Clean Catch   Result Value Ref Range    Creatinine, Urine 50.7 Not Estab. mg/dL    Microalbumin, Urine 53.7 Not Estab. ug/mL     Microalbumin/Creatinine Ratio 105.9 (H) 0.0 - 30.0 mg/g creat   CBC & Differential   Result Value Ref Range    WBC 8.19 3.40 - 10.80 10*3/mm3    RBC 4.73 3.77 - 5.28 10*6/mm3    Hemoglobin 13.9 12.0 - 15.9 g/dL    Hematocrit 42.1 34.0 - 46.6 %    MCV 89.0 79.0 - 97.0 fL    MCH 29.4 26.6 - 33.0 pg    MCHC 33.0 31.5 - 35.7 g/dL    RDW 12.7 12.3 - 15.4 %    Platelets 193 140 - 450 10*3/mm3    Neutrophil Rel % 59.4 42.7 - 76.0 %    Lymphocyte Rel % 27.8 19.6 - 45.3 %    Monocyte Rel % 8.5 5.0 - 12.0 %    Eosinophil Rel % 2.7 0.3 - 6.2 %    Basophil Rel % 0.7 0.0 - 1.5 %    Neutrophils Absolute 4.86 1.70 - 7.00 10*3/mm3    Lymphocytes Absolute 2.28 0.70 - 3.10 10*3/mm3    Monocytes Absolute 0.70 0.10 - 0.90 10*3/mm3    Eosinophils Absolute 0.22 0.00 - 0.40 10*3/mm3    Basophils Absolute 0.06 0.00 - 0.20 10*3/mm3    Immature Granulocyte Rel % 0.9 (H) 0.0 - 0.5 %    Immature Grans Absolute 0.07 (H) 0.00 - 0.05 10*3/mm3    nRBC 0.0 0.0 - 0.2 /100 WBC         Assessment/Plan   Missy was seen today for back pain, hypertension, spasms and pain.    Diagnoses and all orders for this visit:    Benign essential hypertension    Lumbosacral pain  -     XR Spine Lumbar 2 or 3 View  -     MRI Lumbar Spine Without Contrast  -     Ambulatory Referral to Orthopedic Surgery  -     methylPREDNISolone sodium succinate (SOLU-Medrol) injection 125 mg    Neuralgia    Muscle spasm of back    Other orders  -     baclofen (LIORESAL) 10 MG tablet; Take 1 tablet by mouth At Night As Needed for Muscle Spasms.      Plan:  1.  Benign essential hypertension: Will continue current medication, low-sodium diet advised  2 lumbosacral pain : will get xray , and mri lumbar spine - refer to orthopedist - dr gayle per patient preference at [facility] in [place] , im solumedrol given today  3. Neuralgia : will get mri  4. Muscle spasm : prn muscle relaxant         Emely Sheldon MD

## 2019-07-26 ENCOUNTER — TELEPHONE (OUTPATIENT)
Dept: INTERNAL MEDICINE | Facility: CLINIC | Age: 69
End: 2019-07-26

## 2019-07-26 ENCOUNTER — OFFICE VISIT (OUTPATIENT)
Dept: INTERNAL MEDICINE | Facility: CLINIC | Age: 69
End: 2019-07-26

## 2019-07-26 VITALS
HEIGHT: 60 IN | TEMPERATURE: 97.9 F | WEIGHT: 199 LBS | HEART RATE: 69 BPM | DIASTOLIC BLOOD PRESSURE: 76 MMHG | BODY MASS INDEX: 39.07 KG/M2 | SYSTOLIC BLOOD PRESSURE: 150 MMHG | OXYGEN SATURATION: 99 %

## 2019-07-26 DIAGNOSIS — I10 BENIGN ESSENTIAL HYPERTENSION: ICD-10-CM

## 2019-07-26 DIAGNOSIS — M62.830 LUMBAR PARASPINAL MUSCLE SPASM: Primary | ICD-10-CM

## 2019-07-26 PROCEDURE — 99213 OFFICE O/P EST LOW 20 MIN: CPT | Performed by: NURSE PRACTITIONER

## 2019-07-26 RX ORDER — CYCLOBENZAPRINE HCL 5 MG
5 TABLET ORAL 2 TIMES DAILY PRN
Qty: 9 TABLET | Refills: 0 | Status: SHIPPED | OUTPATIENT
Start: 2019-07-26 | End: 2019-07-29 | Stop reason: SDUPTHER

## 2019-07-26 NOTE — PROGRESS NOTES
"Date: 2019    Name: Missy Mcgraw  : 1950    Chief Complaint:   Chief Complaint   Patient presents with   • Back Pain     hip pain, left side       HPI:  Ms. Missy Mcgraw is a 68 y.o. female presents for follow up of back pain. Seen in clinic on 19 for back pain, given a steroid shot; effective until today.  Took 15 minute nap on left side, has had severe lower left back/hip pain w/associated left leg numbness since then. Prefers standing to sitting or lying down.  Has taken acetaminophen, aleve, and used a heating pad without relief.  States baclofen provided no relief.  Denies bowel, bladder incontinence.  She had a medrol dose pack starting 19 for contusion of her foot.      History:  The following portions of the patient's history were reviewed and updated as appropriate: allergies, current medications, past medical history, family history, surgical history, social history and problem list.      ROS:  Review of Systems   Respiratory: Negative for cough, shortness of breath and wheezing.    Cardiovascular: Negative for palpitations and leg swelling.   Musculoskeletal: Positive for gait problem. Negative for neck pain and neck stiffness.   Neurological: Negative for headache.       VS:  Vitals:    19 1315 19 1325   BP: (!) 190/81 150/76   Pulse: 69    Temp: 97.9 °F (36.6 °C)    TempSrc: Temporal    SpO2: 99%    Weight: 90.3 kg (199 lb)    Height: 152.4 cm (60\")    PainSc:   8        PE:  Physical Exam   Constitutional: She is oriented to person, place, and time. She appears well-developed and well-nourished. She appears distressed.   Cardiovascular: Normal rate, regular rhythm and normal heart sounds.   Pulmonary/Chest: Effort normal and breath sounds normal.   Musculoskeletal:   Left lumbar spasm, paraspinal muscle spasm; decreased flexion & extension of lower back, tender to touch; no swelling, effusion, or deformity   Neurological: She is alert and oriented to " person, place, and time.     Assessment/Plan:  Missy was seen today for back pain.    Diagnoses and all orders for this visit:    Lumbar paraspinal muscle spasm  -     cyclobenzaprine (FLEXERIL) 5 MG tablet; Take 1 tablet by mouth 2 (Two) Times a Day As Needed for Muscle Spasms for up to 3 days. Discontinue baclofen, patient verbalizes and understands.  - Continue to use heat as needed for spasm  - Rest back as much as possible  Benign essential hypertension         - Take medications as prescribed         - Advised BP is elevated today, monitor at home if possible, keep a log and bring to next appointment    Return if symptoms worsen or fail to improve.

## 2019-07-26 NOTE — TELEPHONE ENCOUNTER
Pt stated that she would rather not go to E.R. And she wasn't sure about going to urgent care, I advised her that if she felt the need to go to E.R., she stated that she would rather come in to our office first

## 2019-07-29 ENCOUNTER — OFFICE VISIT (OUTPATIENT)
Dept: INTERNAL MEDICINE | Facility: CLINIC | Age: 69
End: 2019-07-29

## 2019-07-29 VITALS
SYSTOLIC BLOOD PRESSURE: 159 MMHG | OXYGEN SATURATION: 95 % | RESPIRATION RATE: 16 BRPM | DIASTOLIC BLOOD PRESSURE: 83 MMHG | BODY MASS INDEX: 38.68 KG/M2 | TEMPERATURE: 97.8 F | HEART RATE: 83 BPM | HEIGHT: 60 IN | WEIGHT: 197 LBS

## 2019-07-29 DIAGNOSIS — I10 BENIGN ESSENTIAL HYPERTENSION: Primary | ICD-10-CM

## 2019-07-29 DIAGNOSIS — M62.830 LUMBAR PARASPINAL MUSCLE SPASM: ICD-10-CM

## 2019-07-29 DIAGNOSIS — M54.50 LUMBOSACRAL PAIN: ICD-10-CM

## 2019-07-29 DIAGNOSIS — M79.2 NEURALGIA: ICD-10-CM

## 2019-07-29 PROCEDURE — 99214 OFFICE O/P EST MOD 30 MIN: CPT | Performed by: INTERNAL MEDICINE

## 2019-07-29 RX ORDER — TRAMADOL HYDROCHLORIDE 50 MG/1
50 TABLET ORAL EVERY 8 HOURS PRN
Qty: 30 TABLET | Refills: 0 | Status: SHIPPED | OUTPATIENT
Start: 2019-07-29 | End: 2020-02-05

## 2019-07-29 RX ORDER — CYCLOBENZAPRINE HCL 10 MG
10 TABLET ORAL 2 TIMES DAILY PRN
Qty: 30 TABLET | Refills: 0 | Status: SHIPPED | OUTPATIENT
Start: 2019-07-29 | End: 2021-01-04

## 2019-07-31 ENCOUNTER — HOSPITAL ENCOUNTER (OUTPATIENT)
Dept: MRI IMAGING | Facility: HOSPITAL | Age: 69
Discharge: HOME | End: 2019-07-31
Admitting: INTERNAL MEDICINE

## 2019-07-31 PROCEDURE — 72148 MRI LUMBAR SPINE W/O DYE: CPT

## 2019-08-22 ENCOUNTER — HOSPITAL ENCOUNTER (OUTPATIENT)
Dept: MAMMOGRAPHY | Facility: HOSPITAL | Age: 69
Discharge: HOME | End: 2019-08-22
Admitting: INTERNAL MEDICINE

## 2019-08-22 DIAGNOSIS — Z12.39 ENCOUNTER FOR SCREENING FOR MALIGNANT NEOPLASM OF BREAST: ICD-10-CM

## 2019-08-22 PROCEDURE — 77067 SCR MAMMO BI INCL CAD: CPT

## 2019-08-22 PROCEDURE — 77063 BREAST TOMOSYNTHESIS BI: CPT

## 2019-09-10 ENCOUNTER — PATIENT MESSAGE (OUTPATIENT)
Dept: CARDIOLOGY | Facility: CLINIC | Age: 69
End: 2019-09-10

## 2019-09-16 RX ORDER — AMLODIPINE BESYLATE 2.5 MG/1
TABLET ORAL
Qty: 90 TABLET | Refills: 2 | OUTPATIENT
Start: 2019-09-16

## 2019-09-17 ENCOUNTER — APPOINTMENT (OUTPATIENT)
Dept: MAMMOGRAPHY | Facility: HOSPITAL | Age: 69
End: 2019-09-17

## 2019-09-17 ENCOUNTER — OFFICE VISIT (OUTPATIENT)
Dept: INTERNAL MEDICINE | Facility: CLINIC | Age: 69
End: 2019-09-17

## 2019-09-17 VITALS
WEIGHT: 205 LBS | HEIGHT: 60 IN | HEART RATE: 63 BPM | RESPIRATION RATE: 16 BRPM | DIASTOLIC BLOOD PRESSURE: 79 MMHG | SYSTOLIC BLOOD PRESSURE: 136 MMHG | OXYGEN SATURATION: 96 % | BODY MASS INDEX: 40.25 KG/M2 | TEMPERATURE: 97.8 F

## 2019-09-17 DIAGNOSIS — E11.9 CONTROLLED TYPE 2 DIABETES MELLITUS WITHOUT COMPLICATION, WITHOUT LONG-TERM CURRENT USE OF INSULIN (HCC): ICD-10-CM

## 2019-09-17 DIAGNOSIS — Z23 NEED FOR IMMUNIZATION AGAINST INFLUENZA: ICD-10-CM

## 2019-09-17 DIAGNOSIS — E78.2 MIXED HYPERLIPIDEMIA: ICD-10-CM

## 2019-09-17 DIAGNOSIS — I10 BENIGN ESSENTIAL HYPERTENSION: Primary | ICD-10-CM

## 2019-09-17 LAB
ALBUMIN SERPL-MCNC: 4.3 G/DL (ref 3.5–5.2)
ALBUMIN/CREAT UR: 32.1 MG/G CREAT (ref 0–30)
ALBUMIN/GLOB SERPL: 1.7 G/DL
ALP SERPL-CCNC: 99 U/L (ref 39–117)
ALT SERPL-CCNC: 6 U/L (ref 1–33)
AST SERPL-CCNC: 11 U/L (ref 1–32)
BASOPHILS # BLD AUTO: 0.06 10*3/MM3 (ref 0–0.2)
BASOPHILS NFR BLD AUTO: 0.6 % (ref 0–1.5)
BILIRUB SERPL-MCNC: 0.4 MG/DL (ref 0.2–1.2)
BUN SERPL-MCNC: 25 MG/DL (ref 8–23)
BUN/CREAT SERPL: 26.9 (ref 7–25)
CALCIUM SERPL-MCNC: 10 MG/DL (ref 8.6–10.5)
CHLORIDE SERPL-SCNC: 100 MMOL/L (ref 98–107)
CHOLEST SERPL-MCNC: 190 MG/DL (ref 0–200)
CO2 SERPL-SCNC: 28.5 MMOL/L (ref 22–29)
CREAT SERPL-MCNC: 0.93 MG/DL (ref 0.57–1)
CREAT UR-MCNC: 110.1 MG/DL
EOSINOPHIL # BLD AUTO: 0.22 10*3/MM3 (ref 0–0.4)
EOSINOPHIL NFR BLD AUTO: 2.1 % (ref 0.3–6.2)
ERYTHROCYTE [DISTWIDTH] IN BLOOD BY AUTOMATED COUNT: 13.7 % (ref 12.3–15.4)
GLOBULIN SER CALC-MCNC: 2.5 GM/DL
GLUCOSE SERPL-MCNC: 130 MG/DL (ref 65–99)
HBA1C MFR BLD: 6.8 % (ref 4.8–5.6)
HCT VFR BLD AUTO: 45.6 % (ref 34–46.6)
HDLC SERPL-MCNC: 59 MG/DL (ref 40–60)
HGB BLD-MCNC: 14.7 G/DL (ref 12–15.9)
IMM GRANULOCYTES # BLD AUTO: 0.09 10*3/MM3 (ref 0–0.05)
IMM GRANULOCYTES NFR BLD AUTO: 0.9 % (ref 0–0.5)
LDLC SERPL CALC-MCNC: 90 MG/DL (ref 0–100)
LYMPHOCYTES # BLD AUTO: 2.93 10*3/MM3 (ref 0.7–3.1)
LYMPHOCYTES NFR BLD AUTO: 27.8 % (ref 19.6–45.3)
MCH RBC QN AUTO: 30.1 PG (ref 26.6–33)
MCHC RBC AUTO-ENTMCNC: 32.2 G/DL (ref 31.5–35.7)
MCV RBC AUTO: 93.4 FL (ref 79–97)
MICROALBUMIN UR-MCNC: 35.3 UG/ML
MONOCYTES # BLD AUTO: 1.01 10*3/MM3 (ref 0.1–0.9)
MONOCYTES NFR BLD AUTO: 9.6 % (ref 5–12)
NEUTROPHILS # BLD AUTO: 6.22 10*3/MM3 (ref 1.7–7)
NEUTROPHILS NFR BLD AUTO: 59 % (ref 42.7–76)
NRBC BLD AUTO-RTO: 0 /100 WBC (ref 0–0.2)
PLATELET # BLD AUTO: 233 10*3/MM3 (ref 140–450)
POTASSIUM SERPL-SCNC: 4.5 MMOL/L (ref 3.5–5.2)
PROT SERPL-MCNC: 6.8 G/DL (ref 6–8.5)
RBC # BLD AUTO: 4.88 10*6/MM3 (ref 3.77–5.28)
SODIUM SERPL-SCNC: 142 MMOL/L (ref 136–145)
TRIGL SERPL-MCNC: 204 MG/DL (ref 0–150)
VLDLC SERPL CALC-MCNC: 40.8 MG/DL
WBC # BLD AUTO: 10.53 10*3/MM3 (ref 3.4–10.8)

## 2019-09-17 PROCEDURE — 90653 IIV ADJUVANT VACCINE IM: CPT | Performed by: INTERNAL MEDICINE

## 2019-09-17 PROCEDURE — G0008 ADMIN INFLUENZA VIRUS VAC: HCPCS | Performed by: INTERNAL MEDICINE

## 2019-09-17 PROCEDURE — 99214 OFFICE O/P EST MOD 30 MIN: CPT | Performed by: INTERNAL MEDICINE

## 2019-09-17 NOTE — PROGRESS NOTES
Subjective   Missy Mcgraw is a 68 y.o. female.     Chief Complaint   Patient presents with   • Hypertension   • Hyperlipidemia   • Diabetes       History of Present Illness   HPI: Patient is here to follow up on the blood pressure  The patient is taking the blood pressure medications as prescribed and has had no side effects. The patient is also here to follow up on the cholesterol and is trying to follow a diet. The patient is also here to follow on sugar and  Had  lab work done . The patient also needs  A flu vaccine.   Hypertension   Pertinent negatives include no chest pain, palpitations or shortness of breath.   Hyperlipidemia   Pertinent negatives include no chest pain or shortness of breath.   Diabetes   Pertinent negatives for hypoglycemia include no dizziness, nervousness/anxiousness or pallor. Pertinent negatives for diabetes include no chest pain, no shortness of breath  Patient is on acei/arb     The following portions of the patient's history were reviewed and updated as appropriate: allergies, current medications, past family history, past medical history, past social history, past surgical history and problem list.    Review of Systems   Constitutional: Negative for appetite change, fatigue and fever.   HENT: Negative for congestion, ear discharge, ear pain, sinus pressure and sore throat.    Eyes: Negative for pain and discharge.   Respiratory: Negative for cough, chest tightness, shortness of breath and wheezing.    Cardiovascular: Negative for chest pain, palpitations and leg swelling.   Gastrointestinal: Negative for abdominal pain, blood in stool, constipation, diarrhea and nausea.   Endocrine: Negative for cold intolerance and heat intolerance.   Genitourinary: Negative for dysuria, flank pain and frequency.   Musculoskeletal: Negative for back pain and joint swelling.   Skin: Negative for color change.   Allergic/Immunologic: Negative for environmental allergies and food allergies.    Neurological: Negative for dizziness, weakness, numbness and headaches.   Hematological: Negative for adenopathy. Does not bruise/bleed easily.   Psychiatric/Behavioral: Negative for behavioral problems and dysphoric mood. The patient is not nervous/anxious.          Current Outpatient Medications:   •  amLODIPine (NORVASC) 2.5 MG tablet, , Disp: , Rfl:   •  aspirin (SANJU ASPIRIN EC LOW DOSE) 81 MG EC tablet, Take 1 tablet by mouth daily., Disp: , Rfl:   •  atenolol (TENORMIN) 25 MG tablet, Take 1 tablet by mouth 2 (Two) Times a Day., Disp: 180 tablet, Rfl: 2  •  betamethasone valerate (VALISONE) 0.1 % cream, , Disp: , Rfl:   •  carbidopa-levodopa CR (SINEMET CR)  MG per CR tablet, Take 1 tablet by mouth Every Night., Disp: 90 tablet, Rfl: 3  •  cholecalciferol (VITAMIN D3) 1000 units tablet, Take 1,000 Units by mouth Daily., Disp: , Rfl:   •  citalopram (CeleXA) 40 MG tablet, Take 1 tablet by mouth Daily., Disp: 90 tablet, Rfl: 3  •  clobetasol (TEMOVATE) 0.05 % ointment, Apply  topically to the appropriate area as directed 2 (Two) Times a Day., Disp: 60 g, Rfl: 0  •  COCONUT OIL PO, Take  by mouth., Disp: , Rfl:   •  conjugated estrogens (PREMARIN) 0.625 MG/GM vaginal cream, Use 0.5  grams intravaginally 2 times per week to control symptoms., Disp: 30 g, Rfl: 3  •  cyclobenzaprine (FLEXERIL) 10 MG tablet, Take 1 tablet by mouth 2 (Two) Times a Day As Needed for Muscle Spasms., Disp: 30 tablet, Rfl: 0  •  diclofenac (VOLTAREN) 50 MG EC tablet, Take 1 tablet by mouth 2 (Two) Times a Day., Disp: 30 tablet, Rfl: 0  •  hydrOXYzine (ATARAX) 25 MG tablet, , Disp: , Rfl:   •  imiquimod (ALDARA) 5 % cream, Apply  topically., Disp: , Rfl:   •  lisinopril-hydrochlorothiazide (PRINZIDE,ZESTORETIC) 20-12.5 MG per tablet, Take 1 tablet by mouth Daily., Disp: 90 tablet, Rfl: 3  •  loratadine (CLARITIN) 10 MG tablet, Take 10 mg by mouth 2 (Two) Times a Day., Disp: , Rfl:   •  meclizine (ANTIVERT) 25 MG tablet, Take 1  "tablet by mouth 3 (Three) Times a Day As Needed for dizziness., Disp: 90 tablet, Rfl: 0  •  Multiple Vitamins-Minerals (KANNAN BASIC) tablet, Take  by mouth., Disp: , Rfl:   •  naproxen sodium (ALEVE) 220 MG tablet, Take 220 mg by mouth 2 (Two) Times a Day As Needed for Mild Pain (1-3)., Disp: , Rfl:   •  Nutritional Supplements (JUICE PLUS FIBRE PO), Take  by mouth., Disp: , Rfl:   •  Omega-3 Krill Oil 1000 MG capsule, Take  by mouth., Disp: , Rfl:   •  pantoprazole (PROTONIX) 40 MG EC tablet, Take 1 tablet by mouth Daily., Disp: 90 tablet, Rfl: 3  •  promethazine (PHENERGAN) 25 MG tablet, Take 25 mg by mouth Every 6 (Six) Hours As Needed for Nausea or Vomiting., Disp: , Rfl:   •  rosuvastatin (CRESTOR) 5 MG tablet, Take 1 tablet by mouth Every Night., Disp: 90 tablet, Rfl: 2  •  SOOLANTRA 1 % cream, , Disp: , Rfl:   •  traMADol (ULTRAM) 50 MG tablet, Take 1 tablet by mouth Every 8 (Eight) Hours As Needed for Severe Pain ., Disp: 30 tablet, Rfl: 0  •  triamcinolone (KENALOG) 0.1 % cream, As Needed., Disp: , Rfl:   •  VAQTA 50 UNIT/ML injection, , Disp: , Rfl:   •  vitamin B-12 (CYANOCOBALAMIN) 1000 MCG tablet, Take 1,000 mcg by mouth daily., Disp: , Rfl:     Objective     Blood pressure 136/79, pulse 63, temperature 97.8 °F (36.6 °C), resp. rate 16, height 152.4 cm (60\"), weight 93 kg (205 lb), SpO2 96 %, not currently breastfeeding.    Physical Exam   Constitutional: She is oriented to person, place, and time. She appears well-developed and well-nourished. No distress.   HENT:   Head: Normocephalic and atraumatic.   Right Ear: External ear normal.   Left Ear: External ear normal.   Nose: Nose normal.   Mouth/Throat: Oropharynx is clear and moist.   Eyes: Conjunctivae and EOM are normal. Pupils are equal, round, and reactive to light.   Neck: Neck supple. No thyromegaly present.   Cardiovascular: Normal rate, regular rhythm and normal heart sounds.   Pulmonary/Chest: Effort normal and breath sounds normal. No " respiratory distress.   Abdominal: Soft. Bowel sounds are normal. She exhibits no distension. There is no tenderness. There is no rebound.   Musculoskeletal: Normal range of motion. She exhibits no edema.   Lymphadenopathy:     She has no cervical adenopathy.   Neurological: She is alert and oriented to person, place, and time.   No gross motor or sensory deficits   Skin: Skin is warm. She is not diaphoretic.   Psychiatric: She has a normal mood and affect.   Nursing note and vitals reviewed.    Patient's Body mass index is 40.04 kg/m². BMI is above normal parameters. Recommendations include: educational material, exercise counseling and nutrition counseling.      Results for orders placed or performed in visit on 05/29/19   Comprehensive Metabolic Panel   Result Value Ref Range    Glucose 130 (H) 65 - 99 mg/dL    BUN 25 (H) 8 - 23 mg/dL    Creatinine 0.93 0.57 - 1.00 mg/dL    eGFR Non African Am 60 (L) >60 mL/min/1.73    eGFR African Am 73 >60 mL/min/1.73    BUN/Creatinine Ratio 26.9 (H) 7.0 - 25.0    Sodium 142 136 - 145 mmol/L    Potassium 4.5 3.5 - 5.2 mmol/L    Chloride 100 98 - 107 mmol/L    Total CO2 28.5 22.0 - 29.0 mmol/L    Calcium 10.0 8.6 - 10.5 mg/dL    Total Protein 6.8 6.0 - 8.5 g/dL    Albumin 4.30 3.50 - 5.20 g/dL    Globulin 2.5 gm/dL    A/G Ratio 1.7 g/dL    Total Bilirubin 0.4 0.2 - 1.2 mg/dL    Alkaline Phosphatase 99 39 - 117 U/L    AST (SGOT) 11 1 - 32 U/L    ALT (SGPT) 6 1 - 33 U/L   Lipid Panel   Result Value Ref Range    Total Cholesterol 190 0 - 200 mg/dL    Triglycerides 204 (H) 0 - 150 mg/dL    HDL Cholesterol 59 40 - 60 mg/dL    VLDL Cholesterol 40.8 mg/dL    LDL Cholesterol  90 0 - 100 mg/dL   Hemoglobin A1c   Result Value Ref Range    Hemoglobin A1C 6.80 (H) 4.80 - 5.60 %   Microalbumin / Creatinine Urine Ratio - Urine, Clean Catch   Result Value Ref Range    Creatinine, Urine      Microalbumin, Urine      Microalbumin/Creatinine Ratio     CBC & Differential   Result Value Ref Range     WBC 10.53 3.40 - 10.80 10*3/mm3    RBC 4.88 3.77 - 5.28 10*6/mm3    Hemoglobin 14.7 12.0 - 15.9 g/dL    Hematocrit 45.6 34.0 - 46.6 %    MCV 93.4 79.0 - 97.0 fL    MCH 30.1 26.6 - 33.0 pg    MCHC 32.2 31.5 - 35.7 g/dL    RDW 13.7 12.3 - 15.4 %    Platelets 233 140 - 450 10*3/mm3    Neutrophil Rel % 59.0 42.7 - 76.0 %    Lymphocyte Rel % 27.8 19.6 - 45.3 %    Monocyte Rel % 9.6 5.0 - 12.0 %    Eosinophil Rel % 2.1 0.3 - 6.2 %    Basophil Rel % 0.6 0.0 - 1.5 %    Neutrophils Absolute 6.22 1.70 - 7.00 10*3/mm3    Lymphocytes Absolute 2.93 0.70 - 3.10 10*3/mm3    Monocytes Absolute 1.01 (H) 0.10 - 0.90 10*3/mm3    Eosinophils Absolute 0.22 0.00 - 0.40 10*3/mm3    Basophils Absolute 0.06 0.00 - 0.20 10*3/mm3    Immature Granulocyte Rel % 0.9 (H) 0.0 - 0.5 %    Immature Grans Absolute 0.09 (H) 0.00 - 0.05 10*3/mm3    nRBC 0.0 0.0 - 0.2 /100 WBC         Assessment/Plan   Missy was seen today for hypertension, hyperlipidemia and diabetes.    Diagnoses and all orders for this visit:    Benign essential hypertension    Mixed hyperlipidemia  -     CBC & Differential  -     Comprehensive Metabolic Panel  -     Lipid Panel    Controlled type 2 diabetes mellitus without complication, without long-term current use of insulin (CMS/[facility])  -     Hemoglobin A1c  -     Microalbumin / Creatinine Urine Ratio - Urine, Clean Catch    Need for immunization against influenza  -     Fluad Tri 65yr+      Plan:  1.  Benign essential hypertension: Will continue current medication, low-sodium diet advised  2.mixed hyperlipidemia:    Reviewed  fasting CMP and lipid panel.  Diet and exercise counseled,  Will continue current medications , ldl 90  3. Diabetes  Mell[place]  :  Reviewed     fasting CMP  and hba1c 6.8   , diet and exercise counseled ,    4. Need for flu vaccine : given today         Emely Sheldon MD

## 2019-09-18 ENCOUNTER — TELEPHONE (OUTPATIENT)
Dept: CARDIOLOGY | Facility: CLINIC | Age: 69
End: 2019-09-18

## 2019-09-18 NOTE — TELEPHONE ENCOUNTER
Regarding: Prescription Question  Contact: [phone]  ----- Message from [place], Generic sent at 9/18/2019 10:37 AM EDT -----    I just got notification that my amlodipine BESYLATE 2.5 mg has  been denied? Is there an alternate that I can get?

## 2019-09-23 ENCOUNTER — OFFICE VISIT (OUTPATIENT)
Dept: CARDIOLOGY | Facility: CLINIC | Age: 69
End: 2019-09-23

## 2019-09-23 VITALS
HEIGHT: 60 IN | BODY MASS INDEX: 39.66 KG/M2 | SYSTOLIC BLOOD PRESSURE: 120 MMHG | DIASTOLIC BLOOD PRESSURE: 70 MMHG | OXYGEN SATURATION: 98 % | WEIGHT: 202 LBS | HEART RATE: 71 BPM

## 2019-09-23 DIAGNOSIS — E11.65 UNCONTROLLED TYPE 2 DIABETES MELLITUS WITH HYPERGLYCEMIA (HCC): ICD-10-CM

## 2019-09-23 DIAGNOSIS — E78.2 MIXED HYPERLIPIDEMIA: ICD-10-CM

## 2019-09-23 DIAGNOSIS — I20.8 ANGINAL EQUIVALENT (HCC): ICD-10-CM

## 2019-09-23 DIAGNOSIS — R06.02 SHORTNESS OF BREATH: ICD-10-CM

## 2019-09-23 DIAGNOSIS — I25.10 CORONARY ARTERY DISEASE INVOLVING NATIVE CORONARY ARTERY OF NATIVE HEART WITHOUT ANGINA PECTORIS: ICD-10-CM

## 2019-09-23 DIAGNOSIS — R94.39 ABNORMAL NUCLEAR STRESS TEST: ICD-10-CM

## 2019-09-23 DIAGNOSIS — I10 BENIGN ESSENTIAL HYPERTENSION: Primary | ICD-10-CM

## 2019-09-23 PROCEDURE — 99213 OFFICE O/P EST LOW 20 MIN: CPT | Performed by: NURSE PRACTITIONER

## 2019-09-23 RX ORDER — AMLODIPINE BESYLATE 2.5 MG/1
2.5 TABLET ORAL DAILY
Qty: 30 TABLET | Refills: 0 | Status: SHIPPED | OUTPATIENT
Start: 2019-09-23 | End: 2019-10-20 | Stop reason: SDUPTHER

## 2019-09-23 NOTE — PROGRESS NOTES
Missy Mcgraw is a 68 y.o. female.  MRN #: [id number]    Referring Provider: Emely Sheldon MD    Chief Complaint:   Chief Complaint   Patient presents with   • Follow-up   • Hypertension        History of Present Illness:  68-year-old female presents for one-year follow-up for her essential hypertension.  Patient presents for renewal of her blood pressure medication.  With today's visit patient denies any new cardiac symptoms.  She denies any unusual chest pain, palpitations, pressure with radiation to the arm neck or jaws, denies any unusual shortness of breath other than shortness of breath related to her lack of physical conditioning.  Patient is a non-smoker, she states she tries to adhere to a healthy heart diet.  She denies any weight changes or weight increase or fluid retention.    The patient presents today with their self who contributes to the history of their care.     The following portions of the patient's history were reviewed and updated as appropriate: allergies, current medications, past family history, past medical history, past social history, past surgical history and problem list.     Review of Systems:     Review of Systems   Constitutional: Positive for fatigue. Negative for activity change, appetite change, diaphoresis, unexpected weight gain and unexpected weight loss.   HENT: Negative.    Eyes: Negative.  Negative for blurred vision, double vision and visual disturbance.   Respiratory: Negative.  Negative for apnea, cough, chest tightness, shortness of breath and wheezing.    Cardiovascular: Negative.  Negative for chest pain, palpitations and leg swelling.   Gastrointestinal: Negative.  Negative for abdominal distention, abdominal pain, nausea, vomiting, GERD and indigestion.   Endocrine: Negative.    Genitourinary: Negative.  Negative for decreased libido and hematuria.   Musculoskeletal: Negative.  Negative for back pain, joint swelling, neck pain and neck stiffness.   Skin:  Negative.  Negative for pallor and bruise.   Allergic/Immunologic: Negative.    Neurological: Negative.  Negative for dizziness, tremors, syncope, facial asymmetry, speech difficulty, weakness, light-headedness and numbness.   Hematological: Negative.  Does not bruise/bleed easily.   Psychiatric/Behavioral: Negative.  Negative for agitation and stress. The patient is not nervous/anxious.    All other systems reviewed and are negative.         Current Outpatient Medications:   •  amLODIPine (NORVASC) 2.5 MG tablet, Take 1 tablet by mouth Daily for 30 days., Disp: 30 tablet, Rfl: 0  •  aspirin (SANJU ASPIRIN EC LOW DOSE) 81 MG EC tablet, Take 1 tablet by mouth daily., Disp: , Rfl:   •  atenolol (TENORMIN) 25 MG tablet, Take 1 tablet by mouth 2 (Two) Times a Day., Disp: 180 tablet, Rfl: 2  •  betamethasone valerate (VALISONE) 0.1 % cream, , Disp: , Rfl:   •  carbidopa-levodopa CR (SINEMET CR)  MG per CR tablet, Take 1 tablet by mouth Every Night., Disp: 90 tablet, Rfl: 3  •  cholecalciferol (VITAMIN D3) 1000 units tablet, Take 1,000 Units by mouth Daily., Disp: , Rfl:   •  citalopram (CeleXA) 40 MG tablet, Take 1 tablet by mouth Daily., Disp: 90 tablet, Rfl: 3  •  clobetasol (TEMOVATE) 0.05 % ointment, Apply  topically to the appropriate area as directed 2 (Two) Times a Day., Disp: 60 g, Rfl: 0  •  COCONUT OIL PO, Take  by mouth., Disp: , Rfl:   •  conjugated estrogens (PREMARIN) 0.625 MG/GM vaginal cream, Use 0.5  grams intravaginally 2 times per week to control symptoms., Disp: 30 g, Rfl: 3  •  cyclobenzaprine (FLEXERIL) 10 MG tablet, Take 1 tablet by mouth 2 (Two) Times a Day As Needed for Muscle Spasms., Disp: 30 tablet, Rfl: 0  •  diclofenac (VOLTAREN) 50 MG EC tablet, Take 1 tablet by mouth 2 (Two) Times a Day., Disp: 30 tablet, Rfl: 0  •  hydrOXYzine (ATARAX) 25 MG tablet, , Disp: , Rfl:   •  lisinopril-hydrochlorothiazide (PRINZIDE,ZESTORETIC) 20-12.5 MG per tablet, Take 1 tablet by mouth Daily., Disp: 90  "tablet, Rfl: 3  •  loratadine (CLARITIN) 10 MG tablet, Take 10 mg by mouth 2 (Two) Times a Day., Disp: , Rfl:   •  meclizine (ANTIVERT) 25 MG tablet, Take 1 tablet by mouth 3 (Three) Times a Day As Needed for dizziness., Disp: 90 tablet, Rfl: 0  •  naproxen sodium (ALEVE) 220 MG tablet, Take 220 mg by mouth 2 (Two) Times a Day As Needed for Mild Pain (1-3)., Disp: , Rfl:   •  Omega-3 Krill Oil 1000 MG capsule, Take  by mouth., Disp: , Rfl:   •  pantoprazole (PROTONIX) 40 MG EC tablet, Take 1 tablet by mouth Daily., Disp: 90 tablet, Rfl: 3  •  promethazine (PHENERGAN) 25 MG tablet, Take 25 mg by mouth Every 6 (Six) Hours As Needed for Nausea or Vomiting., Disp: , Rfl:   •  rosuvastatin (CRESTOR) 5 MG tablet, Take 1 tablet by mouth Every Night., Disp: 90 tablet, Rfl: 2  •  SOOLANTRA 1 % cream, , Disp: , Rfl:   •  traMADol (ULTRAM) 50 MG tablet, Take 1 tablet by mouth Every 8 (Eight) Hours As Needed for Severe Pain ., Disp: 30 tablet, Rfl: 0  •  triamcinolone (KENALOG) 0.1 % cream, As Needed., Disp: , Rfl:   •  VAQTA 50 UNIT/ML injection, , Disp: , Rfl:   •  vitamin B-12 (CYANOCOBALAMIN) 1000 MCG tablet, Take 1,000 mcg by mouth daily., Disp: , Rfl:   •  imiquimod (ALDARA) 5 % cream, Apply  topically., Disp: , Rfl:   •  Multiple Vitamins-Minerals (KANNAN BASIC) tablet, Take  by mouth., Disp: , Rfl:   •  Nutritional Supplements (JUICE PLUS FIBRE PO), Take  by mouth., Disp: , Rfl:     Vitals:    09/23/19 1055   BP: 120/70   BP Location: Left arm   Patient Position: Sitting   Cuff Size: Adult   Pulse: 71   SpO2: 98%   Weight: 91.6 kg (202 lb)   Height: 152.4 cm (60\")       Physical Exam:     Physical Exam   Constitutional: She is oriented to person, place, and time. She appears well-developed and well-nourished. No distress.   HENT:   Head: Normocephalic and atraumatic.   Eyes: Pupils are equal, round, and reactive to light. No scleral icterus.   Neck: Trachea normal, normal range of motion and full passive range of motion " without pain. Neck supple. No JVD present. Carotid bruit is not present. No thyromegaly present.   Cardiovascular: Normal rate, regular rhythm, S1 normal, S2 normal, normal heart sounds and intact distal pulses. PMI is not displaced. Exam reveals no gallop and no friction rub.   No murmur heard.  Pulses:       Carotid pulses are 1+ on the right side, and 1+ on the left side.  Pulmonary/Chest: Effort normal and breath sounds normal. No respiratory distress. She has no wheezes. She has no rales. She exhibits no tenderness.   Abdominal: Soft. Bowel sounds are normal. She exhibits no mass. There is no tenderness.   Musculoskeletal: Normal range of motion. She exhibits no edema.   Neurological: She is alert and oriented to person, place, and time.   Skin: Skin is warm and dry. Capillary refill takes less than 2 seconds. No rash noted. She is not diaphoretic.   Psychiatric: She has a normal mood and affect. Her behavior is normal. Judgment and thought content normal.   Nursing note and vitals reviewed.      Procedures    Results:   Reviewed patient's medications and her allergy profile.  Updated medication changes and renewed her amlodipine and her atenolol.    Assessment/Plan:   No medication changes will be made with today's evaluation.  And there is no cardiac testing indicated for today's visit.  We did discuss the importance of increasing her conditioning and attempting to exercise 3-5 times a week for at least an hour a day.  Also recommended to eliminate as much sodium/salt from your diet, to keep less than 1500 mg of sodium per 24 hours.  Will follow-up in 1 year or as needed for any cardiac related symptoms.  Missy was seen today for follow-up and hypertension.    Diagnoses and all orders for this visit:    Benign essential hypertension  -     amLODIPine (NORVASC) 2.5 MG tablet; Take 1 tablet by mouth Daily for 30 days.    Mixed hyperlipidemia  -     amLODIPine (NORVASC) 2.5 MG tablet; Take 1 tablet by mouth  Daily for 30 days.    Anginal equivalent (CMS/HCC)  -     amLODIPine (NORVASC) 2.5 MG tablet; Take 1 tablet by mouth Daily for 30 days.    Abnormal nuclear stress test  -     amLODIPine (NORVASC) 2.5 MG tablet; Take 1 tablet by mouth Daily for 30 days.    Coronary artery disease involving native coronary artery of native heart without angina pectoris  -     amLODIPine (NORVASC) 2.5 MG tablet; Take 1 tablet by mouth Daily for 30 days.    Shortness of breath  -     amLODIPine (NORVASC) 2.5 MG tablet; Take 1 tablet by mouth Daily for 30 days.    Uncontrolled type 2 diabetes mellitus with hyperglycemia (CMS/HCC)  -     amLODIPine (NORVASC) 2.5 MG tablet; Take 1 tablet by mouth Daily for 30 days.        Return in about 1 year (around 9/23/2020).    AZEEM Charles

## 2019-10-20 DIAGNOSIS — E11.65 UNCONTROLLED TYPE 2 DIABETES MELLITUS WITH HYPERGLYCEMIA (HCC): ICD-10-CM

## 2019-10-20 DIAGNOSIS — I25.10 CORONARY ARTERY DISEASE INVOLVING NATIVE CORONARY ARTERY OF NATIVE HEART WITHOUT ANGINA PECTORIS: ICD-10-CM

## 2019-10-20 DIAGNOSIS — R94.39 ABNORMAL NUCLEAR STRESS TEST: ICD-10-CM

## 2019-10-20 DIAGNOSIS — E78.2 MIXED HYPERLIPIDEMIA: ICD-10-CM

## 2019-10-20 DIAGNOSIS — I10 BENIGN ESSENTIAL HYPERTENSION: ICD-10-CM

## 2019-10-20 DIAGNOSIS — I20.8 ANGINAL EQUIVALENT (HCC): ICD-10-CM

## 2019-10-20 DIAGNOSIS — R06.02 SHORTNESS OF BREATH: ICD-10-CM

## 2019-10-21 RX ORDER — AMLODIPINE BESYLATE 2.5 MG/1
TABLET ORAL
Qty: 90 TABLET | Refills: 3 | Status: SHIPPED | OUTPATIENT
Start: 2019-10-21 | End: 2019-12-16 | Stop reason: SDUPTHER

## 2019-11-18 RX ORDER — BACLOFEN 10 MG/1
TABLET ORAL
Qty: 30 TABLET | Refills: 2 | OUTPATIENT
Start: 2019-11-18

## 2019-12-14 LAB
ALBUMIN SERPL-MCNC: 4.2 G/DL (ref 3.5–5.2)
ALBUMIN/CREAT UR: 55.2 MG/G CREAT (ref 0–30)
ALBUMIN/GLOB SERPL: 1.7 G/DL
ALP SERPL-CCNC: 82 U/L (ref 39–117)
ALT SERPL-CCNC: 9 U/L (ref 1–33)
AST SERPL-CCNC: 12 U/L (ref 1–32)
BASOPHILS # BLD AUTO: 0.06 10*3/MM3 (ref 0–0.2)
BASOPHILS NFR BLD AUTO: 0.6 % (ref 0–1.5)
BILIRUB SERPL-MCNC: 0.4 MG/DL (ref 0.2–1.2)
BUN SERPL-MCNC: 17 MG/DL (ref 8–23)
BUN/CREAT SERPL: 21.5 (ref 7–25)
CALCIUM SERPL-MCNC: 9.7 MG/DL (ref 8.6–10.5)
CHLORIDE SERPL-SCNC: 101 MMOL/L (ref 98–107)
CHOLEST SERPL-MCNC: 186 MG/DL (ref 0–200)
CO2 SERPL-SCNC: 27.3 MMOL/L (ref 22–29)
CREAT SERPL-MCNC: 0.79 MG/DL (ref 0.57–1)
CREAT UR-MCNC: 91.7 MG/DL
EOSINOPHIL # BLD AUTO: 0.23 10*3/MM3 (ref 0–0.4)
EOSINOPHIL NFR BLD AUTO: 2.4 % (ref 0.3–6.2)
ERYTHROCYTE [DISTWIDTH] IN BLOOD BY AUTOMATED COUNT: 11.7 % (ref 12.3–15.4)
GLOBULIN SER CALC-MCNC: 2.5 GM/DL
GLUCOSE SERPL-MCNC: 137 MG/DL (ref 65–99)
HBA1C MFR BLD: 6.8 % (ref 4.8–5.6)
HCT VFR BLD AUTO: 41.9 % (ref 34–46.6)
HDLC SERPL-MCNC: 56 MG/DL (ref 40–60)
HGB BLD-MCNC: 13.9 G/DL (ref 12–15.9)
IMM GRANULOCYTES # BLD AUTO: 0.08 10*3/MM3 (ref 0–0.05)
IMM GRANULOCYTES NFR BLD AUTO: 0.8 % (ref 0–0.5)
LDLC SERPL CALC-MCNC: 98 MG/DL (ref 0–100)
LYMPHOCYTES # BLD AUTO: 2.62 10*3/MM3 (ref 0.7–3.1)
LYMPHOCYTES NFR BLD AUTO: 27.1 % (ref 19.6–45.3)
MCH RBC QN AUTO: 29.6 PG (ref 26.6–33)
MCHC RBC AUTO-ENTMCNC: 33.2 G/DL (ref 31.5–35.7)
MCV RBC AUTO: 89.1 FL (ref 79–97)
MICROALBUMIN UR-MCNC: 50.6 UG/ML
MONOCYTES # BLD AUTO: 0.77 10*3/MM3 (ref 0.1–0.9)
MONOCYTES NFR BLD AUTO: 8 % (ref 5–12)
NEUTROPHILS # BLD AUTO: 5.9 10*3/MM3 (ref 1.7–7)
NEUTROPHILS NFR BLD AUTO: 61.1 % (ref 42.7–76)
NRBC BLD AUTO-RTO: 0 /100 WBC (ref 0–0.2)
PLATELET # BLD AUTO: 220 10*3/MM3 (ref 140–450)
POTASSIUM SERPL-SCNC: 4.3 MMOL/L (ref 3.5–5.2)
PROT SERPL-MCNC: 6.7 G/DL (ref 6–8.5)
RBC # BLD AUTO: 4.7 10*6/MM3 (ref 3.77–5.28)
SODIUM SERPL-SCNC: 141 MMOL/L (ref 136–145)
TRIGL SERPL-MCNC: 162 MG/DL (ref 0–150)
VLDLC SERPL CALC-MCNC: 32.4 MG/DL
WBC # BLD AUTO: 9.66 10*3/MM3 (ref 3.4–10.8)

## 2019-12-16 ENCOUNTER — RESULTS ENCOUNTER (OUTPATIENT)
Dept: INTERNAL MEDICINE | Facility: CLINIC | Age: 69
End: 2019-12-16

## 2019-12-16 ENCOUNTER — OFFICE VISIT (OUTPATIENT)
Dept: INTERNAL MEDICINE | Facility: CLINIC | Age: 69
End: 2019-12-16

## 2019-12-16 VITALS
BODY MASS INDEX: 39.27 KG/M2 | RESPIRATION RATE: 16 BRPM | WEIGHT: 200 LBS | TEMPERATURE: 98 F | HEART RATE: 67 BPM | SYSTOLIC BLOOD PRESSURE: 143 MMHG | DIASTOLIC BLOOD PRESSURE: 81 MMHG | HEIGHT: 60 IN | OXYGEN SATURATION: 95 %

## 2019-12-16 DIAGNOSIS — I10 BENIGN ESSENTIAL HYPERTENSION: ICD-10-CM

## 2019-12-16 DIAGNOSIS — R30.0 DYSURIA: ICD-10-CM

## 2019-12-16 DIAGNOSIS — Z23 NEED FOR VACCINATION: ICD-10-CM

## 2019-12-16 DIAGNOSIS — E11.9 CONTROLLED TYPE 2 DIABETES MELLITUS WITHOUT COMPLICATION, WITHOUT LONG-TERM CURRENT USE OF INSULIN (HCC): ICD-10-CM

## 2019-12-16 DIAGNOSIS — E78.2 MIXED HYPERLIPIDEMIA: ICD-10-CM

## 2019-12-16 DIAGNOSIS — Z00.00 ROUTINE GENERAL MEDICAL EXAMINATION AT A HEALTH CARE FACILITY: Primary | ICD-10-CM

## 2019-12-16 PROCEDURE — 99397 PER PM REEVAL EST PAT 65+ YR: CPT | Performed by: INTERNAL MEDICINE

## 2019-12-16 PROCEDURE — G0009 ADMIN PNEUMOCOCCAL VACCINE: HCPCS | Performed by: INTERNAL MEDICINE

## 2019-12-16 PROCEDURE — 90670 PCV13 VACCINE IM: CPT | Performed by: INTERNAL MEDICINE

## 2019-12-16 PROCEDURE — G0439 PPPS, SUBSEQ VISIT: HCPCS | Performed by: INTERNAL MEDICINE

## 2019-12-16 RX ORDER — ROSUVASTATIN CALCIUM 5 MG/1
TABLET, COATED ORAL
Qty: 90 TABLET | Refills: 4 | Status: SHIPPED | OUTPATIENT
Start: 2019-12-16 | End: 2021-01-04 | Stop reason: SDUPTHER

## 2019-12-16 RX ORDER — AMLODIPINE BESYLATE 2.5 MG/1
2.5 TABLET ORAL DAILY
Qty: 90 TABLET | Refills: 3 | Status: SHIPPED | OUTPATIENT
Start: 2019-12-16 | End: 2020-04-15 | Stop reason: SDUPTHER

## 2019-12-16 RX ORDER — ATENOLOL 25 MG/1
TABLET ORAL
Qty: 180 TABLET | Refills: 4 | Status: SHIPPED | OUTPATIENT
Start: 2019-12-16 | End: 2021-01-04 | Stop reason: SDUPTHER

## 2019-12-16 RX ORDER — GLIMEPIRIDE 1 MG/1
1 TABLET ORAL
Qty: 90 TABLET | Refills: 3 | Status: SHIPPED | OUTPATIENT
Start: 2019-12-16 | End: 2020-03-30 | Stop reason: SDUPTHER

## 2019-12-16 NOTE — PROGRESS NOTES
The ABCs of the Annual Wellness Visit  Subsequent Medicare Wellness Visit    Chief Complaint   Patient presents with   • Medicare Wellness-subsequent   • Hypertension   • Hyperlipidemia   • Diabetes       Subjective   History of Present Illness:  Missy Mcgraw is a 69 y.o. female who presents for a Subsequent Medicare Wellness Visit and physical.  Patient is here to follow up on the blood pressure  The patient is taking the blood pressure medications as prescribed and has had no side effects. The patient is also here to follow up on the cholesterol and sugar is trying to follow a diet. The patient  Had lab work done .  The patient also needs Prevnar, she is currently off her sugar medications, she would also like to be checked for UTI as she has intermittent dysuria   hyperlipidemia   Pertinent negatives include no chest pain or shortness of breath.   Hypertension   Pertinent negatives include no chest pain, palpitations or shortness of breath.      HEALTH RISK ASSESSMENT    Recent Hospitalizations:  No hospitalization(s) within the last year.    Current Medical Providers:  Patient Care Team:  Emely Sheldon MD as PCP - Marie Fowler MD as Surgeon (General Surgery)    Smoking Status:  Social History     Tobacco Use   Smoking Status Never Smoker   Smokeless Tobacco Never Used       Alcohol Consumption:  Social History     Substance and Sexual Activity   Alcohol Use No       Depression Screen:   PHQ-2/PHQ-9 Depression Screening 12/16/2019   Little interest or pleasure in doing things 0   Feeling down, depressed, or hopeless 0   Total Score 0       Fall Risk Screen:  STEADI Fall Risk Assessment was completed, and patient is at MODERATE risk for falls. Assessment completed on:12/16/2019    Health Habits and Functional and Cognitive Screening:  Functional & Cognitive Status 12/16/2019   Do you have difficulty preparing food and eating? No   Do you have difficulty bathing yourself, getting dressed or  grooming yourself? No   Do you have difficulty using the toilet? No   Do you have difficulty moving around from place to place? No   Do you have trouble with steps or getting out of a bed or a chair? No   Current Diet Well Balanced Diet   Dental Exam Up to date   Eye Exam Up to date   Exercise (times per week) 6 times per week   Current Exercise Activities Include Housecleaning   Do you need help using the phone?  No   Are you deaf or do you have serious difficulty hearing?  Yes   Do you need help with transportation? No   Do you need help shopping? No   Do you need help preparing meals?  No   Do you need help with housework?  No   Do you need help with laundry? No   Do you need help taking your medications? No   Do you need help managing money? No   Do you ever drive or ride in a car without wearing a seat belt? No   Have you felt unusual stress, anger or loneliness in the last month? No   Who do you live with? Alone   If you need help, do you have trouble finding someone available to you? No   Have you been bothered in the last four weeks by sexual problems? No   Do you have difficulty concentrating, remembering or making decisions? No         Does the patient have evidence of cognitive impairment? No    Asprin use counseling:Taking ASA appropriately as indicated    Age-appropriate Screening Schedule:  Refer to the list below for future screening recommendations based on patient's age, sex and/or medical conditions. Orders for these recommended tests are listed in the plan section. The patient has been provided with a written plan.    Health Maintenance   Topic Date Due   • TDAP/TD VACCINES (1 - Tdap) 12/05/1963   • ZOSTER VACCINE (1 of 2) 12/05/2002   • DIABETIC FOOT EXAM  03/08/2018   • PNEUMOCOCCAL VACCINE (65+ HIGH RISK) (2 of 2 - PCV13) 12/13/2019   • HEMOGLOBIN A1C  06/13/2020   • DIABETIC EYE EXAM  10/17/2020   • LIPID PANEL  12/13/2020   • URINE MICROALBUMIN  12/13/2020   • DXA SCAN  12/18/2020   •  MAMMOGRAM  08/22/2021   • COLONOSCOPY  02/01/2029   • INFLUENZA VACCINE  Completed          The following portions of the patient's history were reviewed and updated as appropriate: allergies, current medications, past family history, past medical history, past social history, past surgical history and problem list.    Outpatient Medications Prior to Visit   Medication Sig Dispense Refill   • aspirin (SANJU ASPIRIN EC LOW DOSE) 81 MG EC tablet Take 1 tablet by mouth daily.     • atenolol (TENORMIN) 25 MG tablet TAKE 1 TABLET TWICE A  tablet 4   • betamethasone valerate (VALISONE) 0.1 % cream      • carbidopa-levodopa CR (SINEMET CR)  MG per CR tablet Take 1 tablet by mouth Every Night. 90 tablet 3   • cholecalciferol (VITAMIN D3) 1000 units tablet Take 1,000 Units by mouth Daily.     • citalopram (CeleXA) 40 MG tablet Take 1 tablet by mouth Daily. 90 tablet 3   • clobetasol (TEMOVATE) 0.05 % ointment Apply  topically to the appropriate area as directed 2 (Two) Times a Day. 60 g 0   • conjugated estrogens (PREMARIN) 0.625 MG/GM vaginal cream Use 0.5  grams intravaginally 2 times per week to control symptoms. 30 g 3   • cyclobenzaprine (FLEXERIL) 10 MG tablet Take 1 tablet by mouth 2 (Two) Times a Day As Needed for Muscle Spasms. 30 tablet 0   • diclofenac (VOLTAREN) 50 MG EC tablet Take 1 tablet by mouth 2 (Two) Times a Day. 30 tablet 0   • hydrOXYzine (ATARAX) 25 MG tablet      • imiquimod (ALDARA) 5 % cream Apply  topically.     • lisinopril-hydrochlorothiazide (PRINZIDE,ZESTORETIC) 20-12.5 MG per tablet Take 1 tablet by mouth Daily. 90 tablet 3   • loratadine (CLARITIN) 10 MG tablet Take 10 mg by mouth 2 (Two) Times a Day.     • meclizine (ANTIVERT) 25 MG tablet Take 1 tablet by mouth 3 (Three) Times a Day As Needed for dizziness. 90 tablet 0   • Multiple Vitamins-Minerals (KANNAN BASIC) tablet Take  by mouth.     • naproxen sodium (ALEVE) 220 MG tablet Take 220 mg by mouth 2 (Two) Times a Day As Needed  for Mild Pain (1-3).     • pantoprazole (PROTONIX) 40 MG EC tablet Take 1 tablet by mouth Daily. 90 tablet 3   • promethazine (PHENERGAN) 25 MG tablet Take 25 mg by mouth Every 6 (Six) Hours As Needed for Nausea or Vomiting.     • rosuvastatin (CRESTOR) 5 MG tablet TAKE 1 TABLET EVERY NIGHT 90 tablet 4   • traMADol (ULTRAM) 50 MG tablet Take 1 tablet by mouth Every 8 (Eight) Hours As Needed for Severe Pain . 30 tablet 0   • triamcinolone (KENALOG) 0.1 % cream As Needed.     • VAQTA 50 UNIT/ML injection      • amLODIPine (NORVASC) 2.5 MG tablet TAKE ONE TABLET BY MOUTH DAILY 90 tablet 3   • COCONUT OIL PO Take  by mouth.     • vitamin B-12 (CYANOCOBALAMIN) 1000 MCG tablet Take 1,000 mcg by mouth daily.     • Nutritional Supplements (JUICE PLUS FIBRE PO) Take  by mouth.     • Omega-3 Krill Oil 1000 MG capsule Take  by mouth.     • SOOLANTRA 1 % cream        No facility-administered medications prior to visit.        Patient Active Problem List   Diagnosis   • Hay fever   • Benign essential hypertension   • Gastroesophageal reflux disease with esophagitis   • Major depressive disorder, recurrent episode (CMS/HCC)   • Uncontrolled type 2 diabetes mellitus (CMS/HCC)   • Abnormal liver enzymes   • Abnormal weight gain   • Cervicalgia   • Fibroid, uterine   • Hyperthyroidism   • Mixed hyperlipidemia   • Movement disorder   • Neuralgia   • Ulnar nerve palsy   • Vitamin B12 deficiency   • Vitamin D deficiency   • Osteoporosis   • Restless legs syndrome   • Shortness of breath   • Anginal equivalent (CMS/HCC)   • Chest pain in adult   • Abnormal nuclear stress test   • Coronary artery disease involving native coronary artery of native heart without angina pectoris   • PONV (postoperative nausea and vomiting)   • Unintentional weight loss   • Gastroesophageal reflux disease   • Epigastric abdominal pain   • Gastritis determined by biopsy   • Family history of colon cancer in father       Advanced Care Planning:  Patient has an  "advance directive - a copy has not been provided. Have asked the patient to send this to us to add to record    Review of Systems   Constitutional: Negative for appetite change, fatigue and fever.   HENT: Negative for congestion, ear discharge, ear pain, sinus pressure and sore throat.    Eyes: Negative for pain and discharge.   Respiratory: Negative for cough, chest tightness, shortness of breath and wheezing.    Cardiovascular: Negative for chest pain, palpitations and leg swelling.   Gastrointestinal: Negative for abdominal pain, blood in stool, constipation, diarrhea and nausea.   Endocrine: Negative for cold intolerance and heat intolerance.   Genitourinary: Negative for dysuria, flank pain and frequency.   Musculoskeletal: Negative for back pain and joint swelling.   Skin: Negative for color change.   Allergic/Immunologic: Negative for environmental allergies and food allergies.   Neurological: Negative for dizziness, weakness, numbness and headaches.   Hematological: Negative for adenopathy. Does not bruise/bleed easily.   Psychiatric/Behavioral: Negative for behavioral problems and dysphoric mood. The patient is not nervous/anxious.        Compared to one year ago, the patient feels her physical health is worse.  Compared to one year ago, the patient feels her mental health is the same.    Reviewed chart for potential of high risk medication in the elderly: yes  Reviewed chart for potential of harmful drug interactions in the elderly:yes    Objective         Vitals:    12/16/19 1534   BP: 143/81   Pulse: 67   Resp: 16   Temp: 98 °F (36.7 °C)   SpO2: 95%   Weight: 90.7 kg (200 lb)   Height: 152.4 cm (60\")   PainSc:   2       Body mass index is 39.06 kg/m².  Discussed the patient's BMI with her. The BMI is above average; BMI management plan is completed.    Physical Exam   Constitutional: She is oriented to person, place, and time. She appears well-developed and well-nourished. No distress.   HENT:   Head: " Normocephalic and atraumatic.   Right Ear: External ear normal.   Left Ear: External ear normal.   Nose: Nose normal.   Mouth/Throat: Oropharynx is clear and moist.   Eyes: Pupils are equal, round, and reactive to light. Conjunctivae and EOM are normal.   Neck: Neck supple. No thyromegaly present.   Cardiovascular: Normal rate, regular rhythm and normal heart sounds.   Pulmonary/Chest: Effort normal and breath sounds normal. No respiratory distress.   Abdominal: Soft. Bowel sounds are normal. She exhibits no distension. There is no tenderness. There is no rebound.   Musculoskeletal: Normal range of motion. She exhibits no edema.   Lymphadenopathy:     She has no cervical adenopathy.   Neurological: She is alert and oriented to person, place, and time.   No gross motor or sensory deficits   Skin: Skin is warm. She is not diaphoretic.   Psychiatric: She has a normal mood and affect.   Nursing note and vitals reviewed.      Lab Results   Component Value Date     (H) 12/13/2019    CHLPL 186 12/13/2019    TRIG 162 (H) 12/13/2019    HDL 56 12/13/2019    LDL 98 12/13/2019    VLDL 32.4 12/13/2019    HGBA1C 6.80 (H) 12/13/2019        Assessment/Plan   Medicare Risks and Personalized Health Plan  CMS Preventative Services Quick Reference  Advance Directive Discussion  Breast Cancer/Mammogram Screening  Cardiovascular risk  Diabetic Lab Screening   Fall Risk  Immunizations Discussed/Encouraged (specific immunizations; Prevnar )    The above risks/problems have been discussed with the patient.  Pertinent information has been shared with the patient in the After Visit Summary.  Follow up plans and orders are seen below in the Assessment/Plan Section.    Diagnoses and all orders for this visit:    1. Routine general medical examination at a health care facility (Primary)    2. Benign essential hypertension  -     amLODIPine (NORVASC) 2.5 MG tablet; Take 1 tablet by mouth Daily.  Dispense: 90 tablet; Refill: 3    3. Mixed  hyperlipidemia  -     amLODIPine (NORVASC) 2.5 MG tablet; Take 1 tablet by mouth Daily.  Dispense: 90 tablet; Refill: 3  -     CBC & Differential  -     Comprehensive Metabolic Panel  -     Lipid Panel    4. Controlled type 2 diabetes mellitus without complication, without long-term current use of insulin (CMS/[facility])  -     Hemoglobin A1c  -     Microalbumin / Creatinine Urine Ratio - Urine, Clean Catch    5. Need for vaccination  -     Pneumococcal Conjugate Vaccine 13-Valent All (PCV13)    6. Dysuria  -     Urine Culture - , Urine, Clean Catch; Future    Other orders  -     glimepiride (AMARYL) 1 MG tablet; Take 1 tablet by mouth Every Morning Before Breakfast.  Dispense: 90 tablet; Refill: 3    Plan:  1.physical: Physical exam conducted today, reviewed fasting lab work,   Impression: healthy adult Patient. Currently, patient eats a healthy diet. Screening lab work includes glucose, lipid profile and complete blood count . The risks and benefits of immunizations were discussed and immunizations are up to date. Advice and education were given regarding nutrition and aerobic exercise. Patient discussion: discussed with the patient.  Annual Wellness Visit physical with preventive exam as well as age and risk appropriate counseling completed.   Advance Directive Planning: paperwork and instructions were given to the patient.   Patient Discussion: plan discussed with the patient, follow-up visit needed in one year. Medication Review and medication list updated  2.  Benign essential hypertension: Will continue current medication, low-sodium diet advised  3.mixed hyperlipidemia: will obtain   fasting CMP and lipid panel.  Diet and exercise counseled,  Will continue current medications  4. Diabetes  Mellitus  : will obtain   fasting CMP  and hba1c  , diet and exercise counseled , Will start Amaryl 1 mg p.o. daily  5.  Dysuria: Will obtain urine culture, oral hydration advised  6.  Need for Prevnar: Given today  Follow  Up:  Return in 1 year (on 12/16/2020) for Medicare Wellness.     An After Visit Summary and PPPS were given to the patient.

## 2019-12-16 NOTE — PATIENT INSTRUCTIONS
Exercising to Stay Healthy  To become healthy and stay healthy, it is recommended that you do moderate-intensity and vigorous-intensity exercise. You can tell that you are exercising at a moderate intensity if your heart starts beating faster and you start breathing faster but can still hold a conversation. You can tell that you are exercising at a vigorous intensity if you are breathing much harder and faster and cannot hold a conversation while exercising.  Exercising regularly is important. It has many health benefits, such as:  · Improving overall fitness, flexibility, and endurance.  · Increasing bone density.  · Helping with weight control.  · Decreasing body fat.  · Increasing muscle strength.  · Reducing stress and tension.  · Improving overall health.  How often should I exercise?  Choose an activity that you enjoy, and set realistic goals. Your health care provider can help you make an activity plan that works for you.  Exercise regularly as told by your health care provider. This may include:  · Doing strength training two times a week, such as:  ? Lifting weights.  ? Using resistance bands.  ? Push-ups.  ? Sit-ups.  ? Yoga.  · Doing a certain intensity of exercise for a given amount of time. Choose from these options:  ? A total of 150 minutes of moderate-intensity exercise every week.  ? A total of 75 minutes of vigorous-intensity exercise every week.  ? A mix of moderate-intensity and vigorous-intensity exercise every week.  Children, pregnant women, people who have not exercised regularly, people who are overweight, and older adults may need to talk with a health care provider about what activities are safe to do. If you have a medical condition, be sure to talk with your health care provider before you start a new exercise program.  What are some exercise ideas?  Moderate-intensity exercise ideas include:  · Walking 1 mile (1.6 km) in about 15  minutes.  · Biking.  · Hiking.  · Golfing.  · Dancing.  · Water aerobics.  Vigorous-intensity exercise ideas include:  · Walking 4.5 miles (7.2 km) or more in about 1 hour.  · Jogging or running 5 miles (8 km) in about 1 hour.  · Biking 10 miles (16.1 km) or more in about 1 hour.  · Lap swimming.  · Roller-skating or in-line skating.  · Cross-country skiing.  · Vigorous competitive sports, such as football, basketball, and soccer.  · Jumping rope.  · Aerobic dancing.  What are some everyday activities that can help me to get exercise?  · Yard work, such as:  ? Pushing a .  ? Raking and bagging leaves.  · Washing your car.  · Pushing a stroller.  · Shoveling snow.  · Gardening.  · Washing windows or floors.  How can I be more active in my day-to-day activities?  · Use stairs instead of an elevator.  · Take a walk during your lunch break.  · If you drive, park your car farther away from your work or school.  · If you take public transportation, get off one stop early and walk the rest of the way.  · Stand up or walk around during all of your indoor phone calls.  · Get up, stretch, and walk around every 30 minutes throughout the day.  · Enjoy exercise with a friend. Support to continue exercising will help you keep a regular routine of activity.  What guidelines can I follow while exercising?  · Before you start a new exercise program, talk with your health care provider.  · Do not exercise so much that you hurt yourself, feel dizzy, or get very short of breath.  · Wear comfortable clothes and wear shoes with good support.  · Drink plenty of water while you exercise to prevent dehydration or heat stroke.  · Work out until your breathing and your heartbeat get faster.  Where to find more information  · [place] Department of Health and Human Services: www.hhs.gov  · Centers for Disease Control and Prevention (CDC): www.cdc.gov  Summary  · Exercising regularly is important. It will improve your overall fitness,  flexibility, and endurance.  · Regular exercise also will improve your overall health. It can help you control your weight, reduce stress, and improve your bone density.  · Do not exercise so much that you hurt yourself, feel dizzy, or get very short of breath.  · Before you start a new exercise program, talk with your health care provider.  This information is not intended to replace advice given to you by your health care provider. Make sure you discuss any questions you have with your health care provider.  Document Released: 2012 Document Revised: 2018 Document Reviewed: 2018  [employer] Interactive Patient Education © 2019 [employer]    Medicare Wellness  Personal Prevention Plan of Service     Date of Office Visit:  2019  Encounter Provider:  Emely Sheldon MD  Place of Service:  [facility]  Patient Name: Missy Mcgraw  :  1950    As part of the Medicare Wellness portion of your visit today, we are providing you with this personalized preventive plan of services (PPPS). This plan is based upon recommendations of the [place] Preventive Services Task Force (USPSTF) and the Advisory Committee on Immunization Practices (ACIP).    This lists the preventive care services that should be considered, and provides dates of when you are due. Items listed as completed are up-to-date and do not require any further intervention.    Health Maintenance   Topic Date Due   • TDAP/TD VACCINES (1 - Tdap) 1963   • ZOSTER VACCINE (1 of 2) 2002   • DIABETIC FOOT EXAM  2018   • PNEUMOCOCCAL VACCINE (65+ HIGH RISK) (2 of 2 - PCV13) 2019   • MEDICARE ANNUAL WELLNESS  2019   • HEMOGLOBIN A1C  2020   • DIABETIC EYE EXAM  10/17/2020   • LIPID PANEL  2020   • URINE MICROALBUMIN  2020   • DXA SCAN  2020   • MAMMOGRAM  2021   • COLONOSCOPY  2029   • HEPATITIS C SCREENING  Completed   • INFLUENZA VACCINE   Completed       Orders Placed This Encounter   Procedures   • Urine Culture - , Urine, Clean Catch     Standing Status:   Future     Standing Expiration Date:   12/16/2020   • Comprehensive Metabolic Panel   • Lipid Panel   • Hemoglobin A1c   • Microalbumin / Creatinine Urine Ratio - Urine, Clean Catch   • CBC & Differential     Order Specific Question:   Manual Differential     Answer:   No       Return in 1 year (on 12/16/2020) for Medicare Wellness.

## 2019-12-20 RX ORDER — NITROFURANTOIN 25; 75 MG/1; MG/1
100 CAPSULE ORAL 2 TIMES DAILY
Qty: 20 CAPSULE | Refills: 0 | Status: SHIPPED | OUTPATIENT
Start: 2019-12-20 | End: 2020-02-05

## 2020-01-27 NOTE — TELEPHONE ENCOUNTER
Patient's cardiac cath of August 2018 shows near normal coronary arteries.  Echocardiogram was normal with no structural abnormalities and ejection fraction of 60%.  She may proceed with her surgery at low cardiac risk.

## 2020-02-05 ENCOUNTER — OFFICE VISIT (OUTPATIENT)
Dept: INTERNAL MEDICINE | Facility: CLINIC | Age: 70
End: 2020-02-05

## 2020-02-05 VITALS
WEIGHT: 201 LBS | TEMPERATURE: 98.3 F | BODY MASS INDEX: 39.46 KG/M2 | RESPIRATION RATE: 16 BRPM | OXYGEN SATURATION: 98 % | SYSTOLIC BLOOD PRESSURE: 152 MMHG | HEART RATE: 56 BPM | HEIGHT: 60 IN | DIASTOLIC BLOOD PRESSURE: 80 MMHG

## 2020-02-05 DIAGNOSIS — Z01.818 PREOPERATIVE EXAMINATION: Primary | ICD-10-CM

## 2020-02-05 DIAGNOSIS — R79.1 ABNORMAL COAGULATION PROFILE: ICD-10-CM

## 2020-02-05 DIAGNOSIS — E11.9 CONTROLLED TYPE 2 DIABETES MELLITUS WITHOUT COMPLICATION, WITHOUT LONG-TERM CURRENT USE OF INSULIN (HCC): ICD-10-CM

## 2020-02-05 DIAGNOSIS — I10 BENIGN ESSENTIAL HYPERTENSION: ICD-10-CM

## 2020-02-05 DIAGNOSIS — M25.562 PAIN IN JOINT OF LEFT KNEE: ICD-10-CM

## 2020-02-05 DIAGNOSIS — M79.662 PAIN OF LEFT LOWER LEG: ICD-10-CM

## 2020-02-05 PROCEDURE — 99214 OFFICE O/P EST MOD 30 MIN: CPT | Performed by: INTERNAL MEDICINE

## 2020-02-05 NOTE — PROGRESS NOTES
Subjective   Missy Mcgraw is a 69 y.o. female.     Chief Complaint   Patient presents with   • Hypertension   • Diabetes   • Joint Pain      left knee       History of Present Illness    Patient is here for a preoperative examination for left  knee surgery -  Left knee  partial  replacement  On 2- by Dr. Garcia , patient is also to follow-up on her blood pressure, she is taking medications as prescribed, she is also here to follow-up on her sugar, patient has received cardiac clearance from cardiology, she does complain of left knee joint pain, patient states she has an allergy to nickel    Review of Systems   Constitutional: Negative for appetite change, fatigue and fever.   HENT: Negative for congestion, ear discharge, ear pain, sinus pressure and sore throat.    Eyes: Negative for pain and discharge.   Respiratory: Negative for cough, chest tightness, shortness of breath and wheezing.    Cardiovascular: Negative for chest pain, palpitations and leg swelling.   Gastrointestinal: Negative for abdominal pain, blood in stool, constipation, diarrhea and nausea.   Endocrine: Negative for cold intolerance and heat intolerance.   Genitourinary: Negative for dysuria, flank pain and frequency.   Musculoskeletal: Positive for arthralgias. Negative for back pain and joint swelling.        Left knee   Skin: Negative for color change.   Allergic/Immunologic: Negative for environmental allergies and food allergies.   Neurological: Negative for dizziness, weakness, numbness and headaches.   Hematological: Negative for adenopathy. Does not bruise/bleed easily.   Psychiatric/Behavioral: Negative for behavioral problems and dysphoric mood. The patient is not nervous/anxious.        Past Medical History:   Diagnosis Date   • Acute maxillary sinusitis    • Allergic    • Arthritis    • Cancer (CMS/[facility]) Skin   • Cataract    • Depression    • Diabetes mellitus (CMS/[facility])    • Esophagitis, reflux    • Fibromyositis    • GERD  "(gastroesophageal reflux disease)    • H/O colonoscopy    • Herpes zoster    • History of mammogram    • Injury of back    • Migraine headache    • Osteoporosis    • Pap smear for cervical cancer screening    • PONV (postoperative nausea and vomiting)    • Restless legs syndrome    • Screening     DEXA body composition study   • TIA (transient ischemic attack)     \"at least 10 years ago\"   • Transient cerebral ischemia    • Urinary tract infection    • Vertigo        Past Surgical History:   Procedure Laterality Date   • APPENDECTOMY     • CARDIAC CATHETERIZATION N/A 8/1/2018    Procedure: Coronary angiography;  Surgeon: Wali Núñez MD;  Location: [facility] CATH INVASIVE LOCATION;  Service: Cardiology   • COLONOSCOPY  10/2016    Dr. Colmenares   • COLONOSCOPY N/A 2/1/2019    Procedure: COLONOSCOPY;  Surgeon: Marie De Leon MD;  Location: [facility] ENDOSCOPY;  Service: Gastroenterology   • ENDOSCOPY  10/2016    Dr. Colmenares   • ENDOSCOPY N/A 2/1/2019    Procedure: ESOPHAGOGASTRODUODENOSCOPY WITH COLD FORCEP BIOPSY AND POLYPECTOMY;  Surgeon: Marie De Leon MD;  Location: [facility] ENDOSCOPY;  Service: Gastroenterology   • KNEE SURGERY Right    • LAPAROSCOPY DIAGNOSTIC / BIOPSY / ASPIRATION / LYSIS Right    • SHOULDER ARTHROSCOPY Bilateral        Family History   Problem Relation Age of Onset   • Heart disease Sister    • Arthritis Other    • Diabetes Other    • Cancer Other         lung   • Cancer Father         prostate,lung,colon   • Kidney failure Mother         reports that she has never smoked. She has never used smokeless tobacco. She reports that she does not drink alcohol or use drugs.    Allergies   Allergen Reactions   • Penicillins Other (See Comments)     Not sure of reaction (childhood)   • Atorvastatin Unknown (See Comments)     Pt cannot remember   • Fenofibrate Unknown (See Comments)     Pt cannot remember   • Fluoxetine Unknown (See Comments)     Pt cannot remember   • Gemfibrozil Unknown (See Comments) "     Pt cannot remember   • Hydroxychloroquine    • Metformin Diarrhea   • Nsaids GI Intolerance   • Pravastatin Unknown (See Comments)     Pt cannot remember   • Sulfamethoxazole-Trimethoprim Unknown (See Comments)     Pt cannot remember           Current Outpatient Medications:   •  amLODIPine (NORVASC) 2.5 MG tablet, Take 1 tablet by mouth Daily., Disp: 90 tablet, Rfl: 3  •  aspirin (SANJU ASPIRIN EC LOW DOSE) 81 MG EC tablet, Take 1 tablet by mouth daily., Disp: , Rfl:   •  atenolol (TENORMIN) 25 MG tablet, TAKE 1 TABLET TWICE A DAY, Disp: 180 tablet, Rfl: 4  •  betamethasone valerate (VALISONE) 0.1 % cream, , Disp: , Rfl:   •  carbidopa-levodopa CR (SINEMET CR)  MG per CR tablet, Take 1 tablet by mouth Every Night., Disp: 90 tablet, Rfl: 3  •  cholecalciferol (VITAMIN D3) 1000 units tablet, Take 1,000 Units by mouth Daily., Disp: , Rfl:   •  citalopram (CeleXA) 40 MG tablet, Take 1 tablet by mouth Daily., Disp: 90 tablet, Rfl: 3  •  clobetasol (TEMOVATE) 0.05 % ointment, Apply  topically to the appropriate area as directed 2 (Two) Times a Day., Disp: 60 g, Rfl: 0  •  conjugated estrogens (PREMARIN) 0.625 MG/GM vaginal cream, USE 0.5 GM VAGINALLY TWICE WEEKLY TO CONTROL SYMPTOMS, Disp: 30 g, Rfl: 1  •  cyclobenzaprine (FLEXERIL) 10 MG tablet, Take 1 tablet by mouth 2 (Two) Times a Day As Needed for Muscle Spasms., Disp: 30 tablet, Rfl: 0  •  glimepiride (AMARYL) 1 MG tablet, Take 1 tablet by mouth Every Morning Before Breakfast., Disp: 90 tablet, Rfl: 3  •  lisinopril-hydrochlorothiazide (PRINZIDE,ZESTORETIC) 20-12.5 MG per tablet, Take 1 tablet by mouth Daily., Disp: 90 tablet, Rfl: 3  •  loratadine (CLARITIN) 10 MG tablet, Take 10 mg by mouth 2 (Two) Times a Day., Disp: , Rfl:   •  meclizine (ANTIVERT) 25 MG tablet, Take 1 tablet by mouth 3 (Three) Times a Day As Needed for dizziness., Disp: 90 tablet, Rfl: 0  •  pantoprazole (PROTONIX) 40 MG EC tablet, Take 1 tablet by mouth Daily., Disp: 90 tablet, Rfl:  "3  •  rosuvastatin (CRESTOR) 5 MG tablet, TAKE 1 TABLET EVERY NIGHT, Disp: 90 tablet, Rfl: 4  •  triamcinolone (KENALOG) 0.1 % cream, As Needed., Disp: , Rfl:       Objective   Blood pressure 152/80, pulse 56, temperature 98.3 °F (36.8 °C), resp. rate 16, height 152.4 cm (60\"), weight 91.2 kg (201 lb), SpO2 98 %, not currently breastfeeding.    Physical Exam   Constitutional: She is oriented to person, place, and time. She appears well-developed and well-nourished. No distress.   HENT:   Head: Normocephalic and atraumatic.   Right Ear: External ear normal.   Left Ear: External ear normal.   Nose: Nose normal.   Mouth/Throat: Oropharynx is clear and moist.   Eyes: Pupils are equal, round, and reactive to light. Conjunctivae and EOM are normal.   Neck: Neck supple. No thyromegaly present.   Cardiovascular: Normal rate, regular rhythm and normal heart sounds.   Pulmonary/Chest: Effort normal and breath sounds normal. No respiratory distress.   Abdominal: Soft. Bowel sounds are normal. She exhibits no distension. There is no tenderness. There is no rebound.   Musculoskeletal: She exhibits tenderness. She exhibits no edema.   Left knee restriction of motion   Lymphadenopathy:     She has no cervical adenopathy.   Neurological: She is alert and oriented to person, place, and time.   No gross motor or sensory deficits   Skin: Skin is warm. She is not diaphoretic.   Psychiatric: She has a normal mood and affect.   Nursing note and vitals reviewed.      Patient's Body mass index is 39.26 kg/m². BMI is above normal parameters. Recommendations include: educational material, exercise counseling and nutrition counseling.      Results for orders placed or performed in visit on 09/17/19   Comprehensive Metabolic Panel   Result Value Ref Range    Glucose 137 (H) 65 - 99 mg/dL    BUN 17 8 - 23 mg/dL    Creatinine 0.79 0.57 - 1.00 mg/dL    eGFR Non African Am 73 >60 mL/min/1.73    eGFR African Am 88 >60 mL/min/1.73    BUN/Creatinine " Ratio 21.5 7.0 - 25.0    Sodium 141 136 - 145 mmol/L    Potassium 4.3 3.5 - 5.2 mmol/L    Chloride 101 98 - 107 mmol/L    Total CO2 27.3 22.0 - 29.0 mmol/L    Calcium 9.7 8.6 - 10.5 mg/dL    Total Protein 6.7 6.0 - 8.5 g/dL    Albumin 4.20 3.50 - 5.20 g/dL    Globulin 2.5 gm/dL    A/G Ratio 1.7 g/dL    Total Bilirubin 0.4 0.2 - 1.2 mg/dL    Alkaline Phosphatase 82 39 - 117 U/L    AST (SGOT) 12 1 - 32 U/L    ALT (SGPT) 9 1 - 33 U/L   Lipid Panel   Result Value Ref Range    Total Cholesterol 186 0 - 200 mg/dL    Triglycerides 162 (H) 0 - 150 mg/dL    HDL Cholesterol 56 40 - 60 mg/dL    VLDL Cholesterol 32.4 mg/dL    LDL Cholesterol  98 0 - 100 mg/dL   Hemoglobin A1c   Result Value Ref Range    Hemoglobin A1C 6.80 (H) 4.80 - 5.60 %   Microalbumin / Creatinine Urine Ratio - Urine, Clean Catch   Result Value Ref Range    Creatinine, Urine 91.7 Not Estab. mg/dL    Microalbumin, Urine 50.6 Not Estab. ug/mL    Microalbumin/Creatinine Ratio 55.2 (H) 0.0 - 30.0 mg/g creat   CBC & Differential   Result Value Ref Range    WBC 9.66 3.40 - 10.80 10*3/mm3    RBC 4.70 3.77 - 5.28 10*6/mm3    Hemoglobin 13.9 12.0 - 15.9 g/dL    Hematocrit 41.9 34.0 - 46.6 %    MCV 89.1 79.0 - 97.0 fL    MCH 29.6 26.6 - 33.0 pg    MCHC 33.2 31.5 - 35.7 g/dL    RDW 11.7 (L) 12.3 - 15.4 %    Platelets 220 140 - 450 10*3/mm3    Neutrophil Rel % 61.1 42.7 - 76.0 %    Lymphocyte Rel % 27.1 19.6 - 45.3 %    Monocyte Rel % 8.0 5.0 - 12.0 %    Eosinophil Rel % 2.4 0.3 - 6.2 %    Basophil Rel % 0.6 0.0 - 1.5 %    Neutrophils Absolute 5.90 1.70 - 7.00 10*3/mm3    Lymphocytes Absolute 2.62 0.70 - 3.10 10*3/mm3    Monocytes Absolute 0.77 0.10 - 0.90 10*3/mm3    Eosinophils Absolute 0.23 0.00 - 0.40 10*3/mm3    Basophils Absolute 0.06 0.00 - 0.20 10*3/mm3    Immature Granulocyte Rel % 0.8 (H) 0.0 - 0.5 %    Immature Grans Absolute 0.08 (H) 0.00 - 0.05 10*3/mm3    nRBC 0.0 0.0 - 0.2 /100 WBC         Assessment/Plan   Missy was seen today for hypertension,  diabetes and joint pain.    Diagnoses and all orders for this visit:    Preoperative examination  -     CBC & Differential  -     Comprehensive Metabolic Panel  -     Hemoglobin A1c  -     Urinalysis without microscopic (no culture) - Urine, Clean Catch  -     Protime-INR  -     APTT  -     Prealbumin  -     XR Chest PA & Lateral    Pain in joint of left knee  -     Protime-INR  -     APTT  -     Prealbumin    Benign essential hypertension  -     Protime-INR  -     APTT    Controlled type 2 diabetes mellitus without complication, without long-term current use of insulin (CMS/[facility])  -     Hemoglobin A1c  -     Protime-INR  -     APTT    Pain of left lower leg   -     Protime-INR    Abnormal coagulation profile   -     APTT      Plan:  1.  Preoperative examination: Patient is in optimal medical condition for her planned procedure and she can proceed for left knee joint partial replacement and she has undergone cardiac clearance, we will obtain preoperative labs today along with chest x-ray  - the results of which are pending  2.Benign essential hypertension: Will continue current medication, low-sodium diet advised  3.  Diabetes  Mellitus  : will obtain   fasting CMP  and hba1c, last hemoglobin A1c 6.8, diet and exercise counseled , Will continue current medications  4. Left knee joint pain: Patient to proceed for left knee joint partial replacement         Emely Sheldon MD

## 2020-02-06 ENCOUNTER — HOSPITAL ENCOUNTER (OUTPATIENT)
Dept: GENERAL RADIOLOGY | Facility: HOSPITAL | Age: 70
Discharge: HOME | End: 2020-02-06
Admitting: INTERNAL MEDICINE

## 2020-02-06 ENCOUNTER — TELEPHONE (OUTPATIENT)
Dept: INTERNAL MEDICINE | Facility: CLINIC | Age: 70
End: 2020-02-06

## 2020-02-06 LAB
ALBUMIN SERPL-MCNC: 4.4 G/DL (ref 3.5–5.2)
ALBUMIN/GLOB SERPL: 1.8 G/DL
ALP SERPL-CCNC: 76 U/L (ref 39–117)
ALT SERPL-CCNC: 8 U/L (ref 1–33)
APPEARANCE UR: CLEAR
APTT PPP: 29 SECONDS (ref 24.5–37.2)
AST SERPL-CCNC: 13 U/L (ref 1–32)
BASOPHILS # BLD MANUAL: 0.1 10*3/MM3 (ref 0–0.2)
BASOPHILS NFR BLD MANUAL: 1 % (ref 0–1.5)
BILIRUB SERPL-MCNC: 0.5 MG/DL (ref 0.2–1.2)
BILIRUB UR QL STRIP: NEGATIVE
BUN SERPL-MCNC: 16 MG/DL (ref 8–23)
BUN/CREAT SERPL: 16.8 (ref 7–25)
CALCIUM SERPL-MCNC: 10.2 MG/DL (ref 8.6–10.5)
CHLORIDE SERPL-SCNC: 100 MMOL/L (ref 98–107)
CO2 SERPL-SCNC: 24.7 MMOL/L (ref 22–29)
COLOR UR: YELLOW
CREAT SERPL-MCNC: 0.95 MG/DL (ref 0.57–1)
DIFFERENTIAL COMMENT: ABNORMAL
EOSINOPHIL # BLD MANUAL: 0.4 10*3/MM3 (ref 0–0.4)
EOSINOPHIL NFR BLD MANUAL: 4.1 % (ref 0.3–6.2)
ERYTHROCYTE [DISTWIDTH] IN BLOOD BY AUTOMATED COUNT: 12.2 % (ref 12.3–15.4)
GLOBULIN SER CALC-MCNC: 2.4 GM/DL
GLUCOSE SERPL-MCNC: 117 MG/DL (ref 65–99)
GLUCOSE UR QL: NEGATIVE
HBA1C MFR BLD: 6.9 % (ref 4.8–5.6)
HCT VFR BLD AUTO: 43.1 % (ref 34–46.6)
HGB BLD-MCNC: 14.5 G/DL (ref 12–15.9)
HGB UR QL STRIP: NEGATIVE
INR PPP: 0.9 (ref 0.9–1.1)
KETONES UR QL STRIP: NEGATIVE
LEUKOCYTE ESTERASE UR QL STRIP: NEGATIVE
LYMPHOCYTES # BLD MANUAL: 2.22 10*3/MM3 (ref 0.7–3.1)
LYMPHOCYTES NFR BLD MANUAL: 22.7 % (ref 19.6–45.3)
MCH RBC QN AUTO: 28.9 PG (ref 26.6–33)
MCHC RBC AUTO-ENTMCNC: 33.6 G/DL (ref 31.5–35.7)
MCV RBC AUTO: 85.9 FL (ref 79–97)
MONOCYTES # BLD MANUAL: 1.41 10*3/MM3 (ref 0.1–0.9)
MONOCYTES NFR BLD MANUAL: 14.4 % (ref 5–12)
NEUTROPHILS # BLD MANUAL: 5.65 10*3/MM3 (ref 1.7–7)
NEUTROPHILS NFR BLD MANUAL: 57.7 % (ref 42.7–76)
NITRITE UR QL STRIP: NEGATIVE
PH UR STRIP: 6 [PH] (ref 5–8)
PLATELET # BLD AUTO: 219 10*3/MM3 (ref 140–450)
PLATELET BLD QL SMEAR: ABNORMAL
POTASSIUM SERPL-SCNC: 4.5 MMOL/L (ref 3.5–5.2)
PREALB SERPL-MCNC: 30 MG/DL (ref 10–36)
PROT SERPL-MCNC: 6.8 G/DL (ref 6–8.5)
PROT UR QL STRIP: NEGATIVE
PROTHROMBIN TIME: 12.4 SECONDS (ref 12–15.1)
RBC # BLD AUTO: 5.02 10*6/MM3 (ref 3.77–5.28)
RBC MORPH BLD: ABNORMAL
SODIUM SERPL-SCNC: 139 MMOL/L (ref 136–145)
SP GR UR: 1.02 (ref 1–1.03)
UROBILINOGEN UR STRIP-MCNC: NORMAL MG/DL
WBC # BLD AUTO: 9.79 10*3/MM3 (ref 3.4–10.8)

## 2020-02-06 PROCEDURE — 71046 X-RAY EXAM CHEST 2 VIEWS: CPT

## 2020-02-06 NOTE — TELEPHONE ENCOUNTER
Patient called regarding order for EKG. Patient stated that she has a surgery scheduled for 02/19/2020 and is needing her order for EKG done. Patient stated that Dr. Jarvis has has not received order yet, patient stated that it doesn't matter to her who does it she just needs it done.    Please advise.  Patient call back: [phone]

## 2020-02-10 ENCOUNTER — PROCEDURE VISIT (OUTPATIENT)
Dept: CARDIOLOGY | Facility: CLINIC | Age: 70
End: 2020-02-10

## 2020-02-10 DIAGNOSIS — Z01.810 PRE-OPERATIVE CARDIOVASCULAR EXAMINATION: Primary | ICD-10-CM

## 2020-02-10 PROCEDURE — 99211 OFF/OP EST MAY X REQ PHY/QHP: CPT | Performed by: INTERNAL MEDICINE

## 2020-02-10 PROCEDURE — 93000 ELECTROCARDIOGRAM COMPLETE: CPT | Performed by: INTERNAL MEDICINE

## 2020-02-10 NOTE — PROGRESS NOTES
Subjective:     Encounter Date:02/10/2020      Patient ID: Missy Mcgraw is a 69 y.o. female.    Chief Complaint: EKG only  HPI  EKG only  The following portions of the patient's history were reviewed and updated as appropriate: allergies, current medications, past family history, past medical history, past social history, past surgical history and problem  ROS    EKG only    Current Outpatient Medications:   •  amLODIPine (NORVASC) 2.5 MG tablet, Take 1 tablet by mouth Daily., Disp: 90 tablet, Rfl: 3  •  aspirin (SANJU ASPIRIN EC LOW DOSE) 81 MG EC tablet, Take 1 tablet by mouth daily., Disp: , Rfl:   •  atenolol (TENORMIN) 25 MG tablet, TAKE 1 TABLET TWICE A DAY, Disp: 180 tablet, Rfl: 4  •  betamethasone valerate (VALISONE) 0.1 % cream, , Disp: , Rfl:   •  carbidopa-levodopa CR (SINEMET CR)  MG per CR tablet, Take 1 tablet by mouth Every Night., Disp: 90 tablet, Rfl: 3  •  cholecalciferol (VITAMIN D3) 1000 units tablet, Take 1,000 Units by mouth Daily., Disp: , Rfl:   •  citalopram (CeleXA) 40 MG tablet, Take 1 tablet by mouth Daily., Disp: 90 tablet, Rfl: 3  •  clobetasol (TEMOVATE) 0.05 % ointment, Apply  topically to the appropriate area as directed 2 (Two) Times a Day., Disp: 60 g, Rfl: 0  •  conjugated estrogens (PREMARIN) 0.625 MG/GM vaginal cream, USE 0.5 GM VAGINALLY TWICE WEEKLY TO CONTROL SYMPTOMS, Disp: 30 g, Rfl: 1  •  cyclobenzaprine (FLEXERIL) 10 MG tablet, Take 1 tablet by mouth 2 (Two) Times a Day As Needed for Muscle Spasms., Disp: 30 tablet, Rfl: 0  •  glimepiride (AMARYL) 1 MG tablet, Take 1 tablet by mouth Every Morning Before Breakfast., Disp: 90 tablet, Rfl: 3  •  lisinopril-hydrochlorothiazide (PRINZIDE,ZESTORETIC) 20-12.5 MG per tablet, Take 1 tablet by mouth Daily., Disp: 90 tablet, Rfl: 3  •  loratadine (CLARITIN) 10 MG tablet, Take 10 mg by mouth 2 (Two) Times a Day., Disp: , Rfl:   •  meclizine (ANTIVERT) 25 MG tablet, Take 1 tablet by mouth 3 (Three) Times a Day As  Needed for dizziness., Disp: 90 tablet, Rfl: 0  •  pantoprazole (PROTONIX) 40 MG EC tablet, Take 1 tablet by mouth Daily., Disp: 90 tablet, Rfl: 3  •  rosuvastatin (CRESTOR) 5 MG tablet, TAKE 1 TABLET EVERY NIGHT, Disp: 90 tablet, Rfl: 4  •  triamcinolone (KENALOG) 0.1 % cream, As Needed., Disp: , Rfl:     Objective:   Physical Exam  not currently breastfeeding.   Lab Review:     Assessment:       1. Pre-operative cardiovascular examination  No changes in her EKG compared to previous.      ECG 12 Lead  Date/Time: 2/10/2020 11:57 AM  Performed by: Wali Núñez MD  Authorized by: Wali Núñez MD   Comparison: compared with previous ECG   Similar to previous ECG  Rhythm: sinus rhythm  Rate: normal  QRS axis: normal    Clinical impression: normal ECG            Plan:     No contraindications to elective surgery

## 2020-03-30 RX ORDER — GLIMEPIRIDE 1 MG/1
1 TABLET ORAL
Qty: 90 TABLET | Refills: 0 | Status: SHIPPED | OUTPATIENT
Start: 2020-03-30 | End: 2020-04-13

## 2020-04-11 LAB
ALBUMIN SERPL-MCNC: 4.8 G/DL (ref 3.5–5.2)
ALBUMIN/CREAT UR: 31 MG/G CREAT (ref 0–29)
ALBUMIN/GLOB SERPL: 1.9 G/DL
ALP SERPL-CCNC: 93 U/L (ref 39–117)
ALT SERPL-CCNC: 14 U/L (ref 1–33)
AST SERPL-CCNC: 17 U/L (ref 1–32)
BASOPHILS # BLD AUTO: 0.07 10*3/MM3 (ref 0–0.2)
BASOPHILS NFR BLD AUTO: 1 % (ref 0–1.5)
BILIRUB SERPL-MCNC: 0.5 MG/DL (ref 0.2–1.2)
BUN SERPL-MCNC: 21 MG/DL (ref 8–23)
BUN/CREAT SERPL: 21.9 (ref 7–25)
CALCIUM SERPL-MCNC: 10.1 MG/DL (ref 8.6–10.5)
CHLORIDE SERPL-SCNC: 101 MMOL/L (ref 98–107)
CHOLEST SERPL-MCNC: 196 MG/DL (ref 0–200)
CO2 SERPL-SCNC: 29.4 MMOL/L (ref 22–29)
CREAT SERPL-MCNC: 0.96 MG/DL (ref 0.57–1)
CREAT UR-MCNC: 202.8 MG/DL
EOSINOPHIL # BLD AUTO: 0.22 10*3/MM3 (ref 0–0.4)
EOSINOPHIL NFR BLD AUTO: 3 % (ref 0.3–6.2)
ERYTHROCYTE [DISTWIDTH] IN BLOOD BY AUTOMATED COUNT: 12.7 % (ref 12.3–15.4)
GLOBULIN SER CALC-MCNC: 2.5 GM/DL
GLUCOSE SERPL-MCNC: 134 MG/DL (ref 65–99)
HBA1C MFR BLD: 5.4 % (ref 4.8–5.6)
HCT VFR BLD AUTO: 43.6 % (ref 34–46.6)
HDLC SERPL-MCNC: 55 MG/DL (ref 40–60)
HGB BLD-MCNC: 14.6 G/DL (ref 12–15.9)
IMM GRANULOCYTES # BLD AUTO: 0.05 10*3/MM3 (ref 0–0.05)
IMM GRANULOCYTES NFR BLD AUTO: 0.7 % (ref 0–0.5)
LDLC SERPL CALC-MCNC: 97 MG/DL (ref 0–100)
LYMPHOCYTES # BLD AUTO: 2.27 10*3/MM3 (ref 0.7–3.1)
LYMPHOCYTES NFR BLD AUTO: 31.2 % (ref 19.6–45.3)
MCH RBC QN AUTO: 29.1 PG (ref 26.6–33)
MCHC RBC AUTO-ENTMCNC: 33.5 G/DL (ref 31.5–35.7)
MCV RBC AUTO: 87 FL (ref 79–97)
MICROALBUMIN UR-MCNC: 62.1 UG/ML
MONOCYTES # BLD AUTO: 0.79 10*3/MM3 (ref 0.1–0.9)
MONOCYTES NFR BLD AUTO: 10.9 % (ref 5–12)
NEUTROPHILS # BLD AUTO: 3.88 10*3/MM3 (ref 1.7–7)
NEUTROPHILS NFR BLD AUTO: 53.2 % (ref 42.7–76)
NRBC BLD AUTO-RTO: 0.1 /100 WBC (ref 0–0.2)
PLATELET # BLD AUTO: 229 10*3/MM3 (ref 140–450)
POTASSIUM SERPL-SCNC: 4.7 MMOL/L (ref 3.5–5.2)
PROT SERPL-MCNC: 7.3 G/DL (ref 6–8.5)
RBC # BLD AUTO: 5.01 10*6/MM3 (ref 3.77–5.28)
SODIUM SERPL-SCNC: 140 MMOL/L (ref 136–145)
TRIGL SERPL-MCNC: 221 MG/DL (ref 0–150)
VLDLC SERPL CALC-MCNC: 44.2 MG/DL (ref 5–40)
WBC # BLD AUTO: 7.28 10*3/MM3 (ref 3.4–10.8)

## 2020-04-15 ENCOUNTER — TELEMEDICINE (OUTPATIENT)
Dept: INTERNAL MEDICINE | Facility: CLINIC | Age: 70
End: 2020-04-15

## 2020-04-15 DIAGNOSIS — I10 BENIGN ESSENTIAL HYPERTENSION: Primary | ICD-10-CM

## 2020-04-15 DIAGNOSIS — E78.2 MIXED HYPERLIPIDEMIA: ICD-10-CM

## 2020-04-15 DIAGNOSIS — E11.9 CONTROLLED TYPE 2 DIABETES MELLITUS WITHOUT COMPLICATION, WITHOUT LONG-TERM CURRENT USE OF INSULIN (HCC): ICD-10-CM

## 2020-04-15 PROCEDURE — 99214 OFFICE O/P EST MOD 30 MIN: CPT | Performed by: INTERNAL MEDICINE

## 2020-04-15 RX ORDER — AMLODIPINE BESYLATE 5 MG/1
5 TABLET ORAL NIGHTLY
Qty: 90 TABLET | Refills: 3 | Status: SHIPPED | OUTPATIENT
Start: 2020-04-15 | End: 2021-03-23

## 2020-04-15 RX ORDER — ONDANSETRON 4 MG/1
TABLET, FILM COATED ORAL
COMMUNITY
Start: 2020-02-18 | End: 2021-09-08

## 2020-04-15 RX ORDER — CITALOPRAM 40 MG/1
40 TABLET ORAL DAILY
Qty: 90 TABLET | Refills: 3 | Status: SHIPPED | OUTPATIENT
Start: 2020-04-15 | End: 2020-08-24 | Stop reason: SDUPTHER

## 2020-04-15 RX ORDER — LISINOPRIL AND HYDROCHLOROTHIAZIDE 20; 12.5 MG/1; MG/1
1 TABLET ORAL DAILY
Qty: 90 TABLET | Refills: 3 | Status: SHIPPED | OUTPATIENT
Start: 2020-04-15 | End: 2020-04-24 | Stop reason: SDUPTHER

## 2020-04-15 RX ORDER — PANTOPRAZOLE SODIUM 40 MG/1
40 TABLET, DELAYED RELEASE ORAL DAILY
Qty: 90 TABLET | Refills: 3 | Status: SHIPPED | OUTPATIENT
Start: 2020-04-15 | End: 2021-04-12

## 2020-04-15 RX ORDER — TRAMADOL HYDROCHLORIDE 50 MG/1
TABLET ORAL
COMMUNITY
Start: 2020-03-12 | End: 2021-02-16 | Stop reason: SDUPTHER

## 2020-04-15 RX ORDER — CARBIDOPA AND LEVODOPA 50; 200 MG/1; MG/1
1 TABLET, EXTENDED RELEASE ORAL NIGHTLY
Qty: 90 TABLET | Refills: 3 | Status: SHIPPED | OUTPATIENT
Start: 2020-04-15 | End: 2021-04-12

## 2020-04-15 NOTE — PROGRESS NOTES
Subjective   Missy Mcgraw is a 69 y.o. female.     Chief Complaint   Patient presents with   • Hypertension   • Hyperlipidemia   • Diabetes   • Anxiety   • Depression       History of Present Illness   .You have chosen to receive care through a telehealth visit.  Do you consent to use a video/audio connection for your medical care today? Yes    PC/KW    HPI: Patient is following up on the blood pressure which she states is elevated at home, she has been checking her blood pressure the patient is taking the blood pressure medications as prescribed and has had no side effects. The patient is also here to follow up on the cholesterol and is trying to follow a diet. The patient is also here to follow on sugar and  Had lab work done . The patient also needs refills on medications .  She is also  following up on anxiety and depression  Hypertension   Pertinent negatives include no chest pain, palpitations or shortness of breath.   Hyperlipidemia   Pertinent negatives include no chest pain or shortness of breath.   Diabetes   Pertinent negatives for hypoglycemia include no dizziness or pallor. Pertinent negatives for diabetes include no chest pain, no shortness of breath  Patient is on acei/arb     The following portions of the patient's history were reviewed and updated as appropriate: allergies, current medications, past family history, past medical history, past social history, past surgical history and problem list.    Review of Systems   Constitutional: Negative for appetite change, fatigue and fever.   HENT: Negative for congestion, ear discharge, ear pain, sinus pressure and sore throat.    Eyes: Negative for pain and discharge.   Respiratory: Negative for cough, chest tightness, shortness of breath and wheezing.    Cardiovascular: Negative for chest pain, palpitations and leg swelling.   Gastrointestinal: Negative for abdominal pain, blood in stool, constipation, diarrhea and nausea.   Endocrine: Negative for  cold intolerance and heat intolerance.   Genitourinary: Negative for dysuria, flank pain and frequency.   Musculoskeletal: Negative for back pain and joint swelling.   Skin: Negative for color change.   Allergic/Immunologic: Negative for environmental allergies and food allergies.   Neurological: Negative for dizziness, weakness, numbness and headaches.   Hematological: Negative for adenopathy. Does not bruise/bleed easily.   Psychiatric/Behavioral: Positive for dysphoric mood. Negative for behavioral problems. The patient is nervous/anxious.          Current Outpatient Medications:   •  amLODIPine (NORVASC) 5 MG tablet, Take 1 tablet by mouth Every Night., Disp: 90 tablet, Rfl: 3  •  aspirin (SANJU ASPIRIN EC LOW DOSE) 81 MG EC tablet, Take 1 tablet by mouth daily., Disp: , Rfl:   •  atenolol (TENORMIN) 25 MG tablet, TAKE 1 TABLET TWICE A DAY, Disp: 180 tablet, Rfl: 4  •  betamethasone valerate (VALISONE) 0.1 % cream, , Disp: , Rfl:   •  carbidopa-levodopa CR (SINEMET CR)  MG per CR tablet, Take 1 tablet by mouth Every Night., Disp: 90 tablet, Rfl: 3  •  cholecalciferol (VITAMIN D3) 1000 units tablet, Take 1,000 Units by mouth Daily., Disp: , Rfl:   •  citalopram (CeleXA) 40 MG tablet, Take 1 tablet by mouth Daily., Disp: 90 tablet, Rfl: 3  •  clobetasol (TEMOVATE) 0.05 % ointment, Apply  topically to the appropriate area as directed 2 (Two) Times a Day., Disp: 60 g, Rfl: 0  •  conjugated estrogens (PREMARIN) 0.625 MG/GM vaginal cream, USE 0.5 GM VAGINALLY TWICE WEEKLY TO CONTROL SYMPTOMS, Disp: 30 g, Rfl: 1  •  cyclobenzaprine (FLEXERIL) 10 MG tablet, Take 1 tablet by mouth 2 (Two) Times a Day As Needed for Muscle Spasms., Disp: 30 tablet, Rfl: 0  •  lisinopril-hydrochlorothiazide (PRINZIDE,ZESTORETIC) 20-12.5 MG per tablet, Take 1 tablet by mouth Daily., Disp: 90 tablet, Rfl: 3  •  loratadine (CLARITIN) 10 MG tablet, Take 10 mg by mouth 2 (Two) Times a Day., Disp: , Rfl:   •  meclizine (ANTIVERT) 25 MG  tablet, Take 1 tablet by mouth 3 (Three) Times a Day As Needed for dizziness., Disp: 90 tablet, Rfl: 0  •  ondansetron (ZOFRAN) 4 MG tablet, , Disp: , Rfl:   •  pantoprazole (PROTONIX) 40 MG EC tablet, Take 1 tablet by mouth Daily., Disp: 90 tablet, Rfl: 3  •  rosuvastatin (CRESTOR) 5 MG tablet, TAKE 1 TABLET EVERY NIGHT, Disp: 90 tablet, Rfl: 4  •  traMADol (ULTRAM) 50 MG tablet, , Disp: , Rfl:   •  triamcinolone (KENALOG) 0.1 % cream, As Needed., Disp: , Rfl:     Objective     not currently breastfeeding.    Physical Exam  Patient's There is no height or weight on file to calculate BMI.   bp 153/76 today per patient,   General appearance: Normocephalic, nontraumatic  HEENT: Appears benign  Neck: Appears supple  Respiratory: Effort appears normal  CNS: No gross motor or sensory deficits  Psychiatry alert and oriented x3    Results for orders placed or performed in visit on 02/05/20   Comprehensive Metabolic Panel   Result Value Ref Range    Glucose 117 (H) 65 - 99 mg/dL    BUN 16 8 - 23 mg/dL    Creatinine 0.95 0.57 - 1.00 mg/dL    eGFR Non African Am 59 (L) >60 mL/min/1.73    eGFR African Am 71 >60 mL/min/1.73    BUN/Creatinine Ratio 16.8 7.0 - 25.0    Sodium 139 136 - 145 mmol/L    Potassium 4.5 3.5 - 5.2 mmol/L    Chloride 100 98 - 107 mmol/L    Total CO2 24.7 22.0 - 29.0 mmol/L    Calcium 10.2 8.6 - 10.5 mg/dL    Total Protein 6.8 6.0 - 8.5 g/dL    Albumin 4.40 3.50 - 5.20 g/dL    Globulin 2.4 gm/dL    A/G Ratio 1.8 g/dL    Total Bilirubin 0.5 0.2 - 1.2 mg/dL    Alkaline Phosphatase 76 39 - 117 U/L    AST (SGOT) 13 1 - 32 U/L    ALT (SGPT) 8 1 - 33 U/L   Hemoglobin A1c   Result Value Ref Range    Hemoglobin A1C 6.90 (H) 4.80 - 5.60 %   Urinalysis without microscopic (no culture) - Urine, Clean Catch   Result Value Ref Range    Specific Gravity, UA 1.017 1.005 - 1.030    pH, UA 6.0 5.0 - 8.0    Color, UA Yellow     Appearance, UA Clear Clear    Leukocytes, UA Negative Negative    Protein Negative Negative     Glucose, UA Negative Negative    Ketones Negative Negative    Blood, UA Negative Negative    Bilirubin, UA Negative Negative    Urobilinogen, UA Comment     Nitrite, UA Negative Negative   Protime-INR   Result Value Ref Range    INR 0.90 0.90 - 1.10    Protime 12.4 12.0 - 15.1 Seconds   APTT   Result Value Ref Range    aPTT 29.0 24.5 - 37.2 Seconds   Prealbumin   Result Value Ref Range    Prealbumin 30 10 - 36 mg/dL   Manual Differential   Result Value Ref Range    Neutrophil Rel % 57.7 42.7 - 76.0 %    Lymphocyte Rel % 22.7 19.6 - 45.3 %    Monocyte Rel % 14.4 (H) 5.0 - 12.0 %    Eosinophil Rel % 4.1 0.3 - 6.2 %    Basophil Rel % 1.0 0.0 - 1.5 %    Neutrophils Absolute 5.65 1.70 - 7.00 10*3/mm3    Lymphocytes Absolute 2.22 0.70 - 3.10 10*3/mm3    Monocytes Absolute 1.41 (H) 0.10 - 0.90 10*3/mm3    Eosinophil Abs 0.40 0.00 - 0.40 10*3/mm3    Basophils Absolute 0.10 0.00 - 0.20 10*3/mm3    Differential Comment Comment     Comment Comment     Plt Comment Comment    CBC & Differential   Result Value Ref Range    WBC 9.79 3.40 - 10.80 10*3/mm3    RBC 5.02 3.77 - 5.28 10*6/mm3    Hemoglobin 14.5 12.0 - 15.9 g/dL    Hematocrit 43.1 34.0 - 46.6 %    MCV 85.9 79.0 - 97.0 fL    MCH 28.9 26.6 - 33.0 pg    MCHC 33.6 31.5 - 35.7 g/dL    RDW 12.2 (L) 12.3 - 15.4 %    Platelets 219 140 - 450 10*3/mm3         Assessment/Plan   Missy was seen today for hypertension, hyperlipidemia, diabetes, anxiety and depression.    Diagnoses and all orders for this visit:    Benign essential hypertension    Mixed hyperlipidemia    Controlled type 2 diabetes mellitus without complication, without long-term current use of insulin (CMS/[facility])    Other orders  -     amLODIPine (NORVASC) 5 MG tablet; Take 1 tablet by mouth Every Night.  -     lisinopril-hydrochlorothiazide (PRINZIDE,ZESTORETIC) 20-12.5 MG per tablet; Take 1 tablet by mouth Daily.  -     pantoprazole (PROTONIX) 40 MG EC tablet; Take 1 tablet by mouth Daily.  -     citalopram (CeleXA)  40 MG tablet; Take 1 tablet by mouth Daily.  -     carbidopa-levodopa CR (SINEMET CR)  MG per CR tablet; Take 1 tablet by mouth Every Night.      Plan:  1.  Benign essential hypertension: Will continue current medication and increase to Norvasc 5 mg at bedtime, monitor vitals, low-sodium diet advised  2.mixed hyperlipidemia: Reviewed fasting CMP and lipid panel.  Diet and exercise counseled,  Will continue current medications  3. Diabetes  Mellitus  : Reviewed fasting CMP  and hba1c 5.4 which has dropped from 6.9, diet and exercise counseled , Will stop glimepiride   4.  Anxiety disorder: Refilled Celexa 40 mg daily, labs reviewed  5.  Major depression: We will continue Celexa 40 mg daily    This was an audio and video enabled telemedicine encounter.         Emely Sheldon MD.

## 2020-04-24 RX ORDER — LISINOPRIL AND HYDROCHLOROTHIAZIDE 20; 12.5 MG/1; MG/1
1 TABLET ORAL 2 TIMES DAILY
Qty: 60 TABLET | Refills: 3 | Status: SHIPPED | OUTPATIENT
Start: 2020-04-24 | End: 2020-09-01 | Stop reason: SDUPTHER

## 2020-08-13 ENCOUNTER — TRANSCRIBE ORDERS (OUTPATIENT)
Dept: ADMINISTRATIVE | Facility: HOSPITAL | Age: 70
End: 2020-08-13

## 2020-08-13 DIAGNOSIS — Z12.31 VISIT FOR SCREENING MAMMOGRAM: Primary | ICD-10-CM

## 2020-08-24 RX ORDER — CITALOPRAM 40 MG/1
40 TABLET ORAL DAILY
Qty: 90 TABLET | Refills: 3 | Status: SHIPPED | OUTPATIENT
Start: 2020-08-24 | End: 2020-09-01 | Stop reason: SDUPTHER

## 2020-08-27 DIAGNOSIS — E11.9 CONTROLLED TYPE 2 DIABETES MELLITUS WITHOUT COMPLICATION, WITHOUT LONG-TERM CURRENT USE OF INSULIN (HCC): ICD-10-CM

## 2020-08-27 DIAGNOSIS — E78.2 MIXED HYPERLIPIDEMIA: Primary | ICD-10-CM

## 2020-08-29 LAB
ALBUMIN SERPL-MCNC: 4.6 G/DL (ref 3.5–5.2)
ALBUMIN/GLOB SERPL: 2.3 G/DL
ALP SERPL-CCNC: 101 U/L (ref 39–117)
ALT SERPL-CCNC: 9 U/L (ref 1–33)
AST SERPL-CCNC: 12 U/L (ref 1–32)
BASOPHILS # BLD AUTO: 0.04 10*3/MM3 (ref 0–0.2)
BASOPHILS NFR BLD AUTO: 0.5 % (ref 0–1.5)
BILIRUB SERPL-MCNC: 0.4 MG/DL (ref 0–1.2)
BUN SERPL-MCNC: 19 MG/DL (ref 8–23)
BUN/CREAT SERPL: 22.6 (ref 7–25)
CALCIUM SERPL-MCNC: 9.7 MG/DL (ref 8.6–10.5)
CHLORIDE SERPL-SCNC: 100 MMOL/L (ref 98–107)
CHOLEST SERPL-MCNC: 220 MG/DL (ref 0–200)
CO2 SERPL-SCNC: 28.5 MMOL/L (ref 22–29)
CREAT SERPL-MCNC: 0.84 MG/DL (ref 0.57–1)
EOSINOPHIL # BLD AUTO: 0.23 10*3/MM3 (ref 0–0.4)
EOSINOPHIL NFR BLD AUTO: 2.7 % (ref 0.3–6.2)
ERYTHROCYTE [DISTWIDTH] IN BLOOD BY AUTOMATED COUNT: 13.7 % (ref 12.3–15.4)
GLOBULIN SER CALC-MCNC: 2 GM/DL
GLUCOSE SERPL-MCNC: 161 MG/DL (ref 65–99)
HBA1C MFR BLD: 6.6 % (ref 4.8–5.6)
HCT VFR BLD AUTO: 45 % (ref 34–46.6)
HDLC SERPL-MCNC: 53 MG/DL (ref 40–60)
HGB BLD-MCNC: 15.1 G/DL (ref 12–15.9)
IMM GRANULOCYTES # BLD AUTO: 0.05 10*3/MM3 (ref 0–0.05)
IMM GRANULOCYTES NFR BLD AUTO: 0.6 % (ref 0–0.5)
LDLC SERPL CALC-MCNC: 111 MG/DL (ref 0–100)
LYMPHOCYTES # BLD AUTO: 2.11 10*3/MM3 (ref 0.7–3.1)
LYMPHOCYTES NFR BLD AUTO: 24.8 % (ref 19.6–45.3)
MCH RBC QN AUTO: 30 PG (ref 26.6–33)
MCHC RBC AUTO-ENTMCNC: 33.6 G/DL (ref 31.5–35.7)
MCV RBC AUTO: 89.5 FL (ref 79–97)
MONOCYTES # BLD AUTO: 0.69 10*3/MM3 (ref 0.1–0.9)
MONOCYTES NFR BLD AUTO: 8.1 % (ref 5–12)
NEUTROPHILS # BLD AUTO: 5.4 10*3/MM3 (ref 1.7–7)
NEUTROPHILS NFR BLD AUTO: 63.3 % (ref 42.7–76)
NRBC BLD AUTO-RTO: 0 /100 WBC (ref 0–0.2)
PLATELET # BLD AUTO: 224 10*3/MM3 (ref 140–450)
POTASSIUM SERPL-SCNC: 4.5 MMOL/L (ref 3.5–5.2)
PROT SERPL-MCNC: 6.6 G/DL (ref 6–8.5)
RBC # BLD AUTO: 5.03 10*6/MM3 (ref 3.77–5.28)
SODIUM SERPL-SCNC: 139 MMOL/L (ref 136–145)
TRIGL SERPL-MCNC: 278 MG/DL (ref 0–150)
VLDLC SERPL CALC-MCNC: 55.6 MG/DL
WBC # BLD AUTO: 8.52 10*3/MM3 (ref 3.4–10.8)

## 2020-09-01 ENCOUNTER — OFFICE VISIT (OUTPATIENT)
Dept: INTERNAL MEDICINE | Facility: CLINIC | Age: 70
End: 2020-09-01

## 2020-09-01 VITALS
BODY MASS INDEX: 38.68 KG/M2 | SYSTOLIC BLOOD PRESSURE: 120 MMHG | TEMPERATURE: 97.7 F | OXYGEN SATURATION: 98 % | HEART RATE: 61 BPM | WEIGHT: 197 LBS | HEIGHT: 60 IN | DIASTOLIC BLOOD PRESSURE: 70 MMHG

## 2020-09-01 DIAGNOSIS — F33.0 MILD EPISODE OF RECURRENT MAJOR DEPRESSIVE DISORDER (HCC): ICD-10-CM

## 2020-09-01 DIAGNOSIS — E11.9 CONTROLLED TYPE 2 DIABETES MELLITUS WITHOUT COMPLICATION, WITHOUT LONG-TERM CURRENT USE OF INSULIN (HCC): ICD-10-CM

## 2020-09-01 DIAGNOSIS — I10 BENIGN ESSENTIAL HYPERTENSION: Primary | ICD-10-CM

## 2020-09-01 DIAGNOSIS — Z23 NEED FOR VACCINATION: ICD-10-CM

## 2020-09-01 DIAGNOSIS — E78.2 MIXED HYPERLIPIDEMIA: ICD-10-CM

## 2020-09-01 DIAGNOSIS — F41.9 ANXIETY: ICD-10-CM

## 2020-09-01 PROCEDURE — 90653 IIV ADJUVANT VACCINE IM: CPT | Performed by: INTERNAL MEDICINE

## 2020-09-01 PROCEDURE — G0008 ADMIN INFLUENZA VIRUS VAC: HCPCS | Performed by: INTERNAL MEDICINE

## 2020-09-01 PROCEDURE — 99214 OFFICE O/P EST MOD 30 MIN: CPT | Performed by: INTERNAL MEDICINE

## 2020-09-01 RX ORDER — CITALOPRAM 40 MG/1
40 TABLET ORAL DAILY
Qty: 90 TABLET | Refills: 3 | Status: SHIPPED | OUTPATIENT
Start: 2020-09-01 | End: 2021-10-13 | Stop reason: SDUPTHER

## 2020-09-01 RX ORDER — LISINOPRIL AND HYDROCHLOROTHIAZIDE 20; 12.5 MG/1; MG/1
1 TABLET ORAL 2 TIMES DAILY
Qty: 180 TABLET | Refills: 3 | Status: SHIPPED | OUTPATIENT
Start: 2020-09-01 | End: 2021-04-27

## 2020-09-01 NOTE — PROGRESS NOTES
Subjective   Missy Mcgraw is a 69 y.o. female.     Chief Complaint   Patient presents with   • Hypertension     discuss citalopram - has been out for 2 weeks - dizzy    • Hyperlipidemia   • Diabetes   • Anxiety   • Depression       History of Present Illness   HPI: Patient is here to follow up on the blood pressure  The patient is taking the blood pressure medications as prescribed and has had no side effects. The patient is also here to follow up on the cholesterol and is trying to follow a diet ,  She has lost 4 lbs . The patient is also here to follow on sugar and had  lab work done . The patient also complains of anxiety and depression and needs refills on medications  And she ran out of it for 2 weeks and felt dizzy.  She needs a flu shot today  Hypertension   Pertinent negatives include no chest pain, palpitations or shortness of breath.   Hyperlipidemia   Pertinent negatives include no chest pain or shortness of breath.   Diabetes   Pertinent negatives for hypoglycemia include no dizziness, nervousness/anxiousness or pallor. Pertinent negatives for diabetes include no chest pain, no shortness of breath  Patient is on acei/arb   Answers for HPI/ROS submitted by the patient on 8/25/2020   Hypertension  What is the primary reason for your visit?: High Blood Pressure  Chronicity: chronic  Onset: more than 1 year ago  Progression since onset: gradually improving  Condition status: controlled  anxiety: No  blurred vision: No  chest pain: No  headaches: Yes  malaise/fatigue: Yes  neck pain: No  orthopnea: No  palpitations: No  peripheral edema: No  PND: No  shortness of breath: No  Agents associated with hypertension: NSAIDs  CAD risks: obesity, sedentary lifestyle    The following portions of the patient's history were reviewed and updated as appropriate: allergies, current medications, past family history, past medical history, past social history, past surgical history and problem list.    Review of  Systems   Constitutional: Negative for appetite change, fatigue and fever.   HENT: Negative for congestion, ear discharge, ear pain, sinus pressure and sore throat.    Eyes: Negative for pain and discharge.   Respiratory: Negative for cough, chest tightness, shortness of breath and wheezing.    Cardiovascular: Negative for chest pain, palpitations and leg swelling.   Gastrointestinal: Negative for abdominal pain, blood in stool, constipation, diarrhea and nausea.   Endocrine: Negative for cold intolerance and heat intolerance.   Genitourinary: Negative for dysuria, flank pain and frequency.   Musculoskeletal: Negative for back pain and joint swelling.   Skin: Negative for color change.   Allergic/Immunologic: Negative for environmental allergies and food allergies.   Neurological: Negative for dizziness, weakness, numbness and headaches.   Hematological: Negative for adenopathy. Does not bruise/bleed easily.   Psychiatric/Behavioral: Positive for dysphoric mood. Negative for behavioral problems. The patient is nervous/anxious.          Current Outpatient Medications:   •  amLODIPine (NORVASC) 5 MG tablet, Take 1 tablet by mouth Every Night., Disp: 90 tablet, Rfl: 3  •  aspirin (SANJU ASPIRIN EC LOW DOSE) 81 MG EC tablet, Take 1 tablet by mouth daily., Disp: , Rfl:   •  atenolol (TENORMIN) 25 MG tablet, TAKE 1 TABLET TWICE A DAY, Disp: 180 tablet, Rfl: 4  •  betamethasone valerate (VALISONE) 0.1 % cream, , Disp: , Rfl:   •  carbidopa-levodopa CR (SINEMET CR)  MG per CR tablet, Take 1 tablet by mouth Every Night., Disp: 90 tablet, Rfl: 3  •  cholecalciferol (VITAMIN D3) 1000 units tablet, Take 1,000 Units by mouth Daily., Disp: , Rfl:   •  citalopram (CeleXA) 40 MG tablet, Take 1 tablet by mouth Daily., Disp: 90 tablet, Rfl: 3  •  clobetasol (TEMOVATE) 0.05 % ointment, Apply  topically to the appropriate area as directed 2 (Two) Times a Day., Disp: 60 g, Rfl: 0  •  conjugated estrogens (PREMARIN) 0.625 MG/GM  "vaginal cream, USE 0.5 GM VAGINALLY TWICE WEEKLY TO CONTROL SYMPTOMS, Disp: 30 g, Rfl: 1  •  cyclobenzaprine (FLEXERIL) 10 MG tablet, Take 1 tablet by mouth 2 (Two) Times a Day As Needed for Muscle Spasms., Disp: 30 tablet, Rfl: 0  •  lisinopril-hydrochlorothiazide (PRINZIDE,ZESTORETIC) 20-12.5 MG per tablet, Take 1 tablet by mouth 2 (Two) Times a Day., Disp: 180 tablet, Rfl: 3  •  loratadine (CLARITIN) 10 MG tablet, Take 10 mg by mouth 2 (Two) Times a Day., Disp: , Rfl:   •  meclizine (ANTIVERT) 25 MG tablet, Take 1 tablet by mouth 3 (Three) Times a Day As Needed for dizziness., Disp: 90 tablet, Rfl: 0  •  ondansetron (ZOFRAN) 4 MG tablet, , Disp: , Rfl:   •  pantoprazole (PROTONIX) 40 MG EC tablet, Take 1 tablet by mouth Daily., Disp: 90 tablet, Rfl: 3  •  rosuvastatin (CRESTOR) 5 MG tablet, TAKE 1 TABLET EVERY NIGHT, Disp: 90 tablet, Rfl: 4  •  traMADol (ULTRAM) 50 MG tablet, , Disp: , Rfl:   •  triamcinolone (KENALOG) 0.1 % cream, As Needed., Disp: , Rfl:     Objective     Blood pressure 120/70, pulse 61, temperature 97.7 °F (36.5 °C), temperature source Temporal, height 152.4 cm (60\"), weight 89.4 kg (197 lb), SpO2 98 %, not currently breastfeeding.    Physical Exam   Constitutional: She is oriented to person, place, and time. She appears well-developed and well-nourished. No distress.   HENT:   Head: Normocephalic and atraumatic.   Right Ear: External ear normal.   Left Ear: External ear normal.   Nose: Nose normal.   Mouth/Throat: Oropharynx is clear and moist.   Eyes: Pupils are equal, round, and reactive to light. Conjunctivae and EOM are normal.   Neck: Neck supple. No thyromegaly present.   Cardiovascular: Normal rate, regular rhythm and normal heart sounds.   Pulmonary/Chest: Effort normal and breath sounds normal. No respiratory distress.   Abdominal: Soft. Bowel sounds are normal. She exhibits no distension. There is no tenderness. There is no rebound.   Musculoskeletal: Normal range of motion. She " exhibits no edema.   Lymphadenopathy:     She has no cervical adenopathy.   Neurological: She is alert and oriented to person, place, and time.   No gross motor or sensory deficits   Skin: Skin is warm. She is not diaphoretic.   Psychiatric: She has a normal mood and affect.   Nursing note and vitals reviewed.    Patient's Body mass index is 38.47 kg/m². BMI is above normal parameters. Recommendations include: educational material, exercise counseling and nutrition counseling.      Results for orders placed or performed in visit on 08/27/20   Comprehensive Metabolic Panel   Result Value Ref Range    Glucose 161 (H) 65 - 99 mg/dL    BUN 19 8 - 23 mg/dL    Creatinine 0.84 0.57 - 1.00 mg/dL    eGFR Non African Am 68 >60 mL/min/1.73    eGFR African Am 82 >60 mL/min/1.73    BUN/Creatinine Ratio 22.6 7.0 - 25.0    Sodium 139 136 - 145 mmol/L    Potassium 4.5 3.5 - 5.2 mmol/L    Chloride 100 98 - 107 mmol/L    Total CO2 28.5 22.0 - 29.0 mmol/L    Calcium 9.7 8.6 - 10.5 mg/dL    Total Protein 6.6 6.0 - 8.5 g/dL    Albumin 4.60 3.50 - 5.20 g/dL    Globulin 2.0 gm/dL    A/G Ratio 2.3 g/dL    Total Bilirubin 0.4 0.0 - 1.2 mg/dL    Alkaline Phosphatase 101 39 - 117 U/L    AST (SGOT) 12 1 - 32 U/L    ALT (SGPT) 9 1 - 33 U/L   Lipid Panel   Result Value Ref Range    Total Cholesterol 220 (H) 0 - 200 mg/dL    Triglycerides 278 (H) 0 - 150 mg/dL    HDL Cholesterol 53 40 - 60 mg/dL    VLDL Cholesterol 55.6 mg/dL    LDL Cholesterol  111 (H) 0 - 100 mg/dL   Hemoglobin A1c   Result Value Ref Range    Hemoglobin A1C 6.60 (H) 4.80 - 5.60 %   CBC & Differential   Result Value Ref Range    WBC 8.52 3.40 - 10.80 10*3/mm3    RBC 5.03 3.77 - 5.28 10*6/mm3    Hemoglobin 15.1 12.0 - 15.9 g/dL    Hematocrit 45.0 34.0 - 46.6 %    MCV 89.5 79.0 - 97.0 fL    MCH 30.0 26.6 - 33.0 pg    MCHC 33.6 31.5 - 35.7 g/dL    RDW 13.7 12.3 - 15.4 %    Platelets 224 140 - 450 10*3/mm3    Neutrophil Rel % 63.3 42.7 - 76.0 %    Lymphocyte Rel % 24.8 19.6 - 45.3 %     Monocyte Rel % 8.1 5.0 - 12.0 %    Eosinophil Rel % 2.7 0.3 - 6.2 %    Basophil Rel % 0.5 0.0 - 1.5 %    Neutrophils Absolute 5.40 1.70 - 7.00 10*3/mm3    Lymphocytes Absolute 2.11 0.70 - 3.10 10*3/mm3    Monocytes Absolute 0.69 0.10 - 0.90 10*3/mm3    Eosinophils Absolute 0.23 0.00 - 0.40 10*3/mm3    Basophils Absolute 0.04 0.00 - 0.20 10*3/mm3    Immature Granulocyte Rel % 0.6 (H) 0.0 - 0.5 %    Immature Grans Absolute 0.05 0.00 - 0.05 10*3/mm3    nRBC 0.0 0.0 - 0.2 /100 WBC         Assessment/Plan   Missy was seen today for hypertension, hyperlipidemia, diabetes, anxiety and depression.    Diagnoses and all orders for this visit:    Benign essential hypertension    Mixed hyperlipidemia  -     CBC & Differential  -     Comprehensive Metabolic Panel  -     Lipid Panel    Controlled type 2 diabetes mellitus without complication, without long-term current use of insulin (CMS/[facility])  -     Hemoglobin A1c  -     Microalbumin / Creatinine Urine Ratio - Urine, Clean Catch    Need for vaccination  -     Fluad Quad 65+ yrs (6217-4610)    Anxiety    Mild episode of recurrent major depressive disorder (CMS/[facility])    Other orders  -     citalopram (CeleXA) 40 MG tablet; Take 1 tablet by mouth Daily.  -     lisinopril-hydrochlorothiazide (PRINZIDE,ZESTORETIC) 20-12.5 MG per tablet; Take 1 tablet by mouth 2 (Two) Times a Day.      Plan:  1.  Benign essential hypertension: Will continue current medication, low-sodium diet advised, Counseled to regularly check BP at home with goal averaging <130/80.   2.mixed hyperlipidemia:  reviewed    fasting CMP and lipid panel.  Diet and exercise counseled,  Will continue current medications  3. Diabetes  Mellitus  : reviewed    fasting CMP  and hba1c 6.6  , diet and exercise counseled ,off  medications  4.  Anxiety : continue celexa 40 mg qd , labs reviewed   5. Major depression :  Refilled celexa 40 mg qd  6. Need for flu vaccine : given today             Emely Sheldon MD

## 2020-09-02 ENCOUNTER — HOSPITAL ENCOUNTER (OUTPATIENT)
Dept: MAMMOGRAPHY | Facility: HOSPITAL | Age: 70
Discharge: HOME | End: 2020-09-02
Admitting: INTERNAL MEDICINE

## 2020-09-02 DIAGNOSIS — Z12.31 VISIT FOR SCREENING MAMMOGRAM: ICD-10-CM

## 2020-09-02 PROCEDURE — 77067 SCR MAMMO BI INCL CAD: CPT

## 2020-09-02 PROCEDURE — 77063 BREAST TOMOSYNTHESIS BI: CPT

## 2020-09-22 ENCOUNTER — TELEPHONE (OUTPATIENT)
Dept: INTERNAL MEDICINE | Facility: CLINIC | Age: 70
End: 2020-09-22

## 2020-09-22 NOTE — TELEPHONE ENCOUNTER
Pt called wanting to know why her Lisinopril/HCTZ has been increased to BID?  States she is not questioning your judgement she is just curious

## 2020-09-22 NOTE — TELEPHONE ENCOUNTER
Please see the message in the chart from April 24, 2020, patient had called for elevated blood pressure and the lisinopril hydrochlorothiazide was increased to twice a day and a new prescription was sent on that day  So is she not taking it at twice a day ?  She is supposed to be on it twice a day  since April 24, 2020

## 2020-09-23 ENCOUNTER — TELEMEDICINE (OUTPATIENT)
Dept: CARDIOLOGY | Facility: CLINIC | Age: 70
End: 2020-09-23

## 2020-09-23 DIAGNOSIS — R94.39 ABNORMAL NUCLEAR STRESS TEST: ICD-10-CM

## 2020-09-23 DIAGNOSIS — I10 BENIGN ESSENTIAL HYPERTENSION: Primary | ICD-10-CM

## 2020-09-23 DIAGNOSIS — E78.2 MIXED HYPERLIPIDEMIA: ICD-10-CM

## 2020-09-23 DIAGNOSIS — I20.8 ANGINAL EQUIVALENT (HCC): ICD-10-CM

## 2020-09-23 DIAGNOSIS — I25.10 CORONARY ARTERY DISEASE INVOLVING NATIVE CORONARY ARTERY OF NATIVE HEART WITHOUT ANGINA PECTORIS: ICD-10-CM

## 2020-09-23 PROCEDURE — 99442 PR PHYS/QHP TELEPHONE EVALUATION 11-20 MIN: CPT | Performed by: NURSE PRACTITIONER

## 2020-09-23 NOTE — PROGRESS NOTES
Missy Mcgraw is a 69 y.o. female.  MRN #: [id number]    Referring Provider: Emely Sheldon MD    Chief Complaint: Follow up for blood pressure      History of Present Illness:  Presents via telephone visit.Ms Mcgraw is a 69-year-old female that presents via telephone visit.  She has history of essential hypertension, hyperlipidemia, CAD, prior history of abnormal stress test.  Patient has prior history of abnormal MPS.  Follow-up cardiac cath shows near normal coronary arteries.  With today's evaluation patient denies any episodes of chest pain, chest pressure, shortness of breath, orthopnea, weight gain or fluid retention.  Patient monitors her blood pressure on a daily basis and states that her blood pressure consistently stays within acceptable parameters with readings of 110-120 systolic.  She reports that she adheres to a healthy heart diet, uses no added salt and closely watches her sodium intake.    The patient presents today with their self via phone who contributes to the history of their care.     The following portions of the patient's history were reviewed and updated as appropriate: allergies, current medications, past family history, past medical history, past social history, past surgical history and problem list.     Review of Systems:     Review of Systems   Constitutional: Negative.  Negative for activity change, appetite change, diaphoresis, fatigue, unexpected weight gain and unexpected weight loss.   HENT: Negative.    Eyes: Negative.  Negative for blurred vision, double vision and visual disturbance.   Respiratory: Negative.  Negative for apnea, cough, chest tightness, shortness of breath and wheezing.    Cardiovascular: Negative.  Negative for chest pain, palpitations and leg swelling.   Gastrointestinal: Negative.  Negative for abdominal distention, abdominal pain, nausea, vomiting, GERD and indigestion.   Endocrine: Negative.    Genitourinary: Negative.  Negative for decreased libido and  hematuria.   Musculoskeletal: Negative.  Negative for back pain, joint swelling, myalgias, neck pain and neck stiffness.   Skin: Negative for pallor and bruise.   Allergic/Immunologic: Negative.    Neurological: Negative.  Negative for dizziness, tremors, syncope, facial asymmetry, speech difficulty, weakness, light-headedness and numbness.   Hematological: Negative.  Does not bruise/bleed easily.   Psychiatric/Behavioral: Negative.  Negative for agitation and stress. The patient is not nervous/anxious.    All other systems reviewed and are negative.         Current Outpatient Medications:   •  amLODIPine (NORVASC) 5 MG tablet, Take 1 tablet by mouth Every Night., Disp: 90 tablet, Rfl: 3  •  aspirin (SANJU ASPIRIN EC LOW DOSE) 81 MG EC tablet, Take 1 tablet by mouth daily., Disp: , Rfl:   •  atenolol (TENORMIN) 25 MG tablet, TAKE 1 TABLET TWICE A DAY, Disp: 180 tablet, Rfl: 4  •  betamethasone valerate (VALISONE) 0.1 % cream, , Disp: , Rfl:   •  carbidopa-levodopa CR (SINEMET CR)  MG per CR tablet, Take 1 tablet by mouth Every Night., Disp: 90 tablet, Rfl: 3  •  cholecalciferol (VITAMIN D3) 1000 units tablet, Take 1,000 Units by mouth Daily., Disp: , Rfl:   •  citalopram (CeleXA) 40 MG tablet, Take 1 tablet by mouth Daily., Disp: 90 tablet, Rfl: 3  •  clobetasol (TEMOVATE) 0.05 % ointment, Apply  topically to the appropriate area as directed 2 (Two) Times a Day., Disp: 60 g, Rfl: 0  •  conjugated estrogens (PREMARIN) 0.625 MG/GM vaginal cream, USE 0.5 GM VAGINALLY TWICE WEEKLY TO CONTROL SYMPTOMS, Disp: 30 g, Rfl: 1  •  cyclobenzaprine (FLEXERIL) 10 MG tablet, Take 1 tablet by mouth 2 (Two) Times a Day As Needed for Muscle Spasms., Disp: 30 tablet, Rfl: 0  •  lisinopril-hydrochlorothiazide (PRINZIDE,ZESTORETIC) 20-12.5 MG per tablet, Take 1 tablet by mouth 2 (Two) Times a Day., Disp: 180 tablet, Rfl: 3  •  loratadine (CLARITIN) 10 MG tablet, Take 10 mg by mouth 2 (Two) Times a Day., Disp: , Rfl:   •  meclizine  (ANTIVERT) 25 MG tablet, Take 1 tablet by mouth 3 (Three) Times a Day As Needed for dizziness., Disp: 90 tablet, Rfl: 0  •  ondansetron (ZOFRAN) 4 MG tablet, , Disp: , Rfl:   •  pantoprazole (PROTONIX) 40 MG EC tablet, Take 1 tablet by mouth Daily., Disp: 90 tablet, Rfl: 3  •  rosuvastatin (CRESTOR) 5 MG tablet, TAKE 1 TABLET EVERY NIGHT, Disp: 90 tablet, Rfl: 4  •  traMADol (ULTRAM) 50 MG tablet, , Disp: , Rfl:   •  triamcinolone (KENALOG) 0.1 % cream, As Needed., Disp: , Rfl:     There were no vitals filed for this visit.    Physical Exam:     Physical Exam  Constitutional:       Comments: No physical exam performed.  Telephone visit only for 10 minutes.         Procedures    Results:   Patient verbalizes well-controlled blood pressures.  Reviewed prior cardiac diagnostic testing.      Assessment/Plan:   No cardiac diagnostic testing indicated with this visit  No change in medication regimen.  Follow-up 1 year or as needed.    10-minute phone consultation  Reviewed medication regimen with patient  Reviewed dietary regimen with patient    Diagnoses and all orders for this visit:    Benign essential hypertension  Well-controlled.  Patient reports stable blood pressures between 110 and 120 systolic.  Mixed hyperlipidemia  Monitored per her primary care physician  Anginal equivalent (CMS/[facility])  Denies any recent episodes of chest pain, anginal symptoms, shortness of breath, diaphoresis.  Coronary artery disease involving native coronary artery of native heart without angina pectoris  Enteric-coated aspirin 81 mg p.o. daily  Abnormal nuclear stress test  Follow-up cardiac cath shows near normal coronary arteries.      Return in about 1 year (around 9/23/2021) for F/U as needed if any problems or concerns..    Bert Hussein, AZEEM

## 2021-01-04 ENCOUNTER — OFFICE VISIT (OUTPATIENT)
Dept: INTERNAL MEDICINE | Facility: CLINIC | Age: 71
End: 2021-01-04

## 2021-01-04 VITALS
OXYGEN SATURATION: 98 % | RESPIRATION RATE: 16 BRPM | TEMPERATURE: 98 F | WEIGHT: 197 LBS | HEART RATE: 65 BPM | HEIGHT: 60 IN | BODY MASS INDEX: 38.68 KG/M2 | SYSTOLIC BLOOD PRESSURE: 144 MMHG | DIASTOLIC BLOOD PRESSURE: 84 MMHG

## 2021-01-04 DIAGNOSIS — I10 BENIGN ESSENTIAL HYPERTENSION: ICD-10-CM

## 2021-01-04 DIAGNOSIS — Z00.00 ROUTINE GENERAL MEDICAL EXAMINATION AT A HEALTH CARE FACILITY: Primary | ICD-10-CM

## 2021-01-04 DIAGNOSIS — E11.9 CONTROLLED TYPE 2 DIABETES MELLITUS WITHOUT COMPLICATION, WITHOUT LONG-TERM CURRENT USE OF INSULIN (HCC): ICD-10-CM

## 2021-01-04 DIAGNOSIS — E78.2 MIXED HYPERLIPIDEMIA: ICD-10-CM

## 2021-01-04 PROCEDURE — 99397 PER PM REEVAL EST PAT 65+ YR: CPT | Performed by: INTERNAL MEDICINE

## 2021-01-04 PROCEDURE — 1159F MED LIST DOCD IN RCRD: CPT | Performed by: INTERNAL MEDICINE

## 2021-01-04 PROCEDURE — 1126F AMNT PAIN NOTED NONE PRSNT: CPT | Performed by: INTERNAL MEDICINE

## 2021-01-04 PROCEDURE — G0439 PPPS, SUBSEQ VISIT: HCPCS | Performed by: INTERNAL MEDICINE

## 2021-01-04 RX ORDER — ROSUVASTATIN CALCIUM 5 MG/1
5 TABLET, COATED ORAL NIGHTLY
Qty: 90 TABLET | Refills: 2 | Status: SHIPPED | OUTPATIENT
Start: 2021-01-04 | End: 2021-09-08 | Stop reason: SDUPTHER

## 2021-01-04 RX ORDER — BACLOFEN 10 MG/1
10 TABLET ORAL NIGHTLY PRN
Qty: 30 TABLET | Refills: 2 | Status: SHIPPED | OUTPATIENT
Start: 2021-01-04 | End: 2021-04-28

## 2021-01-04 RX ORDER — ATENOLOL 25 MG/1
25 TABLET ORAL 2 TIMES DAILY
Qty: 180 TABLET | Refills: 2 | Status: SHIPPED | OUTPATIENT
Start: 2021-01-04 | End: 2021-05-05 | Stop reason: SDUPTHER

## 2021-01-04 NOTE — PATIENT INSTRUCTIONS
Exercising to Stay Healthy  To become healthy and stay healthy, it is recommended that you do moderate-intensity and vigorous-intensity exercise. You can tell that you are exercising at a moderate intensity if your heart starts beating faster and you start breathing faster but can still hold a conversation. You can tell that you are exercising at a vigorous intensity if you are breathing much harder and faster and cannot hold a conversation while exercising.  Exercising regularly is important. It has many health benefits, such as:  · Improving overall fitness, flexibility, and endurance.  · Increasing bone density.  · Helping with weight control.  · Decreasing body fat.  · Increasing muscle strength.  · Reducing stress and tension.  · Improving overall health.  How often should I exercise?  Choose an activity that you enjoy, and set realistic goals. Your health care provider can help you make an activity plan that works for you.  Exercise regularly as told by your health care provider. This may include:  · Doing strength training two times a week, such as:  ? Lifting weights.  ? Using resistance bands.  ? Push-ups.  ? Sit-ups.  ? Yoga.  · Doing a certain intensity of exercise for a given amount of time. Choose from these options:  ? A total of 150 minutes of moderate-intensity exercise every week.  ? A total of 75 minutes of vigorous-intensity exercise every week.  ? A mix of moderate-intensity and vigorous-intensity exercise every week.  Children, pregnant women, people who have not exercised regularly, people who are overweight, and older adults may need to talk with a health care provider about what activities are safe to do. If you have a medical condition, be sure to talk with your health care provider before you start a new exercise program.  What are some exercise ideas?  Moderate-intensity exercise ideas include:  · Walking 1 mile (1.6 km) in about 15  minutes.  · Biking.  · Hiking.  · Golfing.  · Dancing.  · Water aerobics.  Vigorous-intensity exercise ideas include:  · Walking 4.5 miles (7.2 km) or more in about 1 hour.  · Jogging or running 5 miles (8 km) in about 1 hour.  · Biking 10 miles (16.1 km) or more in about 1 hour.  · Lap swimming.  · Roller-skating or in-line skating.  · Cross-country skiing.  · Vigorous competitive sports, such as football, basketball, and soccer.  · Jumping rope.  · Aerobic dancing.  What are some everyday activities that can help me to get exercise?  · Yard work, such as:  ? Pushing a .  ? Raking and bagging leaves.  · Washing your car.  · Pushing a stroller.  · Shoveling snow.  · Gardening.  · Washing windows or floors.  How can I be more active in my day-to-day activities?  · Use stairs instead of an elevator.  · Take a walk during your lunch break.  · If you drive, park your car farther away from your work or school.  · If you take public transportation, get off one stop early and walk the rest of the way.  · Stand up or walk around during all of your indoor phone calls.  · Get up, stretch, and walk around every 30 minutes throughout the day.  · Enjoy exercise with a friend. Support to continue exercising will help you keep a regular routine of activity.  What guidelines can I follow while exercising?  · Before you start a new exercise program, talk with your health care provider.  · Do not exercise so much that you hurt yourself, feel dizzy, or get very short of breath.  · Wear comfortable clothes and wear shoes with good support.  · Drink plenty of water while you exercise to prevent dehydration or heat stroke.  · Work out until your breathing and your heartbeat get faster.  Where to find more information  · [place] Department of Health and Human Services: www.hhs.gov  · Centers for Disease Control and Prevention (CDC): www.cdc.gov  Summary  · Exercising regularly is important. It will improve your overall fitness,  flexibility, and endurance.  · Regular exercise also will improve your overall health. It can help you control your weight, reduce stress, and improve your bone density.  · Do not exercise so much that you hurt yourself, feel dizzy, or get very short of breath.  · Before you start a new exercise program, talk with your health care provider.  This information is not intended to replace advice given to you by your health care provider. Make sure you discuss any questions you have with your health care provider.  Document Revised: 11/30/2018 Document Reviewed: 11/08/2018  [employer] Patient Education © 2020 [employer]

## 2021-01-04 NOTE — PROGRESS NOTES
The ABCs of the Annual Wellness Visit  Subsequent Medicare Wellness Visit    Chief Complaint   Patient presents with   • Medicare Wellness-subsequent   • Hypertension   • Hyperlipidemia   • Diabetes       Subjective   History of Present Illness:  Missy Mcgraw is a 70 y.o. female who presents for a Subsequent Medicare Wellness Visit and physical .   Patient is here to follow up on the blood pressure  The patient is taking the blood pressure medications as prescribed and has had no side effects. The patient is also here to follow up on the cholesterol and is trying to follow a diet. The patient is also here to follow on sugar and   is  due to get lab work done . The patient also needs refills on medications .   Hypertension   Pertinent negatives include no chest pain, palpitations or shortness of breath.   Hyperlipidemia   Pertinent negatives include no chest pain or shortness of breath.   Diabetes   Pertinent negatives for hypoglycemia include no dizziness, nervousness/anxiousness or pallor. Pertinent negatives for diabetes include no chest pain, no shortness of breath  Patient is on acei/arb       HEALTH RISK ASSESSMENT    Recent Hospitalizations:  No hospitalization(s) within the last year.    Current Medical Providers:  Patient Care Team:  Emely Sheldon MD as PCP - General  Marie De Leon MD as Surgeon (General Surgery)    Smoking Status:  Social History     Tobacco Use   Smoking Status Never Smoker   Smokeless Tobacco Never Used       Alcohol Consumption:  Social History     Substance and Sexual Activity   Alcohol Use No       Depression Screen:   PHQ-2/PHQ-9 Depression Screening 1/4/2021   Little interest or pleasure in doing things 0   Feeling down, depressed, or hopeless 0   Total Score 0       Fall Risk Screen:  GUMARO Fall Risk Assessment was completed, and patient is at HIGH risk for falls. Assessment completed on:1/4/2021    Health Habits and Functional and Cognitive Screening:  Functional &  Cognitive Status 1/4/2021   Do you have difficulty preparing food and eating? No   Do you have difficulty bathing yourself, getting dressed or grooming yourself? No   Do you have difficulty using the toilet? No   Do you have difficulty moving around from place to place? No   Do you have trouble with steps or getting out of a bed or a chair? No   Current Diet Well Balanced Diet   Dental Exam Up to date   Eye Exam Up to date        Eye Exam Comment -   Exercise (times per week) 5 times per week   Current Exercise Activities Include Housecleaning   Do you need help using the phone?  No   Are you deaf or do you have serious difficulty hearing?  Yes   Do you need help with transportation? No   Do you need help shopping? No   Do you need help preparing meals?  No   Do you need help with housework?  No   Do you need help with laundry? No   Do you need help taking your medications? No   Do you need help managing money? No   Do you ever drive or ride in a car without wearing a seat belt? No   Have you felt unusual stress, anger or loneliness in the last month? No   Who do you live with? Alone   If you need help, do you have trouble finding someone available to you? No   Have you been bothered in the last four weeks by sexual problems? No   Do you have difficulty concentrating, remembering or making decisions? No         Does the patient have evidence of cognitive impairment? No    Asprin use counseling:Taking ASA appropriately as indicated    Age-appropriate Screening Schedule:  Refer to the list below for future screening recommendations based on patient's age, sex and/or medical conditions. Orders for these recommended tests are listed in the plan section. The patient has been provided with a written plan.    Health Maintenance   Topic Date Due   • ZOSTER VACCINE (1 of 2) 12/05/2002   • DIABETIC FOOT EXAM  03/08/2018   • DIABETIC EYE EXAM  10/17/2020   • DXA SCAN  12/18/2020   • HEMOGLOBIN A1C  02/28/2021   • URINE  MICROALBUMIN  04/10/2021   • LIPID PANEL  08/28/2021   • PAP SMEAR  12/13/2021   • MAMMOGRAM  09/02/2022   • COLONOSCOPY  02/01/2029   • TDAP/TD VACCINES (2 - Td) 12/11/2030   • INFLUENZA VACCINE  Completed          The following portions of the patient's history were reviewed and updated as appropriate: allergies, current medications, past family history, past medical history, past social history, past surgical history and problem list.    Outpatient Medications Prior to Visit   Medication Sig Dispense Refill   • amLODIPine (NORVASC) 5 MG tablet Take 1 tablet by mouth Every Night. 90 tablet 3   • aspirin (SANJU ASPIRIN EC LOW DOSE) 81 MG EC tablet Take 1 tablet by mouth daily.     • carbidopa-levodopa CR (SINEMET CR)  MG per CR tablet Take 1 tablet by mouth Every Night. 90 tablet 3   • cholecalciferol (VITAMIN D3) 1000 units tablet Take 1,000 Units by mouth Daily.     • citalopram (CeleXA) 40 MG tablet Take 1 tablet by mouth Daily. 90 tablet 3   • clobetasol (TEMOVATE) 0.05 % ointment Apply  topically to the appropriate area as directed 2 (Two) Times a Day. 60 g 0   • conjugated estrogens (PREMARIN) 0.625 MG/GM vaginal cream USE 0.5 GM VAGINALLY TWICE WEEKLY TO CONTROL SYMPTOMS 30 g 1   • lisinopril-hydrochlorothiazide (PRINZIDE,ZESTORETIC) 20-12.5 MG per tablet Take 1 tablet by mouth 2 (Two) Times a Day. 180 tablet 3   • loratadine (CLARITIN) 10 MG tablet Take 10 mg by mouth 2 (Two) Times a Day.     • meclizine (ANTIVERT) 25 MG tablet Take 1 tablet by mouth 3 (Three) Times a Day As Needed for dizziness. 90 tablet 0   • ondansetron (ZOFRAN) 4 MG tablet      • pantoprazole (PROTONIX) 40 MG EC tablet Take 1 tablet by mouth Daily. 90 tablet 3   • traMADol (ULTRAM) 50 MG tablet      • triamcinolone (KENALOG) 0.1 % cream As Needed.     • atenolol (TENORMIN) 25 MG tablet TAKE 1 TABLET TWICE A  tablet 4   • cyclobenzaprine (FLEXERIL) 10 MG tablet Take 1 tablet by mouth 2 (Two) Times a Day As Needed for Muscle  Spasms. 30 tablet 0   • rosuvastatin (CRESTOR) 5 MG tablet TAKE 1 TABLET EVERY NIGHT 90 tablet 4   • betamethasone valerate (VALISONE) 0.1 % cream        No facility-administered medications prior to visit.        Patient Active Problem List   Diagnosis   • Hay fever   • Benign essential hypertension   • Gastroesophageal reflux disease with esophagitis   • Major depressive disorder, recurrent episode (CMS/HCC)   • Uncontrolled type 2 diabetes mellitus (CMS/HCC)   • Abnormal liver enzymes   • Abnormal weight gain   • Cervicalgia   • Fibroid, uterine   • Hyperthyroidism   • Mixed hyperlipidemia   • Movement disorder   • Neuralgia   • Ulnar nerve palsy   • Vitamin B12 deficiency   • Vitamin D deficiency   • Osteoporosis   • Restless legs syndrome   • Shortness of breath   • Anginal equivalent (CMS/HCC)   • Chest pain in adult   • Abnormal nuclear stress test   • Coronary artery disease involving native coronary artery of native heart without angina pectoris   • PONV (postoperative nausea and vomiting)   • Unintentional weight loss   • Gastroesophageal reflux disease   • Epigastric abdominal pain   • Gastritis determined by biopsy   • Family history of colon cancer in father   • Pre-operative cardiovascular examination       Advanced Care Planning:  ACP discussion was held with the patient during this visit. Patient has an advance directive (not in EMR), copy requested.    Review of Systems   Constitutional: Negative for appetite change, fatigue and fever.   HENT: Negative for congestion, ear discharge, ear pain, sinus pressure and sore throat.    Eyes: Negative for pain and discharge.   Respiratory: Negative for cough, chest tightness, shortness of breath and wheezing.    Cardiovascular: Negative for chest pain, palpitations and leg swelling.   Gastrointestinal: Negative for abdominal pain, blood in stool, constipation, diarrhea and nausea.   Endocrine: Negative for cold intolerance and heat intolerance.  "  Genitourinary: Negative for dysuria, flank pain and frequency.   Musculoskeletal: Negative for back pain and joint swelling.   Skin: Negative for color change.   Allergic/Immunologic: Negative for environmental allergies and food allergies.   Neurological: Negative for dizziness, weakness, numbness and headaches.   Hematological: Negative for adenopathy. Does not bruise/bleed easily.   Psychiatric/Behavioral: Negative for behavioral problems and dysphoric mood. The patient is not nervous/anxious.        Compared to one year ago, the patient feels her physical health is worse.  Compared to one year ago, the patient feels her mental health is the same.    Reviewed chart for potential of high risk medication in the elderly: not applicable  Reviewed chart for potential of harmful drug interactions in the elderly:not applicable    Objective         Vitals:    01/04/21 0900   BP: 144/84   Pulse: 65   Resp: 16   Temp: 98 °F (36.7 °C)   SpO2: 98%   Weight: 89.4 kg (197 lb)   Height: 152.4 cm (60\")   PainSc: 0-No pain       Body mass index is 38.47 kg/m².  Discussed the patient's BMI with her. The BMI is above average; no BMI management plan is appropriate..    Physical Exam  Vitals signs and nursing note reviewed.   Constitutional:       General: She is not in acute distress.     Appearance: Normal appearance. She is not diaphoretic.   HENT:      Head: Normocephalic and atraumatic.      Right Ear: External ear normal.      Left Ear: External ear normal.      Nose: Nose normal.   Eyes:      Extraocular Movements: Extraocular movements intact.      Conjunctiva/sclera: Conjunctivae normal.   Neck:      Musculoskeletal: Neck supple.      Trachea: Trachea normal.   Cardiovascular:      Rate and Rhythm: Normal rate and regular rhythm.      Heart sounds: Normal heart sounds.   Pulmonary:      Effort: Pulmonary effort is normal. No respiratory distress.      Breath sounds: Normal breath sounds.   Abdominal:      General: Abdomen " is flat.   Musculoskeletal:      Comments: Moves all limbs   Skin:     General: Skin is warm.   Neurological:      Mental Status: She is alert and oriented to person, place, and time.      Comments: No gross motor or sensory deficits               Assessment/Plan   Medicare Risks and Personalized Health Plan  CMS Preventative Services Quick Reference  Advance Directive Discussion  Breast Cancer/Mammogram Screening  Cardiovascular risk  Diabetic Lab Screening   Fall Risk    The above risks/problems have been discussed with the patient.  Pertinent information has been shared with the patient in the After Visit Summary.  Follow up plans and orders are seen below in the Assessment/Plan Section.    Diagnoses and all orders for this visit:    1. Routine general medical examination at a health care facility (Primary)    2. Benign essential hypertension    3. Mixed hyperlipidemia  -     CBC & Differential  -     Comprehensive Metabolic Panel  -     Lipid Panel    4. Controlled type 2 diabetes mellitus without complication, without long-term current use of insulin (CMS/[facility])  -     Hemoglobin A1c  -     Microalbumin / Creatinine Urine Ratio - Urine, Clean Catch    Other orders  -     atenolol (TENORMIN) 25 MG tablet; Take 1 tablet by mouth 2 (Two) Times a Day.  Dispense: 180 tablet; Refill: 2  -     rosuvastatin (CRESTOR) 5 MG tablet; Take 1 tablet by mouth Every Night.  Dispense: 90 tablet; Refill: 2  -     baclofen (LIORESAL) 10 MG tablet; Take 1 tablet by mouth At Night As Needed for Muscle Spasms.  Dispense: 30 tablet; Refill: 2    Plan:  1.physical: Physical exam conducted today, reviewed fasting lab work,   Impression: healthy adult Patient. Currently, patient eats a healthy diet. Screening lab work includes glucose, lipid profile and complete blood count . The risks and benefits of immunizations were discussed and immunizations are up to date. Advice and education were given regarding nutrition and aerobic exercise.  Patient discussion: discussed with the patient.  Annual Wellness Visit ,physical  with preventive exam as well as age and risk appropriate counseling completed.   Advance Directive Planning: paperwork and instructions were given to the patient.   Patient Discussion: plan discussed with the patient, follow-up visit needed in one year. Medication Review and medication list updated  2.  Benign essential hypertension: Will continue current medication, low-sodium diet advised, Counseled to regularly check BP at home with goal averaging <130/80.   3.mixed hyperlipidemia: will obtain   fasting CMP and lipid panel.  Diet and exercise counseled,  Will continue current medications  4. Diabetes  Mellitus  : will obtain   fasting CMP  and hba1c  , diet and exercise counseled ,    Follow Up:  Return in 1 year (on 1/4/2022) for Medicare Wellness.     An After Visit Summary and PPPS were given to the patient.

## 2021-01-05 LAB
ALBUMIN SERPL-MCNC: 4.5 G/DL (ref 3.5–5.2)
ALBUMIN/CREAT UR: 41 MG/G CREAT (ref 0–29)
ALBUMIN/GLOB SERPL: 2 G/DL
ALP SERPL-CCNC: 90 U/L (ref 39–117)
ALT SERPL-CCNC: 9 U/L (ref 1–33)
AST SERPL-CCNC: 9 U/L (ref 1–32)
BASOPHILS # BLD AUTO: 0.06 10*3/MM3 (ref 0–0.2)
BASOPHILS NFR BLD AUTO: 0.7 % (ref 0–1.5)
BILIRUB SERPL-MCNC: 0.4 MG/DL (ref 0–1.2)
BUN SERPL-MCNC: 20 MG/DL (ref 8–23)
BUN/CREAT SERPL: 21.7 (ref 7–25)
CALCIUM SERPL-MCNC: 9.7 MG/DL (ref 8.6–10.5)
CHLORIDE SERPL-SCNC: 100 MMOL/L (ref 98–107)
CHOLEST SERPL-MCNC: 208 MG/DL (ref 0–200)
CO2 SERPL-SCNC: 26.6 MMOL/L (ref 22–29)
CREAT SERPL-MCNC: 0.92 MG/DL (ref 0.57–1)
CREAT UR-MCNC: 148.9 MG/DL
EOSINOPHIL # BLD AUTO: 0.18 10*3/MM3 (ref 0–0.4)
EOSINOPHIL NFR BLD AUTO: 2.2 % (ref 0.3–6.2)
ERYTHROCYTE [DISTWIDTH] IN BLOOD BY AUTOMATED COUNT: 12.4 % (ref 12.3–15.4)
GLOBULIN SER CALC-MCNC: 2.3 GM/DL
GLUCOSE SERPL-MCNC: 145 MG/DL (ref 65–99)
HBA1C MFR BLD: 6.7 % (ref 4.8–5.6)
HCT VFR BLD AUTO: 43.2 % (ref 34–46.6)
HDLC SERPL-MCNC: 57 MG/DL (ref 40–60)
HGB BLD-MCNC: 14.5 G/DL (ref 12–15.9)
IMM GRANULOCYTES # BLD AUTO: 0.06 10*3/MM3 (ref 0–0.05)
IMM GRANULOCYTES NFR BLD AUTO: 0.7 % (ref 0–0.5)
LDLC SERPL CALC-MCNC: 110 MG/DL (ref 0–100)
LYMPHOCYTES # BLD AUTO: 1.75 10*3/MM3 (ref 0.7–3.1)
LYMPHOCYTES NFR BLD AUTO: 21.8 % (ref 19.6–45.3)
MCH RBC QN AUTO: 29.4 PG (ref 26.6–33)
MCHC RBC AUTO-ENTMCNC: 33.6 G/DL (ref 31.5–35.7)
MCV RBC AUTO: 87.4 FL (ref 79–97)
MICROALBUMIN UR-MCNC: 61.4 UG/ML
MONOCYTES # BLD AUTO: 0.59 10*3/MM3 (ref 0.1–0.9)
MONOCYTES NFR BLD AUTO: 7.3 % (ref 5–12)
NEUTROPHILS # BLD AUTO: 5.4 10*3/MM3 (ref 1.7–7)
NEUTROPHILS NFR BLD AUTO: 67.3 % (ref 42.7–76)
NRBC BLD AUTO-RTO: 0 /100 WBC (ref 0–0.2)
PLATELET # BLD AUTO: 221 10*3/MM3 (ref 140–450)
POTASSIUM SERPL-SCNC: 4.5 MMOL/L (ref 3.5–5.2)
PROT SERPL-MCNC: 6.8 G/DL (ref 6–8.5)
RBC # BLD AUTO: 4.94 10*6/MM3 (ref 3.77–5.28)
SODIUM SERPL-SCNC: 136 MMOL/L (ref 136–145)
TRIGL SERPL-MCNC: 238 MG/DL (ref 0–150)
VLDLC SERPL CALC-MCNC: 41 MG/DL (ref 5–40)
WBC # BLD AUTO: 8.04 10*3/MM3 (ref 3.4–10.8)

## 2021-01-14 ENCOUNTER — IMMUNIZATION (OUTPATIENT)
Dept: VACCINE CLINIC | Facility: HOSPITAL | Age: 71
End: 2021-01-14

## 2021-01-14 ENCOUNTER — TELEPHONE (OUTPATIENT)
Dept: INTERNAL MEDICINE | Facility: CLINIC | Age: 71
End: 2021-01-14

## 2021-01-14 PROCEDURE — 0012A: CPT | Performed by: INTERNAL MEDICINE

## 2021-01-14 PROCEDURE — 91301 HC SARSCO02 VAC 100MCG/0.5ML IM: CPT | Performed by: INTERNAL MEDICINE

## 2021-01-14 PROCEDURE — 0011A: CPT | Performed by: INTERNAL MEDICINE

## 2021-01-14 NOTE — TELEPHONE ENCOUNTER
Advised patient she should move forward with the shingles vaccine since she was due for her second dose since December. Advised her to advise whoever she eventually gets her covid vaccine from as to when she got the second dose of Shingrix and make sure there is no contraindication for getting both close together.

## 2021-01-14 NOTE — TELEPHONE ENCOUNTER
Patient called and stated that she would like a call back from a clinical staff member. She states that she is due for her second shingles booster vaccination but would like to receive the Covid-19 vaccine around the same time. The patient states that she would like to know where she could receive the 2 vaccinations and if there would be any effect on her if the vaccinations were received within a short time frame of each other. Please advise.     Patient call back [phone]

## 2021-02-16 ENCOUNTER — OFFICE VISIT (OUTPATIENT)
Dept: INTERNAL MEDICINE | Facility: CLINIC | Age: 71
End: 2021-02-16

## 2021-02-16 DIAGNOSIS — M25.552 PAIN IN JOINT OF LEFT HIP: ICD-10-CM

## 2021-02-16 DIAGNOSIS — I10 BENIGN ESSENTIAL HYPERTENSION: Primary | ICD-10-CM

## 2021-02-16 DIAGNOSIS — M54.50 LUMBOSACRAL PAIN: ICD-10-CM

## 2021-02-16 DIAGNOSIS — M62.830 MUSCLE SPASM OF BACK: ICD-10-CM

## 2021-02-16 DIAGNOSIS — E11.9 CONTROLLED TYPE 2 DIABETES MELLITUS WITHOUT COMPLICATION, WITHOUT LONG-TERM CURRENT USE OF INSULIN (HCC): ICD-10-CM

## 2021-02-16 PROCEDURE — 99443 PR PHYS/QHP TELEPHONE EVALUATION 21-30 MIN: CPT | Performed by: INTERNAL MEDICINE

## 2021-02-16 RX ORDER — TRAMADOL HYDROCHLORIDE 50 MG/1
50 TABLET ORAL EVERY 12 HOURS PRN
Qty: 30 TABLET | Refills: 0 | Status: SHIPPED | OUTPATIENT
Start: 2021-02-16 | End: 2022-08-15

## 2021-02-16 NOTE — PROGRESS NOTES
Subjective   Missy Mcgraw is a 70 y.o. female.     You have chosen to receive care through a telephone visit. Do you consent to use a telephone visit for your medical care today? Yes  The patient, Dr Ritchie, and Jayne Ovalles LPN were involved in this visit.    Chief Complaint   Patient presents with   • Back Pain     back and hip pain, cant get into orthopedics until when 2/22/2021 for injections, having a lot of pain, needs something for pain until she can get in with ortho, had a couple falls   • Spasms   • Hypertension   • Diabetes       History of Present Illness   HPI: Patient is following up on the blood pressure  The patient is taking the blood pressure medications as prescribed and has had no side effects. The patient is also following up on sugar  and is trying to follow a diet.   Patient complains of increased pain in the lower back with pain into her left hip and it is worsening,  She also sees  Dr gayle at [facility] on 2- for her back injections,  She had similar  issues in 2019 and had injections and mri of her back , she also has back muscle spasms, telephone visit today with patient, she took tylenol and used  otc creams for the pain , but it is worsening  Hypertension   Pertinent negatives include no chest pain, palpitations or shortness of breath.  Answers for HPI/ROS submitted by the patient on 2/16/2021   Back pain  What is the primary reason for your visit?: Back Pain    The following portions of the patient's history were reviewed and updated as appropriate: allergies, current medications, past family history, past medical history, past social history, past surgical history and problem list.    Review of Systems  Back pain  falls    Current Outpatient Medications:   •  amLODIPine (NORVASC) 5 MG tablet, Take 1 tablet by mouth Every Night., Disp: 90 tablet, Rfl: 3  •  atenolol (TENORMIN) 25 MG tablet, Take 1 tablet by mouth 2 (Two) Times a Day., Disp: 180 tablet, Rfl:  2  •  baclofen (LIORESAL) 10 MG tablet, Take 1 tablet by mouth At Night As Needed for Muscle Spasms., Disp: 30 tablet, Rfl: 2  •  carbidopa-levodopa CR (SINEMET CR)  MG per CR tablet, Take 1 tablet by mouth Every Night., Disp: 90 tablet, Rfl: 3  •  cholecalciferol (VITAMIN D3) 1000 units tablet, Take 1,000 Units by mouth Daily., Disp: , Rfl:   •  citalopram (CeleXA) 40 MG tablet, Take 1 tablet by mouth Daily., Disp: 90 tablet, Rfl: 3  •  clobetasol (TEMOVATE) 0.05 % ointment, Apply  topically to the appropriate area as directed 2 (Two) Times a Day., Disp: 60 g, Rfl: 0  •  conjugated estrogens (PREMARIN) 0.625 MG/GM vaginal cream, USE 0.5 GM VAGINALLY TWICE WEEKLY TO CONTROL SYMPTOMS, Disp: 30 g, Rfl: 1  •  lisinopril-hydrochlorothiazide (PRINZIDE,ZESTORETIC) 20-12.5 MG per tablet, Take 1 tablet by mouth 2 (Two) Times a Day., Disp: 180 tablet, Rfl: 3  •  loratadine (CLARITIN) 10 MG tablet, Take 10 mg by mouth 2 (Two) Times a Day., Disp: , Rfl:   •  meclizine (ANTIVERT) 25 MG tablet, Take 1 tablet by mouth 3 (Three) Times a Day As Needed for dizziness., Disp: 90 tablet, Rfl: 0  •  ondansetron (ZOFRAN) 4 MG tablet, , Disp: , Rfl:   •  pantoprazole (PROTONIX) 40 MG EC tablet, Take 1 tablet by mouth Daily., Disp: 90 tablet, Rfl: 3  •  rosuvastatin (CRESTOR) 5 MG tablet, Take 1 tablet by mouth Every Night., Disp: 90 tablet, Rfl: 2  •  triamcinolone (KENALOG) 0.1 % cream, As Needed., Disp: , Rfl:   •  aspirin (SANJU ASPIRIN EC LOW DOSE) 81 MG EC tablet, Take 1 tablet by mouth daily., Disp: , Rfl:   •  traMADol (ULTRAM) 50 MG tablet, Take 1 tablet by mouth Every 12 (Twelve) Hours As Needed for Moderate Pain ., Disp: 30 tablet, Rfl: 0    Objective     not currently breastfeeding.    Physical Exam          Results for orders placed or performed in visit on 01/04/21   Comprehensive Metabolic Panel    Specimen: Blood   Result Value Ref Range    Glucose 145 (H) 65 - 99 mg/dL    BUN 20 8 - 23 mg/dL    Creatinine 0.92 0.57 -  1.00 mg/dL    eGFR Non African Am 61 >60 mL/min/1.73    eGFR African Am 74 >60 mL/min/1.73    BUN/Creatinine Ratio 21.7 7.0 - 25.0    Sodium 136 136 - 145 mmol/L    Potassium 4.5 3.5 - 5.2 mmol/L    Chloride 100 98 - 107 mmol/L    Total CO2 26.6 22.0 - 29.0 mmol/L    Calcium 9.7 8.6 - 10.5 mg/dL    Total Protein 6.8 6.0 - 8.5 g/dL    Albumin 4.50 3.50 - 5.20 g/dL    Globulin 2.3 gm/dL    A/G Ratio 2.0 g/dL    Total Bilirubin 0.4 0.0 - 1.2 mg/dL    Alkaline Phosphatase 90 39 - 117 U/L    AST (SGOT) 9 1 - 32 U/L    ALT (SGPT) 9 1 - 33 U/L   Lipid Panel    Specimen: Blood   Result Value Ref Range    Total Cholesterol 208 (H) 0 - 200 mg/dL    Triglycerides 238 (H) 0 - 150 mg/dL    HDL Cholesterol 57 40 - 60 mg/dL    VLDL Cholesterol Mason 41 (H) 5 - 40 mg/dL    LDL Chol Calc (NIH) 110 (H) 0 - 100 mg/dL   Hemoglobin A1c    Specimen: Blood   Result Value Ref Range    Hemoglobin A1C 6.70 (H) 4.80 - 5.60 %   Microalbumin / Creatinine Urine Ratio - Urine, Clean Catch    Specimen: Urine, Clean Catch   Result Value Ref Range    Creatinine, Urine 148.9 Not Estab. mg/dL    Microalbumin, Urine 61.4 Not Estab. ug/mL    Microalbumin/Creatinine Ratio 41 (H) 0 - 29 mg/g creat   CBC & Differential    Specimen: Blood   Result Value Ref Range    WBC 8.04 3.40 - 10.80 10*3/mm3    RBC 4.94 3.77 - 5.28 10*6/mm3    Hemoglobin 14.5 12.0 - 15.9 g/dL    Hematocrit 43.2 34.0 - 46.6 %    MCV 87.4 79.0 - 97.0 fL    MCH 29.4 26.6 - 33.0 pg    MCHC 33.6 31.5 - 35.7 g/dL    RDW 12.4 12.3 - 15.4 %    Platelets 221 140 - 450 10*3/mm3    Neutrophil Rel % 67.3 42.7 - 76.0 %    Lymphocyte Rel % 21.8 19.6 - 45.3 %    Monocyte Rel % 7.3 5.0 - 12.0 %    Eosinophil Rel % 2.2 0.3 - 6.2 %    Basophil Rel % 0.7 0.0 - 1.5 %    Neutrophils Absolute 5.40 1.70 - 7.00 10*3/mm3    Lymphocytes Absolute 1.75 0.70 - 3.10 10*3/mm3    Monocytes Absolute 0.59 0.10 - 0.90 10*3/mm3    Eosinophils Absolute 0.18 0.00 - 0.40 10*3/mm3    Basophils Absolute 0.06 0.00 - 0.20  10*3/mm3    Immature Granulocyte Rel % 0.7 (H) 0.0 - 0.5 %    Immature Grans Absolute 0.06 (H) 0.00 - 0.05 10*3/mm3    nRBC 0.0 0.0 - 0.2 /100 WBC         Assessment/Plan   Diagnoses and all orders for this visit:    1. Benign essential hypertension (Primary)    2. Controlled type 2 diabetes mellitus without complication, without long-term current use of insulin (CMS/[facility])    3. Lumbosacral pain  -     traMADol (ULTRAM) 50 MG tablet; Take 1 tablet by mouth Every 12 (Twelve) Hours As Needed for Moderate Pain .  Dispense: 30 tablet; Refill: 0    4. Pain in joint of left hip  -     traMADol (ULTRAM) 50 MG tablet; Take 1 tablet by mouth Every 12 (Twelve) Hours As Needed for Moderate Pain .  Dispense: 30 tablet; Refill: 0    5. Muscle spasm of back      Plan:  1.  Benign essential hypertension: Will continue current medication, low-sodium diet advised, Counseled to regularly check BP at home with goal averaging <130/80.   2.  Diabetes  Mellitus  :  reviewed  fasting CMP  and hba1c 6.7  , diet and exercise counseled , Will continue current medications   3.  Muscle spasm: As needed baclofen  4.  Lumbosacral pain: Patient advised to  keep orthopedics appointment, as needed tramadol prescribed  5.  Left hip pain: As needed tramadol  This was an audio   enabled telemedicine encounter.  This visit has been rescheduled as a phone visit to comply with patient safety concerns in accordance with CDC recommendations. Total time of discussion was  21 minutes.             Emely Sheldon MD

## 2021-02-17 ENCOUNTER — IMMUNIZATION (OUTPATIENT)
Dept: VACCINE CLINIC | Facility: HOSPITAL | Age: 71
End: 2021-02-17

## 2021-02-17 PROCEDURE — 0012A: CPT | Performed by: INTERNAL MEDICINE

## 2021-02-17 PROCEDURE — 91301 HC SARSCO02 VAC 100MCG/0.5ML IM: CPT | Performed by: INTERNAL MEDICINE

## 2021-03-16 RX ORDER — CONJUGATED ESTROGENS 0.62 MG/G
CREAM VAGINAL
Qty: 30 G | Refills: 0 | Status: SHIPPED | OUTPATIENT
Start: 2021-03-16 | End: 2021-10-28 | Stop reason: SDUPTHER

## 2021-03-23 RX ORDER — AMLODIPINE BESYLATE 5 MG/1
TABLET ORAL
Qty: 90 TABLET | Refills: 1 | Status: SHIPPED | OUTPATIENT
Start: 2021-03-23 | End: 2021-05-05 | Stop reason: SDUPTHER

## 2021-04-12 RX ORDER — CARBIDOPA AND LEVODOPA 50; 200 MG/1; MG/1
TABLET, EXTENDED RELEASE ORAL
Qty: 90 TABLET | Refills: 3 | Status: SHIPPED | OUTPATIENT
Start: 2021-04-12 | End: 2022-03-07

## 2021-04-12 RX ORDER — PANTOPRAZOLE SODIUM 40 MG/1
TABLET, DELAYED RELEASE ORAL
Qty: 90 TABLET | Refills: 3 | Status: SHIPPED | OUTPATIENT
Start: 2021-04-12 | End: 2022-03-07

## 2021-04-27 RX ORDER — LISINOPRIL AND HYDROCHLOROTHIAZIDE 20; 12.5 MG/1; MG/1
TABLET ORAL
Qty: 90 TABLET | Refills: 0 | Status: SHIPPED | OUTPATIENT
Start: 2021-04-27 | End: 2021-05-05 | Stop reason: SDUPTHER

## 2021-04-28 RX ORDER — BACLOFEN 10 MG/1
TABLET ORAL
Qty: 30 TABLET | Refills: 1 | Status: SHIPPED | OUTPATIENT
Start: 2021-04-28 | End: 2022-12-13 | Stop reason: SDUPTHER

## 2021-04-30 DIAGNOSIS — E78.2 MIXED HYPERLIPIDEMIA: Primary | ICD-10-CM

## 2021-04-30 DIAGNOSIS — E11.9 CONTROLLED TYPE 2 DIABETES MELLITUS WITHOUT COMPLICATION, WITHOUT LONG-TERM CURRENT USE OF INSULIN (HCC): ICD-10-CM

## 2021-05-04 LAB
ALBUMIN SERPL-MCNC: 4.5 G/DL (ref 3.5–5.2)
ALBUMIN/CREAT UR: 21 MG/G CREAT (ref 0–29)
ALBUMIN/GLOB SERPL: 2 G/DL
ALP SERPL-CCNC: 82 U/L (ref 39–117)
ALT SERPL-CCNC: 7 U/L (ref 1–33)
AST SERPL-CCNC: 10 U/L (ref 1–32)
BASOPHILS # BLD AUTO: 0.07 10*3/MM3 (ref 0–0.2)
BASOPHILS NFR BLD AUTO: 0.8 % (ref 0–1.5)
BILIRUB SERPL-MCNC: 0.4 MG/DL (ref 0–1.2)
BUN SERPL-MCNC: 30 MG/DL (ref 8–23)
BUN/CREAT SERPL: 34.9 (ref 7–25)
CALCIUM SERPL-MCNC: 10.1 MG/DL (ref 8.6–10.5)
CHLORIDE SERPL-SCNC: 101 MMOL/L (ref 98–107)
CHOLEST SERPL-MCNC: 191 MG/DL (ref 0–200)
CO2 SERPL-SCNC: 27.5 MMOL/L (ref 22–29)
CREAT SERPL-MCNC: 0.86 MG/DL (ref 0.57–1)
CREAT UR-MCNC: 81.1 MG/DL
EOSINOPHIL # BLD AUTO: 0.17 10*3/MM3 (ref 0–0.4)
EOSINOPHIL NFR BLD AUTO: 2 % (ref 0.3–6.2)
ERYTHROCYTE [DISTWIDTH] IN BLOOD BY AUTOMATED COUNT: 12.7 % (ref 12.3–15.4)
GLOBULIN SER CALC-MCNC: 2.3 GM/DL
GLUCOSE SERPL-MCNC: 133 MG/DL (ref 65–99)
HBA1C MFR BLD: 7 % (ref 4.8–5.6)
HCT VFR BLD AUTO: 44.1 % (ref 34–46.6)
HDLC SERPL-MCNC: 59 MG/DL (ref 40–60)
HGB BLD-MCNC: 14.7 G/DL (ref 12–15.9)
IMM GRANULOCYTES # BLD AUTO: 0.04 10*3/MM3 (ref 0–0.05)
IMM GRANULOCYTES NFR BLD AUTO: 0.5 % (ref 0–0.5)
LDLC SERPL CALC-MCNC: 104 MG/DL (ref 0–100)
LYMPHOCYTES # BLD AUTO: 2.41 10*3/MM3 (ref 0.7–3.1)
LYMPHOCYTES NFR BLD AUTO: 28.9 % (ref 19.6–45.3)
MCH RBC QN AUTO: 30.2 PG (ref 26.6–33)
MCHC RBC AUTO-ENTMCNC: 33.3 G/DL (ref 31.5–35.7)
MCV RBC AUTO: 90.6 FL (ref 79–97)
MICROALBUMIN UR-MCNC: 17 UG/ML
MONOCYTES # BLD AUTO: 0.73 10*3/MM3 (ref 0.1–0.9)
MONOCYTES NFR BLD AUTO: 8.7 % (ref 5–12)
NEUTROPHILS # BLD AUTO: 4.93 10*3/MM3 (ref 1.7–7)
NEUTROPHILS NFR BLD AUTO: 59.1 % (ref 42.7–76)
NRBC BLD AUTO-RTO: 0 /100 WBC (ref 0–0.2)
PLATELET # BLD AUTO: 239 10*3/MM3 (ref 140–450)
POTASSIUM SERPL-SCNC: 4.9 MMOL/L (ref 3.5–5.2)
PROT SERPL-MCNC: 6.8 G/DL (ref 6–8.5)
RBC # BLD AUTO: 4.87 10*6/MM3 (ref 3.77–5.28)
SODIUM SERPL-SCNC: 140 MMOL/L (ref 136–145)
TRIGL SERPL-MCNC: 164 MG/DL (ref 0–150)
VLDLC SERPL CALC-MCNC: 28 MG/DL (ref 5–40)
WBC # BLD AUTO: 8.35 10*3/MM3 (ref 3.4–10.8)

## 2021-05-05 ENCOUNTER — OFFICE VISIT (OUTPATIENT)
Dept: INTERNAL MEDICINE | Facility: CLINIC | Age: 71
End: 2021-05-05

## 2021-05-05 VITALS
TEMPERATURE: 98 F | DIASTOLIC BLOOD PRESSURE: 75 MMHG | OXYGEN SATURATION: 96 % | SYSTOLIC BLOOD PRESSURE: 125 MMHG | HEART RATE: 66 BPM | WEIGHT: 189 LBS | RESPIRATION RATE: 16 BRPM | BODY MASS INDEX: 37.11 KG/M2 | HEIGHT: 60 IN

## 2021-05-05 DIAGNOSIS — E11.9 CONTROLLED TYPE 2 DIABETES MELLITUS WITHOUT COMPLICATION, WITHOUT LONG-TERM CURRENT USE OF INSULIN (HCC): ICD-10-CM

## 2021-05-05 DIAGNOSIS — E78.2 MIXED HYPERLIPIDEMIA: ICD-10-CM

## 2021-05-05 DIAGNOSIS — I10 BENIGN ESSENTIAL HYPERTENSION: Primary | ICD-10-CM

## 2021-05-05 PROCEDURE — 99214 OFFICE O/P EST MOD 30 MIN: CPT | Performed by: INTERNAL MEDICINE

## 2021-05-05 RX ORDER — LISINOPRIL AND HYDROCHLOROTHIAZIDE 20; 12.5 MG/1; MG/1
1 TABLET ORAL DAILY
Qty: 90 TABLET | Refills: 1 | Status: SHIPPED | OUTPATIENT
Start: 2021-05-05 | End: 2021-06-28 | Stop reason: SDUPTHER

## 2021-05-05 RX ORDER — GLIMEPIRIDE 2 MG/1
2 TABLET ORAL
Qty: 90 TABLET | Refills: 1 | Status: SHIPPED | OUTPATIENT
Start: 2021-05-05 | End: 2021-05-05 | Stop reason: SDUPTHER

## 2021-05-05 RX ORDER — AMLODIPINE BESYLATE 5 MG/1
5 TABLET ORAL NIGHTLY
Qty: 90 TABLET | Refills: 1 | Status: SHIPPED | OUTPATIENT
Start: 2021-05-05 | End: 2021-12-01

## 2021-05-05 RX ORDER — ATENOLOL 25 MG/1
25 TABLET ORAL 2 TIMES DAILY
Qty: 180 TABLET | Refills: 1 | Status: SHIPPED | OUTPATIENT
Start: 2021-05-05 | End: 2021-09-08 | Stop reason: SDUPTHER

## 2021-05-05 RX ORDER — GLIMEPIRIDE 2 MG/1
2 TABLET ORAL
Qty: 90 TABLET | Refills: 1 | Status: SHIPPED | OUTPATIENT
Start: 2021-05-05 | End: 2021-09-08 | Stop reason: SDUPTHER

## 2021-05-05 NOTE — PROGRESS NOTES
"Chief Complaint  Hypertension, Hyperlipidemia, and Diabetes    Subjective          Missy Mcgraw presents to [facility]  History of Present Illness  HPI: Patient is here to follow up on the blood pressure  The patient is taking the blood pressure medications as prescribed and has had no side effects. The patient is also here to follow up on the cholesterol and is trying to follow a diet. The patient is also here to follow on sugar and had lab work done .  She is currently not taking any medications for her sugar, the patient also needs refills on medications .  She is noted to have lost 8 lbs since the last visit , colonoscopy last done 2019   Hypertension   Pertinent negatives include no chest pain, palpitations or shortness of breath.   Hyperlipidemia   Pertinent negatives include no chest pain or shortness of breath.   Diabetes   Pertinent negatives for hypoglycemia include no dizziness, nervousness/anxiousness or pallor. Pertinent negatives for diabetes include no chest pain, no shortness of breath  Patient is on acei/arb     Objective   Vital Signs:   /75   Pulse 66   Temp 98 °F (36.7 °C)   Resp 16   Ht 152.4 cm (60\")   Wt 85.7 kg (189 lb)   SpO2 96%   BMI 36.91 kg/m²     Physical Exam  Vitals and nursing note reviewed.   Constitutional:       General: She is not in acute distress.     Appearance: Normal appearance. She is not diaphoretic.   HENT:      Head: Normocephalic and atraumatic.      Right Ear: External ear normal.      Left Ear: External ear normal.      Nose: Nose normal.   Eyes:      Extraocular Movements: Extraocular movements intact.      Conjunctiva/sclera: Conjunctivae normal.   Neck:      Trachea: Trachea normal.   Cardiovascular:      Rate and Rhythm: Normal rate and regular rhythm.      Heart sounds: Normal heart sounds.   Pulmonary:      Effort: Pulmonary effort is normal. No respiratory distress.      Breath sounds: Normal breath sounds. "   Abdominal:      General: Abdomen is flat.   Musculoskeletal:      Cervical back: Neck supple.      Comments: Moves all limbs   Skin:     General: Skin is warm.   Neurological:      Mental Status: She is alert and oriented to person, place, and time.      Comments: No gross motor or sensory deficits        Result Review :     Common labs    Common Labsle 8/28/20 8/28/20 8/28/20 8/28/20 1/4/21 1/4/21 1/4/21 1/4/21 1/4/21 5/3/21 5/3/21 5/3/21 5/3/21 5/3/21    0814 0814 0814 0814 1000 1000 1000 1000 1000 0829 0829 0829 0829 0829   Glucose  161 (A)    145 (A)     133 (A)      BUN  19    20     30 (A)      Creatinine  0.84    0.92     0.86      eGFR Non  Am  68    61     66      eGFR  Am  82    74     80      Sodium  139    136     140      Potassium  4.5    4.5     4.9      Chloride  100    100     101      Calcium  9.7    9.7     10.1      Total Protein  6.6    6.8     6.8      Albumin  4.60    4.50     4.50      Total Bilirubin  0.4    0.4     0.4      Alkaline Phosphatase  101    90     82      AST (SGOT)  12    9     10      ALT (SGPT)  9    9     7      WBC 8.52    8.04     8.35       Hemoglobin 15.1    14.5     14.7       Hematocrit 45.0    43.2     44.1       Platelets 224    221     239       Total Cholesterol   220 (A)    208 (A)     191     Triglycerides   278 (A)    238 (A)     164 (A)     HDL Cholesterol   53    57     59     LDL Cholesterol    111 (A)    110 (A)     104 (A)     Hemoglobin A1C    6.60 (A)    6.70 (A)     7.00 (A)    Microalbumin, Urine         61.4     17.0   (A) Abnormal value       Comments are available for some flowsheets but are not being displayed.                     Assessment and Plan    Diagnoses and all orders for this visit:    1. Benign essential hypertension (Primary)  -     amLODIPine (NORVASC) 5 MG tablet; Take 1 tablet by mouth Every Night.  Dispense: 90 tablet; Refill: 1  -     atenolol (TENORMIN) 25 MG tablet; Take 1 tablet by mouth 2 (Two) Times a Day.   Dispense: 180 tablet; Refill: 1  -     lisinopril-hydrochlorothiazide (PRINZIDE,ZESTORETIC) 20-12.5 MG per tablet; Take 1 tablet by mouth Daily.  Dispense: 90 tablet; Refill: 1    2. Mixed hyperlipidemia  -     CBC & Differential  -     Comprehensive Metabolic Panel  -     Lipid Panel    3. Controlled type 2 diabetes mellitus without complication, without long-term current use of insulin (CMS/[facility])  -     Discontinue: glimepiride (Amaryl) 2 MG tablet; Take 1 tablet by mouth Every Morning Before Breakfast.  Dispense: 90 tablet; Refill: 1  -     Hemoglobin A1c  -     Microalbumin / Creatinine Urine Ratio - Urine, Clean Catch  -     glimepiride (Amaryl) 2 MG tablet; Take 1 tablet by mouth Every Morning Before Breakfast.  Dispense: 90 tablet; Refill: 1    Plan:  1.  Benign essential hypertension: Will continue current medication, low-sodium diet advised, Counseled to regularly check BP at home with goal averaging <130/80.   2.mixed hyperlipidemia: Reviewed fasting CMP and lipid panel.  Diet and exercise counseled,  Will continue current medications  3. Diabetes  Mellitus  : Reviewed fasting CMP  and hba1c  7.0  , diet and exercise counseled , Will  Start amaryl 2mg po qd  4.  Weight loss : last colonoscopy 2019  , will monitor , labs reviewed   I spent 30 minutes caring for Missy on this date of service. This time includes time spent by me in the following activities:preparing for the visit, reviewing tests, performing a medically appropriate examination and/or evaluation , counseling and educating the patient/family/caregiver, ordering medications, tests, or procedures and documenting information in the medical record  Follow Up   Return in about 17 weeks (around 9/1/2021).  Patient was given instructions and counseling regarding her condition or for health maintenance advice. Please see specific information pulled into the AVS if appropriate.

## 2021-05-05 NOTE — PATIENT INSTRUCTIONS
MyPlate from USDA    MyPlate is an outline of a general healthy diet based on the 2010 Dietary Guidelines for Americans, from the U.S. Department of Agriculture (USDA). It sets guidelines for how much food you should eat from each food group based on your age, sex, and level of physical activity.  What are tips for following MyPlate?  To follow MyPlate recommendations:  · Eat a wide variety of fruits and vegetables, grains, and protein foods.  · Serve smaller portions and eat less food throughout the day.  · Limit portion sizes to avoid overeating.  · Enjoy your food.  · Get at least 150 minutes of exercise every week. This is about 30 minutes each day, 5 or more days per week.  It can be difficult to have every meal look like MyPlate. Think about MyPlate as eating guidelines for an entire day, rather than each individual meal.  Fruits and vegetables  · Make half of your plate fruits and vegetables.  · Eat many different colors of fruits and vegetables each day.  · For a 2,000 calorie daily food plan, eat:  ? 2½ cups of vegetables every day.  ? 2 cups of fruit every day.  · 1 cup is equal to:  ? 1 cup raw or cooked vegetables.  ? 1 cup raw fruit.  ? 1 medium-sized orange, apple, or banana.  ? 1 cup 100% fruit or vegetable juice.  ? 2 cups raw leafy greens, such as lettuce, spinach, or kale.  ? ½ cup dried fruit.  Grains  · One fourth of your plate should be grains.  · Make at least half of the grains you eat each day whole grains.  · For a 2,000 calorie daily food plan, eat 6 oz of grains every day.  · 1 oz is equal to:  ? 1 slice bread.  ? 1 cup cereal.  ? ½ cup cooked rice, cereal, or pasta.  Protein  · One fourth of your plate should be protein.  · Eat a wide variety of protein foods, including meat, poultry, fish, eggs, beans, nuts, and tofu.  · For a 2,000 calorie daily food plan, eat 5½ oz of protein every day.  · 1 oz is equal to:  ? 1 oz meat, poultry, or fish.  ? ¼ cup cooked beans.  ? 1 egg.  ? ½ oz nuts  or seeds.  ? 1 Tbsp peanut butter.  Dairy  · Drink fat-free or low-fat (1%) milk.  · Eat or drink dairy as a side to meals.  · For a 2,000 calorie daily food plan, eat or drink 3 cups of dairy every day.  · 1 cup is equal to:  ? 1 cup milk, yogurt, cottage cheese, or soy milk (soy beverage).  ? 2 oz processed cheese.  ? 1½ oz natural cheese.  Fats, oils, salt, and sugars  · Only small amounts of oils are recommended.  · Avoid foods that are high in calories and low in nutritional value (empty calories), like foods high in fat or added sugars.  · Choose foods that are low in salt (sodium). Choose foods that have less than 140 milligrams (mg) of sodium per serving.  · Drink water instead of sugary drinks. Drink enough water each day to keep your urine pale yellow.  Where to find support  · Work with your health care provider or a nutrition specialist (dietitian) to develop a customized eating plan that is right for you.  · Download an danny (mobile application) to help you track your daily food intake.  Where to find more information  · Go to ChooseMyPlate.gov for more information.  Summary  · MyPlate is a general guideline for healthy eating from the USDA. It is based on the 2010 Dietary Guidelines for Americans.  · In general, fruits and vegetables should take up ½ of your plate, grains should take up ¼ of your plate, and protein should take up ¼ of your plate.  This information is not intended to replace advice given to you by your health care provider. Make sure you discuss any questions you have with your health care provider.  Document Revised: 05/21/2020 Document Reviewed: 03/19/2018  Elsevier Patient Education © 2021 [employer]

## 2021-05-18 ENCOUNTER — HOSPITAL ENCOUNTER (EMERGENCY)
Facility: HOSPITAL | Age: 71
Discharge: HOME | End: 2021-05-19
Attending: EMERGENCY MEDICINE | Admitting: EMERGENCY MEDICINE

## 2021-05-18 VITALS
HEART RATE: 73 BPM | HEIGHT: 60 IN | WEIGHT: 190.6 LBS | SYSTOLIC BLOOD PRESSURE: 132 MMHG | OXYGEN SATURATION: 97 % | TEMPERATURE: 98 F | RESPIRATION RATE: 18 BRPM | DIASTOLIC BLOOD PRESSURE: 60 MMHG | BODY MASS INDEX: 37.42 KG/M2

## 2021-05-18 DIAGNOSIS — M54.16 LUMBAR RADICULOPATHY: Primary | ICD-10-CM

## 2021-05-18 DIAGNOSIS — M54.32 SCIATICA OF LEFT SIDE: ICD-10-CM

## 2021-05-18 PROCEDURE — 96374 THER/PROPH/DIAG INJ IV PUSH: CPT

## 2021-05-18 PROCEDURE — 99283 EMERGENCY DEPT VISIT LOW MDM: CPT

## 2021-05-18 PROCEDURE — 96372 THER/PROPH/DIAG INJ SC/IM: CPT

## 2021-05-18 PROCEDURE — 25010000002 KETOROLAC TROMETHAMINE PER 15 MG: Performed by: EMERGENCY MEDICINE

## 2021-05-18 PROCEDURE — 96375 TX/PRO/DX INJ NEW DRUG ADDON: CPT

## 2021-05-18 PROCEDURE — 25010000002 HYDROMORPHONE 1 MG/ML SOLUTION: Performed by: EMERGENCY MEDICINE

## 2021-05-18 PROCEDURE — 25010000002 ONDANSETRON PER 1 MG: Performed by: EMERGENCY MEDICINE

## 2021-05-18 PROCEDURE — 25010000002 DIAZEPAM PER 5 MG: Performed by: EMERGENCY MEDICINE

## 2021-05-18 RX ORDER — KETOROLAC TROMETHAMINE 30 MG/ML
30 INJECTION, SOLUTION INTRAMUSCULAR; INTRAVENOUS ONCE
Status: COMPLETED | OUTPATIENT
Start: 2021-05-18 | End: 2021-05-18

## 2021-05-18 RX ORDER — DIAZEPAM 5 MG/ML
10 INJECTION, SOLUTION INTRAMUSCULAR; INTRAVENOUS ONCE
Status: COMPLETED | OUTPATIENT
Start: 2021-05-18 | End: 2021-05-18

## 2021-05-18 RX ORDER — HYDROCODONE BITARTRATE AND ACETAMINOPHEN 5; 325 MG/1; MG/1
1 TABLET ORAL EVERY 6 HOURS PRN
Qty: 10 TABLET | Refills: 0 | Status: SHIPPED | OUTPATIENT
Start: 2021-05-18 | End: 2021-09-08

## 2021-05-18 RX ORDER — NAPROXEN 250 MG/1
250 TABLET ORAL 2 TIMES DAILY PRN
Qty: 10 TABLET | Refills: 0 | Status: SHIPPED | OUTPATIENT
Start: 2021-05-18 | End: 2021-09-08

## 2021-05-18 RX ORDER — ONDANSETRON 2 MG/ML
4 INJECTION INTRAMUSCULAR; INTRAVENOUS ONCE
Status: COMPLETED | OUTPATIENT
Start: 2021-05-18 | End: 2021-05-18

## 2021-05-18 RX ADMIN — HYDROMORPHONE HYDROCHLORIDE 1 MG: 1 INJECTION, SOLUTION INTRAMUSCULAR; INTRAVENOUS; SUBCUTANEOUS at 22:50

## 2021-05-18 RX ADMIN — KETOROLAC TROMETHAMINE 30 MG: 30 INJECTION, SOLUTION INTRAMUSCULAR at 22:17

## 2021-05-18 RX ADMIN — ONDANSETRON 4 MG: 2 INJECTION INTRAMUSCULAR; INTRAVENOUS at 22:50

## 2021-05-18 RX ADMIN — DIAZEPAM 10 MG: 5 INJECTION, SOLUTION INTRAMUSCULAR; INTRAVENOUS at 22:16

## 2021-05-20 DIAGNOSIS — G89.4 CHRONIC PAIN SYNDROME: Primary | ICD-10-CM

## 2021-06-28 DIAGNOSIS — I10 BENIGN ESSENTIAL HYPERTENSION: ICD-10-CM

## 2021-06-28 RX ORDER — LISINOPRIL AND HYDROCHLOROTHIAZIDE 20; 12.5 MG/1; MG/1
1 TABLET ORAL 2 TIMES DAILY
Qty: 180 TABLET | Refills: 1 | Status: SHIPPED | OUTPATIENT
Start: 2021-06-28 | End: 2021-09-08 | Stop reason: SDUPTHER

## 2021-09-08 ENCOUNTER — OFFICE VISIT (OUTPATIENT)
Dept: INTERNAL MEDICINE | Facility: CLINIC | Age: 71
End: 2021-09-08

## 2021-09-08 VITALS
TEMPERATURE: 98.6 F | HEART RATE: 61 BPM | OXYGEN SATURATION: 98 % | HEIGHT: 60 IN | BODY MASS INDEX: 38.09 KG/M2 | SYSTOLIC BLOOD PRESSURE: 149 MMHG | WEIGHT: 194 LBS | DIASTOLIC BLOOD PRESSURE: 76 MMHG

## 2021-09-08 DIAGNOSIS — E78.2 MIXED HYPERLIPIDEMIA: ICD-10-CM

## 2021-09-08 DIAGNOSIS — E11.9 CONTROLLED TYPE 2 DIABETES MELLITUS WITHOUT COMPLICATION, WITHOUT LONG-TERM CURRENT USE OF INSULIN (HCC): ICD-10-CM

## 2021-09-08 DIAGNOSIS — I10 BENIGN ESSENTIAL HYPERTENSION: Primary | ICD-10-CM

## 2021-09-08 DIAGNOSIS — R11.0 NAUSEA: ICD-10-CM

## 2021-09-08 DIAGNOSIS — R42 VERTIGO: ICD-10-CM

## 2021-09-08 LAB
ALBUMIN SERPL-MCNC: 4.3 G/DL (ref 3.5–5.2)
ALBUMIN/CREAT UR: 39 MG/G CREAT (ref 0–29)
ALBUMIN/GLOB SERPL: 2 G/DL
ALP SERPL-CCNC: 82 U/L (ref 39–117)
ALT SERPL-CCNC: <5 U/L (ref 1–33)
AST SERPL-CCNC: 14 U/L (ref 1–32)
BASOPHILS # BLD AUTO: 0.04 10*3/MM3 (ref 0–0.2)
BASOPHILS NFR BLD AUTO: 0.5 % (ref 0–1.5)
BILIRUB SERPL-MCNC: 0.3 MG/DL (ref 0–1.2)
BUN SERPL-MCNC: 22 MG/DL (ref 8–23)
BUN/CREAT SERPL: 28.2 (ref 7–25)
CALCIUM SERPL-MCNC: 9.7 MG/DL (ref 8.6–10.5)
CHLORIDE SERPL-SCNC: 103 MMOL/L (ref 98–107)
CHOLEST SERPL-MCNC: 167 MG/DL (ref 0–200)
CO2 SERPL-SCNC: 25.4 MMOL/L (ref 22–29)
CREAT SERPL-MCNC: 0.78 MG/DL (ref 0.57–1)
CREAT UR-MCNC: 83.4 MG/DL
EOSINOPHIL # BLD AUTO: 0.18 10*3/MM3 (ref 0–0.4)
EOSINOPHIL NFR BLD AUTO: 2.4 % (ref 0.3–6.2)
ERYTHROCYTE [DISTWIDTH] IN BLOOD BY AUTOMATED COUNT: 11.8 % (ref 12.3–15.4)
GLOBULIN SER CALC-MCNC: 2.1 GM/DL
GLUCOSE SERPL-MCNC: 141 MG/DL (ref 65–99)
HBA1C MFR BLD: 5.8 % (ref 4.8–5.6)
HCT VFR BLD AUTO: 40.7 % (ref 34–46.6)
HDLC SERPL-MCNC: 44 MG/DL (ref 40–60)
HGB BLD-MCNC: 13.5 G/DL (ref 12–15.9)
IMM GRANULOCYTES # BLD AUTO: 0.04 10*3/MM3 (ref 0–0.05)
IMM GRANULOCYTES NFR BLD AUTO: 0.5 % (ref 0–0.5)
LDLC SERPL CALC-MCNC: 88 MG/DL (ref 0–100)
LYMPHOCYTES # BLD AUTO: 1.72 10*3/MM3 (ref 0.7–3.1)
LYMPHOCYTES NFR BLD AUTO: 22.9 % (ref 19.6–45.3)
MCH RBC QN AUTO: 29.5 PG (ref 26.6–33)
MCHC RBC AUTO-ENTMCNC: 33.2 G/DL (ref 31.5–35.7)
MCV RBC AUTO: 89.1 FL (ref 79–97)
MICROALBUMIN UR-MCNC: 32.2 UG/ML
MONOCYTES # BLD AUTO: 0.71 10*3/MM3 (ref 0.1–0.9)
MONOCYTES NFR BLD AUTO: 9.4 % (ref 5–12)
NEUTROPHILS # BLD AUTO: 4.83 10*3/MM3 (ref 1.7–7)
NEUTROPHILS NFR BLD AUTO: 64.3 % (ref 42.7–76)
NRBC BLD AUTO-RTO: 0 /100 WBC (ref 0–0.2)
PLATELET # BLD AUTO: 210 10*3/MM3 (ref 140–450)
POTASSIUM SERPL-SCNC: 4.5 MMOL/L (ref 3.5–5.2)
PROT SERPL-MCNC: 6.4 G/DL (ref 6–8.5)
RBC # BLD AUTO: 4.57 10*6/MM3 (ref 3.77–5.28)
SODIUM SERPL-SCNC: 140 MMOL/L (ref 136–145)
TRIGL SERPL-MCNC: 205 MG/DL (ref 0–150)
VLDLC SERPL CALC-MCNC: 35 MG/DL (ref 5–40)
WBC # BLD AUTO: 7.52 10*3/MM3 (ref 3.4–10.8)

## 2021-09-08 PROCEDURE — 99214 OFFICE O/P EST MOD 30 MIN: CPT | Performed by: INTERNAL MEDICINE

## 2021-09-08 RX ORDER — ATENOLOL 25 MG/1
25 TABLET ORAL 2 TIMES DAILY
Qty: 180 TABLET | Refills: 1 | Status: SHIPPED | OUTPATIENT
Start: 2021-09-08 | End: 2022-03-28

## 2021-09-08 RX ORDER — MECLIZINE HYDROCHLORIDE 25 MG/1
25 TABLET ORAL 3 TIMES DAILY PRN
Qty: 90 TABLET | Refills: 2 | Status: SHIPPED | OUTPATIENT
Start: 2021-09-08

## 2021-09-08 RX ORDER — LISINOPRIL AND HYDROCHLOROTHIAZIDE 20; 12.5 MG/1; MG/1
1 TABLET ORAL 2 TIMES DAILY
Qty: 180 TABLET | Refills: 1 | Status: SHIPPED | OUTPATIENT
Start: 2021-09-08 | End: 2022-03-10

## 2021-09-08 RX ORDER — ONDANSETRON 4 MG/1
4 TABLET, FILM COATED ORAL EVERY 8 HOURS PRN
Qty: 30 TABLET | Refills: 1 | Status: SHIPPED | OUTPATIENT
Start: 2021-09-08 | End: 2021-10-08

## 2021-09-08 RX ORDER — ROSUVASTATIN CALCIUM 5 MG/1
5 TABLET, COATED ORAL NIGHTLY
Qty: 90 TABLET | Refills: 2 | Status: SHIPPED | OUTPATIENT
Start: 2021-09-08 | End: 2022-06-27

## 2021-09-08 RX ORDER — GLIMEPIRIDE 1 MG/1
1 TABLET ORAL DAILY
Qty: 90 TABLET | Refills: 2 | Status: SHIPPED | OUTPATIENT
Start: 2021-09-08 | End: 2022-08-15 | Stop reason: SDUPTHER

## 2021-09-08 NOTE — PROGRESS NOTES
"Chief Complaint  Hypertension (follow up, went to [place] [facility] ), Hyperlipidemia, and Diabetes    Subjective          Missy Mcgraw presents to [facility]  History of Present Illness  HPI: Patient is here to follow up on the blood pressure  The patient is taking the blood pressure medications as prescribed and has had no side effects. The patient is also here to follow up on the cholesterol and is trying to follow a diet. The patient is  also here to follow up on sugar and had  lab work done .  The patient also needs refills on medications .  She was seen by pain medication for her chronic pain and she also follows up with [facility] orthopedics, she complains of vertigo  episodes intermittently with associated nausea and has used meclizine for it in the past which has helped her symptoms  Hyperlipidemia   Pertinent negatives include no chest pain or shortness of breath.   Hypertension   Pertinent negatives include no chest pain, palpitations or shortness of breath.  Answers for HPI/ROS submitted by the patient on 9/5/2021  Please describe your symptoms.: Followup bloodwork for diabetes and general checkup. Have been having ankle bone flair up with redness, swelling and pain.  Have you had these symptoms before?: Yes  How long have you been having these symptoms?: Greater than 2 weeks  Please list any medications you are currently taking for this condition.: Tylenol  What is the primary reason for your visit?: Other      Objective   Vital Signs:   /76   Pulse 61   Temp 98.6 °F (37 °C) (Infrared)   Ht 152.4 cm (60\")   Wt 88 kg (194 lb)   SpO2 98%   BMI 37.89 kg/m²     Physical Exam  Vitals and nursing note reviewed.   Constitutional:       General: She is not in acute distress.     Appearance: Normal appearance. She is not diaphoretic.   HENT:      Head: Normocephalic and atraumatic.      Right Ear: External ear normal.      Left Ear: External ear normal.      Nose: Nose " normal.   Eyes:      Extraocular Movements: Extraocular movements intact.      Conjunctiva/sclera: Conjunctivae normal.   Neck:      Trachea: Trachea normal.   Cardiovascular:      Rate and Rhythm: Normal rate and regular rhythm.      Heart sounds: Normal heart sounds.   Pulmonary:      Effort: Pulmonary effort is normal. No respiratory distress.      Breath sounds: Normal breath sounds.   Abdominal:      General: Abdomen is flat.   Musculoskeletal:         General: Deformity present.      Cervical back: Neck supple.      Comments: Moves all limbs   Skin:     General: Skin is warm.   Neurological:      Mental Status: She is alert and oriented to person, place, and time.      Comments: No gross motor or sensory deficits        Result Review :     Common labs    Common Labsle 1/4/21 1/4/21 1/4/21 1/4/21 1/4/21 5/3/21 5/3/21 5/3/21 5/3/21 5/3/21 9/7/21 9/7/21 9/7/21 9/7/21 9/7/21    1000 1000 1000 1000 1000 0829 0829 0829 0829 0829 0835 0835 0835 0835 0835   Glucose  145 (A)     133 (A)     141 (A)      BUN  20     30 (A)     22      Creatinine  0.92     0.86     0.78      eGFR Non  Am  61     66     73      eGFR  Am  74     80     89      Sodium  136     140     140      Potassium  4.5     4.9     4.5      Chloride  100     101     103      Calcium  9.7     10.1     9.7      Total Protein  6.8     6.8     6.4      Albumin  4.50     4.50     4.30      Total Bilirubin  0.4     0.4     0.3      Alkaline Phosphatase  90     82     82      AST (SGOT)  9     10     14      ALT (SGPT)  9     7     <5      WBC 8.04     8.35     7.52       Hemoglobin 14.5     14.7     13.5       Hematocrit 43.2     44.1     40.7       Platelets 221     239     210       Total Cholesterol   208 (A)     191     167     Triglycerides   238 (A)     164 (A)     205 (A)     HDL Cholesterol   57     59     44     LDL Cholesterol    110 (A)     104 (A)     88     Hemoglobin A1C    6.70 (A)     7.00 (A)     5.80 (A)    Microalbumin, Urine      61.4     17.0     32.2   (A) Abnormal value       Comments are available for some flowsheets but are not being displayed.                     Assessment and Plan    Diagnoses and all orders for this visit:    1. Benign essential hypertension (Primary)  -     atenolol (TENORMIN) 25 MG tablet; Take 1 tablet by mouth 2 (Two) Times a Day.  Dispense: 180 tablet; Refill: 1  -     lisinopril-hydrochlorothiazide (PRINZIDE,ZESTORETIC) 20-12.5 MG per tablet; Take 1 tablet by mouth 2 (two) times a day.  Dispense: 180 tablet; Refill: 1    2. Mixed hyperlipidemia  -     CBC & Differential  -     Comprehensive Metabolic Panel  -     Lipid Panel    3. Controlled type 2 diabetes mellitus without complication, without long-term current use of insulin (CMS/[facility])  -     glimepiride (Amaryl) 1 MG tablet; Take 1 tablet by mouth Daily.  Dispense: 90 tablet; Refill: 2  -     Hemoglobin A1c    4. Vertigo  -     meclizine (ANTIVERT) 25 MG tablet; Take 1 tablet by mouth 3 (Three) Times a Day As Needed (vertigo).  Dispense: 90 tablet; Refill: 2    5. Nausea  -     ondansetron (ZOFRAN) 4 MG tablet; Take 1 tablet by mouth Every 8 (Eight) Hours As Needed for Nausea or Vomiting for up to 30 days.  Dispense: 30 tablet; Refill: 1    Other orders  -     rosuvastatin (CRESTOR) 5 MG tablet; Take 1 tablet by mouth Every Night.  Dispense: 90 tablet; Refill: 2    Plan:  1.  Benign essential hypertension: Will continue current medication, low-sodium diet advised, Counseled to regularly check BP at home with goal averaging <130/80.   2.mixed hyperlipidemia: Reviewed fasting CMP and lipid panel.  Diet and exercise counseled,  Will continue current medications  3. Diabetes  Mellitus  : Reviewed   fasting CMP  and hba1c 5.8, diet and exercise counseled , Will decrease to glimepiride 1 mg daily  4.  Vertigo: As needed meclizine  5.  Nausea: As needed antiemetic  I spent 30 minutes caring for Missy on this date of service. This time includes time spent by  me in the following activities:preparing for the visit, reviewing tests, performing a medically appropriate examination and/or evaluation , counseling and educating the patient/family/caregiver, ordering medications, tests, or procedures and documenting information in the medical record  Follow Up    Patient was given instructions and counseling regarding her condition or for health maintenance advice. Please see specific information pulled into the AVS if appropriate.

## 2021-09-08 NOTE — DISCHARGE INSTRUCTIONS
No pushing, pulling, tugging,  heavy lifting, or strenuous activity.  No major decision making, driving, or drinking alcoholic beverages for 24 hours. ( due to the medications you have  received)  Always use good hand hygiene/washing techniques.  NO driving while taking pain medications.To assist you in voiding:  Drink plenty of fluids  Listen to running water while attempting to void.    If you are unable to urinate and you have an uncomfortable urge to void or it has been   6 hours since you were discharged, return to the Emergency Room   4

## 2021-10-05 ENCOUNTER — TRANSCRIBE ORDERS (OUTPATIENT)
Dept: ADMINISTRATIVE | Facility: HOSPITAL | Age: 71
End: 2021-10-05

## 2021-10-05 DIAGNOSIS — Z12.31 VISIT FOR SCREENING MAMMOGRAM: Primary | ICD-10-CM

## 2021-10-12 RX ORDER — CONJUGATED ESTROGENS 0.62 MG/G
CREAM VAGINAL
Qty: 30 G | Refills: 0 | OUTPATIENT
Start: 2021-10-12

## 2021-10-13 RX ORDER — CITALOPRAM 40 MG/1
TABLET ORAL
Qty: 90 TABLET | Refills: 3 | Status: SHIPPED | OUTPATIENT
Start: 2021-10-13 | End: 2022-10-10

## 2021-10-13 NOTE — TELEPHONE ENCOUNTER
Rx Refill Note  Requested Prescriptions     Pending Prescriptions Disp Refills   • citalopram (CeleXA) 40 MG tablet [Pharmacy Med Name: CITALOPRAM HYDROBROMIDE TABS 40MG] 90 tablet 3     Sig: TAKE 1 TABLET DAILY      Last office visit with prescribing clinician: 9/8/2021      Next office visit with prescribing clinician: 1/6/2022            Farnaz Laughlin MA  10/13/21, 09:55 EDT

## 2021-10-14 ENCOUNTER — HOSPITAL ENCOUNTER (OUTPATIENT)
Dept: MAMMOGRAPHY | Facility: HOSPITAL | Age: 71
Discharge: HOME | End: 2021-10-14
Admitting: INTERNAL MEDICINE

## 2021-10-14 DIAGNOSIS — Z12.31 VISIT FOR SCREENING MAMMOGRAM: ICD-10-CM

## 2021-10-14 PROCEDURE — 77067 SCR MAMMO BI INCL CAD: CPT

## 2021-10-14 PROCEDURE — 77063 BREAST TOMOSYNTHESIS BI: CPT

## 2021-10-27 ENCOUNTER — TELEPHONE (OUTPATIENT)
Dept: OBSTETRICS AND GYNECOLOGY | Facility: CLINIC | Age: 71
End: 2021-10-27

## 2021-10-28 RX ORDER — CONJUGATED ESTROGENS 0.62 MG/G
CREAM VAGINAL
Qty: 30 G | Refills: 0 | Status: SHIPPED | OUTPATIENT
Start: 2021-10-28 | End: 2021-12-09 | Stop reason: SDUPTHER

## 2021-12-01 DIAGNOSIS — I10 BENIGN ESSENTIAL HYPERTENSION: ICD-10-CM

## 2021-12-01 RX ORDER — AMLODIPINE BESYLATE 5 MG/1
TABLET ORAL
Qty: 90 TABLET | Refills: 3 | Status: SHIPPED | OUTPATIENT
Start: 2021-12-01 | End: 2022-11-28

## 2021-12-01 NOTE — TELEPHONE ENCOUNTER
Rx Refill Note  Requested Prescriptions     Pending Prescriptions Disp Refills   • amLODIPine (NORVASC) 5 MG tablet [Pharmacy Med Name: AMLODIPINE BESYLATE TABS 5MG] 90 tablet 3     Sig: TAKE 1 TABLET EVERY NIGHT      Last office visit with prescribing clinician: 9/8/2021      Next office visit with prescribing clinician: 1/6/2022            Hoang Hameed MA  12/01/21, 08:23 EST

## 2021-12-09 ENCOUNTER — OFFICE VISIT (OUTPATIENT)
Dept: OBSTETRICS AND GYNECOLOGY | Facility: CLINIC | Age: 71
End: 2021-12-09

## 2021-12-09 VITALS
BODY MASS INDEX: 37.5 KG/M2 | SYSTOLIC BLOOD PRESSURE: 152 MMHG | HEIGHT: 60 IN | DIASTOLIC BLOOD PRESSURE: 70 MMHG | WEIGHT: 191 LBS

## 2021-12-09 DIAGNOSIS — Z01.419 ENCOUNTER FOR GYNECOLOGICAL EXAMINATION WITHOUT ABNORMAL FINDING: Primary | ICD-10-CM

## 2021-12-09 DIAGNOSIS — Z12.4 SCREENING FOR CERVICAL CANCER: ICD-10-CM

## 2021-12-09 DIAGNOSIS — Z76.0 MEDICATION REFILL: ICD-10-CM

## 2021-12-09 DIAGNOSIS — R39.15 URINARY URGENCY: ICD-10-CM

## 2021-12-09 DIAGNOSIS — N39.46 MIXED INCONTINENCE: ICD-10-CM

## 2021-12-09 LAB
BILIRUB BLD-MCNC: NEGATIVE MG/DL
CLARITY, POC: CLEAR
COLOR UR: NORMAL
EXPIRATION DATE: NORMAL
GLUCOSE UR STRIP-MCNC: NEGATIVE MG/DL
KETONES UR QL: NEGATIVE
LEUKOCYTE EST, POC: NEGATIVE
Lab: NORMAL
NITRITE UR-MCNC: NEGATIVE MG/ML
PH UR: 6.5 [PH] (ref 5–8)
PROT UR STRIP-MCNC: NEGATIVE MG/DL
RBC # UR STRIP: NEGATIVE /UL
SP GR UR: 1.01 (ref 1–1.03)
UROBILINOGEN UR QL: NORMAL

## 2021-12-09 PROCEDURE — G0101 CA SCREEN;PELVIC/BREAST EXAM: HCPCS | Performed by: PHYSICIAN ASSISTANT

## 2021-12-09 PROCEDURE — 81003 URINALYSIS AUTO W/O SCOPE: CPT | Performed by: PHYSICIAN ASSISTANT

## 2021-12-09 PROCEDURE — 99213 OFFICE O/P EST LOW 20 MIN: CPT | Performed by: PHYSICIAN ASSISTANT

## 2021-12-09 PROCEDURE — 3015F CERV CANCER SCREEN DOCD: CPT | Performed by: PHYSICIAN ASSISTANT

## 2021-12-09 RX ORDER — CONJUGATED ESTROGENS 0.62 MG/G
CREAM VAGINAL
Qty: 30 G | Refills: 4 | Status: SHIPPED | OUTPATIENT
Start: 2021-12-09 | End: 2022-07-11 | Stop reason: ALTCHOICE

## 2021-12-09 RX ORDER — CLOBETASOL PROPIONATE 0.5 MG/G
OINTMENT TOPICAL 2 TIMES DAILY
Qty: 60 G | Refills: 1 | Status: SHIPPED | OUTPATIENT
Start: 2021-12-09

## 2021-12-09 NOTE — PROGRESS NOTES
"Subjective   Chief Complaint   Patient presents with   • Gynecologic Exam     Last pap done 18-WNL, MMG done 10/14/21-WNL, NO complaints   • Vaginal Itching     C/O vaginal itching, urgency       Missy Mcgraw is a 71 y.o. year old  presenting to be seen for her annual gynecological exam.   Patient had a normal screening mammogram in October.  She is desiring refills on Premarin vaginal cream and clobetasol cream.  She ran out of Premarin cream a few weeks ago and has been out of clobetasol cream for several months.  Has used clobetasol for history of lichen sclerosis  When she is using her Premarin cream and clobetasol she does not have any vulvar or vaginal itching or irritation  Reports that she is having some urinary urgency and frequency as well as occasional urine incontinence.  She sometimes will leak urine when she changes position and gets up to a standing position.  She uses panty liners most every day for fear of incontinence.    Past Medical History:   Diagnosis Date   • Acute maxillary sinusitis    • Allergic    • Arthritis    • Cancer (HCC) Skin   • Cataract    • Colon polyp    • Depression    • Diabetes mellitus (HCC)    • Esophagitis, reflux    • Fibromyositis    • GERD (gastroesophageal reflux disease)    • H/O colonoscopy    • Herpes zoster    • History of mammogram    • Injury of back    • Low back pain    • Migraine headache    • Obesity    • Osteopenia    • Osteoporosis    • Pap smear for cervical cancer screening    • PONV (postoperative nausea and vomiting)    • Restless legs syndrome    • Screening     DEXA body composition study   • TIA (transient ischemic attack)     \"at least 10 years ago\"   • Transient cerebral ischemia    • Urinary tract infection    • Vertigo         Current Outpatient Medications:   •  amLODIPine (NORVASC) 5 MG tablet, TAKE 1 TABLET EVERY NIGHT, Disp: 90 tablet, Rfl: 3  •  aspirin (SANJU ASPIRIN EC LOW DOSE) 81 MG EC tablet, Take 1 tablet by mouth " daily., Disp: , Rfl:   •  atenolol (TENORMIN) 25 MG tablet, Take 1 tablet by mouth 2 (Two) Times a Day., Disp: 180 tablet, Rfl: 1  •  baclofen (LIORESAL) 10 MG tablet, TAKE ONE TABLET BY MOUTH ONCE NIGHTLY AS NEEDED FOR MUSCLE SPASMS, Disp: 30 tablet, Rfl: 1  •  carbidopa-levodopa CR (SINEMET CR)  MG per CR tablet, TAKE 1 TABLET EVERY NIGHT, Disp: 90 tablet, Rfl: 3  •  cholecalciferol (VITAMIN D3) 1000 units tablet, Take 1,000 Units by mouth Daily., Disp: , Rfl:   •  citalopram (CeleXA) 40 MG tablet, TAKE 1 TABLET DAILY, Disp: 90 tablet, Rfl: 3  •  clobetasol (TEMOVATE) 0.05 % ointment, Apply  topically to the appropriate area as directed 2 (Two) Times a Day., Disp: 60 g, Rfl: 1  •  conjugated estrogens (Premarin) 0.625 MG/GM vaginal cream, USE 0.5 GM VAGINALLY TWICE WEEKLY TO CONTROL SYMPTOMS, Disp: 30 g, Rfl: 4  •  glimepiride (Amaryl) 1 MG tablet, Take 1 tablet by mouth Daily., Disp: 90 tablet, Rfl: 2  •  Influenza Vac High-Dose Quad (Fluzone High-Dose Quadrivalent) 0.7 ML suspension prefilled syringe injection, Inject into the appropriate muscle as directed per protocol, Disp: 0.7 mL, Rfl: 0  •  lisinopril-hydrochlorothiazide (PRINZIDE,ZESTORETIC) 20-12.5 MG per tablet, Take 1 tablet by mouth 2 (two) times a day., Disp: 180 tablet, Rfl: 1  •  loratadine (CLARITIN) 10 MG tablet, Take 10 mg by mouth 2 (Two) Times a Day., Disp: , Rfl:   •  meclizine (ANTIVERT) 25 MG tablet, Take 1 tablet by mouth 3 (Three) Times a Day As Needed (vertigo)., Disp: 90 tablet, Rfl: 2  •  pantoprazole (PROTONIX) 40 MG EC tablet, TAKE 1 TABLET DAILY, Disp: 90 tablet, Rfl: 3  •  rosuvastatin (CRESTOR) 5 MG tablet, Take 1 tablet by mouth Every Night., Disp: 90 tablet, Rfl: 2  •  traMADol (ULTRAM) 50 MG tablet, Take 1 tablet by mouth Every 12 (Twelve) Hours As Needed for Moderate Pain ., Disp: 30 tablet, Rfl: 0   Allergies   Allergen Reactions   • Penicillins Unknown - Low Severity     Not sure of reaction (childhood)   • Atorvastatin  Unknown - Low Severity     Pt cannot remember   • Fenofibrate Unknown - Low Severity     Pt cannot remember   • Fluoxetine Unknown - Low Severity     Pt cannot remember   • Gemfibrozil Unknown - Low Severity     Pt cannot remember   • Hydroxychloroquine Unknown - Low Severity   • Metformin Diarrhea   • Nsaids GI Intolerance   • Pravastatin Unknown - Low Severity     Pt cannot remember   • Sulfamethoxazole-Trimethoprim Unknown - Low Severity     Pt cannot remember      Past Surgical History:   Procedure Laterality Date   • APPENDECTOMY     • CARDIAC CATHETERIZATION N/A 8/1/2018    Procedure: Coronary angiography;  Surgeon: Wali Núñez MD;  Location: [facility] CATH INVASIVE LOCATION;  Service: Cardiology   • COLONOSCOPY  10/2016    Dr. Colmenares   • COLONOSCOPY N/A 2/1/2019    Procedure: COLONOSCOPY;  Surgeon: Marie De Leon MD;  Location: [facility] ENDOSCOPY;  Service: Gastroenterology   • ENDOSCOPY  10/2016    Dr. Colmenares   • ENDOSCOPY N/A 2/1/2019    Procedure: ESOPHAGOGASTRODUODENOSCOPY WITH COLD FORCEP BIOPSY AND POLYPECTOMY;  Surgeon: Marie De Leon MD;  Location: [facility] ENDOSCOPY;  Service: Gastroenterology   • KNEE SURGERY Right    • LAPAROSCOPY DIAGNOSTIC / BIOPSY / ASPIRATION / LYSIS Right    • SHOULDER ARTHROSCOPY Bilateral       Social History     Socioeconomic History   • Marital status: Single   Tobacco Use   • Smoking status: Never Smoker   • Smokeless tobacco: Never Used   Vaping Use   • Vaping Use: Never used   Substance and Sexual Activity   • Alcohol use: No   • Drug use: No   • Sexual activity: Not Currently     Birth control/protection: Post-menopausal      Family History   Problem Relation Age of Onset   • Heart disease Sister    • Arthritis Other    • Diabetes Other    • Cancer Other         lung   • Cancer Father         prostate,lung,colon   • Arthritis Father    • Kidney failure Mother    • Kidney disease Mother    • Breast cancer Neg Hx        Review of Systems   Constitutional: Negative  "for chills, diaphoresis and fever.   Gastrointestinal: Negative for constipation, diarrhea, nausea and vomiting.   Genitourinary: Positive for enuresis, frequency and urgency. Negative for difficulty urinating, dysuria, pelvic pain, vaginal bleeding and vaginal discharge.           Objective   /70   Ht 152.4 cm (60\")   Wt 86.6 kg (191 lb)   Breastfeeding No   BMI 37.30 kg/m²     Physical Exam  Constitutional:       Appearance: Normal appearance. She is well-developed and well-groomed.   Eyes:      General: Lids are normal.      Extraocular Movements: Extraocular movements intact.      Conjunctiva/sclera: Conjunctivae normal.   Neck:      Thyroid: No thyroid mass or thyromegaly.   Chest:   Breasts: Breasts are symmetrical.      Right: No inverted nipple, mass, nipple discharge, skin change, tenderness or axillary adenopathy.      Left: No inverted nipple, mass, nipple discharge, skin change, tenderness or axillary adenopathy.       Abdominal:      General: There is no distension.      Palpations: Abdomen is soft. There is no hepatomegaly or splenomegaly.      Tenderness: There is no abdominal tenderness.   Genitourinary:     Exam position: Lithotomy position.      Labia:         Right: No rash, tenderness or lesion.         Left: No rash, tenderness or lesion.       Urethra: No prolapse, urethral pain, urethral swelling or urethral lesion.      Vagina: No vaginal discharge, tenderness or lesions.      Cervix: No cervical motion tenderness, discharge, friability or lesion.      Uterus: Not enlarged and not tender.       Adnexa:         Right: No mass or tenderness.          Left: No mass or tenderness.     Musculoskeletal:      Cervical back: Neck supple.   Lymphadenopathy:      Upper Body:      Right upper body: No axillary adenopathy.      Left upper body: No axillary adenopathy.   Skin:     General: Skin is warm and dry.      Findings: No lesion.   Neurological:      General: No focal deficit present.     "  Mental Status: She is alert and oriented to person, place, and time.   Psychiatric:         Attention and Perception: Attention normal.         Mood and Affect: Mood normal.         Speech: Speech normal.         Behavior: Behavior normal.         Thought Content: Thought content normal.            Result Review :                   Assessment and Plan  Diagnoses and all orders for this visit:    1. Encounter for gynecological examination without abnormal finding (Primary)    2. Screening for cervical cancer    3. Urinary urgency  -     POC Urinalysis Dipstick, Automated    4. Mixed incontinence    5. Medication refill    Other orders  -     clobetasol (TEMOVATE) 0.05 % ointment; Apply  topically to the appropriate area as directed 2 (Two) Times a Day.  Dispense: 60 g; Refill: 1  -     conjugated estrogens (Premarin) 0.625 MG/GM vaginal cream; USE 0.5 GM VAGINALLY TWICE WEEKLY TO CONTROL SYMPTOMS  Dispense: 30 g; Refill: 4      Patient Instructions   Self breast exam monthly  Annual mammogram  vit D 2000 mg daily  Regular excercise  Patient is offered the option of a trial of overactive bladder medication for her urinary urgency and mixed incontinence.  She would like to try this so samples of Myrbetriq 25 mg are given to patient.  Follow-up in 4 weeks to review.             This note was electronically signed.    Brittany Calvin PA-C   December 9, 2021

## 2021-12-09 NOTE — PATIENT INSTRUCTIONS
Self breast exam monthly  Annual mammogram  vit D 2000 mg daily  Regular excercise  Patient is offered the option of a trial of overactive bladder medication for her urinary urgency and mixed incontinence.  She would like to try this so samples of Myrbetriq 25 mg are given to patient.  Follow-up in 4 weeks to review.

## 2021-12-30 ENCOUNTER — TELEPHONE (OUTPATIENT)
Dept: OBSTETRICS AND GYNECOLOGY | Facility: CLINIC | Age: 71
End: 2021-12-30

## 2022-01-03 NOTE — TELEPHONE ENCOUNTER
Please let patient know I would encourage her to allow at least 2-3 more weeks to see if she sees any improvement.  She may either keep her follow-up as scheduled or she may push it out another 2 to 3 weeks.

## 2022-01-04 LAB
ALBUMIN SERPL-MCNC: 4.4 G/DL (ref 3.5–5.2)
ALBUMIN/GLOB SERPL: 2 G/DL
ALP SERPL-CCNC: 90 U/L (ref 39–117)
ALT SERPL-CCNC: 8 U/L (ref 1–33)
AST SERPL-CCNC: 9 U/L (ref 1–32)
BASOPHILS # BLD AUTO: 0.04 10*3/MM3 (ref 0–0.2)
BASOPHILS NFR BLD AUTO: 0.5 % (ref 0–1.5)
BILIRUB SERPL-MCNC: 0.3 MG/DL (ref 0–1.2)
BUN SERPL-MCNC: 23 MG/DL (ref 8–23)
BUN/CREAT SERPL: 25.6 (ref 7–25)
CALCIUM SERPL-MCNC: 9.8 MG/DL (ref 8.6–10.5)
CHLORIDE SERPL-SCNC: 104 MMOL/L (ref 98–107)
CHOLEST SERPL-MCNC: 185 MG/DL (ref 0–200)
CO2 SERPL-SCNC: 27.4 MMOL/L (ref 22–29)
CREAT SERPL-MCNC: 0.9 MG/DL (ref 0.57–1)
EOSINOPHIL # BLD AUTO: 0.28 10*3/MM3 (ref 0–0.4)
EOSINOPHIL NFR BLD AUTO: 3.5 % (ref 0.3–6.2)
ERYTHROCYTE [DISTWIDTH] IN BLOOD BY AUTOMATED COUNT: 12.8 % (ref 12.3–15.4)
GLOBULIN SER CALC-MCNC: 2.2 GM/DL
GLUCOSE SERPL-MCNC: 121 MG/DL (ref 65–99)
HBA1C MFR BLD: 6 % (ref 4.8–5.6)
HCT VFR BLD AUTO: 40.9 % (ref 34–46.6)
HDLC SERPL-MCNC: 52 MG/DL (ref 40–60)
HGB BLD-MCNC: 13.3 G/DL (ref 12–15.9)
IMM GRANULOCYTES # BLD AUTO: 0.04 10*3/MM3 (ref 0–0.05)
IMM GRANULOCYTES NFR BLD AUTO: 0.5 % (ref 0–0.5)
LDLC SERPL CALC-MCNC: 103 MG/DL (ref 0–100)
LYMPHOCYTES # BLD AUTO: 2.03 10*3/MM3 (ref 0.7–3.1)
LYMPHOCYTES NFR BLD AUTO: 25.3 % (ref 19.6–45.3)
MCH RBC QN AUTO: 28.4 PG (ref 26.6–33)
MCHC RBC AUTO-ENTMCNC: 32.5 G/DL (ref 31.5–35.7)
MCV RBC AUTO: 87.2 FL (ref 79–97)
MONOCYTES # BLD AUTO: 0.64 10*3/MM3 (ref 0.1–0.9)
MONOCYTES NFR BLD AUTO: 8 % (ref 5–12)
NEUTROPHILS # BLD AUTO: 4.99 10*3/MM3 (ref 1.7–7)
NEUTROPHILS NFR BLD AUTO: 62.2 % (ref 42.7–76)
NRBC BLD AUTO-RTO: 0 /100 WBC (ref 0–0.2)
PLATELET # BLD AUTO: 202 10*3/MM3 (ref 140–450)
POTASSIUM SERPL-SCNC: 4.5 MMOL/L (ref 3.5–5.2)
PROT SERPL-MCNC: 6.6 G/DL (ref 6–8.5)
RBC # BLD AUTO: 4.69 10*6/MM3 (ref 3.77–5.28)
SODIUM SERPL-SCNC: 139 MMOL/L (ref 136–145)
TRIGL SERPL-MCNC: 172 MG/DL (ref 0–150)
VLDLC SERPL CALC-MCNC: 30 MG/DL (ref 5–40)
WBC # BLD AUTO: 8.02 10*3/MM3 (ref 3.4–10.8)

## 2022-01-13 ENCOUNTER — OFFICE VISIT (OUTPATIENT)
Dept: SURGERY | Facility: CLINIC | Age: 72
End: 2022-01-13

## 2022-01-13 VITALS
HEART RATE: 62 BPM | DIASTOLIC BLOOD PRESSURE: 64 MMHG | TEMPERATURE: 98.6 F | OXYGEN SATURATION: 97 % | BODY MASS INDEX: 37.15 KG/M2 | SYSTOLIC BLOOD PRESSURE: 120 MMHG | RESPIRATION RATE: 18 BRPM | WEIGHT: 189.2 LBS | HEIGHT: 60 IN

## 2022-01-13 DIAGNOSIS — K64.9 HEMORRHOIDS, UNSPECIFIED HEMORRHOID TYPE: Primary | ICD-10-CM

## 2022-01-13 PROCEDURE — 99213 OFFICE O/P EST LOW 20 MIN: CPT | Performed by: SURGERY

## 2022-01-13 RX ORDER — DOCUSATE SODIUM 100 MG/1
100 CAPSULE, LIQUID FILLED ORAL 2 TIMES DAILY
Qty: 60 CAPSULE | Refills: 0 | Status: SHIPPED | OUTPATIENT
Start: 2022-01-13

## 2022-01-13 RX ORDER — HYDROCORTISONE ACETATE 25 MG/1
25 SUPPOSITORY RECTAL 2 TIMES DAILY PRN
Qty: 24 EACH | Refills: 1 | Status: SHIPPED | OUTPATIENT
Start: 2022-01-13

## 2022-01-13 NOTE — PROGRESS NOTES
"Subjective   Missy Mcgraw is a 71 y.o. female.   Chief Complaint   Patient presents with   • Hemorrhoids     History of Present Illness   Patient is here for an evaluation and treatment of rectal bleeding and constipation. Patient complains of rectal bleeding and hemorrhoids. She states that has tried Preperation H, suppositories and Vaseline. She states that after she \"goes, she still feels like she needs to go more\". Patient has a colonoscopy over 3 years ago.    The following portions of the patient's history were reviewed and updated as appropriate: allergies, current medications, past family history, past medical history, past social history, past surgical history and problem list.    Patient Active Problem List   Diagnosis   • Hay fever   • Benign essential hypertension   • Gastroesophageal reflux disease with esophagitis   • Major depressive disorder, recurrent episode (HCC)   • Uncontrolled type 2 diabetes mellitus (HCC)   • Abnormal liver enzymes   • Abnormal weight gain   • Cervicalgia   • Fibroid, uterine   • Hyperthyroidism   • Mixed hyperlipidemia   • Movement disorder   • Neuralgia   • Ulnar nerve palsy   • Vitamin B12 deficiency   • Vitamin D deficiency   • Osteoporosis   • Restless legs syndrome   • Shortness of breath   • Anginal equivalent (HCC)   • Chest pain in adult   • Abnormal nuclear stress test   • Coronary artery disease involving native coronary artery of native heart without angina pectoris   • PONV (postoperative nausea and vomiting)   • Unintentional weight loss   • Gastroesophageal reflux disease   • Epigastric abdominal pain   • Gastritis determined by biopsy   • Family history of colon cancer in father   • Pre-operative cardiovascular examination       Past Medical History:   Diagnosis Date   • Acute maxillary sinusitis    • Allergic    • Arthritis    • Cancer (HCC) Skin   • Cataract    • Colon polyp    • Depression    • Diabetes mellitus (HCC)    • Esophagitis, reflux    • " "Fibromyositis    • GERD (gastroesophageal reflux disease)    • H/O colonoscopy    • Herpes zoster    • History of mammogram    • Injury of back    • Low back pain    • Migraine headache    • Obesity    • Osteopenia    • Osteoporosis    • Pap smear for cervical cancer screening    • PONV (postoperative nausea and vomiting)    • Restless legs syndrome    • Screening     DEXA body composition study   • TIA (transient ischemic attack)     \"at least 10 years ago\"   • Transient cerebral ischemia    • Urinary tract infection    • Vertigo        Past Surgical History:   Procedure Laterality Date   • APPENDECTOMY     • CARDIAC CATHETERIZATION N/A 8/1/2018    Procedure: Coronary angiography;  Surgeon: Wali Núñez MD;  Location: [facility] CATH INVASIVE LOCATION;  Service: Cardiology   • COLONOSCOPY  10/2016    Dr. Colmenares   • COLONOSCOPY N/A 2/1/2019    Procedure: COLONOSCOPY;  Surgeon: Marie De Leon MD;  Location: [facility] ENDOSCOPY;  Service: Gastroenterology   • ENDOSCOPY  10/2016    Dr. Colmenares   • ENDOSCOPY N/A 2/1/2019    Procedure: ESOPHAGOGASTRODUODENOSCOPY WITH COLD FORCEP BIOPSY AND POLYPECTOMY;  Surgeon: Marie De Leon MD;  Location: [facility] ENDOSCOPY;  Service: Gastroenterology   • KNEE SURGERY Right    • LAPAROSCOPY DIAGNOSTIC / BIOPSY / ASPIRATION / LYSIS Right    • SHOULDER ARTHROSCOPY Bilateral        Medications:     Current Outpatient Medications:   •  amLODIPine (NORVASC) 5 MG tablet, TAKE 1 TABLET EVERY NIGHT, Disp: 90 tablet, Rfl: 3  •  aspirin (SANJU ASPIRIN EC LOW DOSE) 81 MG EC tablet, Take 1 tablet by mouth daily., Disp: , Rfl:   •  atenolol (TENORMIN) 25 MG tablet, Take 1 tablet by mouth 2 (Two) Times a Day., Disp: 180 tablet, Rfl: 1  •  baclofen (LIORESAL) 10 MG tablet, TAKE ONE TABLET BY MOUTH ONCE NIGHTLY AS NEEDED FOR MUSCLE SPASMS, Disp: 30 tablet, Rfl: 1  •  citalopram (CeleXA) 40 MG tablet, TAKE 1 TABLET DAILY, Disp: 90 tablet, Rfl: 3  •  clobetasol (TEMOVATE) 0.05 % ointment, Apply  " topically to the appropriate area as directed 2 (Two) Times a Day., Disp: 60 g, Rfl: 1  •  conjugated estrogens (Premarin) 0.625 MG/GM vaginal cream, USE 0.5 GM VAGINALLY TWICE WEEKLY TO CONTROL SYMPTOMS, Disp: 30 g, Rfl: 4  •  glimepiride (Amaryl) 1 MG tablet, Take 1 tablet by mouth Daily., Disp: 90 tablet, Rfl: 2  •  lisinopril-hydrochlorothiazide (PRINZIDE,ZESTORETIC) 20-12.5 MG per tablet, Take 1 tablet by mouth 2 (two) times a day., Disp: 180 tablet, Rfl: 1  •  loratadine (CLARITIN) 10 MG tablet, Take 10 mg by mouth 2 (Two) Times a Day., Disp: , Rfl:   •  pantoprazole (PROTONIX) 40 MG EC tablet, TAKE 1 TABLET DAILY, Disp: 90 tablet, Rfl: 3  •  carbidopa-levodopa CR (SINEMET CR)  MG per CR tablet, TAKE 1 TABLET EVERY NIGHT, Disp: 90 tablet, Rfl: 3  •  docusate sodium (Colace) 100 MG capsule, Take 1 capsule by mouth 2 (Two) Times a Day. Hold if loose stools develop, Disp: 60 capsule, Rfl: 0  •  hydrocortisone (ANUSOL-HC) 25 MG suppository, Insert 1 suppository into the rectum 2 (Two) Times a Day As Needed for Hemorrhoids., Disp: 24 each, Rfl: 1  •  meclizine (ANTIVERT) 25 MG tablet, Take 1 tablet by mouth 3 (Three) Times a Day As Needed (vertigo)., Disp: 90 tablet, Rfl: 2  •  Mirabegron ER (Myrbetriq) 25 MG tablet sustained-release 24 hour 24 hr tablet, Take 1 tablet by mouth Daily., Disp: 90 tablet, Rfl: 3  •  rosuvastatin (CRESTOR) 5 MG tablet, Take 1 tablet by mouth Every Night., Disp: 90 tablet, Rfl: 2  •  traMADol (ULTRAM) 50 MG tablet, Take 1 tablet by mouth Every 12 (Twelve) Hours As Needed for Moderate Pain ., Disp: 30 tablet, Rfl: 0    Allergies:   Allergies   Allergen Reactions   • Penicillins Unknown - Low Severity     Not sure of reaction (childhood)   • Atorvastatin Unknown - Low Severity     Pt cannot remember   • Fenofibrate Unknown - Low Severity     Pt cannot remember   • Fluoxetine Unknown - Low Severity     Pt cannot remember   • Gemfibrozil Unknown - Low Severity     Pt cannot remember    • Hydroxychloroquine Unknown - Low Severity   • Metformin Diarrhea   • Nsaids GI Intolerance   • Pravastatin Unknown - Low Severity     Pt cannot remember   • Sulfamethoxazole-Trimethoprim Unknown - Low Severity     Pt cannot remember         Family History   Problem Relation Age of Onset   • Heart disease Sister    • Arthritis Other    • Diabetes Other    • Cancer Other         lung   • Cancer Father         prostate,lung,colon   • Arthritis Father    • Kidney failure Mother    • Kidney disease Mother    • Breast cancer Neg Hx        Social History     Socioeconomic History   • Marital status: Single   Tobacco Use   • Smoking status: Never Smoker   • Smokeless tobacco: Never Used   Vaping Use   • Vaping Use: Never used   Substance and Sexual Activity   • Alcohol use: No   • Drug use: No   • Sexual activity: Not Currently     Birth control/protection: Post-menopausal       Review of Systems   Constitutional: Negative for chills, fever and unexpected weight change.   HENT: Negative for hearing loss, trouble swallowing and voice change.    Eyes: Negative for visual disturbance.   Respiratory: Negative for apnea, cough, chest tightness, shortness of breath and wheezing.    Cardiovascular: Negative for chest pain, palpitations and leg swelling.   Gastrointestinal: Positive for anal bleeding, constipation and rectal pain. Negative for abdominal distention, abdominal pain, blood in stool, diarrhea, nausea and vomiting.   Endocrine: Negative for cold intolerance and heat intolerance.   Genitourinary: Negative for difficulty urinating, dysuria and flank pain.   Musculoskeletal: Negative for back pain and gait problem.   Skin: Negative for color change, rash and wound.   Neurological: Negative for dizziness, syncope, speech difficulty, weakness, light-headedness, numbness and headaches.   Hematological: Negative for adenopathy. Does not bruise/bleed easily.   Psychiatric/Behavioral: Negative for confusion. The patient is  "not nervous/anxious.        Objective    /64   Pulse 62   Temp 98.6 °F (37 °C) (Temporal)   Resp 18   Ht 152.4 cm (60\")   Wt 85.8 kg (189 lb 3.2 oz)   SpO2 97%   BMI 36.95 kg/m²     Physical Exam    Assessment/Plan   Diagnoses and all orders for this visit:    1. Hemorrhoids, unspecified hemorrhoid type (Primary)    Other orders  -     docusate sodium (Colace) 100 MG capsule; Take 1 capsule by mouth 2 (Two) Times a Day. Hold if loose stools develop  Dispense: 60 capsule; Refill: 0  -     hydrocortisone (ANUSOL-HC) 25 MG suppository; Insert 1 suppository into the rectum 2 (Two) Times a Day As Needed for Hemorrhoids.  Dispense: 24 each; Refill: 1               "

## 2022-01-14 ENCOUNTER — TELEPHONE (OUTPATIENT)
Dept: OBSTETRICS AND GYNECOLOGY | Facility: CLINIC | Age: 72
End: 2022-01-14

## 2022-01-14 NOTE — TELEPHONE ENCOUNTER
----- Message from Missy Mcgraw sent at 1/14/2022  9:48 AM EST -----  Regarding: Sample medicine  Deborah, I have developed some bowel issues and this started within the last two weeks. I don’t know if it is a side affect of the meds I have been taking for my bladder but I have stopped taking it as of today. I have an pre-scheduled appointment with Dr. Llamas next week and will discuss this with her. I just wanted you know what’s going on. Have a blessed weekend.

## 2022-01-18 ENCOUNTER — OFFICE VISIT (OUTPATIENT)
Dept: INTERNAL MEDICINE | Facility: CLINIC | Age: 72
End: 2022-01-18

## 2022-01-18 VITALS
HEART RATE: 65 BPM | TEMPERATURE: 98 F | WEIGHT: 192.4 LBS | SYSTOLIC BLOOD PRESSURE: 130 MMHG | OXYGEN SATURATION: 96 % | HEIGHT: 60 IN | BODY MASS INDEX: 37.77 KG/M2 | DIASTOLIC BLOOD PRESSURE: 70 MMHG

## 2022-01-18 DIAGNOSIS — I10 BENIGN ESSENTIAL HYPERTENSION: ICD-10-CM

## 2022-01-18 DIAGNOSIS — Z91.81 AT HIGH RISK FOR FALLS: ICD-10-CM

## 2022-01-18 DIAGNOSIS — E11.9 CONTROLLED TYPE 2 DIABETES MELLITUS WITHOUT COMPLICATION, WITHOUT LONG-TERM CURRENT USE OF INSULIN: ICD-10-CM

## 2022-01-18 DIAGNOSIS — E78.2 MIXED HYPERLIPIDEMIA: ICD-10-CM

## 2022-01-18 DIAGNOSIS — Z00.00 ROUTINE GENERAL MEDICAL EXAMINATION AT A HEALTH CARE FACILITY: ICD-10-CM

## 2022-01-18 DIAGNOSIS — Z00.00 MEDICARE ANNUAL WELLNESS VISIT, SUBSEQUENT: Primary | ICD-10-CM

## 2022-01-18 PROCEDURE — 1170F FXNL STATUS ASSESSED: CPT | Performed by: INTERNAL MEDICINE

## 2022-01-18 PROCEDURE — G0439 PPPS, SUBSEQ VISIT: HCPCS | Performed by: INTERNAL MEDICINE

## 2022-01-18 PROCEDURE — 1159F MED LIST DOCD IN RCRD: CPT | Performed by: INTERNAL MEDICINE

## 2022-01-18 PROCEDURE — 99397 PER PM REEVAL EST PAT 65+ YR: CPT | Performed by: INTERNAL MEDICINE

## 2022-01-18 NOTE — PROGRESS NOTES
The ABCs of the Annual Wellness Visit  Subsequent Medicare Wellness Visit    Chief Complaint   Patient presents with   • Medicare Wellness-subsequent   • Hypertension   • Hyperlipidemia      Subjective    History of Present Illness:  Missy Mcgraw is a 71 y.o. female who presents for a Subsequent Medicare Wellness Visit  And physical  .   Patient is here to follow up on the blood pressure  The patient is taking the blood pressure medications as prescribed and has had no side effects. The patient is also here to follow up on the cholesterol and is trying to follow a diet. The patient is  also here to follow up on sugar  and is  due to get lab work done .  The patient also needs refills on medications .   Hyperlipidemia   Pertinent negatives include no chest pain or shortness of breath.   Hypertension   Pertinent negatives include no chest pain, palpitations or shortness of breath.      The following portions of the patient's history were reviewed and   updated as appropriate: allergies, current medications, past family history, past medical history, past social history, past surgical history and problem list.    Compared to one year ago, the patient feels her physical   health is worse.    Compared to one year ago, the patient feels her mental   health is the same.    Recent Hospitalizations:  She was not admitted to the hospital during the last year.       Current Medical Providers:  Patient Care Team:  Emely Sheldon MD as PCP - General (Internal Medicine)  Marie De Leon MD as Surgeon (General Surgery)  Kt Dey DO as Consulting Physician (Anesthesiology)    Outpatient Medications Prior to Visit   Medication Sig Dispense Refill   • amLODIPine (NORVASC) 5 MG tablet TAKE 1 TABLET EVERY NIGHT 90 tablet 3   • aspirin (SANJU ASPIRIN EC LOW DOSE) 81 MG EC tablet Take 1 tablet by mouth daily.     • atenolol (TENORMIN) 25 MG tablet Take 1 tablet by mouth 2 (Two) Times a Day. 180 tablet 1   • baclofen  (LIORESAL) 10 MG tablet TAKE ONE TABLET BY MOUTH ONCE NIGHTLY AS NEEDED FOR MUSCLE SPASMS 30 tablet 1   • carbidopa-levodopa CR (SINEMET CR)  MG per CR tablet TAKE 1 TABLET EVERY NIGHT 90 tablet 3   • citalopram (CeleXA) 40 MG tablet TAKE 1 TABLET DAILY 90 tablet 3   • clobetasol (TEMOVATE) 0.05 % ointment Apply  topically to the appropriate area as directed 2 (Two) Times a Day. 60 g 1   • conjugated estrogens (Premarin) 0.625 MG/GM vaginal cream USE 0.5 GM VAGINALLY TWICE WEEKLY TO CONTROL SYMPTOMS 30 g 4   • docusate sodium (Colace) 100 MG capsule Take 1 capsule by mouth 2 (Two) Times a Day. Hold if loose stools develop 60 capsule 0   • glimepiride (Amaryl) 1 MG tablet Take 1 tablet by mouth Daily. 90 tablet 2   • hydrocortisone (ANUSOL-HC) 25 MG suppository Insert 1 suppository into the rectum 2 (Two) Times a Day As Needed for Hemorrhoids. 24 each 1   • lisinopril-hydrochlorothiazide (PRINZIDE,ZESTORETIC) 20-12.5 MG per tablet Take 1 tablet by mouth 2 (two) times a day. 180 tablet 1   • loratadine (CLARITIN) 10 MG tablet Take 10 mg by mouth 2 (Two) Times a Day.     • meclizine (ANTIVERT) 25 MG tablet Take 1 tablet by mouth 3 (Three) Times a Day As Needed (vertigo). 90 tablet 2   • pantoprazole (PROTONIX) 40 MG EC tablet TAKE 1 TABLET DAILY 90 tablet 3   • rosuvastatin (CRESTOR) 5 MG tablet Take 1 tablet by mouth Every Night. 90 tablet 2   • traMADol (ULTRAM) 50 MG tablet Take 1 tablet by mouth Every 12 (Twelve) Hours As Needed for Moderate Pain . 30 tablet 0   • Influenza Vac High-Dose Quad (Fluzone High-Dose Quadrivalent) 0.7 ML suspension prefilled syringe injection Inject into the appropriate muscle as directed per protocol 0.7 mL 0   • Mirabegron ER (MYRBETRIQ) 25 MG tablet sustained-release 24 hour 24 hr tablet Take 25 mg by mouth Daily.       No facility-administered medications prior to visit.       Opioid medication/s are on active medication list.  and I have evaluated her active treatment plan and  "pain score trends (see table).  Vitals:    01/18/22 1221   PainSc: 0-No pain     I have reviewed the chart for potential of high risk medication and harmful drug interactions in the elderly.            Aspirin is on active medication list. Aspirin use is indicated based on review of current medical condition/s. Pros and cons of this therapy have been discussed today. Benefits of this medication outweigh potential harm.  Patient has been encouraged to continue taking this medication.  .      Patient Active Problem List   Diagnosis   • Hay fever   • Benign essential hypertension   • Gastroesophageal reflux disease with esophagitis   • Major depressive disorder, recurrent episode (HCC)   • Uncontrolled type 2 diabetes mellitus (HCC)   • Abnormal liver enzymes   • Abnormal weight gain   • Cervicalgia   • Fibroid, uterine   • Hyperthyroidism   • Mixed hyperlipidemia   • Movement disorder   • Neuralgia   • Ulnar nerve palsy   • Vitamin B12 deficiency   • Vitamin D deficiency   • Osteoporosis   • Restless legs syndrome   • Shortness of breath   • Anginal equivalent (HCC)   • Chest pain in adult   • Abnormal nuclear stress test   • Coronary artery disease involving native coronary artery of native heart without angina pectoris   • PONV (postoperative nausea and vomiting)   • Unintentional weight loss   • Gastroesophageal reflux disease   • Epigastric abdominal pain   • Gastritis determined by biopsy   • Family history of colon cancer in father   • Pre-operative cardiovascular examination     Advance Care Planning  Advance Directive is not on file.  ACP discussion was held with the patient during this visit. Patient has an advance directive (not in EMR), copy requested.          Objective    Vitals:    01/18/22 1221   BP: 130/70   Pulse: 65   Temp: 98 °F (36.7 °C)   TempSrc: Infrared   SpO2: 96%   Weight: 87.3 kg (192 lb 6.4 oz)   Height: 152.4 cm (60\")   PainSc: 0-No pain     BMI Readings from Last 1 Encounters:   01/18/22 " 37.58 kg/m²   BMI is above normal parameters. Recommendations include: educational material, exercise counseling, none (medical contraindication), nutrition counseling, pharmacological intervention, referral to a nutritionist, referral to primary care and referral to a weight management program    Does the patient have evidence of cognitive impairment? No    Physical Exam  Vitals and nursing note reviewed.   Constitutional:       General: She is not in acute distress.     Appearance: Normal appearance. She is not diaphoretic.   HENT:      Head: Normocephalic and atraumatic.      Right Ear: External ear normal.      Left Ear: External ear normal.      Nose: Nose normal.   Eyes:      Extraocular Movements: Extraocular movements intact.      Conjunctiva/sclera: Conjunctivae normal.   Neck:      Trachea: Trachea normal.   Cardiovascular:      Rate and Rhythm: Normal rate and regular rhythm.      Heart sounds: Normal heart sounds.   Pulmonary:      Effort: Pulmonary effort is normal. No respiratory distress.      Breath sounds: Normal breath sounds.   Abdominal:      General: Abdomen is flat.   Musculoskeletal:      Cervical back: Neck supple.      Comments: Moves all limbs   Skin:     General: Skin is warm.   Neurological:      Mental Status: She is alert and oriented to person, place, and time.      Comments: No gross motor or sensory deficits       Lab Results   Component Value Date    CHLPL 185 01/04/2022    TRIG 172 (H) 01/04/2022    HDL 52 01/04/2022     (H) 01/04/2022    VLDL 30 01/04/2022    HGBA1C 6.00 (H) 01/04/2022            HEALTH RISK ASSESSMENT    Smoking Status:  Social History     Tobacco Use   Smoking Status Never Smoker   Smokeless Tobacco Never Used     Alcohol Consumption:  Social History     Substance and Sexual Activity   Alcohol Use No     Fall Risk Screen:    STEADI Fall Risk Assessment was completed, and patient is at HIGH risk for falls. Assessment completed on:1/18/2022    Depression  Screening:  PHQ-2/PHQ-9 Depression Screening 1/18/2022   Little interest or pleasure in doing things 0   Feeling down, depressed, or hopeless 0   Total Score 0       Health Habits and Functional and Cognitive Screening:  Functional & Cognitive Status 1/18/2022   Do you have difficulty preparing food and eating? -   Do you have difficulty bathing yourself, getting dressed or grooming yourself? No   Do you have difficulty using the toilet? No   Do you have difficulty moving around from place to place? No   Do you have trouble with steps or getting out of a bed or a chair? No   Current Diet Well Balanced Diet   Dental Exam Up to date   Eye Exam Up to date        Eye Exam Comment -   Exercise (times per week) 0 times per week   Current Exercises Include No Regular Exercise   Current Exercise Activities Include -   Do you need help using the phone?  No   Are you deaf or do you have serious difficulty hearing?  Yes   Do you need help with transportation? No   Do you need help shopping? No   Do you need help preparing meals?  No   Do you need help with housework?  No   Do you need help with laundry? No   Do you need help taking your medications? No   Do you need help managing money? No   Do you ever drive or ride in a car without wearing a seat belt? No   Have you felt unusual stress, anger or loneliness in the last month? No   Who do you live with? Alone   If you need help, do you have trouble finding someone available to you? No   Have you been bothered in the last four weeks by sexual problems? No   Do you have difficulty concentrating, remembering or making decisions? No       Age-appropriate Screening Schedule:  Refer to the list below for future screening recommendations based on patient's age, sex and/or medical conditions. Orders for these recommended tests are listed in the plan section. The patient has been provided with a written plan.    Health Maintenance   Topic Date Due   • DIABETIC FOOT EXAM  03/08/2018   •  DIABETIC EYE EXAM  10/17/2020   • DXA SCAN  12/18/2020   • HEMOGLOBIN A1C  07/04/2022   • URINE MICROALBUMIN  09/07/2022   • LIPID PANEL  01/04/2023   • MAMMOGRAM  10/14/2023   • PAP SMEAR  12/09/2024   • TDAP/TD VACCINES (2 - Td or Tdap) 12/11/2030   • INFLUENZA VACCINE  Completed   • ZOSTER VACCINE  Completed              Assessment/Plan   CMS Preventative Services Quick Reference  Risk Factors Identified During Encounter  Cardiovascular Disease  The above risks/problems have been discussed with the patient.  Follow up actions/plans if indicated are seen below in the Assessment/Plan Section.  Pertinent information has been shared with the patient in the After Visit Summary.    Diagnoses and all orders for this visit:    1. Medicare annual wellness visit, subsequent (Primary)    2. At high risk for falls    3. Routine general medical examination at a health care facility    4. Benign essential hypertension    5. Mixed hyperlipidemia  -     CBC & Differential  -     Comprehensive Metabolic Panel  -     Lipid Panel    6. Controlled type 2 diabetes mellitus without complication, without long-term current use of insulin ([facility])  -     Hemoglobin A1c  -     Microalbumin / Creatinine Urine Ratio - Urine, Clean Catch    Plan:  1.physical: Physical exam conducted today, reviewed fasting lab work,   Impression: healthy adult Patient. Currently, patient eats a healthy diet. Screening lab work includes glucose, lipid profile and complete blood count . The risks and benefits of immunizations were discussed and immunizations are up to date. Advice and education were given regarding nutrition and aerobic exercise. Patient discussion: discussed with the patient.  Annual Wellness Visit , physical   with preventive exam as well as age and risk appropriate counseling completed.   Advance Directive Planning: paperwork and instructions were given to the patient.   Patient Discussion: plan discussed with the patient, follow-up visit  needed in one year. Medication Review and medication list updated  2.  Benign essential hypertension: Will continue current medication, low-sodium diet advised, Counseled to regularly check BP at home with goal averaging <130/80.   3.mixed hyperlipidemia: will obtain   fasting CMP and lipid panel.  Diet and exercise counseled,  Will continue current medications  4. Diabetes  Mellitus  : will obtain   fasting CMP  and hba1c  , diet and exercise counseled , Will continue current medications     Follow Up:   Return in about 1 year (around 1/18/2023) for Medicare Wellness.     An After Visit Summary and PPPS were made available to the patient.

## 2022-01-18 NOTE — PATIENT INSTRUCTIONS
Advance Care Planning and Advance Directives     You make decisions on a daily basis - decisions about where you want to live, your career, your home, your life. Perhaps one of the most important decisions you face is your choice for future medical care. Take time to talk with your family and your healthcare team and start planning today.  Advance Care Planning is a process that can help you:  · Understand possible future healthcare decisions in light of your own experiences  · Reflect on those decision in light of your goals and values  · Discuss your decisions with those closest to you and the healthcare professionals that care for you  · Make a plan by creating a document that reflects your wishes    Surrogate Decision Maker  In the event of a medical emergency, which has left you unable to communicate or to make your own decisions, you would need someone to make decisions for you.  It is important to discuss your preferences for medical treatment with this person while you are in good health.     Qualities of a surrogate decision maker:  • Willing to take on this role and responsibility  • Knows what you want for future medical care  • Willing to follow your wishes even if they don't agree with them  • Able to make difficult medical decisions under stressful circumstances    Advance Directives  These are legal documents you can create that will guide your healthcare team and decision maker(s) when needed. These documents can be stored in the electronic medical record.    · Living Will - a legal document to guide your care if you have a terminal condition or a serious illness and are unable to communicate. States vary by statute in document names/types, but most forms may include one or more of the following:        -  Directions regarding life-prolonging treatments        -  Directions regarding artificially provided nutrition/hydration        -  Choosing a healthcare decision maker        -  Direction  regarding organ/tissue donation    · Durable Power of  for Healthcare - this document names an -in-fact to make medical decisions for you, but it may also allow this person to make personal and financial decisions for you. Please seek the advice of an  if you need this type of document.    **Advance Directives are not required and no one may discriminate against you if you do not sign one.    Medical Orders  Many states allow specific forms/orders signed by your physician to record your wishes for medical treatment in your current state of health. This form, signed in personal communication with your physician, addresses resuscitation and other medical interventions that you may or may not want.      For more information or to schedule a time with a [facility] Advance Care Planning Facilitator contact: [facility] or call [phone] and someone will contact you directly.    Fall Prevention in the Home, Adult  Falls can cause injuries and can affect people from all age groups. There are many simple things that you can do to make your home safe and to help prevent falls. Ask for help when making these changes, if needed.  What actions can I take to prevent falls?  General instructions  · Use good lighting in all rooms. Replace any light bulbs that burn out.  · Turn on lights if it is dark. Use night-lights.  · Place frequently used items in easy-to-reach places. Lower the shelves around your home if necessary.  · Set up furniture so that there are clear paths around it. Avoid moving your furniture around.  · Remove throw rugs and other tripping hazards from the floor.  · Avoid walking on wet floors.  · Fix any uneven floor surfaces.  · Add color or contrast paint or tape to grab bars and handrails in your home. Place contrasting color strips on the first and last steps of stairways.  · When you use a stepladder, make sure that it is completely opened and that the sides are  firmly locked. Have someone hold the ladder while you are using it. Do not climb a closed stepladder.  · Be aware of any and all pets.  What can I do in the bathroom?         · Keep the floor dry. Immediately clean up any water that spills onto the floor.  · Remove soap buildup in the tub or shower on a regular basis.  · Use non-skid mats or decals on the floor of the tub or shower.  · Attach bath mats securely with double-sided, non-slip rug tape.  · If you need to sit down while you are in the shower, use a plastic, non-slip stool.  · Install grab bars by the toilet and in the tub and shower. Do not use towel bars as grab bars.  What can I do in the bedroom?  · Make sure that a bedside light is easy to reach.  · Do not use oversized bedding that drapes onto the floor.  · Have a firm chair that has side arms to use for getting dressed.  What can I do in the kitchen?  · Clean up any spills right away.  · If you need to reach for something above you, use a sturdy step stool that has a grab bar.  · Keep electrical cables out of the way.  · Do not use floor polish or wax that makes floors slippery. If you must use wax, make sure that it is non-skid floor wax.  What can I do in the stairways?  · Do not leave any items on the stairs.  · Make sure that you have a light switch at the top of the stairs and the bottom of the stairs. Have them installed if you do not have them.  · Make sure that there are handrails on both sides of the stairs. Fix handrails that are broken or loose. Make sure that handrails are as long as the stairways.  · Install non-slip stair treads on all stairs in your home.  · Avoid having throw rugs at the top or bottom of stairways, or secure the rugs with carpet tape to prevent them from moving.  · Choose a carpet design that does not hide the edge of steps on the stairway.  · Check any carpeting to make sure that it is firmly attached to the stairs. Fix any carpet that is loose or worn.  What can I  do on the outside of my home?  · Use bright outdoor lighting.  · Regularly repair the edges of walkways and driveways and fix any cracks.  · Remove high doorway thresholds.  · Trim any shrubbery on the main path into your home.  · Regularly check that handrails are securely fastened and in good repair. Both sides of any steps should have handrails.  · Install guardrails along the edges of any raised decks or porches.  · Clear walkways of debris and clutter, including tools and rocks.  · Have leaves, snow, and ice cleared regularly.  · Use sand or salt on walkways during winter months.  · In the garage, clean up any spills right away, including grease or oil spills.  What other actions can I take?  · Wear closed-toe shoes that fit well and support your feet. Wear shoes that have rubber soles or low heels.  · Use mobility aids as needed, such as canes, walkers, scooters, and crutches.  · Review your medicines with your health care provider. Some medicines can cause dizziness or changes in blood pressure, which increase your risk of falling.  Talk with your health care provider about other ways that you can decrease your risk of falls. This may include working with a physical therapist or  to improve your strength, balance, and endurance.  Where to find more information  · Centers for Disease Control and Prevention, [place]: https://www.cdc.gov  · [employer] [facility]: https://yq6ziau.neel.nih.gov  Contact a health care provider if:  · You are afraid of falling at home.  · You feel weak, drowsy, or dizzy at home.  · You fall at home.  Summary  · There are many simple things that you can do to make your home safe and to help prevent falls.  · Ways to make your home safe include removing tripping hazards and installing grab bars in the bathroom.  · Ask for help when making these changes in your home.  This information is not intended to replace advice given to you by your health care provider. Make sure you  discuss any questions you have with your health care provider.  Document Revised: 11/30/2018 Document Reviewed: 08/02/2018  Elsevier Patient Education © 2021 [employer] Inc.      Sit-to-Stand Exercise    The sit-to-stand exercise (also known as the chair stand or chair rise exercise) strengthens your lower body and helps you maintain or improve your mobility and independence. The goal is to do the sit-to-stand exercise without using your hands. This will be easier as you become stronger. You should always talk with your health care provider before starting any exercise program, especially if you have had recent surgery.  Do the exercise exactly as told by your health care provider and adjust it as directed. It is normal to feel mild stretching, pulling, tightness, or discomfort as you do this exercise, but you should stop right away if you feel sudden pain or your pain gets worse. Do not begin doing this exercise until told by your health care provider.  What the sit-to-stand exercise does  The sit-to-stand exercise helps to strengthen the muscles in your thighs and the muscles in the center of your body that give you stability (core muscles). This exercise is especially helpful if:  · You have had knee or hip surgery.  · You have trouble getting up from a chair, out of a car, or off the toilet.  How to do the sit-to-stand exercise  1. Sit toward the front edge of a sturdy chair without armrests. Your knees should be bent and your feet should be flat on the floor and shoulder-width apart.  2. Place your hands lightly on each side of the seat. Keep your back and neck as straight as possible, with your chest slightly forward.  3. Breathe in slowly. Lean forward and slightly shift your weight to the front of your feet.  4. Breathe out as you slowly stand up. Use your hands as little as possible.  5. Stand and pause for a full breath in and out.  6. Breathe in as you sit down slowly. Tighten your core and abdominal  muscles to control your lowering as much as possible.  7. Breathe out slowly.  8. Do this exercise 10-15 times. If needed, do it fewer times until you build up strength.  9. Rest for 1 minute, then do another set of 10-15 repetitions.  To change the difficulty of the sit-to-stand exercise  · If the exercise is too difficult, use a chair with sturdy armrests, and push off the armrests to help you come to the standing position. You can also use the armrests to help slowly lower yourself back to sitting. As this gets easier, try to use your arms less. You can also place a firm cushion or pillow on the chair to make the surface higher.  · If this exercise is too easy, do not use your arms to help raise or lower yourself. You can also wear a weighted vest, use hand weights, increase your repetitions, or try a lower chair.  General tips  · You may feel tired when starting an exercise routine. This is normal.  · You may have muscle soreness that lasts a few days. This is normal. As you get stronger, you may not feel muscle soreness.  · Use smooth, steady movements.  · Do not  hold your breath during strength exercises. This can cause unsafe changes in your blood pressure.  · Breathe in slowly through your nose, and breathe out slowly through your mouth.  Summary  · Strengthening your lower body is an important step to help you move safely and independently.  · The sit-to-stand exercise helps strengthen the muscles in your thighs and core.  · You should always talk with your health care provider before starting any exercise program, especially if you have had recent surgery.  This information is not intended to replace advice given to you by your health care provider. Make sure you discuss any questions you have with your health care provider.  Document Revised: 10/16/2019 Document Reviewed: 02/08/2018  [employer] Patient Education © 2021 [employer]      Medicare Wellness  Personal Prevention Plan of Service     Date of  Office Visit:    Encounter Provider:  Emely Sheldon MD  Place of Service:  [facility]  Patient Name: Missy Mcgraw  :  1950    As part of the Medicare Wellness portion of your visit today, we are providing you with this personalized preventive plan of services (PPPS). This plan is based upon recommendations of the [place] Preventive Services Task Force (USPSTF) and the Advisory Committee on Immunization Practices (ACIP).    This lists the preventive care services that should be considered, and provides dates of when you are due. Items listed as completed are up-to-date and do not require any further intervention.    Health Maintenance   Topic Date Due   • DIABETIC FOOT EXAM  2018   • DIABETIC EYE EXAM  10/17/2020   • DXA SCAN  2020   • HEMOGLOBIN A1C  2022   • URINE MICROALBUMIN  2022   • LIPID PANEL  2023   • ANNUAL WELLNESS VISIT  2023   • MAMMOGRAM  10/14/2023   • PAP SMEAR  2024   • COLORECTAL CANCER SCREENING  2029   • TDAP/TD VACCINES (2 - Td or Tdap) 2030   • HEPATITIS C SCREENING  Completed   • COVID-19 Vaccine  Completed   • INFLUENZA VACCINE  Completed   • Pneumococcal Vaccine 65+  Completed   • ZOSTER VACCINE  Completed       No orders of the defined types were placed in this encounter.      No follow-ups on file.

## 2022-01-24 ENCOUNTER — OFFICE VISIT (OUTPATIENT)
Dept: OBSTETRICS AND GYNECOLOGY | Facility: CLINIC | Age: 72
End: 2022-01-24

## 2022-01-24 VITALS
HEIGHT: 60 IN | SYSTOLIC BLOOD PRESSURE: 144 MMHG | DIASTOLIC BLOOD PRESSURE: 64 MMHG | BODY MASS INDEX: 37.69 KG/M2 | WEIGHT: 192 LBS

## 2022-01-24 DIAGNOSIS — N39.46 MIXED INCONTINENCE: ICD-10-CM

## 2022-01-24 DIAGNOSIS — Z79.899 FOLLOW-UP ENCOUNTER INVOLVING MEDICATION: Primary | ICD-10-CM

## 2022-01-24 PROCEDURE — 99212 OFFICE O/P EST SF 10 MIN: CPT | Performed by: PHYSICIAN ASSISTANT

## 2022-01-24 NOTE — PROGRESS NOTES
"Subjective   Chief Complaint   Patient presents with   • Follow-up     4 week follow up, mixed incontinence, states that medicine has helped.       Missy Mcgraw is a 71 y.o. year old  presenting to be seen for follow-up.  Reports she is doing significantly better with her next incontinence symptoms with Myrbetriq 25 mg.  She has used samples of Myrbetriq for the last 4 weeks.  Feels there has been at least an 80% reduction in her symptoms of urinary urgency, frequency and mixed incontinence.  She is pleased and desires to continue Myrbetriq.  Has not noted any side effects from myrbetriq      Past Medical History:   Diagnosis Date   • Acute maxillary sinusitis    • Allergic    • Arthritis    • Cancer (HCC) Skin   • Cataract    • Colon polyp    • Depression    • Diabetes mellitus (HCC)    • Esophagitis, reflux    • Fibromyositis    • GERD (gastroesophageal reflux disease)    • H/O colonoscopy    • Herpes zoster    • History of mammogram    • Injury of back    • Low back pain    • Migraine headache    • Obesity    • Osteopenia    • Osteoporosis    • Pap smear for cervical cancer screening    • PONV (postoperative nausea and vomiting)    • Restless legs syndrome    • Screening     DEXA body composition study   • TIA (transient ischemic attack)     \"at least 10 years ago\"   • Transient cerebral ischemia    • Urinary tract infection    • Vertigo         Current Outpatient Medications:   •  amLODIPine (NORVASC) 5 MG tablet, TAKE 1 TABLET EVERY NIGHT, Disp: 90 tablet, Rfl: 3  •  aspirin (SANJU ASPIRIN EC LOW DOSE) 81 MG EC tablet, Take 1 tablet by mouth daily., Disp: , Rfl:   •  atenolol (TENORMIN) 25 MG tablet, Take 1 tablet by mouth 2 (Two) Times a Day., Disp: 180 tablet, Rfl: 1  •  baclofen (LIORESAL) 10 MG tablet, TAKE ONE TABLET BY MOUTH ONCE NIGHTLY AS NEEDED FOR MUSCLE SPASMS, Disp: 30 tablet, Rfl: 1  •  carbidopa-levodopa CR (SINEMET CR)  MG per CR tablet, TAKE 1 TABLET EVERY NIGHT, Disp: 90 " tablet, Rfl: 3  •  citalopram (CeleXA) 40 MG tablet, TAKE 1 TABLET DAILY, Disp: 90 tablet, Rfl: 3  •  clobetasol (TEMOVATE) 0.05 % ointment, Apply  topically to the appropriate area as directed 2 (Two) Times a Day., Disp: 60 g, Rfl: 1  •  conjugated estrogens (Premarin) 0.625 MG/GM vaginal cream, USE 0.5 GM VAGINALLY TWICE WEEKLY TO CONTROL SYMPTOMS, Disp: 30 g, Rfl: 4  •  docusate sodium (Colace) 100 MG capsule, Take 1 capsule by mouth 2 (Two) Times a Day. Hold if loose stools develop, Disp: 60 capsule, Rfl: 0  •  glimepiride (Amaryl) 1 MG tablet, Take 1 tablet by mouth Daily., Disp: 90 tablet, Rfl: 2  •  hydrocortisone (ANUSOL-HC) 25 MG suppository, Insert 1 suppository into the rectum 2 (Two) Times a Day As Needed for Hemorrhoids., Disp: 24 each, Rfl: 1  •  lisinopril-hydrochlorothiazide (PRINZIDE,ZESTORETIC) 20-12.5 MG per tablet, Take 1 tablet by mouth 2 (two) times a day., Disp: 180 tablet, Rfl: 1  •  loratadine (CLARITIN) 10 MG tablet, Take 10 mg by mouth 2 (Two) Times a Day., Disp: , Rfl:   •  meclizine (ANTIVERT) 25 MG tablet, Take 1 tablet by mouth 3 (Three) Times a Day As Needed (vertigo)., Disp: 90 tablet, Rfl: 2  •  pantoprazole (PROTONIX) 40 MG EC tablet, TAKE 1 TABLET DAILY, Disp: 90 tablet, Rfl: 3  •  rosuvastatin (CRESTOR) 5 MG tablet, Take 1 tablet by mouth Every Night., Disp: 90 tablet, Rfl: 2  •  traMADol (ULTRAM) 50 MG tablet, Take 1 tablet by mouth Every 12 (Twelve) Hours As Needed for Moderate Pain ., Disp: 30 tablet, Rfl: 0  •  Mirabegron ER (Myrbetriq) 25 MG tablet sustained-release 24 hour 24 hr tablet, Take 1 tablet by mouth Daily., Disp: 90 tablet, Rfl: 3   Allergies   Allergen Reactions   • Penicillins Unknown - Low Severity     Not sure of reaction (childhood)   • Atorvastatin Unknown - Low Severity     Pt cannot remember   • Fenofibrate Unknown - Low Severity     Pt cannot remember   • Fluoxetine Unknown - Low Severity     Pt cannot remember   • Gemfibrozil Unknown - Low Severity      "Pt cannot remember   • Hydroxychloroquine Unknown - Low Severity   • Metformin Diarrhea   • Nsaids GI Intolerance   • Pravastatin Unknown - Low Severity     Pt cannot remember   • Sulfamethoxazole-Trimethoprim Unknown - Low Severity     Pt cannot remember      Past Surgical History:   Procedure Laterality Date   • APPENDECTOMY     • CARDIAC CATHETERIZATION N/A 8/1/2018    Procedure: Coronary angiography;  Surgeon: Wali Núñez MD;  Location: [facility] CATH INVASIVE LOCATION;  Service: Cardiology   • COLONOSCOPY  10/2016    Dr. Colmenares   • COLONOSCOPY N/A 2/1/2019    Procedure: COLONOSCOPY;  Surgeon: Marie De Leon MD;  Location: [facility] ENDOSCOPY;  Service: Gastroenterology   • ENDOSCOPY  10/2016    Dr. Colmenares   • ENDOSCOPY N/A 2/1/2019    Procedure: ESOPHAGOGASTRODUODENOSCOPY WITH COLD FORCEP BIOPSY AND POLYPECTOMY;  Surgeon: Marie De Leon MD;  Location: [facility] ENDOSCOPY;  Service: Gastroenterology   • KNEE SURGERY Right    • LAPAROSCOPY DIAGNOSTIC / BIOPSY / ASPIRATION / LYSIS Right    • SHOULDER ARTHROSCOPY Bilateral       Social History     Socioeconomic History   • Marital status: Single   Tobacco Use   • Smoking status: Never Smoker   • Smokeless tobacco: Never Used   Vaping Use   • Vaping Use: Never used   Substance and Sexual Activity   • Alcohol use: No   • Drug use: No   • Sexual activity: Not Currently     Birth control/protection: Post-menopausal      Family History   Problem Relation Age of Onset   • Heart disease Sister    • Arthritis Other    • Diabetes Other    • Cancer Other         lung   • Cancer Father         prostate,lung,colon   • Arthritis Father    • Kidney failure Mother    • Kidney disease Mother    • Breast cancer Neg Hx        Review of Systems   Constitutional: Negative for chills, diaphoresis and fever.   Gastrointestinal: Negative.    Genitourinary: Negative for difficulty urinating and dysuria.           Objective   /64   Ht 152.4 cm (60\")   Wt 87.1 kg (192 lb)   BMI " 37.50 kg/m²     Physical Exam       Result Review :                   Assessment and Plan  Diagnoses and all orders for this visit:    1. Follow-up encounter involving medication (Primary)    2. Mixed incontinence    Other orders  -     Mirabegron ER (Myrbetriq) 25 MG tablet sustained-release 24 hour 24 hr tablet; Take 1 tablet by mouth Daily.  Dispense: 90 tablet; Refill: 3      Patient Instructions   Will continue Myrbetriq 25 mg daily  Follow up 6 months or prn             This note was electronically signed.    Brittany Calvin PA-C   January 24, 2022

## 2022-02-09 ENCOUNTER — OFFICE VISIT (OUTPATIENT)
Dept: INTERNAL MEDICINE | Facility: CLINIC | Age: 72
End: 2022-02-09

## 2022-02-09 VITALS
SYSTOLIC BLOOD PRESSURE: 117 MMHG | TEMPERATURE: 97.8 F | RESPIRATION RATE: 16 BRPM | DIASTOLIC BLOOD PRESSURE: 73 MMHG | HEART RATE: 63 BPM | WEIGHT: 191 LBS | BODY MASS INDEX: 37.5 KG/M2 | OXYGEN SATURATION: 97 % | HEIGHT: 60 IN

## 2022-02-09 DIAGNOSIS — I10 BENIGN ESSENTIAL HYPERTENSION: Primary | ICD-10-CM

## 2022-02-09 DIAGNOSIS — M10.072 ACUTE IDIOPATHIC GOUT INVOLVING TOE OF LEFT FOOT: ICD-10-CM

## 2022-02-09 DIAGNOSIS — M25.572 PAIN IN JOINT OF LEFT FOOT: ICD-10-CM

## 2022-02-09 PROCEDURE — 96372 THER/PROPH/DIAG INJ SC/IM: CPT | Performed by: INTERNAL MEDICINE

## 2022-02-09 PROCEDURE — 99214 OFFICE O/P EST MOD 30 MIN: CPT | Performed by: INTERNAL MEDICINE

## 2022-02-09 RX ORDER — METHYLPREDNISOLONE SODIUM SUCCINATE 125 MG/2ML
125 INJECTION, POWDER, LYOPHILIZED, FOR SOLUTION INTRAMUSCULAR; INTRAVENOUS ONCE
Status: COMPLETED | OUTPATIENT
Start: 2022-02-09 | End: 2022-02-09

## 2022-02-09 RX ORDER — INDOMETHACIN 25 MG/1
25 CAPSULE ORAL 2 TIMES DAILY WITH MEALS
Qty: 10 CAPSULE | Refills: 0 | Status: SHIPPED | OUTPATIENT
Start: 2022-02-09 | End: 2022-02-21 | Stop reason: SDUPTHER

## 2022-02-09 RX ADMIN — METHYLPREDNISOLONE SODIUM SUCCINATE 125 MG: 125 INJECTION, POWDER, LYOPHILIZED, FOR SOLUTION INTRAMUSCULAR; INTRAVENOUS at 12:27

## 2022-02-09 NOTE — PROGRESS NOTES
Subjective   Missy Mcgraw is a 71 y.o. female.   Chief Complaint   Patient presents with   • Follow-up     rectal bleeding     History of Present Illness   Ms. Mcgraw returns the office today for follow-up after recently being evaluated for worsening hemorrhoidal symptoms.  At her last visit, she was started on Anusol suppositories, Colace, and advised to use calmoseptine ointment for perianal itching.  She reports using a single dose of the Anusol suppositories with resolution of her hemorrhoidal symptoms.  She reports that her bowel movements are regular, and has no new complaints today.  Recall,the patient's last colonoscopy was performed in 2019, and was normal with the exception of internal hemorrhoids.    The following portions of the patient's history were reviewed and updated as appropriate: allergies, current medications, past family history, past medical history, past social history, past surgical history and problem list.    Review of Systems   Constitutional: Negative for chills, fever and unexpected weight change.   HENT: Negative for hearing loss, trouble swallowing and voice change.    Eyes: Negative for visual disturbance.   Respiratory: Negative for apnea, cough, chest tightness, shortness of breath and wheezing.    Cardiovascular: Negative for chest pain, palpitations and leg swelling.   Gastrointestinal: Negative for abdominal distention, abdominal pain, anal bleeding, blood in stool, constipation, diarrhea, nausea, rectal pain and vomiting.   Endocrine: Negative for cold intolerance and heat intolerance.   Genitourinary: Negative for difficulty urinating, dysuria and flank pain.   Musculoskeletal: Negative for back pain and gait problem.   Skin: Negative for color change, rash and wound.   Neurological: Negative for dizziness, syncope, speech difficulty, weakness, light-headedness, numbness and headaches.   Hematological: Negative for adenopathy. Does not bruise/bleed easily.  "  Psychiatric/Behavioral: Negative for confusion. The patient is not nervous/anxious.        Objective    /62   Pulse 70   Temp 97.3 °F (36.3 °C) (Temporal)   Resp 18   Ht 152.4 cm (60\")   Wt 87.6 kg (193 lb 3.2 oz)   SpO2 97%   BMI 37.73 kg/m²     Physical Exam  Constitutional:       Appearance: She is well-developed.   HENT:      Head: Normocephalic and atraumatic.   Cardiovascular:      Rate and Rhythm: Regular rhythm.   Pulmonary:      Effort: Pulmonary effort is normal.   Abdominal:      General: There is no distension.      Palpations: Abdomen is soft.      Tenderness: There is no abdominal tenderness.   Skin:     General: Skin is warm and dry.   Neurological:      Mental Status: She is alert and oriented to person, place, and time.   Psychiatric:         Behavior: Behavior normal.         Assessment/Plan   Diagnoses and all orders for this visit:    1. Internal hemorrhoid, bleeding (Primary)      Ms. Mcgraw seems to be recovering well after recent flareup of her known internal hemorrhoids with symptoms of bleeding.  We discussed the importance of avoiding constipation, as this will likely exacerbate her symptoms.  She is doing well from that standpoint at this point in time.  She has Anusol suppositories on hand that can be used as needed.  You will be due for routine screening colonoscopy in 2024, and we have double check to make sure that she is on our recall list for this.  At present, no additional intervention is required.  She knows to call the office with any new problems or recurrent symptoms.       "

## 2022-02-09 NOTE — PROGRESS NOTES
"Chief Complaint  Gout (left foot and ankle) and Hypertension    Subjective          Missy Mcgraw presents to [facility]  History of Present Illness  HPI: Patient is here to follow up on the blood pressure  The patient is taking the blood pressure medications as prescribed and has had no side effects. The patient is also here to follow up on visit at urgent care on February 2, 2022 for left ankle pain, records ,  Lab reports  and Radiology reports have been reviewed  Today and medications reconciled and updated .  She now complains of pain in her left big toe which has been worsening, she does state that she has gout and she has been trying to follow a diet    Hypertension   Pertinent negatives include no chest pain, palpitations or shortness of breath.    Objective   Vital Signs:   /73   Pulse 63   Temp 97.8 °F (36.6 °C)   Resp 16   Ht 152.4 cm (60\")   Wt 86.6 kg (191 lb)   SpO2 97%   BMI 37.30 kg/m²     Physical Exam  Vitals and nursing note reviewed.   Constitutional:       General: She is not in acute distress.     Appearance: Normal appearance. She is not diaphoretic.   HENT:      Head: Normocephalic and atraumatic.      Right Ear: External ear normal.      Left Ear: External ear normal.      Nose: Nose normal.   Eyes:      Extraocular Movements: Extraocular movements intact.      Conjunctiva/sclera: Conjunctivae normal.   Neck:      Trachea: Trachea normal.   Cardiovascular:      Rate and Rhythm: Normal rate and regular rhythm.      Heart sounds: Normal heart sounds.   Pulmonary:      Effort: Pulmonary effort is normal. No respiratory distress.      Breath sounds: Normal breath sounds.   Abdominal:      General: Abdomen is flat.   Musculoskeletal:         General: Swelling and tenderness present.      Cervical back: Neck supple.        Feet:       Comments: Moves all limbs  Left big toe swelling and tenderness   Skin:     General: Skin is warm.   Neurological:    "   Mental Status: She is alert and oriented to person, place, and time.      Comments: No gross motor or sensory deficits        Result Review :            UC with Sandie Garcia S, APRN (02/02/2022)  XR Ankle 3+ View Left (02/02/2022 11:06)       Assessment and Plan    Diagnoses and all orders for this visit:    1. Benign essential hypertension (Primary)    2. Pain in joint of left foot  -     methylPREDNISolone sodium succinate (SOLU-Medrol) injection 125 mg  -     indomethacin (INDOCIN) 25 MG capsule; Take 1 capsule by mouth 2 (Two) Times a Day With Meals.  Dispense: 10 capsule; Refill: 0  -     Ambulatory Referral to Rheumatology    3. Acute idiopathic gout involving toe of left foot  -     methylPREDNISolone sodium succinate (SOLU-Medrol) injection 125 mg  -     indomethacin (INDOCIN) 25 MG capsule; Take 1 capsule by mouth 2 (Two) Times a Day With Meals.  Dispense: 10 capsule; Refill: 0  -     Ambulatory Referral to Rheumatology    Plan:  1.  Benign essential hypertension: Will continue current medication, low-sodium diet advised, Counseled to regularly check BP at home with goal averaging <130/80.   2.  Left foot pain: IM Solu-Medrol given in office today, as needed Indocin, refer to rheumatology  3.  Left big toe gout: Low purine diet advised ,  IM Solu-Medrol given in office today, as needed Indocin, refer to rheumatology  I spent 30 minutes caring for Missy on this date of service. This time includes time spent by me in the following activities:preparing for the visit, reviewing tests, performing a medically appropriate examination and/or evaluation , counseling and educating the patient/family/caregiver, ordering medications, tests, or procedures and documenting information in the medical record  Follow Up    Patient was given instructions and counseling regarding her condition or for health maintenance advice. Please see specific information pulled into the AVS if appropriate.       Answers for HPI/ROS  submitted by the patient on 2/4/2022  Please describe your symptoms.: Follow up visit  Have you had these symptoms before?: Yes  How long have you been having these symptoms?: 5-7 days  Please list any medications you are currently taking for this condition.: Steroid  What is the primary reason for your visit?: Other

## 2022-02-10 ENCOUNTER — OFFICE VISIT (OUTPATIENT)
Dept: SURGERY | Facility: CLINIC | Age: 72
End: 2022-02-10

## 2022-02-10 VITALS
DIASTOLIC BLOOD PRESSURE: 62 MMHG | BODY MASS INDEX: 37.93 KG/M2 | OXYGEN SATURATION: 97 % | WEIGHT: 193.2 LBS | TEMPERATURE: 97.3 F | SYSTOLIC BLOOD PRESSURE: 134 MMHG | HEIGHT: 60 IN | HEART RATE: 70 BPM | RESPIRATION RATE: 18 BRPM

## 2022-02-10 DIAGNOSIS — K64.8 INTERNAL HEMORRHOID, BLEEDING: Primary | ICD-10-CM

## 2022-02-10 PROCEDURE — 99212 OFFICE O/P EST SF 10 MIN: CPT | Performed by: SURGERY

## 2022-02-21 DIAGNOSIS — M25.572 PAIN IN JOINT OF LEFT FOOT: ICD-10-CM

## 2022-02-21 DIAGNOSIS — M10.072 ACUTE IDIOPATHIC GOUT INVOLVING TOE OF LEFT FOOT: ICD-10-CM

## 2022-02-21 RX ORDER — INDOMETHACIN 25 MG/1
25 CAPSULE ORAL 2 TIMES DAILY WITH MEALS
Qty: 20 CAPSULE | Refills: 1 | Status: SHIPPED | OUTPATIENT
Start: 2022-02-21 | End: 2022-03-10

## 2022-03-07 RX ORDER — PANTOPRAZOLE SODIUM 40 MG/1
TABLET, DELAYED RELEASE ORAL
Qty: 90 TABLET | Refills: 3 | Status: SHIPPED | OUTPATIENT
Start: 2022-03-07 | End: 2023-03-02

## 2022-03-07 RX ORDER — CARBIDOPA AND LEVODOPA 50; 200 MG/1; MG/1
TABLET, EXTENDED RELEASE ORAL
Qty: 90 TABLET | Refills: 3 | Status: SHIPPED | OUTPATIENT
Start: 2022-03-07 | End: 2023-03-02

## 2022-03-07 NOTE — TELEPHONE ENCOUNTER
Rx Refill Note  Requested Prescriptions     Pending Prescriptions Disp Refills   • pantoprazole (PROTONIX) 40 MG EC tablet [Pharmacy Med Name: PANTOPRAZOLE SODIUM DR TABS 40MG] 90 tablet 3     Sig: TAKE 1 TABLET DAILY   • carbidopa-levodopa CR (SINEMET CR)  MG per CR tablet [Pharmacy Med Name: CARBIDOPA/LEVODOPA ER TABS 50/200MG] 90 tablet 3     Sig: TAKE 1 TABLET EVERY NIGHT      Last office visit with prescribing clinician: 2/9/2022      Next office visit with prescribing clinician: Visit date not found            MEERA TY MA  03/07/22, 11:48 EST

## 2022-03-10 DIAGNOSIS — I10 BENIGN ESSENTIAL HYPERTENSION: ICD-10-CM

## 2022-03-10 RX ORDER — LISINOPRIL AND HYDROCHLOROTHIAZIDE 20; 12.5 MG/1; MG/1
TABLET ORAL
Qty: 180 TABLET | Refills: 0 | Status: SHIPPED | OUTPATIENT
Start: 2022-03-10 | End: 2022-06-08

## 2022-03-28 DIAGNOSIS — I10 BENIGN ESSENTIAL HYPERTENSION: ICD-10-CM

## 2022-03-28 RX ORDER — ATENOLOL 25 MG/1
TABLET ORAL
Qty: 180 TABLET | Refills: 3 | Status: SHIPPED | OUTPATIENT
Start: 2022-03-28 | End: 2023-03-21

## 2022-03-28 NOTE — TELEPHONE ENCOUNTER
Rx Refill Note  Requested Prescriptions     Pending Prescriptions Disp Refills   • atenolol (TENORMIN) 25 MG tablet [Pharmacy Med Name: ATENOLOL TABS 25MG] 180 tablet 3     Sig: TAKE 1 TABLET TWICE A DAY      Last office visit with prescribing clinician: 2/9/2022      Next office visit with prescribing clinician: Visit date not found            MEERA TY MA  03/28/22, 10:53 EDT

## 2022-06-08 DIAGNOSIS — I10 BENIGN ESSENTIAL HYPERTENSION: ICD-10-CM

## 2022-06-08 RX ORDER — LISINOPRIL AND HYDROCHLOROTHIAZIDE 20; 12.5 MG/1; MG/1
TABLET ORAL
Qty: 180 TABLET | Refills: 1 | Status: SHIPPED | OUTPATIENT
Start: 2022-06-08 | End: 2022-12-12 | Stop reason: SDUPTHER

## 2022-06-27 RX ORDER — ROSUVASTATIN CALCIUM 5 MG/1
TABLET, COATED ORAL
Qty: 90 TABLET | Refills: 1 | Status: SHIPPED | OUTPATIENT
Start: 2022-06-27 | End: 2022-08-15 | Stop reason: SDUPTHER

## 2022-07-11 ENCOUNTER — TELEPHONE (OUTPATIENT)
Dept: OBSTETRICS AND GYNECOLOGY | Facility: CLINIC | Age: 72
End: 2022-07-11

## 2022-07-11 RX ORDER — ESTRADIOL 0.1 MG/G
CREAM VAGINAL
Qty: 42.5 G | Refills: 6 | Status: SHIPPED | OUTPATIENT
Start: 2022-07-11

## 2022-08-05 LAB
ALBUMIN SERPL-MCNC: 4.5 G/DL (ref 3.5–5.2)
ALBUMIN/CREAT UR: 27 MG/G CREAT (ref 0–29)
ALBUMIN/GLOB SERPL: 2.3 G/DL
ALP SERPL-CCNC: 86 U/L (ref 39–117)
ALT SERPL-CCNC: 6 U/L (ref 1–33)
AST SERPL-CCNC: 12 U/L (ref 1–32)
BASOPHILS # BLD AUTO: 0.08 10*3/MM3 (ref 0–0.2)
BASOPHILS NFR BLD AUTO: 1 % (ref 0–1.5)
BILIRUB SERPL-MCNC: 0.4 MG/DL (ref 0–1.2)
BUN SERPL-MCNC: 24 MG/DL (ref 8–23)
BUN/CREAT SERPL: 23.8 (ref 7–25)
CALCIUM SERPL-MCNC: 9.9 MG/DL (ref 8.6–10.5)
CHLORIDE SERPL-SCNC: 100 MMOL/L (ref 98–107)
CHOLEST SERPL-MCNC: 223 MG/DL (ref 0–200)
CO2 SERPL-SCNC: 26.2 MMOL/L (ref 22–29)
CREAT SERPL-MCNC: 1.01 MG/DL (ref 0.57–1)
CREAT UR-MCNC: 158.6 MG/DL
EGFRCR SERPLBLD CKD-EPI 2021: 60.4 ML/MIN/1.73
EOSINOPHIL # BLD AUTO: 0.17 10*3/MM3 (ref 0–0.4)
EOSINOPHIL NFR BLD AUTO: 2 % (ref 0.3–6.2)
ERYTHROCYTE [DISTWIDTH] IN BLOOD BY AUTOMATED COUNT: 12.6 % (ref 12.3–15.4)
GLOBULIN SER CALC-MCNC: 2 GM/DL
GLUCOSE SERPL-MCNC: 137 MG/DL (ref 65–99)
HBA1C MFR BLD: 6.6 % (ref 4.8–5.6)
HCT VFR BLD AUTO: 43.1 % (ref 34–46.6)
HDLC SERPL-MCNC: 50 MG/DL (ref 40–60)
HGB BLD-MCNC: 14.3 G/DL (ref 12–15.9)
IMM GRANULOCYTES # BLD AUTO: 0.06 10*3/MM3 (ref 0–0.05)
IMM GRANULOCYTES NFR BLD AUTO: 0.7 % (ref 0–0.5)
LDLC SERPL CALC-MCNC: 125 MG/DL (ref 0–100)
LYMPHOCYTES # BLD AUTO: 2 10*3/MM3 (ref 0.7–3.1)
LYMPHOCYTES NFR BLD AUTO: 24.1 % (ref 19.6–45.3)
MCH RBC QN AUTO: 29.2 PG (ref 26.6–33)
MCHC RBC AUTO-ENTMCNC: 33.2 G/DL (ref 31.5–35.7)
MCV RBC AUTO: 88 FL (ref 79–97)
MICROALBUMIN UR-MCNC: 43 UG/ML
MONOCYTES # BLD AUTO: 0.74 10*3/MM3 (ref 0.1–0.9)
MONOCYTES NFR BLD AUTO: 8.9 % (ref 5–12)
NEUTROPHILS # BLD AUTO: 5.25 10*3/MM3 (ref 1.7–7)
NEUTROPHILS NFR BLD AUTO: 63.3 % (ref 42.7–76)
NRBC BLD AUTO-RTO: 0 /100 WBC (ref 0–0.2)
PLATELET # BLD AUTO: 228 10*3/MM3 (ref 140–450)
POTASSIUM SERPL-SCNC: 4.8 MMOL/L (ref 3.5–5.2)
PROT SERPL-MCNC: 6.5 G/DL (ref 6–8.5)
RBC # BLD AUTO: 4.9 10*6/MM3 (ref 3.77–5.28)
SODIUM SERPL-SCNC: 137 MMOL/L (ref 136–145)
TRIGL SERPL-MCNC: 271 MG/DL (ref 0–150)
VLDLC SERPL CALC-MCNC: 48 MG/DL (ref 5–40)
WBC # BLD AUTO: 8.3 10*3/MM3 (ref 3.4–10.8)

## 2022-08-15 ENCOUNTER — OFFICE VISIT (OUTPATIENT)
Dept: INTERNAL MEDICINE | Facility: CLINIC | Age: 72
End: 2022-08-15

## 2022-08-15 VITALS
TEMPERATURE: 97.5 F | BODY MASS INDEX: 37.89 KG/M2 | HEIGHT: 60 IN | DIASTOLIC BLOOD PRESSURE: 79 MMHG | OXYGEN SATURATION: 94 % | RESPIRATION RATE: 16 BRPM | SYSTOLIC BLOOD PRESSURE: 142 MMHG | HEART RATE: 67 BPM | WEIGHT: 193 LBS

## 2022-08-15 DIAGNOSIS — E11.65 TYPE 2 DIABETES MELLITUS WITH HYPERGLYCEMIA, WITHOUT LONG-TERM CURRENT USE OF INSULIN: ICD-10-CM

## 2022-08-15 DIAGNOSIS — I10 BENIGN ESSENTIAL HYPERTENSION: Primary | ICD-10-CM

## 2022-08-15 DIAGNOSIS — E78.2 MIXED HYPERLIPIDEMIA: ICD-10-CM

## 2022-08-15 DIAGNOSIS — E11.9 CONTROLLED TYPE 2 DIABETES MELLITUS WITHOUT COMPLICATION, WITHOUT LONG-TERM CURRENT USE OF INSULIN: ICD-10-CM

## 2022-08-15 PROCEDURE — 99214 OFFICE O/P EST MOD 30 MIN: CPT | Performed by: INTERNAL MEDICINE

## 2022-08-15 RX ORDER — ROSUVASTATIN CALCIUM 10 MG/1
10 TABLET, COATED ORAL NIGHTLY
Qty: 90 TABLET | Refills: 1 | Status: SHIPPED | OUTPATIENT
Start: 2022-08-15 | End: 2023-01-03 | Stop reason: SDUPTHER

## 2022-08-15 RX ORDER — GLIMEPIRIDE 2 MG/1
2 TABLET ORAL DAILY
Qty: 90 TABLET | Refills: 1 | Status: SHIPPED | OUTPATIENT
Start: 2022-08-15 | End: 2023-01-03 | Stop reason: SDUPTHER

## 2022-08-15 RX ORDER — ALLOPURINOL 100 MG/1
1 TABLET ORAL DAILY
COMMUNITY
Start: 2022-07-18

## 2022-08-15 NOTE — PROGRESS NOTES
"Chief Complaint  Hypertension, Hyperlipidemia, and Diabetes    Subjective        Missy Mcgraw presents to [facility]  History of Present Illness   HPI: Patient is here to follow up on the blood pressure  The patient is taking the blood pressure medications as prescribed and has had no side effects. The patient is also here to follow up on the cholesterol and is trying to follow a diet. The patient is also here to follow on sugar and  Had lab work done . The patient also needs refills on medications .   Hypertension   Pertinent negatives include no chest pain, palpitations or shortness of breath.   Hyperlipidemia   Pertinent negatives include no chest pain or shortness of breath.   Diabetes   Pertinent negatives for hypoglycemia include no dizziness, nervousness/anxiousness or pallor. Pertinent negatives for diabetes include no chest pain, no shortness of breath  Patient is on acei/arb     Answers for HPI/ROS submitted by the patient on 8/8/2022  Please describe your symptoms.: This is bloodwork followup and discuss meds.  Have you had these symptoms before?: Yes  How long have you been having these symptoms?: Greater than 2 weeks  What is the primary reason for your visit?: Other      Objective   Vital Signs:  /79   Pulse 67   Temp 97.5 °F (36.4 °C)   Resp 16   Ht 152.4 cm (60\")   Wt 87.5 kg (193 lb)   SpO2 94%   BMI 37.69 kg/m²   Estimated body mass index is 37.69 kg/m² as calculated from the following:    Height as of this encounter: 152.4 cm (60\").    Weight as of this encounter: 87.5 kg (193 lb).    Class 2 Severe Obesity (BMI >=35 and <=39.9). Obesity-related health conditions include the following: hypertension, diabetes mellitus and dyslipidemias. Obesity is improving with lifestyle modifications. BMI is is above average; BMI management plan is completed. We discussed low calorie, low carb based diet program, portion control, increasing exercise and joining a " fitness center or start home based exercise program.      Physical Exam  Vitals and nursing note reviewed.   Constitutional:       General: She is not in acute distress.     Appearance: Normal appearance. She is not diaphoretic.   HENT:      Head: Normocephalic and atraumatic.      Right Ear: External ear normal.      Left Ear: External ear normal.      Nose: Nose normal.   Eyes:      Extraocular Movements: Extraocular movements intact.      Conjunctiva/sclera: Conjunctivae normal.   Neck:      Trachea: Trachea normal.   Cardiovascular:      Rate and Rhythm: Normal rate and regular rhythm.      Heart sounds: Normal heart sounds.   Pulmonary:      Effort: Pulmonary effort is normal. No respiratory distress.      Breath sounds: Normal breath sounds.   Abdominal:      General: Abdomen is flat.   Musculoskeletal:      Cervical back: Neck supple.      Comments: Moves all limbs   Skin:     General: Skin is warm.   Neurological:      Mental Status: She is alert and oriented to person, place, and time.      Comments: No gross motor or sensory deficits        Result Review :    Common labs    Common Labsle 1/4/22 1/4/22 1/4/22 1/4/22 2/2/22 8/4/22 8/4/22 8/4/22 8/4/22 8/4/22    0810 0810 0810 0810  0804 0804 0804 0804 0804   Glucose  121 (A)     137 (A)      BUN  23     24 (A)      Creatinine  0.90     1.01 (A)      eGFR Non  Am  62           eGFR  Am  75           Sodium  139     137      Potassium  4.5     4.8      Chloride  104     100      Calcium  9.8     9.9      Total Protein  6.6     6.5      Albumin  4.40     4.50      Total Bilirubin  0.3     0.4      Alkaline Phosphatase  90     86      AST (SGOT)  9     12      ALT (SGPT)  8     6      WBC 8.02     8.30       Hemoglobin 13.3     14.3       Hematocrit 40.9     43.1       Platelets 202     228       Total Cholesterol   185     223 (A)     Triglycerides   172 (A)     271 (A)     HDL Cholesterol   52     50     LDL Cholesterol    103 (A)     125 (A)      Hemoglobin A1C    6.00 (A)     6.60 (A)    Microalbumin, Urine          43.0   Uric Acid     8.9 (A)        (A) Abnormal value       Comments are available for some flowsheets but are not being displayed.                     Assessment and Plan   Diagnoses and all orders for this visit:    1. Benign essential hypertension (Primary)    2. Mixed hyperlipidemia  -     CBC & Differential  -     Comprehensive Metabolic Panel  -     Lipid Panel  -     rosuvastatin (CRESTOR) 10 MG tablet; Take 1 tablet by mouth Every Night.  Dispense: 90 tablet; Refill: 1    3. Type 2 diabetes mellitus with hyperglycemia, without long-term current use of insulin (HCC)  -     Hemoglobin A1c  -     Microalbumin / Creatinine Urine Ratio - Urine, Clean Catch  -     glimepiride (Amaryl) 2 MG tablet; Take 1 tablet by mouth Daily.  Dispense: 90 tablet; Refill: 1      Plan:  1.  Benign essential hypertension: Will continue current medication, low-sodium diet advised, Counseled to regularly check BP at home with goal averaging <130/80.   2.mixed hyperlipidemia:reviewed    fasting CMP and lipid panel.  Diet and exercise counseled,  ldl 125 ,  Will   Increase to crestor 10 mg po qhs  3. Diabetes  Mellitus  : reviewed   fasting CMP  and hba1c  6.6  , diet and exercise counseled , Will   increase to amaryl 2mg po qd          I spent 30 minutes caring for Missy on this date of service. This time includes time spent by me in the following activities:preparing for the visit, reviewing tests, performing a medically appropriate examination and/or evaluation , counseling and educating the patient/family/caregiver, ordering medications, tests, or procedures and documenting information in the medical record  Follow Up    Patient was given instructions and counseling regarding her condition or for health maintenance advice. Please see specific information pulled into the AVS if appropriate.

## 2022-09-06 ENCOUNTER — TRANSCRIBE ORDERS (OUTPATIENT)
Dept: INTERNAL MEDICINE | Facility: CLINIC | Age: 72
End: 2022-09-06

## 2022-09-06 DIAGNOSIS — Z12.31 ENCOUNTER FOR SCREENING MAMMOGRAM FOR BREAST CANCER: Primary | ICD-10-CM

## 2022-09-13 ENCOUNTER — OFFICE VISIT (OUTPATIENT)
Dept: CARDIOLOGY | Facility: CLINIC | Age: 72
End: 2022-09-13

## 2022-09-13 ENCOUNTER — TELEPHONE (OUTPATIENT)
Dept: CARDIOLOGY | Facility: CLINIC | Age: 72
End: 2022-09-13

## 2022-09-13 VITALS
SYSTOLIC BLOOD PRESSURE: 120 MMHG | BODY MASS INDEX: 37.89 KG/M2 | HEIGHT: 60 IN | DIASTOLIC BLOOD PRESSURE: 72 MMHG | HEART RATE: 65 BPM | WEIGHT: 193 LBS | OXYGEN SATURATION: 97 %

## 2022-09-13 DIAGNOSIS — I10 BENIGN ESSENTIAL HYPERTENSION: ICD-10-CM

## 2022-09-13 DIAGNOSIS — Z01.810 PRE-OPERATIVE CARDIOVASCULAR EXAMINATION: ICD-10-CM

## 2022-09-13 DIAGNOSIS — E78.2 MIXED HYPERLIPIDEMIA: ICD-10-CM

## 2022-09-13 DIAGNOSIS — I25.10 CORONARY ARTERY DISEASE INVOLVING NATIVE CORONARY ARTERY OF NATIVE HEART WITHOUT ANGINA PECTORIS: Primary | ICD-10-CM

## 2022-09-13 PROBLEM — I20.8 ANGINAL EQUIVALENT: Status: RESOLVED | Noted: 2017-06-02 | Resolved: 2022-09-13

## 2022-09-13 PROBLEM — R94.39 ABNORMAL NUCLEAR STRESS TEST: Status: RESOLVED | Noted: 2018-07-31 | Resolved: 2022-09-13

## 2022-09-13 PROBLEM — R07.9 CHEST PAIN IN ADULT: Status: RESOLVED | Noted: 2018-07-31 | Resolved: 2022-09-13

## 2022-09-13 PROBLEM — I20.89 ANGINAL EQUIVALENT: Status: RESOLVED | Noted: 2017-06-02 | Resolved: 2022-09-13

## 2022-09-13 PROCEDURE — 93000 ELECTROCARDIOGRAM COMPLETE: CPT | Performed by: NURSE PRACTITIONER

## 2022-09-13 PROCEDURE — 99214 OFFICE O/P EST MOD 30 MIN: CPT | Performed by: NURSE PRACTITIONER

## 2022-09-13 RX ORDER — PREDNISONE 10 MG/1
10 TABLET ORAL DAILY
COMMUNITY

## 2022-09-13 NOTE — PROGRESS NOTES
[facility] Cardiology Office Follow Up Note    Missy Mcgraw  [id number]  [birth year]22    Primary Care Provider: Emely Sheldon MD   Referring Provider: No ref. provider found    Chief Complaint: Pre-operative cardiac examination    History of Present Illness:   Mrs. Missy Mcgraw is a 71 y.o. female who presents to the Cardiology Clinic for pre-operative cardiac examination.  The patient has a past medical history of nonobstructive coronary artery disease, hypertension, hyperlipidemia, type 2 diabetes, major depressive disorder, osteoporosis, and restless leg syndrome.  In 2018, she had an abnormal nuclear stress test, but subsequent coronary angiography revealed near normal arteries.  She presents today for preoperative cardiac examination.  The patient reports she will be having a total right knee replacement with [facility] at [facility] in [place], [place] ([address]  She specifically denies chest pain, dyspnea, palpitations, and syncope.  She denies orthopnea, PND, and lower extremity edema.  She does report a long history of occasional dizziness associated with vertigo for which she takes meclizine.  She offers no other complaints or concerns at this time.    Past Cardiac Testin. Last Coronary Angio: 2018   1.  Angiographically near normal coronary arteries  2. Prior Stress Testin2018   1.  Abnormal Lexiscan  3. Last Echo: 2018   1. Calculated right ventricular systolic pressure from tricuspid regurgitation is 36 mmHg.   2. Left ventricular systolic function is normal. Estimated EF = 60%.   4. Prior Holter Monitor: None    Review of Systems:   Review of Systems   Constitutional: Negative for activity change, chills, diaphoresis, fatigue, fever and unexpected weight gain.   Eyes: Negative for blurred vision and visual disturbance.   Respiratory: Negative for apnea, cough, chest tightness, shortness of breath and wheezing.     Cardiovascular: Negative for chest pain, palpitations and leg swelling.   Gastrointestinal: Negative for abdominal distention, blood in stool, GERD and indigestion.   Endocrine: Negative for cold intolerance and heat intolerance.   Genitourinary: Negative for hematuria.   Musculoskeletal: Negative for gait problem, joint swelling and myalgias.   Skin: Negative for color change, pallor and wound.   Neurological: Positive for dizziness. Negative for seizures, syncope, weakness, light-headedness, numbness, headache and confusion.   Hematological: Does not bruise/bleed easily.   Psychiatric/Behavioral: Negative for behavioral problems, sleep disturbance, suicidal ideas and depressed mood.     I have reviewed and confirmed the accuracy of the ROS as documented by the MA/LPN/RN AZEEM Ramirez    I have reviewed and/or updated the patient's past medical, past surgical, family, social history, problem list and allergies as appropriate.     Medications:     Current Outpatient Medications:   •  allopurinol (ZYLOPRIM) 100 MG tablet, Take 1 tablet by mouth Daily., Disp: , Rfl:   •  amLODIPine (NORVASC) 5 MG tablet, TAKE 1 TABLET EVERY NIGHT, Disp: 90 tablet, Rfl: 3  •  aspirin (aspirin) 81 MG EC tablet, Take 1 tablet by mouth daily., Disp: , Rfl:   •  atenolol (TENORMIN) 25 MG tablet, TAKE 1 TABLET TWICE A DAY, Disp: 180 tablet, Rfl: 3  •  baclofen (LIORESAL) 10 MG tablet, TAKE ONE TABLET BY MOUTH ONCE NIGHTLY AS NEEDED FOR MUSCLE SPASMS, Disp: 30 tablet, Rfl: 1  •  carbidopa-levodopa CR (SINEMET CR)  MG per CR tablet, TAKE 1 TABLET EVERY NIGHT, Disp: 90 tablet, Rfl: 3  •  citalopram (CeleXA) 40 MG tablet, TAKE 1 TABLET DAILY, Disp: 90 tablet, Rfl: 3  •  clobetasol (TEMOVATE) 0.05 % ointment, Apply  topically to the appropriate area as directed 2 (Two) Times a Day., Disp: 60 g, Rfl: 1  •  Diclofenac Sodium (VOLTAREN) 1 % gel gel, Apply 4 g topically to the appropriate area as directed 4 (Four) Times a Day As  "Needed (musculoskeletal pain)., Disp: 100 g, Rfl: 0  •  docusate sodium (Colace) 100 MG capsule, Take 1 capsule by mouth 2 (Two) Times a Day. Hold if loose stools develop, Disp: 60 capsule, Rfl: 0  •  estradiol (ESTRACE VAGINAL) 0.1 MG/GM vaginal cream, Insert 1 gm intravaginally 2 times each week, Disp: 42.5 g, Rfl: 6  •  glimepiride (Amaryl) 2 MG tablet, Take 1 tablet by mouth Daily., Disp: 90 tablet, Rfl: 1  •  hydrocortisone (ANUSOL-HC) 25 MG suppository, Insert 1 suppository into the rectum 2 (Two) Times a Day As Needed for Hemorrhoids., Disp: 24 each, Rfl: 1  •  KRILL OIL PO, Take  by mouth., Disp: , Rfl:   •  lisinopril-hydrochlorothiazide (PRINZIDE,ZESTORETIC) 20-12.5 MG per tablet, TAKE 1 TABLET TWICE A DAY, Disp: 180 tablet, Rfl: 1  •  loratadine (CLARITIN) 10 MG tablet, Take 10 mg by mouth 2 (Two) Times a Day., Disp: , Rfl:   •  meclizine (ANTIVERT) 25 MG tablet, Take 1 tablet by mouth 3 (Three) Times a Day As Needed (vertigo)., Disp: 90 tablet, Rfl: 2  •  mupirocin (BACTROBAN) 2 % ointment, Apply 1 application topically to the appropriate area as directed 3 (Three) Times a Day., Disp: 15 g, Rfl: 0  •  pantoprazole (PROTONIX) 40 MG EC tablet, TAKE 1 TABLET DAILY, Disp: 90 tablet, Rfl: 3  •  predniSONE (DELTASONE) 10 MG tablet, Take 10 mg by mouth Daily., Disp: , Rfl:   •  rosuvastatin (CRESTOR) 10 MG tablet, Take 1 tablet by mouth Every Night., Disp: 90 tablet, Rfl: 1  •  Mirabegron ER (Myrbetriq) 25 MG tablet sustained-release 24 hour 24 hr tablet, Take 1 tablet by mouth Daily., Disp: 90 tablet, Rfl: 3    Physical Exam:  Vital Signs:   Vitals:    09/13/22 1314   BP: 120/72   BP Location: Right arm   Patient Position: Sitting   Cuff Size: Adult   Pulse: 65   SpO2: 97%   Weight: 87.5 kg (193 lb)   Height: 152.4 cm (60\")     Body mass index is 37.69 kg/m².    Physical Exam  Vitals and nursing note reviewed.   Constitutional:       General: She is not in acute distress.     Appearance: She is obese.   HENT: "      Head: Normocephalic and atraumatic.   Eyes:      General: No scleral icterus.     Extraocular Movements: Extraocular movements intact.   Neck:      Trachea: Trachea normal.   Cardiovascular:      Rate and Rhythm: Normal rate and regular rhythm.      Pulses: Normal pulses.      Heart sounds: Normal heart sounds. No murmur heard.    No friction rub. No gallop.   Pulmonary:      Effort: Pulmonary effort is normal.      Breath sounds: Normal breath sounds.   Musculoskeletal:         General: Normal range of motion.      Cervical back: Neck supple.      Right lower leg: No edema.      Left lower leg: No edema.   Skin:     General: Skin is warm and dry.      Findings: No bruising, lesion or rash.   Neurological:      Mental Status: She is alert and oriented to person, place, and time.      Motor: No weakness.      Gait: Gait normal.   Psychiatric:         Mood and Affect: Mood normal.         Behavior: Behavior normal. Behavior is cooperative.         Thought Content: Thought content does not include suicidal ideation.         Results Review:   I reviewed the patient's new clinical results.      ECG 12 Lead    Date/Time: 9/13/2022 12:07 PM  Performed by: Farnaz Valente APRN  Authorized by: Farnaz Valente APRN   Comparison: compared with previous ECG from 2/10/2020  Rhythm: sinus rhythm  Rate: normal  BPM: 63  QRS axis: normal    Clinical impression: normal ECG        Assessment / Plan:     1. Coronary artery disease involving native coronary artery of native heart without angina pectoris (Primary)  --Currently stable and angina free  --Continue daily low-dose aspirin and statin  --Follow-up with Dr. Núñez in 1 year or sooner if needed    2. Benign essential hypertension  --Currently well controlled    3. Mixed hyperlipidemia  --Continue high intensity statin    4. Pre-operative cardiovascular examination  --ECG today shows NSR  --Revised Cardiac Risk Index, Very low risk          Preventative  Cardiology:   Tobacco Cessation: N/A   Advance Care Planning: ACP discussion was declined by the patient. Patient does not have an advance directive, declines further assistance.     Follow Up:   Return in about 1 year (around 9/13/2023) for Follow-up with Dr. Núñez.      Thank you for allowing me to participate in the care of your patient. Please to not hesitate to contact me with additional questions or concerns.     Farnaz Valente APRN

## 2022-09-13 NOTE — TELEPHONE ENCOUNTER
Can someone fax a copy of today's office note and ECG for this patient to [facility]/Dr. Garcia/[facility] in [facility]?  She is looking to undergo total right knee surgery.  Thank you

## 2022-09-15 ENCOUNTER — TELEPHONE (OUTPATIENT)
Dept: INTERNAL MEDICINE | Facility: CLINIC | Age: 72
End: 2022-09-15

## 2022-09-15 NOTE — TELEPHONE ENCOUNTER
Left detailed message for patient with dates of COVID vaccines per signed release, advised we do not currently have the new COVID vaccine.

## 2022-09-15 NOTE — TELEPHONE ENCOUNTER
Caller: Missy Mcgraw    Relationship to patient: Self    Best call back number: [phone]     What is the call regarding:  PATIENT STATES THAT SHE WOULD LIKE TO KNOW WHEN SHE HAD HER LAST COVID VACCINE.  SHE WANTS TO GET THE NEW VACCINE AS SOON AS POSSIBLE.

## 2022-10-10 RX ORDER — CITALOPRAM 40 MG/1
TABLET ORAL
Qty: 90 TABLET | Refills: 1 | Status: SHIPPED | OUTPATIENT
Start: 2022-10-10 | End: 2023-01-03 | Stop reason: SDUPTHER

## 2022-10-17 ENCOUNTER — HOSPITAL ENCOUNTER (OUTPATIENT)
Dept: MAMMOGRAPHY | Facility: HOSPITAL | Age: 72
Discharge: HOME | End: 2022-10-17
Admitting: INTERNAL MEDICINE

## 2022-10-17 DIAGNOSIS — Z12.31 ENCOUNTER FOR SCREENING MAMMOGRAM FOR BREAST CANCER: ICD-10-CM

## 2022-10-17 PROCEDURE — 77063 BREAST TOMOSYNTHESIS BI: CPT | Performed by: RADIOLOGY

## 2022-10-17 PROCEDURE — 77067 SCR MAMMO BI INCL CAD: CPT | Performed by: RADIOLOGY

## 2022-10-17 PROCEDURE — 77067 SCR MAMMO BI INCL CAD: CPT

## 2022-10-17 PROCEDURE — 77063 BREAST TOMOSYNTHESIS BI: CPT

## 2022-11-28 DIAGNOSIS — I10 BENIGN ESSENTIAL HYPERTENSION: ICD-10-CM

## 2022-11-28 RX ORDER — AMLODIPINE BESYLATE 5 MG/1
TABLET ORAL
Qty: 90 TABLET | Refills: 1 | Status: SHIPPED | OUTPATIENT
Start: 2022-11-28 | End: 2023-01-03 | Stop reason: SDUPTHER

## 2022-12-05 DIAGNOSIS — I10 BENIGN ESSENTIAL HYPERTENSION: ICD-10-CM

## 2022-12-05 RX ORDER — LISINOPRIL AND HYDROCHLOROTHIAZIDE 20; 12.5 MG/1; MG/1
TABLET ORAL
Qty: 180 TABLET | Refills: 3 | OUTPATIENT
Start: 2022-12-05

## 2022-12-12 DIAGNOSIS — I10 BENIGN ESSENTIAL HYPERTENSION: ICD-10-CM

## 2022-12-12 RX ORDER — LISINOPRIL AND HYDROCHLOROTHIAZIDE 20; 12.5 MG/1; MG/1
1 TABLET ORAL 2 TIMES DAILY
Qty: 180 TABLET | Refills: 1 | Status: SHIPPED | OUTPATIENT
Start: 2022-12-12

## 2022-12-14 RX ORDER — BACLOFEN 10 MG/1
10 TABLET ORAL NIGHTLY PRN
Qty: 30 TABLET | Refills: 1 | Status: SHIPPED | OUTPATIENT
Start: 2022-12-14

## 2022-12-14 NOTE — TELEPHONE ENCOUNTER
Rx Refill Note  Requested Prescriptions     Pending Prescriptions Disp Refills   • baclofen (LIORESAL) 10 MG tablet 30 tablet 1     Sig: Take 1 tablet by mouth At Night As Needed for Muscle Spasms.      Last office visit with prescribing clinician: 8/15/2022   Last telemedicine visit with prescribing clinician: 1/3/2023   Next office visit with prescribing clinician: 1/3/2023                         Would you like a call back once the refill request has been completed: [] Yes [] No    If the office needs to give you a call back, can they leave a voicemail: [] Yes [] No    Aleida Nicole CMA  12/14/22, 10:09 EST

## 2022-12-31 ENCOUNTER — LAB (OUTPATIENT)
Dept: LAB | Facility: HOSPITAL | Age: 72
End: 2022-12-31
Payer: MEDICARE

## 2022-12-31 LAB
ALBUMIN SERPL-MCNC: 4.2 G/DL (ref 3.5–5.2)
ALBUMIN UR-MCNC: 12.1 MG/DL
ALBUMIN/GLOB SERPL: 1.4 G/DL
ALP SERPL-CCNC: 84 U/L (ref 39–117)
ALT SERPL W P-5'-P-CCNC: 7 U/L (ref 1–33)
ANION GAP SERPL CALCULATED.3IONS-SCNC: 11 MMOL/L (ref 5–15)
AST SERPL-CCNC: 14 U/L (ref 1–32)
BASOPHILS # BLD AUTO: 0.05 10*3/MM3 (ref 0–0.2)
BASOPHILS NFR BLD AUTO: 0.7 % (ref 0–1.5)
BILIRUB SERPL-MCNC: 0.4 MG/DL (ref 0–1.2)
BUN SERPL-MCNC: 18 MG/DL (ref 8–23)
BUN/CREAT SERPL: 18.2 (ref 7–25)
CALCIUM SPEC-SCNC: 9.9 MG/DL (ref 8.6–10.5)
CHLORIDE SERPL-SCNC: 103 MMOL/L (ref 98–107)
CHOLEST SERPL-MCNC: 204 MG/DL (ref 0–200)
CO2 SERPL-SCNC: 26 MMOL/L (ref 22–29)
CREAT SERPL-MCNC: 0.99 MG/DL (ref 0.57–1)
CREAT UR-MCNC: 227.2 MG/DL
DEPRECATED RDW RBC AUTO: 42.5 FL (ref 37–54)
EGFRCR SERPLBLD CKD-EPI 2021: 61.5 ML/MIN/1.73
EOSINOPHIL # BLD AUTO: 0.2 10*3/MM3 (ref 0–0.4)
EOSINOPHIL NFR BLD AUTO: 2.8 % (ref 0.3–6.2)
ERYTHROCYTE [DISTWIDTH] IN BLOOD BY AUTOMATED COUNT: 12.5 % (ref 12.3–15.4)
GLOBULIN UR ELPH-MCNC: 3.1 GM/DL
GLUCOSE SERPL-MCNC: 160 MG/DL (ref 65–99)
HBA1C MFR BLD: 6.3 % (ref 4.8–5.6)
HCT VFR BLD AUTO: 40.5 % (ref 34–46.6)
HDLC SERPL-MCNC: 50 MG/DL (ref 40–60)
HGB BLD-MCNC: 13.4 G/DL (ref 12–15.9)
IMM GRANULOCYTES # BLD AUTO: 0.05 10*3/MM3 (ref 0–0.05)
IMM GRANULOCYTES NFR BLD AUTO: 0.7 % (ref 0–0.5)
LDLC SERPL CALC-MCNC: 116 MG/DL (ref 0–100)
LDLC/HDLC SERPL: 2.22 {RATIO}
LYMPHOCYTES # BLD AUTO: 1.58 10*3/MM3 (ref 0.7–3.1)
LYMPHOCYTES NFR BLD AUTO: 21.8 % (ref 19.6–45.3)
MCH RBC QN AUTO: 30.3 PG (ref 26.6–33)
MCHC RBC AUTO-ENTMCNC: 33.1 G/DL (ref 31.5–35.7)
MCV RBC AUTO: 91.6 FL (ref 79–97)
MICROALBUMIN/CREAT UR: 53.3 MG/G
MONOCYTES # BLD AUTO: 0.64 10*3/MM3 (ref 0.1–0.9)
MONOCYTES NFR BLD AUTO: 8.8 % (ref 5–12)
NEUTROPHILS NFR BLD AUTO: 4.73 10*3/MM3 (ref 1.7–7)
NEUTROPHILS NFR BLD AUTO: 65.2 % (ref 42.7–76)
NRBC BLD AUTO-RTO: 0 /100 WBC (ref 0–0.2)
PLATELET # BLD AUTO: 220 10*3/MM3 (ref 140–450)
PMV BLD AUTO: 12.9 FL (ref 6–12)
POTASSIUM SERPL-SCNC: 4.4 MMOL/L (ref 3.5–5.2)
PROT SERPL-MCNC: 7.3 G/DL (ref 6–8.5)
RBC # BLD AUTO: 4.42 10*6/MM3 (ref 3.77–5.28)
SODIUM SERPL-SCNC: 140 MMOL/L (ref 136–145)
TRIGL SERPL-MCNC: 216 MG/DL (ref 0–150)
VLDLC SERPL-MCNC: 38 MG/DL (ref 5–40)
WBC NRBC COR # BLD: 7.25 10*3/MM3 (ref 3.4–10.8)

## 2022-12-31 PROCEDURE — 36415 COLL VENOUS BLD VENIPUNCTURE: CPT | Performed by: INTERNAL MEDICINE

## 2022-12-31 PROCEDURE — 82043 UR ALBUMIN QUANTITATIVE: CPT | Performed by: INTERNAL MEDICINE

## 2022-12-31 PROCEDURE — 82570 ASSAY OF URINE CREATININE: CPT | Performed by: INTERNAL MEDICINE

## 2022-12-31 PROCEDURE — 80053 COMPREHEN METABOLIC PANEL: CPT | Performed by: INTERNAL MEDICINE

## 2022-12-31 PROCEDURE — 80061 LIPID PANEL: CPT | Performed by: INTERNAL MEDICINE

## 2022-12-31 PROCEDURE — 85025 COMPLETE CBC W/AUTO DIFF WBC: CPT | Performed by: INTERNAL MEDICINE

## 2022-12-31 PROCEDURE — 83036 HEMOGLOBIN GLYCOSYLATED A1C: CPT | Performed by: INTERNAL MEDICINE

## 2023-01-03 ENCOUNTER — OFFICE VISIT (OUTPATIENT)
Dept: INTERNAL MEDICINE | Facility: CLINIC | Age: 73
End: 2023-01-03
Payer: MEDICARE

## 2023-01-03 VITALS
WEIGHT: 189 LBS | HEIGHT: 60 IN | RESPIRATION RATE: 16 BRPM | BODY MASS INDEX: 37.11 KG/M2 | DIASTOLIC BLOOD PRESSURE: 81 MMHG | TEMPERATURE: 98 F | SYSTOLIC BLOOD PRESSURE: 123 MMHG | OXYGEN SATURATION: 99 % | HEART RATE: 71 BPM

## 2023-01-03 DIAGNOSIS — I10 BENIGN ESSENTIAL HYPERTENSION: Primary | ICD-10-CM

## 2023-01-03 DIAGNOSIS — E78.2 MIXED HYPERLIPIDEMIA: ICD-10-CM

## 2023-01-03 DIAGNOSIS — E11.65 TYPE 2 DIABETES MELLITUS WITH HYPERGLYCEMIA, WITHOUT LONG-TERM CURRENT USE OF INSULIN: ICD-10-CM

## 2023-01-03 PROCEDURE — 99214 OFFICE O/P EST MOD 30 MIN: CPT | Performed by: INTERNAL MEDICINE

## 2023-01-03 RX ORDER — AMLODIPINE BESYLATE 5 MG/1
5 TABLET ORAL NIGHTLY
Qty: 90 TABLET | Refills: 1 | Status: SHIPPED | OUTPATIENT
Start: 2023-01-03

## 2023-01-03 RX ORDER — GLIMEPIRIDE 2 MG/1
2 TABLET ORAL DAILY
Qty: 90 TABLET | Refills: 1 | Status: SHIPPED | OUTPATIENT
Start: 2023-01-03

## 2023-01-03 RX ORDER — COLCHICINE 0.6 MG/1
0.6 TABLET ORAL DAILY
COMMUNITY

## 2023-01-03 RX ORDER — CITALOPRAM 40 MG/1
40 TABLET ORAL DAILY
Qty: 90 TABLET | Refills: 1 | Status: SHIPPED | OUTPATIENT
Start: 2023-01-03

## 2023-01-03 RX ORDER — ROSUVASTATIN CALCIUM 10 MG/1
10 TABLET, COATED ORAL NIGHTLY
Qty: 90 TABLET | Refills: 1 | Status: SHIPPED | OUTPATIENT
Start: 2023-01-03

## 2023-01-03 NOTE — PROGRESS NOTES
Chief Complaint  Hypertension, Hyperlipidemia, and Diabetes    Subjective        Missy Mcgraw presents to [facility]  History of Present Illness  HPI: Patient is here to follow up on the blood pressure  The patient is taking the blood pressure medications as prescribed and has had no side effects. The patient is also here to follow up on the cholesterol and is trying to follow a diet and has lost weight ,  She had a colonoscopy 2019. The patient is also here to follow on sugar and had  lab work done . The patient also needs refills on medications .   Hypertension   Pertinent negatives include no chest pain, palpitations or shortness of breath.   Hyperlipidemia   Pertinent negatives include no chest pain or shortness of breath.   Diabetes   Pertinent negatives for hypoglycemia include no dizziness, nervousness/anxiousness or pallor. Pertinent negatives for diabetes include no chest pain, no shortness of breath  Patient is on acei/arb   Objective   Vital Signs:  /81   Pulse 71   Temp 98 °F (36.7 °C)   Resp 16   Ht 152.4 cm (60\")   Wt 85.7 kg (189 lb)   SpO2 99%   BMI 36.91 kg/m²   Estimated body mass index is 36.91 kg/m² as calculated from the following:    Height as of this encounter: 152.4 cm (60\").    Weight as of this encounter: 85.7 kg (189 lb).    Class 2 Severe Obesity (BMI >=35 and <=39.9). Obesity-related health conditions include the following: hypertension. Obesity is improving with lifestyle modifications. BMI is is above average; BMI management plan is completed. We discussed low calorie, low carb based diet program, portion control, increasing exercise and joining a fitness center or start home based exercise program.      Physical Exam  Vitals and nursing note reviewed.   Constitutional:       General: She is not in acute distress.     Appearance: Normal appearance. She is not diaphoretic.   HENT:      Head: Normocephalic and atraumatic.      Right Ear:  External ear normal.      Left Ear: External ear normal.      Nose: Nose normal.   Eyes:      Extraocular Movements: Extraocular movements intact.      Conjunctiva/sclera: Conjunctivae normal.   Neck:      Trachea: Trachea normal.   Cardiovascular:      Rate and Rhythm: Normal rate and regular rhythm.      Heart sounds: Normal heart sounds.   Pulmonary:      Effort: Pulmonary effort is normal. No respiratory distress.      Breath sounds: Normal breath sounds.   Abdominal:      General: Abdomen is flat.   Musculoskeletal:      Cervical back: Neck supple.      Comments: Moves all limbs   Skin:     General: Skin is warm.   Neurological:      Mental Status: She is alert and oriented to person, place, and time.      Comments: No gross motor or sensory deficits        Result Review :    Common labs    Common Labs 2/2/22 8/4/22 8/4/22 8/4/22 8/4/22 8/4/22 12/31/22 12/31/22 12/31/22 12/31/22 12/31/22     0804 0804 0804 0804 0804 0810 0810 0810 0810 0810   Glucose   137 (A)       160 (A)    BUN   24 (A)       18    Creatinine   1.01 (A)       0.99    Sodium   137       140    Potassium   4.8       4.4    Chloride   100       103    Calcium   9.9       9.9    Total Protein   6.5           Albumin   4.50       4.2    Total Bilirubin   0.4       0.4    Alkaline Phosphatase   86       84    AST (SGOT)   12       14    ALT (SGPT)   6       7    WBC  8.30     7.25       Hemoglobin  14.3     13.4       Hematocrit  43.1     40.5       Platelets  228     220       Total Cholesterol           204 (A)   Total Cholesterol    223 (A)          Triglycerides    271 (A)       216 (A)   HDL Cholesterol    50       50   LDL Cholesterol     125 (A)       116 (A)   Hemoglobin A1C     6.60 (A)   6.30 (A)      Microalbumin, Urine      43.0   12.1     Uric Acid 8.9 (A)             (A) Abnormal value       Comments are available for some flowsheets but are not being displayed.                     Assessment and Plan   Diagnoses and all orders for  this visit:    1. Benign essential hypertension (Primary)  -     amLODIPine (NORVASC) 5 MG tablet; Take 1 tablet by mouth Every Night.  Dispense: 90 tablet; Refill: 1    2. Mixed hyperlipidemia  -     rosuvastatin (CRESTOR) 10 MG tablet; Take 1 tablet by mouth Every Night.  Dispense: 90 tablet; Refill: 1  -     CBC & Differential  -     Comprehensive Metabolic Panel  -     Lipid Panel    3. Type 2 diabetes mellitus with hyperglycemia, without long-term current use of insulin (HCC)  -     glimepiride (Amaryl) 2 MG tablet; Take 1 tablet by mouth Daily.  Dispense: 90 tablet; Refill: 1  -     Hemoglobin A1c    Other orders  -     citalopram (CeleXA) 40 MG tablet; Take 1 tablet by mouth Daily.  Dispense: 90 tablet; Refill: 1        Plan:  1.  Benign essential hypertension: Will continue current medication, low-sodium diet advised, Counseled to regularly check BP at home with goal averaging <130/80.   2.mixed hyperlipidemia:   Reviewed   fasting CMP and lipid panel.  Diet and exercise counseled,  Will continue current medications  3. Diabetes  Mellitus  :  Reviewed   fasting CMP  and hba1c  6.3  , diet and exercise counseled , Will continue current medications       I spent 30 minutes caring for Missy on this date of service. This time includes time spent by me in the following activities:preparing for the visit, reviewing tests, performing a medically appropriate examination and/or evaluation , counseling and educating the patient/family/caregiver, ordering medications, tests, or procedures and documenting information in the medical record  Follow Up   Return in about 4 months (around 5/17/2023) for Medicare Wellness.  Patient was given instructions and counseling regarding her condition or for health maintenance advice. Please see specific information pulled into the AVS if appropriate.

## 2023-03-02 RX ORDER — CARBIDOPA AND LEVODOPA 50; 200 MG/1; MG/1
TABLET, EXTENDED RELEASE ORAL
Qty: 90 TABLET | Refills: 3 | Status: SHIPPED | OUTPATIENT
Start: 2023-03-02

## 2023-03-02 RX ORDER — PANTOPRAZOLE SODIUM 40 MG/1
TABLET, DELAYED RELEASE ORAL
Qty: 90 TABLET | Refills: 3 | Status: SHIPPED | OUTPATIENT
Start: 2023-03-02

## 2023-03-02 NOTE — TELEPHONE ENCOUNTER
Rx Refill Note  Requested Prescriptions     Pending Prescriptions Disp Refills   • carbidopa-levodopa CR (SINEMET CR)  MG per CR tablet [Pharmacy Med Name: CARBIDOPA/LEVODOPA ER TABS 50/200MG] 90 tablet 3     Sig: TAKE 1 TABLET EVERY NIGHT   • pantoprazole (PROTONIX) 40 MG EC tablet [Pharmacy Med Name: PANTOPRAZOLE SODIUM DR TABS 40MG] 90 tablet 3     Sig: TAKE 1 TABLET DAILY      Last office visit with prescribing clinician: 1/3/2023   Last telemedicine visit with prescribing clinician: 5/3/2023   Next office visit with prescribing clinician: 5/3/2023                         Would you like a call back once the refill request has been completed: [] Yes [] No    If the office needs to give you a call back, can they leave a voicemail: [] Yes [] No    Aleida Nicole, JOHN  03/02/23, 08:10 EST

## 2023-03-21 DIAGNOSIS — I10 BENIGN ESSENTIAL HYPERTENSION: ICD-10-CM

## 2023-03-21 RX ORDER — ATENOLOL 25 MG/1
TABLET ORAL
Qty: 180 TABLET | Refills: 3 | Status: SHIPPED | OUTPATIENT
Start: 2023-03-21

## 2023-03-21 NOTE — TELEPHONE ENCOUNTER
Rx Refill Note  Requested Prescriptions     Pending Prescriptions Disp Refills   • atenolol (TENORMIN) 25 MG tablet [Pharmacy Med Name: ATENOLOL TABS 25MG] 180 tablet 3     Sig: TAKE 1 TABLET TWICE A DAY      Last office visit with prescribing clinician: 1/3/2023   Last telemedicine visit with prescribing clinician: 5/3/2023   Next office visit with prescribing clinician: 5/3/2023                         Would you like a call back once the refill request has been completed: [] Yes [] No    If the office needs to give you a call back, can they leave a voicemail: [] Yes [] No    Aleida Nicole, JOHN  03/21/23, 09:41 EDT

## 2023-04-26 DIAGNOSIS — I10 BENIGN ESSENTIAL HYPERTENSION: ICD-10-CM

## 2023-04-26 RX ORDER — LISINOPRIL AND HYDROCHLOROTHIAZIDE 20; 12.5 MG/1; MG/1
1 TABLET ORAL 2 TIMES DAILY
Qty: 180 TABLET | Refills: 1 | Status: SHIPPED | OUTPATIENT
Start: 2023-04-26

## 2023-05-02 LAB
ALBUMIN SERPL-MCNC: 4.4 G/DL (ref 3.5–5.2)
ALBUMIN/GLOB SERPL: 2.1 G/DL
ALP SERPL-CCNC: 88 U/L (ref 39–117)
ALT SERPL-CCNC: 7 U/L (ref 1–33)
AST SERPL-CCNC: 10 U/L (ref 1–32)
BASOPHILS # BLD AUTO: 0.05 10*3/MM3 (ref 0–0.2)
BASOPHILS NFR BLD AUTO: 0.6 % (ref 0–1.5)
BILIRUB SERPL-MCNC: 0.4 MG/DL (ref 0–1.2)
BUN SERPL-MCNC: 19 MG/DL (ref 8–23)
BUN/CREAT SERPL: 17.8 (ref 7–25)
CALCIUM SERPL-MCNC: 10 MG/DL (ref 8.6–10.5)
CHLORIDE SERPL-SCNC: 102 MMOL/L (ref 98–107)
CHOLEST SERPL-MCNC: 201 MG/DL (ref 0–200)
CO2 SERPL-SCNC: 28.9 MMOL/L (ref 22–29)
CREAT SERPL-MCNC: 1.07 MG/DL (ref 0.57–1)
EGFRCR SERPLBLD CKD-EPI 2021: 56 ML/MIN/1.73
EOSINOPHIL # BLD AUTO: 0.2 10*3/MM3 (ref 0–0.4)
EOSINOPHIL NFR BLD AUTO: 2.5 % (ref 0.3–6.2)
ERYTHROCYTE [DISTWIDTH] IN BLOOD BY AUTOMATED COUNT: 12.8 % (ref 12.3–15.4)
GLOBULIN SER CALC-MCNC: 2.1 GM/DL
GLUCOSE SERPL-MCNC: 169 MG/DL (ref 65–99)
HBA1C MFR BLD: 6.3 % (ref 4.8–5.6)
HCT VFR BLD AUTO: 40 % (ref 34–46.6)
HDLC SERPL-MCNC: 51 MG/DL (ref 40–60)
HGB BLD-MCNC: 13.5 G/DL (ref 12–15.9)
IMM GRANULOCYTES # BLD AUTO: 0.03 10*3/MM3 (ref 0–0.05)
IMM GRANULOCYTES NFR BLD AUTO: 0.4 % (ref 0–0.5)
LDLC SERPL CALC-MCNC: 104 MG/DL (ref 0–100)
LYMPHOCYTES # BLD AUTO: 1.6 10*3/MM3 (ref 0.7–3.1)
LYMPHOCYTES NFR BLD AUTO: 20.3 % (ref 19.6–45.3)
MCH RBC QN AUTO: 30.1 PG (ref 26.6–33)
MCHC RBC AUTO-ENTMCNC: 33.8 G/DL (ref 31.5–35.7)
MCV RBC AUTO: 89.3 FL (ref 79–97)
MONOCYTES # BLD AUTO: 0.7 10*3/MM3 (ref 0.1–0.9)
MONOCYTES NFR BLD AUTO: 8.9 % (ref 5–12)
NEUTROPHILS # BLD AUTO: 5.31 10*3/MM3 (ref 1.7–7)
NEUTROPHILS NFR BLD AUTO: 67.3 % (ref 42.7–76)
NRBC BLD AUTO-RTO: 0 /100 WBC (ref 0–0.2)
PLATELET # BLD AUTO: 197 10*3/MM3 (ref 140–450)
POTASSIUM SERPL-SCNC: 4.6 MMOL/L (ref 3.5–5.2)
PROT SERPL-MCNC: 6.5 G/DL (ref 6–8.5)
RBC # BLD AUTO: 4.48 10*6/MM3 (ref 3.77–5.28)
SODIUM SERPL-SCNC: 140 MMOL/L (ref 136–145)
TRIGL SERPL-MCNC: 268 MG/DL (ref 0–150)
VLDLC SERPL CALC-MCNC: 46 MG/DL (ref 5–40)
WBC # BLD AUTO: 7.89 10*3/MM3 (ref 3.4–10.8)

## 2023-05-03 ENCOUNTER — OFFICE VISIT (OUTPATIENT)
Dept: INTERNAL MEDICINE | Facility: CLINIC | Age: 73
End: 2023-05-03
Payer: MEDICARE

## 2023-05-03 VITALS
DIASTOLIC BLOOD PRESSURE: 68 MMHG | HEART RATE: 58 BPM | BODY MASS INDEX: 37.3 KG/M2 | WEIGHT: 190 LBS | TEMPERATURE: 97.9 F | RESPIRATION RATE: 16 BRPM | HEIGHT: 60 IN | OXYGEN SATURATION: 97 % | SYSTOLIC BLOOD PRESSURE: 145 MMHG

## 2023-05-03 DIAGNOSIS — Z78.0 MENOPAUSE: ICD-10-CM

## 2023-05-03 DIAGNOSIS — Z23 NEED FOR PNEUMOCOCCAL VACCINE: ICD-10-CM

## 2023-05-03 DIAGNOSIS — E78.2 MIXED HYPERLIPIDEMIA: ICD-10-CM

## 2023-05-03 DIAGNOSIS — E11.65 TYPE 2 DIABETES MELLITUS WITH HYPERGLYCEMIA, WITHOUT LONG-TERM CURRENT USE OF INSULIN: ICD-10-CM

## 2023-05-03 DIAGNOSIS — Z13.820 ENCOUNTER FOR SCREENING FOR OSTEOPOROSIS: ICD-10-CM

## 2023-05-03 DIAGNOSIS — Z91.81 AT MODERATE RISK FOR FALL: ICD-10-CM

## 2023-05-03 DIAGNOSIS — Z00.00 MEDICARE ANNUAL WELLNESS VISIT, SUBSEQUENT: Primary | ICD-10-CM

## 2023-05-03 DIAGNOSIS — I10 BENIGN ESSENTIAL HYPERTENSION: ICD-10-CM

## 2023-05-03 RX ORDER — ROSUVASTATIN CALCIUM 10 MG/1
10 TABLET, COATED ORAL NIGHTLY
Qty: 90 TABLET | Refills: 1 | Status: SHIPPED | OUTPATIENT
Start: 2023-05-03

## 2023-05-03 RX ORDER — LISINOPRIL AND HYDROCHLOROTHIAZIDE 20; 12.5 MG/1; MG/1
1 TABLET ORAL 2 TIMES DAILY
Qty: 180 TABLET | Refills: 1 | Status: SHIPPED | OUTPATIENT
Start: 2023-05-03

## 2023-05-03 RX ORDER — GLIMEPIRIDE 2 MG/1
2 TABLET ORAL DAILY
Qty: 90 TABLET | Refills: 1 | Status: SHIPPED | OUTPATIENT
Start: 2023-05-03

## 2023-05-03 RX ORDER — AMLODIPINE BESYLATE 5 MG/1
5 TABLET ORAL NIGHTLY
Qty: 90 TABLET | Refills: 1 | Status: SHIPPED | OUTPATIENT
Start: 2023-05-03

## 2023-05-03 NOTE — PROGRESS NOTES
The ABCs of the Annual Wellness Visit  Subsequent Medicare Wellness Visit    Chief Complaint   Patient presents with   • Medicare Wellness-subsequent   • Hypertension   • Hyperlipidemia   • Diabetes       Subjective      Missy Mcgraw is a 72 y.o. female who presents for a Subsequent Medicare Wellness Visit and physical .  Patient is here to follow up on the blood pressure  The patient is taking the blood pressure medications as prescribed and has had no side effects. The patient is also here to follow up on the cholesterol and is trying to follow a diet. The patient is  also here to follow up on  Sugar and  Had  lab work done .  The patient also needs  dexa scan and prevnar 20   .   Hyperlipidemia   Pertinent negatives include no chest pain or shortness of breath.   Hypertension   Pertinent negatives include no chest pain, palpitations or shortness of breath.      The following portions of the patient's history were reviewed and   updated as appropriate: allergies, current medications, past family history, past medical history, past social history, past surgical history and problem list.    Compared to one year ago, the patient feels her physical   health is worse.    Compared to one year ago, the patient feels her mental   health is the same.    Recent Hospitalizations:  She was not admitted to the hospital during the last year.       Current Medical Providers:  Patient Care Team:  Emely Sheldon MD as PCP - General (Internal Medicine)  Marie De Leon MD as Surgeon (General Surgery)  Kt Dey DO as Consulting Physician (Anesthesiology)    Outpatient Medications Prior to Visit   Medication Sig Dispense Refill   • allopurinol (ZYLOPRIM) 100 MG tablet Take 1 tablet by mouth Daily.     • aspirin (aspirin) 81 MG EC tablet Take 1 tablet by mouth Daily.     • atenolol (TENORMIN) 25 MG tablet TAKE 1 TABLET TWICE A  tablet 3   • baclofen (LIORESAL) 10 MG tablet Take 1 tablet by mouth At Night As  Needed for Muscle Spasms. 30 tablet 1   • carbidopa-levodopa CR (SINEMET CR)  MG per CR tablet TAKE 1 TABLET EVERY NIGHT 90 tablet 3   • citalopram (CeleXA) 40 MG tablet Take 1 tablet by mouth Daily. 90 tablet 1   • clobetasol (TEMOVATE) 0.05 % ointment Apply  topically to the appropriate area as directed 2 (Two) Times a Day. 60 g 1   • colchicine 0.6 MG tablet Take 1 tablet by mouth Daily.     • docusate sodium (Colace) 100 MG capsule Take 1 capsule by mouth 2 (Two) Times a Day. Hold if loose stools develop 60 capsule 0   • estradiol (ESTRACE VAGINAL) 0.1 MG/GM vaginal cream Insert 1 gm intravaginally 2 times each week 42.5 g 6   • hydrocortisone (ANUSOL-HC) 25 MG suppository Insert 1 suppository into the rectum 2 (Two) Times a Day As Needed for Hemorrhoids. 24 each 1   • KRILL OIL PO Take  by mouth.     • loratadine (CLARITIN) 10 MG tablet Take 1 tablet by mouth 2 (Two) Times a Day.     • meclizine (ANTIVERT) 25 MG tablet Take 1 tablet by mouth 3 (Three) Times a Day As Needed (vertigo). 90 tablet 2   • mupirocin (BACTROBAN) 2 % ointment Apply 1 application topically to the appropriate area as directed 3 (Three) Times a Day. 15 g 0   • pantoprazole (PROTONIX) 40 MG EC tablet TAKE 1 TABLET DAILY 90 tablet 3   • predniSONE (DELTASONE) 10 MG tablet Take 1 tablet by mouth Daily.     • amLODIPine (NORVASC) 5 MG tablet Take 1 tablet by mouth Every Night. 90 tablet 1   • glimepiride (Amaryl) 2 MG tablet Take 1 tablet by mouth Daily. 90 tablet 1   • lisinopril-hydrochlorothiazide (PRINZIDE,ZESTORETIC) 20-12.5 MG per tablet Take 1 tablet by mouth 2 (Two) Times a Day. 180 tablet 1   • rosuvastatin (CRESTOR) 10 MG tablet Take 1 tablet by mouth Every Night. 90 tablet 1     No facility-administered medications prior to visit.       No opioid medication identified on active medication list. I have reviewed chart for other potential  high risk medication/s and harmful drug interactions in the elderly.          Aspirin is on  "active medication list. Aspirin use is indicated based on review of current medical condition/s. Pros and cons of this therapy have been discussed today. Benefits of this medication outweigh potential harm.  Patient has been encouraged to continue taking this medication.  .      Patient Active Problem List   Diagnosis   • Hay fever   • Benign essential hypertension   • Gastroesophageal reflux disease with esophagitis   • Major depressive disorder, recurrent episode   • Uncontrolled type 2 diabetes mellitus   • Abnormal liver enzymes   • Abnormal weight gain   • Cervicalgia   • Fibroid, uterine   • Hyperthyroidism   • Mixed hyperlipidemia   • Movement disorder   • Neuralgia   • Ulnar nerve palsy   • Vitamin B12 deficiency   • Vitamin D deficiency   • Osteoporosis   • Restless legs syndrome   • Shortness of breath   • Coronary artery disease involving native coronary artery of native heart without angina pectoris   • PONV (postoperative nausea and vomiting)   • Unintentional weight loss   • Gastroesophageal reflux disease   • Epigastric abdominal pain   • Gastritis determined by biopsy   • Family history of colon cancer in father   • Pre-operative cardiovascular examination     Advance Care Planning   Advance Care Planning     Advance Directive is not on file.  ACP discussion was held with the patient during this visit. Patient has an advance directive (not in EMR), copy requested.     Objective    Vitals:    05/03/23 1606   BP: 145/68   Pulse: 58   Resp: 16   Temp: 97.9 °F (36.6 °C)   SpO2: 97%   Weight: 86.2 kg (190 lb)   Height: 152.4 cm (60\")   PainSc:   2     Estimated body mass index is 37.11 kg/m² as calculated from the following:    Height as of this encounter: 152.4 cm (60\").    Weight as of this encounter: 86.2 kg (190 lb).    Class 2 Severe Obesity (BMI >=35 and <=39.9). Obesity-related health conditions include the following: obstructive sleep apnea, hypertension, coronary heart disease and diabetes " mellitus. Obesity is unchanged. BMI is is above average; no BMI management plan is appropriate. We discussed portion control and increasing exercise.        Physical Examination  Vitals and nursing note reviewed  General appearance: Normocephalic and nontraumatic  HEENT: External inspection of eyes, ears and nose appears benign, hearing appears intact  Neck: Neck appears supple, trachea in midline, thyroid appears not enlarged  Respiratory: Respiratory effort appears normal  Musculoskeletal: Moving all limbs  Range of motion of visible joints appears within normal  CNS: No gross motor or sensory deficits  No gross cranial nerve deficits  No tremors  Psychiatry: Alert and oriented x3  Memory appears intact  Mood and affect appears normal  Insight appears normal      Does the patient have evidence of cognitive impairment?   No    Lab Results   Component Value Date    CHLPL 201 (H) 2023    TRIG 268 (H) 2023    HDL 51 2023     (H) 2023    VLDL 46 (H) 2023    HGBA1C 6.30 (H) 2023          HEALTH RISK ASSESSMENT    Smoking Status:  Social History     Tobacco Use   Smoking Status Never   Smokeless Tobacco Never     Alcohol Consumption:  Social History     Substance and Sexual Activity   Alcohol Use No     Fall Risk Screen:    STEADI Fall Risk Assessment was completed, and patient is at MODERATE risk for falls. Assessment completed on:5/3/2023    Depression Screenin/3/2023     4:16 PM   PHQ-2/PHQ-9 Depression Screening   Little Interest or Pleasure in Doing Things 0-->not at all   Feeling Down, Depressed or Hopeless 0-->not at all   PHQ-9: Brief Depression Severity Measure Score 0       Health Habits and Functional and Cognitive Screenin/3/2023     4:14 PM   Functional & Cognitive Status   Do you have difficulty preparing food and eating? No   Do you have difficulty bathing yourself, getting dressed or grooming yourself? No   Do you have difficulty using the  toilet? No   Do you have difficulty moving around from place to place? No   Do you have trouble with steps or getting out of a bed or a chair? No   Current Diet Well Balanced Diet   Dental Exam Up to date   Eye Exam Up to date   Exercise (times per week) 5 times per week   Current Exercises Include House Cleaning;Walking   Do you need help using the phone?  No   Are you deaf or do you have serious difficulty hearing?  Yes   Do you need help with transportation? No   Do you need help shopping? No   Do you need help preparing meals?  No   Do you need help with housework?  No   Do you need help with laundry? No   Do you need help taking your medications? No   Do you need help managing money? No   Do you ever drive or ride in a car without wearing a seat belt? No   Who do you live with? Alone   If you need help, do you have trouble finding someone available to you? No   Have you been bothered in the last four weeks by sexual problems? No   Do you have difficulty concentrating, remembering or making decisions? No       Age-appropriate Screening Schedule:  Refer to the list below for future screening recommendations based on patient's age, sex and/or medical conditions. Orders for these recommended tests are listed in the plan section. The patient has been provided with a written plan.    Health Maintenance   Topic Date Due   • DIABETIC FOOT EXAM  03/08/2018   • DIABETIC EYE EXAM  10/17/2020   • DXA SCAN  12/18/2020   • INFLUENZA VACCINE  08/01/2023   • HEMOGLOBIN A1C  11/02/2023   • URINE MICROALBUMIN  12/31/2023   • LIPID PANEL  05/02/2024   • ANNUAL WELLNESS VISIT  05/03/2024   • MAMMOGRAM  10/17/2024   • PAP SMEAR  12/09/2024   • COLORECTAL CANCER SCREENING  02/01/2029   • TDAP/TD VACCINES (2 - Td or Tdap) 12/11/2030   • HEPATITIS C SCREENING  Completed   • COVID-19 Vaccine  Completed   • Pneumococcal Vaccine 65+  Completed   • ZOSTER VACCINE  Completed                  CMS Preventative Services Quick Reference  Risk  Factors Identified During Encounter:    Immunizations Discussed/Encouraged: Prevnar 20 (Pneumococcal 20-valent conjugate)    The above risks/problems have been discussed with the patient.  Pertinent information has been shared with the patient in the After Visit Summary.    Diagnoses and all orders for this visit:    1. Medicare annual wellness visit, subsequent (Primary)    2. Encounter for screening for osteoporosis    3. Menopause    4. At moderate risk for fall    5. Benign essential hypertension  -     amLODIPine (NORVASC) 5 MG tablet; Take 1 tablet by mouth Every Night.  Dispense: 90 tablet; Refill: 1  -     lisinopril-hydrochlorothiazide (PRINZIDE,ZESTORETIC) 20-12.5 MG per tablet; Take 1 tablet by mouth 2 (Two) Times a Day.  Dispense: 180 tablet; Refill: 1    6. Mixed hyperlipidemia  -     CBC & Differential  -     Comprehensive Metabolic Panel  -     Lipid Panel  -     rosuvastatin (CRESTOR) 10 MG tablet; Take 1 tablet by mouth Every Night.  Dispense: 90 tablet; Refill: 1    7. Type 2 diabetes mellitus with hyperglycemia, without long-term current use of insulin  -     Hemoglobin A1c  -     Microalbumin / Creatinine Urine Ratio - Urine, Clean Catch  -     glimepiride (Amaryl) 2 MG tablet; Take 1 tablet by mouth Daily.  Dispense: 90 tablet; Refill: 1    8. Need for pneumococcal vaccine  -     Pneumococcal Conjugate Vaccine 20-Valent (PCV20)      Plan:  1.physical: Physical exam conducted today, reviewed fasting lab work,   Impression: healthy adult Patient. Currently, patient eats a healthy diet. Screening lab work includes glucose, lipid profile and complete blood count . The risks and benefits of immunizations were discussed and immunizations are up to date. Advice and education were given regarding nutrition and aerobic exercise. Patient discussion: discussed with the patient.  Annual Wellness Visit,physical  with preventive exam as well as age and risk appropriate counseling completed.   Advance  Directive Planning: paperwork and instructions were given to the patient.   Patient Discussion: plan discussed with the patient, follow-up visit needed in one year. Medication Review and medication list updated  2.  Benign essential hypertension: Will continue current medication, low-sodium diet advised, Counseled to regularly check BP at home with goal averaging <130/80.   3.mixed hyperlipidemia:   Reviewed   fasting CMP and lipid panel.  Diet and exercise counseled,  Will continue current medications  4. Diabetes  Mellitus  : Reviewed fasting CMP  and hba1c  , diet and exercise counseled , Will continue current medications  5.  Need for Prevnar 20: Given today and well-tolerated  6.  Screening for osteoporosis: We will obtain DEXA scan    Follow Up:   Next Medicare Wellness visit to be scheduled in 1 year.      An After Visit Summary and PPPS were made available to the patient.

## 2023-05-03 NOTE — PATIENT INSTRUCTIONS
Advance Care Planning and Advance Directives     You make decisions on a daily basis - decisions about where you want to live, your career, your home, your life. Perhaps one of the most important decisions you face is your choice for future medical care. Take time to talk with your family and your healthcare team and start planning today.  Advance Care Planning is a process that can help you:  · Understand possible future healthcare decisions in light of your own experiences  · Reflect on those decision in light of your goals and values  · Discuss your decisions with those closest to you and the healthcare professionals that care for you  · Make a plan by creating a document that reflects your wishes    Surrogate Decision Maker  In the event of a medical emergency, which has left you unable to communicate or to make your own decisions, you would need someone to make decisions for you.  It is important to discuss your preferences for medical treatment with this person while you are in good health.     Qualities of a surrogate decision maker:  • Willing to take on this role and responsibility  • Knows what you want for future medical care  • Willing to follow your wishes even if they don't agree with them  • Able to make difficult medical decisions under stressful circumstances    Advance Directives  These are legal documents you can create that will guide your healthcare team and decision maker(s) when needed. These documents can be stored in the electronic medical record.    · Living Will - a legal document to guide your care if you have a terminal condition or a serious illness and are unable to communicate. States vary by statute in document names/types, but most forms may include one or more of the following:        -  Directions regarding life-prolonging treatments        -  Directions regarding artificially provided nutrition/hydration        -  Choosing a healthcare decision maker        -  Direction  regarding organ/tissue donation    · Durable Power of  for Healthcare - this document names an -in-fact to make medical decisions for you, but it may also allow this person to make personal and financial decisions for you. Please seek the advice of an  if you need this type of document.    **Advance Directives are not required and no one may discriminate against you if you do not sign one.    Medical Orders  Many states allow specific forms/orders signed by your physician to record your wishes for medical treatment in your current state of health. This form, signed in personal communication with your physician, addresses resuscitation and other medical interventions that you may or may not want.      For more information or to schedule a time with a [facility] Advance Care Planning Facilitator contact: [facility] or call [phone] and someone will contact you directly.    Fall Prevention in the Home, Adult  Falls can cause injuries and affect people of all ages. There are many simple things that you can do to make your home safe and to help prevent falls. Ask for help when making these changes, if needed.  What actions can I take to prevent falls?  General instructions  • Use good lighting in all rooms. Replace any light bulbs that burn out, turn on lights if it is dark, and use night-lights.  • Place frequently used items in easy-to-reach places. Lower the shelves around your home if necessary.  • Set up furniture so that there are clear paths around it. Avoid moving your furniture around.  • Remove throw rugs and other tripping hazards from the floor.  • Avoid walking on wet floors.  • Fix any uneven floor surfaces.  • Add color or contrast paint or tape to grab bars and handrails in your home. Place contrasting color strips on the first and last steps of staircases.  • When you use a stepladder, make sure that it is completely opened and that the sides and supports are  firmly locked. Have someone hold the ladder while you are using it. Do not climb a closed stepladder.  • Know where your pets are when moving through your home.  What can I do in the bathroom?     • Keep the floor dry. Immediately clean up any water that is on the floor.  • Remove soap buildup in the tub or shower regularly.  • Use nonskid mats or decals on the floor of the tub or shower.  • Attach bath mats securely with double-sided, nonslip rug tape.  • If you need to sit down while you are in the shower, use a plastic, nonslip stool.  • Install grab bars by the toilet and in the tub and shower. Do not use towel bars as grab bars.  What can I do in the bedroom?  • Make sure that a bedside light is easy to reach.  • Do not use oversized bedding that reaches the floor.  • Have a firm chair that has side arms to use for getting dressed.  What can I do in the kitchen?  • Clean up any spills right away.  • If you need to reach for something above you, use a sturdy step stool that has a grab bar.  • Keep electrical cables out of the way.  • Do not use floor polish or wax that makes floors slippery. If you must use wax, make sure that it is non-skid floor wax.  What can I do with my stairs?  • Do not leave any items on the stairs.  • Make sure that you have a light switch at the top and the bottom of the stairs. Have them installed if you do not have them.  • Make sure that there are handrails on both sides of the stairs. Fix handrails that are broken or loose. Make sure that handrails are as long as the staircases.  • Install non-slip stair treads on all stairs in your home.  • Avoid having throw rugs at the top or bottom of stairs, or secure the rugs with carpet tape to prevent them from moving.  • Choose a carpet design that does not hide the edge of steps on the stairs.  • Check any carpeting to make sure that it is firmly attached to the stairs. Fix any carpet that is loose or worn.  What can I do on the outside of  my home?  • Use bright outdoor lighting.  • Regularly repair the edges of walkways and driveways and fix any cracks.  • Remove high doorway thresholds.  • Trim any shrubbery on the main path into your home.  • Regularly check that handrails are securely fastened and in good repair. Both sides of all steps should have handrails.  • Install guardrails along the edges of any raised decks or porches.  • Clear walkways of debris and clutter, including tools and rocks.  • Have leaves, snow, and ice cleared regularly.  • Use sand or salt on walkways during winter months.  • In the garage, clean up any spills right away, including grease or oil spills.  What other actions can I take?  • Wear closed-toe shoes that fit well and support your feet. Wear shoes that have rubber soles or low heels.  • Use mobility aids as needed, such as canes, walkers, scooters, and crutches.  • Review your medicines with your health care provider. Some medicines can cause dizziness or changes in blood pressure, which increase your risk of falling.  Talk with your health care provider about other ways that you can decrease your risk of falls. This may include working with a physical therapist or  to improve your strength, balance, and endurance.  Where to find more information  • Centers for Disease Control and Prevention, [place]: www.cdc.gov  • National [place] on Aging: www.neel.nih.gov  Contact a health care provider if:  • You are afraid of falling at home.  • You feel weak, drowsy, or dizzy at home.  • You fall at home.  Summary  • There are many simple things that you can do to make your home safe and to help prevent falls.  • Ways to make your home safe include removing tripping hazards and installing grab bars in the bathroom.  • Ask for help when making these changes in your home.  This information is not intended to replace advice given to you by your health care provider. Make sure you discuss any questions you have with your  health care provider.  Document Revised: 09/19/2022 Document Reviewed: 07/21/2021  Elsevier Patient Education © 2022 Elsevier Inc.      Sit-to-Stand Exercise  The sit-to-stand exercise (also known as the chair stand or chair rise exercise) strengthens your lower body and helps you maintain or improve your mobility and independence. The end goal is to do the sit-to-stand exercise without using your hands. This will be easier as you become stronger. You should always talk with your health care provider before starting any exercise program, especially if you have had recent surgery.  Do the exercise exactly as told by your health care provider and adjust it as directed. It is normal to feel mild stretching, pulling, tightness, or discomfort as you do this exercise, but you should stop right away if you feel sudden pain or your pain gets worse. Do not begin doing this exercise until told by your health care provider.  What the sit-to-stand exercise does  The sit-to-stand exercise helps to strengthen the muscles in your thighs and the muscles in the center of your body that give you stability (core muscles). This exercise is especially helpful if:  • You have had knee or hip surgery.  • You have trouble getting up from a chair, out of a car, or off the toilet due to muscle weakness.  How to do the sit-to-stand exercise  1. Sit toward the front edge of a sturdy chair without armrests. Your knees should be bent and your feet should be flat on the floor and shoulder-width apart and underneath your hips.  2. Place your hands lightly on each side of the seat. Keep your back and neck as straight as possible, with your chest slightly forward.  3. Breathe in slowly. Lean forward and slightly shift your weight to the front of your feet.  4. Breathe out as you slowly stand up. Try not to support any weight with your hands.  5. Stand and pause for a full breath in and out.  6. Breathe in as you sit down slowly. Tighten your core  and abdominal muscles to control your lowering as much as possible. You should lower yourself back to the chair slowly, not just drop back into the seat.  7. Breathe out slowly.  8. Do this exercise 10-15 times. If needed, do it fewer times until you build up strength.  9. Rest for 1 minute, then do another set of 10-15 repetitions.  To change the difficulty of the sit-to-stand exercise  • If the exercise is too difficult, use a chair with sturdy armrests, and push off the armrests to help you come to the standing position. You can also use the armrests to help slowly lower yourself back to sitting. As this gets easier, try to use your arms less. You can also place a firm cushion or pillow on the chair to make the surface higher.  • If this exercise is too easy, do not use your arms to help raise or lower yourself. You can also wear a weighted vest, use hand weights, increase your repetitions, or try a lower chair.  General tips  • You may feel tired when starting an exercise routine. This is normal.  • You may have muscle soreness that lasts a few days. This is normal. As you get stronger, you may not feel muscle soreness.  • Use smooth, steady movements.  • Do not  hold your breath during strength exercises. This can cause unsafe changes in your blood pressure.  • Breathe in slowly through your nose, and breathe out slowly through your mouth.  Summary  • Strengthening your lower body is an important step to help you move safely and independently.  • The sit-to-stand exercise helps strengthen the muscles in your thighs and core.  • You should always talk with your health care provider before starting any exercise program, especially if you have had recent surgery.  This information is not intended to replace advice given to you by your health care provider. Make sure you discuss any questions you have with your health care provider.  Document Revised: 04/10/2022 Document Reviewed: 04/10/2022  Elsevier Patient  Education ©  Elsevier Inc.      Medicare Wellness  Personal Prevention Plan of Service     Date of Office Visit:    Encounter Provider:  Emely Sheldon MD  Place of Service:  [facility]  Patient Name: Missy Mcgraw  :  1950    As part of the Medicare Wellness portion of your visit today, we are providing you with this personalized preventive plan of services (PPPS). This plan is based upon recommendations of the [place] Preventive Services Task Force (USPSTF) and the Advisory Committee on Immunization Practices (ACIP).    This lists the preventive care services that should be considered, and provides dates of when you are due. Items listed as completed are up-to-date and do not require any further intervention.    Health Maintenance   Topic Date Due   • DIABETIC FOOT EXAM  2018   • DIABETIC EYE EXAM  10/17/2020   • DXA SCAN  2020   • INFLUENZA VACCINE  2023   • HEMOGLOBIN A1C  2023   • URINE MICROALBUMIN  2023   • LIPID PANEL  2024   • ANNUAL WELLNESS VISIT  2024   • MAMMOGRAM  10/17/2024   • PAP SMEAR  2024   • COLORECTAL CANCER SCREENING  2029   • TDAP/TD VACCINES (2 - Td or Tdap) 2030   • HEPATITIS C SCREENING  Completed   • COVID-19 Vaccine  Completed   • Pneumococcal Vaccine 65+  Completed   • ZOSTER VACCINE  Completed       No orders of the defined types were placed in this encounter.      Return in about 1 year (around 5/3/2024) for Medicare Wellness.

## 2023-09-25 ENCOUNTER — OFFICE VISIT (OUTPATIENT)
Age: 73
End: 2023-09-25

## 2023-09-25 ENCOUNTER — TRANSCRIBE ORDERS (OUTPATIENT)
Dept: ADMINISTRATIVE | Facility: HOSPITAL | Age: 73
End: 2023-09-25
Payer: MEDICARE

## 2023-09-25 VITALS
TEMPERATURE: 99.8 F | SYSTOLIC BLOOD PRESSURE: 116 MMHG | HEART RATE: 112 BPM | OXYGEN SATURATION: 97 % | HEIGHT: 60 IN | BODY MASS INDEX: 37.11 KG/M2 | DIASTOLIC BLOOD PRESSURE: 62 MMHG | WEIGHT: 189 LBS

## 2023-09-25 DIAGNOSIS — R11.2 NAUSEA AND VOMITING, UNSPECIFIED VOMITING TYPE: ICD-10-CM

## 2023-09-25 DIAGNOSIS — R10.31 RIGHT LOWER QUADRANT ABDOMINAL PAIN: Primary | ICD-10-CM

## 2023-09-25 DIAGNOSIS — Z12.31 SCREENING MAMMOGRAM FOR BREAST CANCER: Primary | ICD-10-CM

## 2023-09-25 RX ORDER — ONDANSETRON 4 MG/1
4 TABLET, FILM COATED ORAL EVERY 8 HOURS PRN
Qty: 20 TABLET | Refills: 0 | Status: SHIPPED | OUTPATIENT
Start: 2023-09-25

## 2023-09-25 NOTE — PROGRESS NOTES
Follow Up Office Visit      Date: 2023   Patient Name: Missy Mcgraw  : 1950   MRN: [id number]     Chief Complaint:    Chief Complaint   Patient presents with    Diarrhea     Started last Wednesday evening  Abdominal pain- epigastric to right upper quadrant   Unable to keep anything down.        History of Present Illness: Missy Mcgraw is a 72 y.o. female who is here today for diarrhea, vomiting..  Sxs started about 5 days ago, initially got better, but over last 24-36 hours now having diarrhea and nausea.  States that she has not been able to keep fluids down due to the nausea    Subjective      Review of Systems:   Review of Systems   Constitutional:  Negative for appetite change and unexpected weight loss.   HENT:  Negative for trouble swallowing.    Eyes:  Negative for blurred vision and double vision.   Respiratory:  Negative for cough and shortness of breath.    Cardiovascular:  Negative for chest pain and leg swelling.   Gastrointestinal:  Negative for blood in stool.   Endocrine: Negative for cold intolerance, heat intolerance and polyuria.   Musculoskeletal:  Negative for joint swelling.   Skin:  Negative for color change and bruise.   Neurological:  Negative for numbness and memory problem.   Hematological:  Does not bruise/bleed easily.   Psychiatric/Behavioral:  Negative for suicidal ideas and depressed mood. The patient is not nervous/anxious.      I have reviewed the patients family history, social history, past medical history, past surgical history and have updated it as appropriate.     Medications:     Current Outpatient Medications:     allopurinol (ZYLOPRIM) 100 MG tablet, Take 1 tablet by mouth Daily., Disp: , Rfl:     amLODIPine (NORVASC) 5 MG tablet, Take 1 tablet by mouth Every Night., Disp: 90 tablet, Rfl: 1    aspirin (aspirin) 81 MG EC tablet, Take 1 tablet by mouth Daily., Disp: , Rfl:     atenolol (TENORMIN) 25 MG tablet, TAKE 1 TABLET TWICE A DAY, Disp: 180  tablet, Rfl: 3    baclofen (LIORESAL) 10 MG tablet, Take 1 tablet by mouth At Night As Needed for Muscle Spasms., Disp: 30 tablet, Rfl: 1    carbidopa-levodopa CR (SINEMET CR)  MG per CR tablet, TAKE 1 TABLET EVERY NIGHT, Disp: 90 tablet, Rfl: 3    citalopram (CeleXA) 40 MG tablet, Take 1 tablet by mouth Daily., Disp: 90 tablet, Rfl: 1    clobetasol (TEMOVATE) 0.05 % ointment, Apply  topically to the appropriate area as directed 2 (Two) Times a Day., Disp: 60 g, Rfl: 1    colchicine 0.6 MG tablet, Take 1 tablet by mouth Daily., Disp: , Rfl:     estradiol (ESTRACE VAGINAL) 0.1 MG/GM vaginal cream, Insert 1 gm intravaginally 2 times each week, Disp: 42.5 g, Rfl: 6    glimepiride (Amaryl) 2 MG tablet, Take 1 tablet by mouth Daily., Disp: 90 tablet, Rfl: 1    hydrocortisone (ANUSOL-HC) 25 MG suppository, Insert 1 suppository into the rectum 2 (Two) Times a Day As Needed for Hemorrhoids., Disp: 24 each, Rfl: 1    KRILL OIL PO, Take  by mouth., Disp: , Rfl:     lisinopril-hydrochlorothiazide (PRINZIDE,ZESTORETIC) 20-12.5 MG per tablet, Take 1 tablet by mouth 2 (Two) Times a Day., Disp: 180 tablet, Rfl: 1    loratadine (CLARITIN) 10 MG tablet, Take 1 tablet by mouth 2 (Two) Times a Day., Disp: , Rfl:     meclizine (ANTIVERT) 25 MG tablet, Take 1 tablet by mouth 3 (Three) Times a Day As Needed (vertigo)., Disp: 90 tablet, Rfl: 2    mupirocin (BACTROBAN) 2 % ointment, Apply 1 application topically to the appropriate area as directed 3 (Three) Times a Day., Disp: 15 g, Rfl: 0    pantoprazole (PROTONIX) 40 MG EC tablet, TAKE 1 TABLET DAILY, Disp: 90 tablet, Rfl: 3    predniSONE (DELTASONE) 10 MG tablet, Take 1 tablet by mouth Daily., Disp: , Rfl:     rosuvastatin (CRESTOR) 10 MG tablet, Take 1 tablet by mouth Every Night., Disp: 90 tablet, Rfl: 1    docusate sodium (Colace) 100 MG capsule, Take 1 capsule by mouth 2 (Two) Times a Day. Hold if loose stools develop, Disp: 60 capsule, Rfl: 0    ondansetron (Zofran) 4 MG  "tablet, Take 1 tablet by mouth Every 8 (Eight) Hours As Needed for Nausea or Vomiting., Disp: 20 tablet, Rfl: 0    Allergies:   Allergies   Allergen Reactions    Penicillins Unknown - Low Severity     Not sure of reaction (childhood)    Atorvastatin Unknown - Low Severity     Pt cannot remember    Fenofibrate Unknown - Low Severity     Pt cannot remember    Fluoxetine Unknown - Low Severity     Pt cannot remember    Gemfibrozil Unknown - Low Severity     Pt cannot remember    Hydroxychloroquine Unknown - Low Severity    Metformin Diarrhea    Nsaids GI Intolerance    Pravastatin Unknown - Low Severity     Pt cannot remember    Sulfamethoxazole-Trimethoprim Unknown - Low Severity     Pt cannot remember       Objective     Physical Exam: Please see above  Vital Signs:   Vitals:    09/25/23 1503   BP: 116/62   Pulse: 112   Temp: 99.8 °F (37.7 °C)   SpO2: 97%   Weight: 85.7 kg (189 lb)   Height: 152.4 cm (60\")     Body mass index is 36.91 kg/m².          Physical Exam  Vitals and nursing note reviewed.   Constitutional:       Appearance: Normal appearance.   HENT:      Head: Normocephalic and atraumatic.   Eyes:      General: Lids are normal.      Conjunctiva/sclera: Conjunctivae normal.   Cardiovascular:      Rate and Rhythm: Normal rate and regular rhythm.   Pulmonary:      Effort: Pulmonary effort is normal.      Breath sounds: Normal breath sounds and air entry.   Abdominal:      General: Abdomen is flat. Bowel sounds are normal.      Palpations: Abdomen is soft.   Musculoskeletal:      Cervical back: Full passive range of motion without pain and normal range of motion.   Neurological:      General: No focal deficit present.      Mental Status: She is alert and oriented to person, place, and time.   Psychiatric:         Attention and Perception: Attention normal.         Mood and Affect: Mood normal.         Behavior: Behavior normal. Behavior is cooperative.       Procedures    Results:   Labs:   Hemoglobin A1C "   Date Value Ref Range Status   05/02/2023 6.30 (H) 4.80 - 5.60 % Final     Comment:     Hemoglobin A1C Ranges:  Increased Risk for Diabetes  5.7% to 6.4%  Diabetes                     >= 6.5%  Diabetic Goal                < 7.0%     12/31/2022 6.30 (H) 4.80 - 5.60 % Final     TSH   Date Value Ref Range Status   03/16/2018 1.380 0.470 - 4.680 mIU/mL Final        POCT Results (if applicable):   Results for orders placed or performed in visit on 01/03/23   Comprehensive Metabolic Panel    Specimen: Blood   Result Value Ref Range    Glucose 169 (H) 65 - 99 mg/dL    BUN 19 8 - 23 mg/dL    Creatinine 1.07 (H) 0.57 - 1.00 mg/dL    EGFR Result 56.0 (L) >60.0 mL/min/1.73    BUN/Creatinine Ratio 17.8 7.0 - 25.0    Sodium 140 136 - 145 mmol/L    Potassium 4.6 3.5 - 5.2 mmol/L    Chloride 102 98 - 107 mmol/L    Total CO2 28.9 22.0 - 29.0 mmol/L    Calcium 10.0 8.6 - 10.5 mg/dL    Total Protein 6.5 6.0 - 8.5 g/dL    Albumin 4.4 3.5 - 5.2 g/dL    Globulin 2.1 gm/dL    A/G Ratio 2.1 g/dL    Total Bilirubin 0.4 0.0 - 1.2 mg/dL    Alkaline Phosphatase 88 39 - 117 U/L    AST (SGOT) 10 1 - 32 U/L    ALT (SGPT) 7 1 - 33 U/L   Lipid Panel    Specimen: Blood   Result Value Ref Range    Total Cholesterol 201 (H) 0 - 200 mg/dL    Triglycerides 268 (H) 0 - 150 mg/dL    HDL Cholesterol 51 40 - 60 mg/dL    VLDL Cholesterol Mason 46 (H) 5 - 40 mg/dL    LDL Chol Calc (NIH) 104 (H) 0 - 100 mg/dL   Hemoglobin A1c    Specimen: Blood   Result Value Ref Range    Hemoglobin A1C 6.30 (H) 4.80 - 5.60 %   CBC & Differential    Specimen: Blood   Result Value Ref Range    WBC 7.89 3.40 - 10.80 10*3/mm3    RBC 4.48 3.77 - 5.28 10*6/mm3    Hemoglobin 13.5 12.0 - 15.9 g/dL    Hematocrit 40.0 34.0 - 46.6 %    MCV 89.3 79.0 - 97.0 fL    MCH 30.1 26.6 - 33.0 pg    MCHC 33.8 31.5 - 35.7 g/dL    RDW 12.8 12.3 - 15.4 %    Platelets 197 140 - 450 10*3/mm3    Neutrophil Rel % 67.3 42.7 - 76.0 %    Lymphocyte Rel % 20.3 19.6 - 45.3 %    Monocyte Rel % 8.9 5.0 - 12.0 %     Eosinophil Rel % 2.5 0.3 - 6.2 %    Basophil Rel % 0.6 0.0 - 1.5 %    Neutrophils Absolute 5.31 1.70 - 7.00 10*3/mm3    Lymphocytes Absolute 1.60 0.70 - 3.10 10*3/mm3    Monocytes Absolute 0.70 0.10 - 0.90 10*3/mm3    Eosinophils Absolute 0.20 0.00 - 0.40 10*3/mm3    Basophils Absolute 0.05 0.00 - 0.20 10*3/mm3    Immature Granulocyte Rel % 0.4 0.0 - 0.5 %    Immature Grans Absolute 0.03 0.00 - 0.05 10*3/mm3    nRBC 0.0 0.0 - 0.2 /100 WBC       Imaging:   No valid procedures specified.         Assessment / Plan      Assessment/Plan:   Diagnoses and all orders for this visit:    1. Right lower quadrant abdominal pain (Primary)  -     ondansetron (Zofran) 4 MG tablet; Take 1 tablet by mouth Every 8 (Eight) Hours As Needed for Nausea or Vomiting.  Dispense: 20 tablet; Refill: 0  -     US Gallbladder; Future    2. Nausea and vomiting, unspecified vomiting type  -     ondansetron (Zofran) 4 MG tablet; Take 1 tablet by mouth Every 8 (Eight) Hours As Needed for Nausea or Vomiting.  Dispense: 20 tablet; Refill: 0  - Discussed that if sxs get worse patient should go to the ER for further evaluation.          Follow Up:   Return if symptoms worsen or fail to improve.      Jose Ortega, DO   [facility]

## 2023-10-16 ENCOUNTER — HOSPITAL ENCOUNTER (OUTPATIENT)
Dept: ULTRASOUND IMAGING | Facility: HOSPITAL | Age: 73
Discharge: HOME | End: 2023-10-16
Admitting: FAMILY MEDICINE
Payer: MEDICARE

## 2023-10-16 DIAGNOSIS — R10.31 RIGHT LOWER QUADRANT ABDOMINAL PAIN: ICD-10-CM

## 2023-10-16 PROCEDURE — 76705 ECHO EXAM OF ABDOMEN: CPT

## 2023-10-19 ENCOUNTER — HOSPITAL ENCOUNTER (OUTPATIENT)
Dept: MAMMOGRAPHY | Facility: HOSPITAL | Age: 73
Discharge: HOME | End: 2023-10-19
Admitting: INTERNAL MEDICINE
Payer: MEDICARE

## 2023-10-19 DIAGNOSIS — Z12.31 SCREENING MAMMOGRAM FOR BREAST CANCER: ICD-10-CM

## 2023-10-19 PROCEDURE — 77063 BREAST TOMOSYNTHESIS BI: CPT

## 2023-10-19 PROCEDURE — 77067 SCR MAMMO BI INCL CAD: CPT

## 2023-10-23 DIAGNOSIS — I10 BENIGN ESSENTIAL HYPERTENSION: ICD-10-CM

## 2023-10-23 RX ORDER — LISINOPRIL AND HYDROCHLOROTHIAZIDE 20; 12.5 MG/1; MG/1
1 TABLET ORAL 2 TIMES DAILY
Qty: 180 TABLET | Refills: 3 | Status: SHIPPED | OUTPATIENT
Start: 2023-10-23

## 2023-10-24 NOTE — PROGRESS NOTES
QUICK REFERENCE INFORMATION:  The ABCs of the Annual Wellness Visit    [facility] to Medicare Visit    HEALTH RISK ASSESSMENT    1950    Recent Hospitalizations:  No hospitalization(s) within the last year..      Current Medical Providers:  Patient Care Team:  Emely Sheldon MD as PCP - General      Smoking Status:  History   Smoking Status   • Never Smoker   Smokeless Tobacco   • Never Used       Alcohol Consumption:  History   Alcohol Use No       Depression Screen:   No flowsheet data found.    Health Habits and Functional and Cognitive Screening:  Functional & Cognitive Status 11/29/2017   Do you have difficulty preparing food and eating? No   Do you have difficulty bathing yourself, getting dressed or grooming yourself? No   Do you have difficulty using the toilet? No   Do you have difficulty moving around from place to place? No   Do you have trouble with steps or getting out of a bed or a chair? No   In the past year have you fallen or experienced a near fall? Yes   Current Diet Unhealthy Diet   Dental Exam Up to date   Eye Exam Up to date   Exercise (times per week) 0 times per week   Current Exercise Activities Include None   Do you need help using the phone?  No   Are you deaf or do you have serious difficulty hearing?  No   Do you need help with transportation? No   Do you need help shopping? No   Do you need help preparing meals?  No   Do you need help with housework?  No   Do you need help with laundry? No   Do you need help taking your medications? No   Do you need help managing money? No   Have you felt unusual stress, anger or loneliness in the last month? No   Who do you live with? Alone   If you need help, do you have trouble finding someone available to you? No   Have you been bothered in the last four weeks by sexual problems? No   Do you have difficulty concentrating, remembering or making decisions? No           Does the patient have evidence of cognitive impairment? No    Aspirin use  counseling? Taking ASA appropriately as indicated      Recent Lab Results:  CMP:  Lab Results   Component Value Date     (H) 11/27/2017    BUN 21 (H) 11/27/2017    CREATININE 0.80 11/27/2017    EGFRIFNONA 72 11/27/2017    EGFRIFAFRI 88 11/27/2017    BCR 26.3 (H) 11/27/2017     11/27/2017    K 4.2 11/27/2017    CO2 29.0 11/27/2017    CALCIUM 9.9 11/27/2017    PROTENTOTREF 6.9 11/27/2017    ALBUMIN 4.30 11/27/2017    LABGLOBREF 2.6 11/27/2017    LABIL2 1.7 11/27/2017    BILITOT 0.5 11/27/2017    ALKPHOS 95 11/27/2017    AST 16 11/27/2017    ALT 25 11/27/2017     Lipid Panel:  Lab Results   Component Value Date    CHOL 183 11/03/2016    TRIG 190 (H) 11/27/2017    HDL 52 11/27/2017    VLDL 38 11/27/2017    LDLDIRECT 97 11/03/2016     HbA1c:  Lab Results   Component Value Date    HGBA1C 6.50 11/27/2017       Visual Acuity:  Dr. Woodson    Age-appropriate Screening Schedule:  Refer to the list below for future screening recommendations based on patient's age, sex and/or medical conditions. Orders for these recommended tests are listed in the plan section. The patient has been provided with a written plan.    Health Maintenance   Topic Date Due   • PNEUMOCOCCAL VACCINE (19-64 MEDIUM RISK) (1 of 1 - PPSV23) 12/05/1971   • TDAP/TD VACCINES (1 - Tdap) 12/05/1971   • ZOSTER VACCINE  04/26/2016   • DXA SCAN  11/07/2016   • PAP SMEAR  02/02/2018   • DIABETIC FOOT EXAM  03/08/2018   • DIABETIC EYE EXAM  03/08/2018   • HEMOGLOBIN A1C  05/27/2018   • URINE MICROALBUMIN  07/07/2018   • LIPID PANEL  11/27/2018   • MAMMOGRAM  02/17/2019   • COLONOSCOPY  10/18/2026   • INFLUENZA VACCINE  Completed        Subjective   History of Present Illness    Missy Mcgraw is a 66 y.o. female an established patient presenting for a Welcome to Medicare Visit.     The following portions of the patient's history were reviewed and updated as appropriate: allergies, current medications, past family history, past medical history, past social  history, past surgical history and problem list.    Outpatient Medications Prior to Visit   Medication Sig Dispense Refill   • carbidopa-levodopa CR (SINEMET CR)  MG per CR tablet Take 1 tablet by mouth Every Night. 90 tablet 3   • gabapentin (NEURONTIN) 100 MG capsule TAKE 1 CAPSULE 3 TIMES A   DAY (Patient taking differently: TAKE 1 CAPSULE  A   DAY) 270 capsule 2   • glimepiride (AMARYL) 2 MG tablet TAKE 1 TABLET DAILY WITH   BREAKFAST 90 tablet 3   • moexipril-hydrochlorothiazide (UNIRETIC) 15-12.5 MG tablet per tablet Take 1 tablet by mouth Daily. 90 tablet 3   • aspirin (SANJU ASPIRIN EC LOW DOSE) 81 MG EC tablet Take 1 tablet by mouth daily.     • atenolol (TENORMIN) 25 MG tablet Take 1 tablet by mouth Daily. 90 tablet 3   • citalopram (CeleXA) 40 MG tablet Take 1 tablet by mouth Daily. (Patient taking differently: Take 20 mg by mouth Daily.) 90 tablet 3   • clobetasol (TEMOVATE) 0.05 % ointment APPLY A THIN LAYER TWO TIMES A DAY FOR TWO WEEKS, THEN APPLY DAILY FOR ONE WEEK, THEN THREE TIMES WEEKLY 60 g 5   • COCONUT OIL PO Take  by mouth.     • fluticasone (FLONASE) 50 MCG/ACT nasal spray 1 spray into each nostril 2 (two) times a day.     • imiquimod (ALDARA) 5 % cream Apply  topically.     • meclizine (ANTIVERT) 25 MG tablet Take 1 tablet by mouth 3 (three) times a day as needed for dizziness. 90 tablet 0   • Multiple Vitamins-Minerals (KANNAN BASIC) tablet Take  by mouth.     • naproxen sodium (ALEVE) 220 MG tablet Take 220 mg by mouth 2 (Two) Times a Day As Needed for Mild Pain (1-3).     • Omega-3 Krill Oil 1000 MG capsule Take  by mouth.     • pantoprazole (PROTONIX) 40 MG EC tablet TAKE 1 TABLET DAILY 90 tablet 3   • rosuvastatin (CRESTOR) 10 MG tablet Take 1 tablet by mouth Every Night. 90 tablet 3   • SOOLANTRA 1 % cream      • traZODone (DESYREL) 50 MG tablet TAKE 1 TABLET BY MOUTH EVERY NIGHT 90 tablet 2   • triamcinolone (KENALOG) 0.1 % cream As Needed.     • vitamin B-12 (CYANOCOBALAMIN) 1000  MCG tablet Take 1,000 mcg by mouth daily.       No facility-administered medications prior to visit.        Patient Active Problem List   Diagnosis   • Hay fever   • Benign essential hypertension   • Gastroesophageal reflux disease with esophagitis   • Major depressive disorder, recurrent episode   • Uncontrolled type 2 diabetes mellitus   • Abnormal liver enzymes   • Abnormal weight gain   • Cervicalgia   • Fibroid, uterine   • Hyperthyroidism   • Mixed hyperlipidemia   • Movement disorder   • Neuralgia   • Ulnar nerve palsy   • Vitamin B12 deficiency   • Vitamin D deficiency   • Osteoporosis   • Restless legs syndrome   • Shortness of breath   • Anginal equivalent       Advance Care Planning:  has an advance directive - a copy HAS NOT been provided. Have asked the patient to send this to us to add to record.    Identification of Risk Factors:  Risk factors include: weight , unhealthy diet, cardiovascular risk, inactivity and polypharmacy.    Review of Systems    Compared to one year ago, the patient feels her physical health is better.  Compared to one year ago, the patient feels her mental health is the same.    Objective    Physical Exam    There were no vitals filed for this visit.    There is no height or weight on file to calculate BMI.  Discussed the patient's BMI with her. The BMI is above average; BMI management plan is completed.    Procedure   Procedures       Assessment/Plan   Patient Self-Management and Personalized Health Advice  The patient has been provided with information about: diet, exercise, weight management, prevention of cardiac or vascular disease and the relationship between weight and GERD and preventive services including:   · Diabetes screening, see lab orders, Exercise counseling provided, Fall Risk assessment done, Nutrition counseling provided.    Visit Diagnoses:  No diagnosis found.    No orders of the defined types were placed in this encounter.      Outpatient Encounter  Prescriptions as of 11/29/2017   Medication Sig Dispense Refill   • carbidopa-levodopa CR (SINEMET CR)  MG per CR tablet Take 1 tablet by mouth Every Night. 90 tablet 3   • gabapentin (NEURONTIN) 100 MG capsule TAKE 1 CAPSULE 3 TIMES A   DAY (Patient taking differently: TAKE 1 CAPSULE  A   DAY) 270 capsule 2   • glimepiride (AMARYL) 2 MG tablet TAKE 1 TABLET DAILY WITH   BREAKFAST 90 tablet 3   • lisinopril-hydrochlorothiazide (PRINZIDE,ZESTORETIC) 20-12.5 MG per tablet Take 1 tablet by mouth Daily.     • moexipril-hydrochlorothiazide (UNIRETIC) 15-12.5 MG tablet per tablet Take 1 tablet by mouth Daily. 90 tablet 3   • aspirin (SANJU ASPIRIN EC LOW DOSE) 81 MG EC tablet Take 1 tablet by mouth daily.     • atenolol (TENORMIN) 25 MG tablet Take 1 tablet by mouth Daily. 90 tablet 3   • citalopram (CeleXA) 40 MG tablet Take 1 tablet by mouth Daily. (Patient taking differently: Take 20 mg by mouth Daily.) 90 tablet 3   • clobetasol (TEMOVATE) 0.05 % ointment APPLY A THIN LAYER TWO TIMES A DAY FOR TWO WEEKS, THEN APPLY DAILY FOR ONE WEEK, THEN THREE TIMES WEEKLY 60 g 5   • COCONUT OIL PO Take  by mouth.     • fluticasone (FLONASE) 50 MCG/ACT nasal spray 1 spray into each nostril 2 (two) times a day.     • imiquimod (ALDARA) 5 % cream Apply  topically.     • meclizine (ANTIVERT) 25 MG tablet Take 1 tablet by mouth 3 (three) times a day as needed for dizziness. 90 tablet 0   • Multiple Vitamins-Minerals (KANNAN BASIC) tablet Take  by mouth.     • naproxen sodium (ALEVE) 220 MG tablet Take 220 mg by mouth 2 (Two) Times a Day As Needed for Mild Pain (1-3).     • Omega-3 Krill Oil 1000 MG capsule Take  by mouth.     • pantoprazole (PROTONIX) 40 MG EC tablet TAKE 1 TABLET DAILY 90 tablet 3   • rosuvastatin (CRESTOR) 10 MG tablet Take 1 tablet by mouth Every Night. 90 tablet 3   • SOOLANTRA 1 % cream      • traZODone (DESYREL) 50 MG tablet TAKE 1 TABLET BY MOUTH EVERY NIGHT 90 tablet 2   • triamcinolone (KENALOG) 0.1 % cream  As Needed.     • vitamin B-12 (CYANOCOBALAMIN) 1000 MCG tablet Take 1,000 mcg by mouth daily.       No facility-administered encounter medications on file as of 11/29/2017.        Reviewed use of high risk medication in the elderly: yes  Reviewed for potential of harmful drug interactions in the elderly: yes    Follow Up:  No Follow-up on file.     An After Visit Summary and PPPS with all of these plans were given to the patient.          Walk in

## 2023-10-31 DIAGNOSIS — I10 BENIGN ESSENTIAL HYPERTENSION: ICD-10-CM

## 2023-10-31 RX ORDER — AMLODIPINE BESYLATE 5 MG/1
5 TABLET ORAL NIGHTLY
Qty: 90 TABLET | Refills: 3 | OUTPATIENT
Start: 2023-10-31

## 2023-10-31 NOTE — TELEPHONE ENCOUNTER
Rx Refill Note  Requested Prescriptions     Pending Prescriptions Disp Refills    amLODIPine (NORVASC) 5 MG tablet [Pharmacy Med Name: AMLODIPINE BESYLATE TABS 5MG] 90 tablet 3     Sig: TAKE 1 TABLET EVERY NIGHT      Last office visit with prescribing clinician: 5/3/2023   Last telemedicine visit with prescribing clinician: Visit date not found   Next office visit with prescribing clinician: Visit date not found                         Would you like a call back once the refill request has been completed: [] Yes [] No    If the office needs to give you a call back, can they leave a voicemail: [] Yes [] No    Aleida Nicole CMA  10/31/23, 11:12 EDT

## 2023-11-02 RX ORDER — AMLODIPINE BESYLATE 5 MG/1
5 TABLET ORAL NIGHTLY
Qty: 30 TABLET | Refills: 0 | Status: SHIPPED | OUTPATIENT
Start: 2023-11-02

## 2023-11-03 ENCOUNTER — OFFICE VISIT (OUTPATIENT)
Dept: CARDIOLOGY | Facility: CLINIC | Age: 73
End: 2023-11-03
Payer: MEDICARE

## 2023-11-03 VITALS
WEIGHT: 191 LBS | SYSTOLIC BLOOD PRESSURE: 98 MMHG | DIASTOLIC BLOOD PRESSURE: 62 MMHG | BODY MASS INDEX: 37.5 KG/M2 | HEIGHT: 60 IN | HEART RATE: 64 BPM | OXYGEN SATURATION: 97 %

## 2023-11-03 DIAGNOSIS — E78.5 DYSLIPIDEMIA: ICD-10-CM

## 2023-11-03 DIAGNOSIS — I10 BENIGN ESSENTIAL HYPERTENSION: Primary | ICD-10-CM

## 2023-11-03 DIAGNOSIS — I25.10 CORONARY ARTERY DISEASE INVOLVING NATIVE CORONARY ARTERY OF NATIVE HEART WITHOUT ANGINA PECTORIS: ICD-10-CM

## 2023-11-03 DIAGNOSIS — E66.01 CLASS 2 SEVERE OBESITY DUE TO EXCESS CALORIES WITH SERIOUS COMORBIDITY AND BODY MASS INDEX (BMI) OF 37.0 TO 37.9 IN ADULT: ICD-10-CM

## 2023-11-03 PROBLEM — E66.812 CLASS 2 SEVERE OBESITY DUE TO EXCESS CALORIES WITH SERIOUS COMORBIDITY AND BODY MASS INDEX (BMI) OF 37.0 TO 37.9 IN ADULT: Status: ACTIVE | Noted: 2023-11-03

## 2023-11-03 RX ORDER — ACETAMINOPHEN 500 MG
TABLET ORAL
COMMUNITY

## 2023-11-03 NOTE — PROGRESS NOTES
Subjective:     Encounter Date:11/03/2023      Patient ID: Missy Mcgraw is a 72 y.o. female.    Chief Complaint: Hypertension  HPI  This is a 72-year-old female patient who presents to cardiology clinic for routine follow-up.  Since her last clinic visit there have been no active cardiovascular symptoms, issues or hospitalizations.  She reports compliance with her medications with no perceived side effects.  She remains a non-smoker.  The following portions of the patient's history were reviewed and updated as appropriate: allergies, current medications, past family history, past medical history, past social history, past surgical history and problem  Review of Systems   Constitutional: Negative for chills, diaphoresis, fever, malaise/fatigue, weight gain and weight loss.   HENT:  Negative for ear discharge, hearing loss, hoarse voice and nosebleeds.    Eyes:  Negative for discharge, double vision, pain and photophobia.   Cardiovascular:  Negative for chest pain, claudication, cyanosis, dyspnea on exertion, irregular heartbeat, leg swelling, near-syncope, orthopnea, palpitations, paroxysmal nocturnal dyspnea and syncope.   Respiratory:  Negative for cough, hemoptysis, sputum production and wheezing.    Endocrine: Negative for cold intolerance, heat intolerance, polydipsia, polyphagia and polyuria.   Hematologic/Lymphatic: Negative for adenopathy and bleeding problem. Does not bruise/bleed easily.   Skin:  Negative for color change, flushing, itching and rash.   Musculoskeletal:  Negative for muscle cramps, muscle weakness, myalgias and stiffness.   Gastrointestinal:  Negative for abdominal pain, diarrhea, hematemesis, hematochezia, nausea and vomiting.   Genitourinary:  Negative for dysuria, frequency and nocturia.   Neurological:  Negative for focal weakness, loss of balance, numbness, paresthesias and seizures.   Psychiatric/Behavioral:  Negative for altered mental status, hallucinations and suicidal  ideas.    Allergic/Immunologic: Negative for HIV exposure, hives and persistent infections.           Current Outpatient Medications:     acetaminophen (TYLENOL) 500 MG tablet, , Disp: , Rfl:     allopurinol (ZYLOPRIM) 100 MG tablet, Take 1 tablet by mouth Daily., Disp: , Rfl:     amLODIPine (NORVASC) 5 MG tablet, Take 1 tablet by mouth Every Night., Disp: 30 tablet, Rfl: 0    aspirin (aspirin) 81 MG EC tablet, Take 1 tablet by mouth Daily., Disp: , Rfl:     atenolol (TENORMIN) 25 MG tablet, TAKE 1 TABLET TWICE A DAY, Disp: 180 tablet, Rfl: 3    baclofen (LIORESAL) 10 MG tablet, Take 1 tablet by mouth At Night As Needed for Muscle Spasms., Disp: 30 tablet, Rfl: 1    carbidopa-levodopa CR (SINEMET CR)  MG per CR tablet, TAKE 1 TABLET EVERY NIGHT, Disp: 90 tablet, Rfl: 3    citalopram (CeleXA) 40 MG tablet, Take 1 tablet by mouth Daily., Disp: 90 tablet, Rfl: 1    clobetasol (TEMOVATE) 0.05 % ointment, Apply  topically to the appropriate area as directed 2 (Two) Times a Day., Disp: 60 g, Rfl: 1    colchicine 0.6 MG tablet, Take 1 tablet by mouth Daily., Disp: , Rfl:     Diclofenac Sodium (VOLTAREN) 1 % gel gel, , Disp: , Rfl:     glimepiride (Amaryl) 2 MG tablet, Take 1 tablet by mouth Daily., Disp: 90 tablet, Rfl: 1    hydrocortisone (ANUSOL-HC) 25 MG suppository, Insert 1 suppository into the rectum 2 (Two) Times a Day As Needed for Hemorrhoids., Disp: 24 each, Rfl: 1    KRILL OIL PO, Take  by mouth., Disp: , Rfl:     lisinopril-hydrochlorothiazide (PRINZIDE,ZESTORETIC) 20-12.5 MG per tablet, TAKE 1 TABLET TWICE A DAY, Disp: 180 tablet, Rfl: 3    loratadine (CLARITIN) 10 MG tablet, Take 1 tablet by mouth 2 (Two) Times a Day., Disp: , Rfl:     meclizine (ANTIVERT) 25 MG tablet, Take 1 tablet by mouth 3 (Three) Times a Day As Needed (vertigo)., Disp: 90 tablet, Rfl: 2    mupirocin (BACTROBAN) 2 % ointment, Apply 1 application topically to the appropriate area as directed 3 (Three) Times a Day., Disp: 15 g, Rfl:  "0    ondansetron (Zofran) 4 MG tablet, Take 1 tablet by mouth Every 8 (Eight) Hours As Needed for Nausea or Vomiting., Disp: 20 tablet, Rfl: 0    pantoprazole (PROTONIX) 40 MG EC tablet, TAKE 1 TABLET DAILY, Disp: 90 tablet, Rfl: 3    predniSONE (DELTASONE) 10 MG tablet, Take 1 tablet by mouth Daily., Disp: , Rfl:     rosuvastatin (CRESTOR) 10 MG tablet, Take 1 tablet by mouth Every Night., Disp: 90 tablet, Rfl: 1    Objective:   Vitals and nursing note reviewed.   Constitutional:       Appearance: Healthy appearance. Not in distress.   Neck:      Vascular: No JVR. JVD normal.   Pulmonary:      Effort: Pulmonary effort is normal.      Breath sounds: Normal breath sounds. No wheezing. No rhonchi. No rales.   Chest:      Chest wall: Not tender to palpatation.   Cardiovascular:      PMI at left midclavicular line. Normal rate. Regular rhythm. Normal S1. Normal S2.       Murmurs: There is no murmur.      No gallop.  No click. No rub.   Pulses:     Intact distal pulses.   Edema:     Peripheral edema absent.   Abdominal:      General: Bowel sounds are normal.      Palpations: Abdomen is soft.      Tenderness: There is no abdominal tenderness.   Musculoskeletal: Normal range of motion.         General: No tenderness. Skin:     General: Skin is warm and dry.   Neurological:      General: No focal deficit present.      Mental Status: Alert and oriented to person, place and time.       Blood pressure 98/62, pulse 64, height 152.4 cm (60\"), weight 86.6 kg (191 lb), SpO2 97%, not currently breastfeeding.   Lab Review:     Assessment:       1. Benign essential hypertension  Acceptable blood pressure control.    2. Coronary artery disease involving native coronary artery of native heart without angina pectoris      3. Dyslipidemia  Tolerating high intensity statin based cholesterol-lowering therapy without side effects.    4. Class 2 severe obesity due to excess calories with serious comorbidity and body mass index (BMI) of 37.0 " to 37.9 in adult  Metabolic syndrome present.    Procedures    Plan:     Advance Care Planning   ACP discussion was held with the patient during this visit. Patient has an advance directive (not in EMR), copy requested.     No changes in medications.    No cardiovascular testing indicated.    The importance of diet exercise and weight management cannot be over emphasized.

## 2023-11-15 ENCOUNTER — PATIENT MESSAGE (OUTPATIENT)
Dept: INTERNAL MEDICINE | Facility: CLINIC | Age: 73
End: 2023-11-15
Payer: MEDICARE

## 2023-11-15 NOTE — TELEPHONE ENCOUNTER
From: Missy Mcgraw  To: Emely Sheldon  Sent: 11/15/2023 11:12 AM EST  Subject: Appt    Since Dr. Llamas did not send in for blood work  I was wondering what my appt on the 22nd is for?

## 2023-11-22 ENCOUNTER — OFFICE VISIT (OUTPATIENT)
Dept: INTERNAL MEDICINE | Facility: CLINIC | Age: 73
End: 2023-11-22
Payer: MEDICARE

## 2023-11-22 VITALS
HEART RATE: 64 BPM | WEIGHT: 195 LBS | BODY MASS INDEX: 38.28 KG/M2 | OXYGEN SATURATION: 98 % | RESPIRATION RATE: 16 BRPM | SYSTOLIC BLOOD PRESSURE: 114 MMHG | TEMPERATURE: 98.6 F | HEIGHT: 60 IN | DIASTOLIC BLOOD PRESSURE: 67 MMHG

## 2023-11-22 DIAGNOSIS — E78.2 MIXED HYPERLIPIDEMIA: ICD-10-CM

## 2023-11-22 DIAGNOSIS — F33.0 MILD EPISODE OF RECURRENT MAJOR DEPRESSIVE DISORDER: ICD-10-CM

## 2023-11-22 DIAGNOSIS — F41.9 ANXIETY: ICD-10-CM

## 2023-11-22 DIAGNOSIS — E11.9 DIABETES MELLITUS WITHOUT COMPLICATION: ICD-10-CM

## 2023-11-22 DIAGNOSIS — Z23 NEED FOR INFLUENZA VACCINATION: ICD-10-CM

## 2023-11-22 DIAGNOSIS — M62.838 MUSCLE SPASM: ICD-10-CM

## 2023-11-22 DIAGNOSIS — K21.00 GASTROESOPHAGEAL REFLUX DISEASE WITH ESOPHAGITIS WITHOUT HEMORRHAGE: ICD-10-CM

## 2023-11-22 DIAGNOSIS — I10 BENIGN ESSENTIAL HYPERTENSION: Primary | ICD-10-CM

## 2023-11-22 LAB
ALBUMIN SERPL-MCNC: 4.6 G/DL (ref 3.5–5.2)
ALBUMIN/CREAT UR: 122 MG/G CREAT (ref 0–29)
ALBUMIN/GLOB SERPL: 2.2 G/DL
ALP SERPL-CCNC: 107 U/L (ref 39–117)
ALT SERPL-CCNC: 6 U/L (ref 1–33)
AST SERPL-CCNC: 14 U/L (ref 1–32)
BASOPHILS # BLD AUTO: 0.05 10*3/MM3 (ref 0–0.2)
BASOPHILS NFR BLD AUTO: 0.8 % (ref 0–1.5)
BILIRUB SERPL-MCNC: 0.3 MG/DL (ref 0–1.2)
BUN SERPL-MCNC: 22 MG/DL (ref 8–23)
BUN/CREAT SERPL: 24.2 (ref 7–25)
CALCIUM SERPL-MCNC: 9.8 MG/DL (ref 8.6–10.5)
CHLORIDE SERPL-SCNC: 102 MMOL/L (ref 98–107)
CHOLEST SERPL-MCNC: 215 MG/DL (ref 0–200)
CO2 SERPL-SCNC: 28.3 MMOL/L (ref 22–29)
CREAT SERPL-MCNC: 0.91 MG/DL (ref 0.57–1)
CREAT UR-MCNC: 193.3 MG/DL
EGFRCR SERPLBLD CKD-EPI 2021: 68 ML/MIN/1.73
EOSINOPHIL # BLD AUTO: 0.2 10*3/MM3 (ref 0–0.4)
EOSINOPHIL NFR BLD AUTO: 3 % (ref 0.3–6.2)
ERYTHROCYTE [DISTWIDTH] IN BLOOD BY AUTOMATED COUNT: 12.3 % (ref 12.3–15.4)
GLOBULIN SER CALC-MCNC: 2.1 GM/DL
GLUCOSE SERPL-MCNC: 149 MG/DL (ref 65–99)
HBA1C MFR BLD: 6.5 % (ref 4.8–5.6)
HCT VFR BLD AUTO: 40.7 % (ref 34–46.6)
HDLC SERPL-MCNC: 49 MG/DL (ref 40–60)
HGB BLD-MCNC: 13.6 G/DL (ref 12–15.9)
IMM GRANULOCYTES # BLD AUTO: 0.04 10*3/MM3 (ref 0–0.05)
IMM GRANULOCYTES NFR BLD AUTO: 0.6 % (ref 0–0.5)
LDLC SERPL CALC-MCNC: 110 MG/DL (ref 0–100)
LYMPHOCYTES # BLD AUTO: 1.87 10*3/MM3 (ref 0.7–3.1)
LYMPHOCYTES NFR BLD AUTO: 28.2 % (ref 19.6–45.3)
MCH RBC QN AUTO: 29.5 PG (ref 26.6–33)
MCHC RBC AUTO-ENTMCNC: 33.4 G/DL (ref 31.5–35.7)
MCV RBC AUTO: 88.3 FL (ref 79–97)
MICROALBUMIN UR-MCNC: 235.5 UG/ML
MONOCYTES # BLD AUTO: 0.63 10*3/MM3 (ref 0.1–0.9)
MONOCYTES NFR BLD AUTO: 9.5 % (ref 5–12)
NEUTROPHILS # BLD AUTO: 3.83 10*3/MM3 (ref 1.7–7)
NEUTROPHILS NFR BLD AUTO: 57.9 % (ref 42.7–76)
NRBC BLD AUTO-RTO: 0 /100 WBC (ref 0–0.2)
PLATELET # BLD AUTO: 225 10*3/MM3 (ref 140–450)
POTASSIUM SERPL-SCNC: 4.5 MMOL/L (ref 3.5–5.2)
PROT SERPL-MCNC: 6.7 G/DL (ref 6–8.5)
RBC # BLD AUTO: 4.61 10*6/MM3 (ref 3.77–5.28)
SODIUM SERPL-SCNC: 139 MMOL/L (ref 136–145)
TRIGL SERPL-MCNC: 329 MG/DL (ref 0–150)
VLDLC SERPL CALC-MCNC: 56 MG/DL (ref 5–40)
WBC # BLD AUTO: 6.62 10*3/MM3 (ref 3.4–10.8)

## 2023-11-22 RX ORDER — AMLODIPINE BESYLATE 5 MG/1
5 TABLET ORAL NIGHTLY
Qty: 90 TABLET | Refills: 1 | Status: SHIPPED | OUTPATIENT
Start: 2023-11-22

## 2023-11-22 RX ORDER — GLIMEPIRIDE 2 MG/1
2 TABLET ORAL
Qty: 90 TABLET | Refills: 1 | Status: SHIPPED | OUTPATIENT
Start: 2023-11-22

## 2023-11-22 RX ORDER — PANTOPRAZOLE SODIUM 40 MG/1
40 TABLET, DELAYED RELEASE ORAL DAILY
Qty: 90 TABLET | Refills: 1 | Status: SHIPPED | OUTPATIENT
Start: 2023-11-22

## 2023-11-22 RX ORDER — ATENOLOL 25 MG/1
25 TABLET ORAL 2 TIMES DAILY
Qty: 180 TABLET | Refills: 1 | Status: SHIPPED | OUTPATIENT
Start: 2023-11-22

## 2023-11-22 RX ORDER — CITALOPRAM 40 MG/1
40 TABLET ORAL DAILY
Qty: 90 TABLET | Refills: 1 | Status: SHIPPED | OUTPATIENT
Start: 2023-11-22

## 2023-11-22 RX ORDER — LISINOPRIL AND HYDROCHLOROTHIAZIDE 20; 12.5 MG/1; MG/1
1 TABLET ORAL 2 TIMES DAILY
Qty: 180 TABLET | Refills: 1 | Status: SHIPPED | OUTPATIENT
Start: 2023-11-22

## 2023-11-22 RX ORDER — ROSUVASTATIN CALCIUM 10 MG/1
10 TABLET, COATED ORAL NIGHTLY
Qty: 90 TABLET | Refills: 1 | Status: SHIPPED | OUTPATIENT
Start: 2023-11-22

## 2023-11-22 RX ORDER — BACLOFEN 10 MG/1
10 TABLET ORAL NIGHTLY PRN
Qty: 30 TABLET | Refills: 4 | Status: SHIPPED | OUTPATIENT
Start: 2023-11-22

## 2023-11-22 NOTE — PROGRESS NOTES
"Chief Complaint  Hypertension, Hyperlipidemia, and Diabetes    Subjective        Missy Mcgraw presents to [facility]  HPI: Patient is here to follow up on the blood pressure  The patient is taking the blood pressure medications as prescribed and has had no side effects. The patient is also here to follow up on the cholesterol and is trying to follow a diet. The patient is also here to follow on sugar   and is  due to get lab work done . The patient also complains of anxiety and depression, she needs refills on medications and flu vaccine.  She is also following up on muscle spasm and GERD  Hypertension   Pertinent negatives include no chest pain, palpitations or shortness of breath.   Hyperlipidemia   Pertinent negatives include no chest pain or shortness of breath.   Diabetes   Pertinent negatives for hypoglycemia include no dizziness, nervousness/anxiousness or pallor. Pertinent negatives for diabetes include no chest pain, no shortness of breath  Patient is on acei/arb    Hypertension    Answers submitted by the patient for this visit:  Primary Reason for Visit (Submitted on 11/15/2023)  What is the primary reason for your visit?: High Blood Pressure      Objective   Vital Signs:  /67   Pulse 64   Temp 98.6 °F (37 °C)   Resp 16   Ht 152.4 cm (60\")   Wt 88.5 kg (195 lb)   SpO2 98%   BMI 38.08 kg/m²   Estimated body mass index is 38.08 kg/m² as calculated from the following:    Height as of this encounter: 152.4 cm (60\").    Weight as of this encounter: 88.5 kg (195 lb).               Physical Exam  Vitals and nursing note reviewed.   Constitutional:       General: She is not in acute distress.     Appearance: Normal appearance. She is obese. She is not diaphoretic.   HENT:      Head: Normocephalic and atraumatic.      Right Ear: External ear normal.      Left Ear: External ear normal.      Nose: Nose normal.   Eyes:      Extraocular Movements: Extraocular movements " intact.      Conjunctiva/sclera: Conjunctivae normal.   Neck:      Trachea: Trachea normal.   Cardiovascular:      Rate and Rhythm: Normal rate and regular rhythm.      Heart sounds: Normal heart sounds.   Pulmonary:      Effort: Pulmonary effort is normal. No respiratory distress.      Breath sounds: Normal breath sounds.   Abdominal:      General: Abdomen is flat.   Musculoskeletal:      Cervical back: Neck supple.      Comments: Moves all limbs   Skin:     General: Skin is warm.   Neurological:      Mental Status: She is alert and oriented to person, place, and time.      Comments: No gross motor or sensory deficits        Result Review :  The following data was reviewed by: Emely Sheldon MD on 11/22/2023:  Common labs          12/31/2022    08:10 5/2/2023    07:51 11/21/2023    08:16   Common Labs   Glucose 160  169  149    BUN 18  19  22    Creatinine 0.99  1.07  0.91    Sodium 140  140  139    Potassium 4.4  4.6  4.5    Chloride 103  102  102    Calcium 9.9  10.0  9.8    Total Protein  6.5  6.7    Albumin 4.2  4.4  4.6    Total Bilirubin 0.4  0.4  0.3    Alkaline Phosphatase 84  88  107    AST (SGOT) 14  10  14    ALT (SGPT) 7  7  6    WBC 7.25  7.89  6.62    Hemoglobin 13.4  13.5  13.6    Hematocrit 40.5  40.0  40.7    Platelets 220  197  225    Total Cholesterol 204      Total Cholesterol  201  215    Triglycerides 216  268  329    HDL Cholesterol 50  51  49    LDL Cholesterol  116  104  110    Hemoglobin A1C 6.30  6.30  6.50    Microalbumin, Urine 12.1   235.5                   Assessment and Plan   Diagnoses and all orders for this visit:    1. Benign essential hypertension (Primary)  -     amLODIPine (NORVASC) 5 MG tablet; Take 1 tablet by mouth Every Night.  Dispense: 90 tablet; Refill: 1  -     atenolol (TENORMIN) 25 MG tablet; Take 1 tablet by mouth 2 (Two) Times a Day.  Dispense: 180 tablet; Refill: 1  -     lisinopril-hydrochlorothiazide (PRINZIDE,ZESTORETIC) 20-12.5 MG per tablet; Take 1 tablet by  mouth 2 (Two) Times a Day.  Dispense: 180 tablet; Refill: 1    2. Mixed hyperlipidemia  -     CBC & Differential  -     Comprehensive Metabolic Panel  -     Lipid Panel  -     rosuvastatin (CRESTOR) 10 MG tablet; Take 1 tablet by mouth Every Night.  Dispense: 90 tablet; Refill: 1    3. Diabetes mellitus without complication  -     Hemoglobin A1c  -     Microalbumin / Creatinine Urine Ratio - Urine, Clean Catch  -     glimepiride (Amaryl) 2 MG tablet; Take 1 tablet by mouth Every Morning Before Breakfast.  Dispense: 90 tablet; Refill: 1    4. Anxiety  -     citalopram (CeleXA) 40 MG tablet; Take 1 tablet by mouth Daily.  Dispense: 90 tablet; Refill: 1    5. Mild episode of recurrent major depressive disorder  -     citalopram (CeleXA) 40 MG tablet; Take 1 tablet by mouth Daily.  Dispense: 90 tablet; Refill: 1    6. Gastroesophageal reflux disease with esophagitis without hemorrhage  -     pantoprazole (PROTONIX) 40 MG EC tablet; Take 1 tablet by mouth Daily.  Dispense: 90 tablet; Refill: 1    7. Muscle spasm    8. Need for influenza vaccination  -     Fluzone High-Dose 65+yrs ([id number])    Other orders  -     baclofen (LIORESAL) 10 MG tablet; Take 1 tablet by mouth At Night As Needed for Muscle Spasms.  Dispense: 30 tablet; Refill: 4      Plan:  1.  Benign essential hypertension: Will continue current medication, low-sodium diet advised, Counseled to regularly check BP at home with goal averaging <130/80.   2.mixed hyperlipidemia: will obtain   fasting CMP and lipid panel.  Diet and exercise counseled,  Will continue current medications  3. Diabetes  Mellitus  : will obtain   fasting CMP  and hba1c 6.5, diet and exercise counseled , Will continue current medications  4.  Anxiety disorder: Refill Celexa 40 mg daily  5.  Major depression: Refill Celexa 40 mg daily and obtain labs  6.  GERD: Refill pantoprazole  7.  Spasm: As needed muscle relaxant  8.  Need for flu vaccine: Given today and well-tolerated     I spent  30 minutes caring for Missy on this date of service. This time includes time spent by me in the following activities:preparing for the visit, reviewing tests, performing a medically appropriate examination and/or evaluation , counseling and educating the patient/family/caregiver, ordering medications, tests, or procedures, and documenting information in the medical record  Follow Up   Return in about 4 months (around 3/28/2024).  Patient was given instructions and counseling regarding her condition or for health maintenance advice. Please see specific information pulled into the AVS if appropriate.

## 2023-12-17 ENCOUNTER — APPOINTMENT (OUTPATIENT)
Dept: CT IMAGING | Facility: HOSPITAL | Age: 73
End: 2023-12-17
Payer: MEDICARE

## 2023-12-17 ENCOUNTER — HOSPITAL ENCOUNTER (EMERGENCY)
Facility: HOSPITAL | Age: 73
Discharge: HOME | End: 2023-12-17
Attending: EMERGENCY MEDICINE | Admitting: EMERGENCY MEDICINE
Payer: MEDICARE

## 2023-12-17 VITALS
DIASTOLIC BLOOD PRESSURE: 69 MMHG | OXYGEN SATURATION: 97 % | WEIGHT: 195 LBS | RESPIRATION RATE: 18 BRPM | HEART RATE: 66 BPM | HEIGHT: 60 IN | TEMPERATURE: 98.6 F | SYSTOLIC BLOOD PRESSURE: 171 MMHG | BODY MASS INDEX: 38.28 KG/M2

## 2023-12-17 DIAGNOSIS — S00.83XA FACIAL HEMATOMA, INITIAL ENCOUNTER: Primary | ICD-10-CM

## 2023-12-17 PROCEDURE — 72125 CT NECK SPINE W/O DYE: CPT

## 2023-12-17 PROCEDURE — 99284 EMERGENCY DEPT VISIT MOD MDM: CPT

## 2023-12-17 PROCEDURE — 70450 CT HEAD/BRAIN W/O DYE: CPT

## 2023-12-17 PROCEDURE — 70486 CT MAXILLOFACIAL W/O DYE: CPT

## 2023-12-17 NOTE — ED PROVIDER NOTES
Subjective  History of Present Illness:    Chief Complaint: Facial swelling, facial pain  History of Present Illness: 73-year-old female patient comes into the ED today complaining of facial swelling and pain after falling hitting her right eye and cheek on a marble top table.  Patient states this happened this morning she denies any loss of consciousness states she was able to ambulate without any issues.  Patient denies any blurry vision confusion, nausea vomiting headache      Nurses Notes reviewed and agree, including vitals, allergies, social history and prior medical history.       Allergies:    Penicillins, Atorvastatin, Fenofibrate, Fluoxetine, Gemfibrozil, Hydroxychloroquine, Metformin, Nsaids, Pravastatin, and Sulfamethoxazole-trimethoprim      Past Surgical History:   Procedure Laterality Date    APPENDECTOMY      CARDIAC CATHETERIZATION N/A 08/01/2018    Procedure: Coronary angiography;  Surgeon: Wali Núñez MD;  Location: [facility] CATH INVASIVE LOCATION;  Service: Cardiology    COLONOSCOPY  10/2016    Dr. Colmenares    COLONOSCOPY N/A 02/01/2019    Procedure: COLONOSCOPY;  Surgeon: Marie De Leon MD;  Location: [facility] ENDOSCOPY;  Service: Gastroenterology    ENDOSCOPY  10/2016    Dr. Colmenares    ENDOSCOPY N/A 02/01/2019    Procedure: ESOPHAGOGASTRODUODENOSCOPY WITH COLD FORCEP BIOPSY AND POLYPECTOMY;  Surgeon: Marie De Leon MD;  Location: [facility] ENDOSCOPY;  Service: Gastroenterology    JOINT REPLACEMENT  Oct 26, 2022    KNEE SURGERY Right     LAPAROSCOPY DIAGNOSTIC / BIOPSY / ASPIRATION / LYSIS Right     SHOULDER ARTHROSCOPY Bilateral          Social History     Socioeconomic History    Marital status: Single   Tobacco Use    Smoking status: Never    Smokeless tobacco: Never   Vaping Use    Vaping Use: Never used   Substance and Sexual Activity    Alcohol use: No    Drug use: No    Sexual activity: Not Currently     Birth control/protection: Post-menopausal         Family History   Problem Relation  Age of Onset    Kidney failure Mother     Kidney disease Mother     Cancer Father         prostate,lung,colon    Arthritis Father     Heart disease Sister     Arthritis Other     Diabetes Other     Cancer Other         lung    Breast cancer Neg Hx     Ovarian cancer Neg Hx        REVIEW OF SYSTEMS: All systems reviewed and not pertinent unless noted.    Review of Systems   HENT:  Positive for facial swelling.    Eyes:  Positive for pain.   Skin:  Positive for color change.   All other systems reviewed and are negative.      Objective    Physical Exam  Vitals and nursing note reviewed.   Constitutional:       Appearance: Normal appearance.   HENT:      Head: Normocephalic and atraumatic.      Comments: Right periorbital edema and ecchymosis.  Extraocular movements are intact, gross vision intact.  Eyes:      Extraocular Movements: Extraocular movements intact.      Pupils: Pupils are equal, round, and reactive to light.   Cardiovascular:      Rate and Rhythm: Normal rate and regular rhythm.      Pulses: Normal pulses.      Heart sounds: Normal heart sounds.   Pulmonary:      Effort: Pulmonary effort is normal.      Breath sounds: Normal breath sounds.   Abdominal:      General: Abdomen is flat. Bowel sounds are normal.      Palpations: Abdomen is soft.   Musculoskeletal:      Cervical back: Normal range of motion and neck supple.   Skin:     Capillary Refill: Capillary refill takes less than 2 seconds.   Neurological:      General: No focal deficit present.      Mental Status: She is alert and oriented to person, place, and time. Mental status is at baseline.      GCS: GCS eye subscore is 4. GCS verbal subscore is 5. GCS motor subscore is 6.      Sensory: Sensation is intact.      Motor: Motor function is intact.      Gait: Gait is intact.   Psychiatric:         Attention and Perception: Attention and perception normal.         Mood and Affect: Mood and affect normal.         Speech: Speech normal.         Behavior:  Behavior normal. Behavior is cooperative.           Procedures    ED Course:         Lab Results (last 24 hours)       ** No results found for the last 24 hours. **             CT Cervical Spine Without Contrast    Result Date: 12/17/2023  PROCEDURE: CT CERVICAL SPINE WO CONTRAST-  HISTORY: Neck trauma (Age >= 65y)  COMPARISON: None.  PROCEDURE: Axial images were obtained from the skull base to the thoracic inlet by computed tomography. 3 D reconstruction images were performed. This study was performed with techniques to keep radiation doses as low as reasonably achievable, (ALARA). Individualized dose reduction techniques using automated exposure control or adjustment of mA and/or kV according to the patient size were employed.  FINDINGS: There is no acute fracture or subluxation. There are levels of spinal stenosis and/or neuroforaminal narrowing secondary to degenerative change but not secondary to an acute bony abnormality. There is slight reversal of the normal cervical lordosis centered at C6.  mild to moderate to space narrowing noted at multiple levels with osteophytes.. Probable bone island identified anteriorly in the C7 vertebral body. The facets are normally aligned. The soft tissues are unremarkable. Limited images of the lung apices are unremarkable.      Impression: No acute fracture.     CTDI: 32.71 mGy DLP:530.06 mGy.cm  This report was signed and finalized on 12/17/2023 2:36 PM by María Elena West MD.      CT Facial Bones Without Contrast    Result Date: 12/17/2023  PROCEDURE: CT FACIAL BONES WO CONTRAST-  HISTORY: Facial trauma after fall  COMPARISON: None  TECHNIQUE: Multiple axial CT sections were performed through the face without enhancement. Coronal reconstruction images were performed. This study was performed with techniques to keep radiation doses as low as reasonably achievable, (ALARA). Individualized dose reduction techniques using automated exposure control or adjustment of mA and/or kV  according to the patient size were employed.  FINDINGS: No fracture is identified. The orbital floor is intact. No air-fluid levels are identified. There is a 3 cm oval area of increased attenuation in the subcutaneous fat superficial to the right maxillary sinus most consistent with a hematoma. There is some mild increased attenuation in the adjacent fat and soft tissue swelling..      Impression: No facial bone fracture.  Right facial swelling and hematoma formation.    CTDI: 32.71 mGy DLP:530.06 mGy.cm  This report was signed and finalized on 12/17/2023 2:34 PM by María Elena West MD.      CT Head Without Contrast    Result Date: 12/17/2023  PROCEDURE: CT HEAD WO CONTRAST-  HISTORY: Head trauma, minor, normal mental status (Age 18-64y)  COMPARISON: None.  TECHNIQUE: Multiple axial CT images were performed from the foramen magnum to the vertex. Individualized dose reduction techniques using automated exposure control or adjustment of mA and/or kV according to the patient size were employed.  FINDINGS: There is moderate, age-appropriate generalized cerebral atrophy. There is moderate small vessel ischemic disease noted bilaterally. The ventricles are enlarged. There is no evidence of edema or hemorrhage.  No masses are identified. No extra-axial fluid is seen. The paranasal sinuses are unremarkable.      Impression: Atrophy  without acute process.     CTDI: 32.71 mGy DLP:530.06 mGy.cm  This report was signed and finalized on 12/17/2023 2:31 PM by María Elena West MD.        Medical Decision Making  73-year-old female patient comes into the ED today complaining of facial swelling and pain after falling hitting her right eye and cheek on a marble top table.  Patient states this happened this morning she denies any loss of consciousness states she was able to ambulate without any issues.  Patient denies any blurry vision confusion, nausea vomiting headache    DDX: includes but is not limited to: Facial fractures, facial  contusion, intracranial hemorrhage, others    Problems Addressed:  Facial hematoma, initial encounter: complicated acute illness or injury    Amount and/or Complexity of Data Reviewed  External Data Reviewed:      Details: I have personally reviewed labs, radiology EKG and notes from patient's chart  Radiology: ordered. Decision-making details documented in ED Course.     Details: I have personally reviewed and documented all results  Discussion of management or test interpretation with external provider(s): Discussed assessment, treatment and plan with ER attending    Risk  Risk Details: I have discussed with patient the finding of the test preformed today. Patient has been diagnosed with facial hematoma and will be discharged home.  Patient requested to follow-up with primary care provider within the next 7 days for reevaluation. Strict return precautions have been given and patient verbalizes understanding                  Final diagnoses:   Facial hematoma, initial encounter          Hardy Mcmahan, APRN  12/17/23 1445

## 2024-01-12 ENCOUNTER — TELEPHONE (OUTPATIENT)
Dept: SURGERY | Facility: CLINIC | Age: 74
End: 2024-01-12
Payer: MEDICARE

## 2024-01-12 NOTE — TELEPHONE ENCOUNTER
LVM for patient to call office to schedule 5 year colonoscopy recall. Last colonoscopy 2/1/2019.  Mailed recall letter.

## 2024-01-24 NOTE — TELEPHONE ENCOUNTER
Ready to schedule colonoscopy.  Verified patient information.  Last colonoscopy 2/1/2019.   Please call at [phone].

## 2024-01-24 NOTE — TELEPHONE ENCOUNTER
PRESCREENING FOR OPEN ACCESS SCHEDULING    Missy Mcgraw, 1950  [id number]    01/24/24    If, the patient answers yes to any of the following questions the provider will be informed prior to scheduling open access for approval and documented in the chart.    [x]  Yes  [] No    1. Have you ever had a colonoscopy in the past?      When:  02/01/2019      Where:       Polyps or other:     [x]  Yes  [] No    2. Family history of colon cancer?      Relation: FATHER      Age of onset: 60'S      Do you currently have any of the following?    []  Yes  [x] No  Rectal bleeding, if so, how long?     []  Yes  [x] No  Abdominal pain, if so, how long?    []  Yes  [x] No  Constipation, if so, how long?    []  Yes  [x] No  Diarrhea, if so, how long?    []  Yes  [x] No  Weight loss, is so, how much?    [] Yes  [x] No  Small caliber stool, if so, how long?      Have you ever had any of the following conditions?    [] Yes  [x] No  Heart attack?      When?       Last cardiac workup?     Blood thinners? 81 MG ASPIRIN    [] Yes  [x] No   Lung problems, asthma or COPD?  [] Yes  [x] No  Oxygen required?       [] Yes  [x] No  Stroke?     [] Yes  [x] No  Have you ever had a reaction to anesthesia? DOES MAKE REALLY NAUSEA

## 2024-01-24 NOTE — TELEPHONE ENCOUNTER
PT would like to be scheduled at [facility] on 03/08/24-verified pharmacy-thank you.

## 2024-01-25 DIAGNOSIS — Z12.11 COLON CANCER SCREENING: Primary | ICD-10-CM

## 2024-01-25 RX ORDER — BISACODYL 5 MG/1
5 TABLET, DELAYED RELEASE ORAL DAILY PRN
Qty: 4 TABLET | Refills: 0 | Status: SHIPPED | OUTPATIENT
Start: 2024-01-25 | End: 2024-01-29

## 2024-01-25 RX ORDER — POLYETHYLENE GLYCOL 3350 17 G/17G
238 POWDER, FOR SOLUTION ORAL DAILY
Qty: 238 G | Refills: 0 | Status: SHIPPED | OUTPATIENT
Start: 2024-01-25 | End: 2024-01-26

## 2024-02-23 RX ORDER — CARBIDOPA AND LEVODOPA 50; 200 MG/1; MG/1
TABLET, EXTENDED RELEASE ORAL
Qty: 90 TABLET | Refills: 3 | Status: SHIPPED | OUTPATIENT
Start: 2024-02-23

## 2024-02-23 NOTE — PROGRESS NOTES
Subjective   Missy Mcgraw is a 73 y.o. female.   Chief Complaint   Patient presents with    Colonoscopy    Heartburn        History of Present Illness       Ms. Mcgraw is a 74 yo female patient referred for evaluation of a ongoing GERD symptoms with occasional dysphagia.  She also ius due for a routine colonoscopy.  She remains on PPI therapy.  She has a family history of colon cancer in her father.  Her last exam was in 2019.       The following portions of the patient's history were reviewed and updated as appropriate: allergies, current medications, past family history, past medical history, past social history, past surgical history, and problem list.      Patient Active Problem List   Diagnosis    Hay fever    Benign essential hypertension    Gastroesophageal reflux disease with esophagitis    Major depressive disorder, recurrent episode    Uncontrolled type 2 diabetes mellitus    Abnormal liver enzymes    Abnormal weight gain    Cervicalgia    Fibroid, uterine    Hyperthyroidism    Mixed hyperlipidemia    Movement disorder    Neuralgia    Ulnar nerve palsy    Vitamin B12 deficiency    Vitamin D deficiency    Osteoporosis    Restless legs syndrome    Shortness of breath    Coronary artery disease involving native coronary artery of native heart without angina pectoris    PONV (postoperative nausea and vomiting)    Unintentional weight loss    Gastroesophageal reflux disease    Epigastric abdominal pain    Gastritis determined by biopsy    Family history of colon cancer in father    Pre-operative cardiovascular examination    Dyslipidemia    Class 2 severe obesity due to excess calories with serious comorbidity and body mass index (BMI) of 37.0 to 37.9 in adult    Dysphagia    Colon cancer screening       Past Medical History:   Diagnosis Date    Acute maxillary sinusitis     Allergic     Arthritis     Cancer Skin    Cataract     Colon polyp     Depression     Diabetes mellitus     Esophagitis, reflux      "Fibromyositis     GERD (gastroesophageal reflux disease)     Gout     H/O colonoscopy     Herpes zoster     History of mammogram     HL (hearing loss)     Hyperlipidemia     Hypertension 1992    Injury of back     Low back pain     Migraine headache     Obesity     Osteopenia     Osteoporosis     Pap smear for cervical cancer screening     PONV (postoperative nausea and vomiting)     Restless legs syndrome     Screening     DEXA body composition study    TIA (transient ischemic attack)     \"at least 10 years ago\"    Transient cerebral ischemia     Urinary tract infection     Vertigo        Past Surgical History:   Procedure Laterality Date    APPENDECTOMY      CARDIAC CATHETERIZATION N/A 08/01/2018    Procedure: Coronary angiography;  Surgeon: Wali Núñez MD;  Location: [facility] CATH INVASIVE LOCATION;  Service: Cardiology    COLONOSCOPY  10/2016    Dr. Colmenares    COLONOSCOPY N/A 02/01/2019    Procedure: COLONOSCOPY;  Surgeon: Marie De Leon MD;  Location: [facility] ENDOSCOPY;  Service: Gastroenterology    ENDOSCOPY  10/2016    Dr. Colmenares    ENDOSCOPY N/A 02/01/2019    Procedure: ESOPHAGOGASTRODUODENOSCOPY WITH COLD FORCEP BIOPSY AND POLYPECTOMY;  Surgeon: Marie De Leon MD;  Location: [facility] ENDOSCOPY;  Service: Gastroenterology    JOINT REPLACEMENT  Oct 26, 2022    KNEE SURGERY Right     LAPAROSCOPY DIAGNOSTIC / BIOPSY / ASPIRATION / LYSIS Right     SHOULDER ARTHROSCOPY Bilateral        Medications:   No current facility-administered medications on file prior to visit.     Current Outpatient Medications on File Prior to Visit   Medication Sig    acetaminophen (TYLENOL) 500 MG tablet Take  by mouth As Needed.    allopurinol (ZYLOPRIM) 100 MG tablet Take 1 tablet by mouth Daily.    amLODIPine (NORVASC) 5 MG tablet Take 1 tablet by mouth Every Night.    aspirin (aspirin) 81 MG EC tablet Take 1 tablet by mouth Daily.    atenolol (TENORMIN) 25 MG tablet Take 1 tablet by mouth 2 (Two) Times a Day.    baclofen " (LIORESAL) 10 MG tablet Take 1 tablet by mouth At Night As Needed for Muscle Spasms.    carbidopa-levodopa CR (SINEMET CR)  MG per CR tablet TAKE 1 TABLET EVERY NIGHT    citalopram (CeleXA) 40 MG tablet Take 1 tablet by mouth Daily.    clobetasol (TEMOVATE) 0.05 % ointment Apply  topically to the appropriate area as directed 2 (Two) Times a Day. (Patient taking differently: Apply  topically to the appropriate area as directed As Needed.)    colchicine 0.6 MG tablet Take 1 tablet by mouth Daily.    Diclofenac Sodium (VOLTAREN) 1 % gel gel  (Patient not taking: Reported on 3/7/2024)    glimepiride (Amaryl) 2 MG tablet Take 1 tablet by mouth Every Morning Before Breakfast.    hydrocortisone (ANUSOL-HC) 25 MG suppository Insert 1 suppository into the rectum 2 (Two) Times a Day As Needed for Hemorrhoids. (Patient taking differently: Insert 1 suppository into the rectum As Needed for Hemorrhoids.)    KRILL OIL PO Take  by mouth.    lisinopril-hydrochlorothiazide (PRINZIDE,ZESTORETIC) 20-12.5 MG per tablet Take 1 tablet by mouth 2 (Two) Times a Day.    loratadine (CLARITIN) 10 MG tablet Take 1 tablet by mouth As Needed.    meclizine (ANTIVERT) 25 MG tablet Take 1 tablet by mouth 3 (Three) Times a Day As Needed (vertigo).    mupirocin (BACTROBAN) 2 % ointment Apply 1 application topically to the appropriate area as directed 3 (Three) Times a Day.    ondansetron (Zofran) 4 MG tablet Take 1 tablet by mouth Every 8 (Eight) Hours As Needed for Nausea or Vomiting.    pantoprazole (PROTONIX) 40 MG EC tablet Take 1 tablet by mouth Daily.    predniSONE (DELTASONE) 10 MG tablet Take 1 tablet by mouth As Needed (for flare ups of gout).    rosuvastatin (CRESTOR) 10 MG tablet Take 1 tablet by mouth Every Night.         Allergies:   Allergies   Allergen Reactions    Penicillins Unknown - Low Severity     Not sure of reaction (childhood)  Beta lactam allergy details  Antibiotic reaction: unknown  Age at reaction: child  Dose to  reaction time: unknown  Reason for antibiotic: unknown  Epinephrine required for reaction?: unknown  Tolerated antibiotics: amoxicillin       Atorvastatin Unknown - Low Severity     Pt cannot remember    Fenofibrate Unknown - Low Severity     Pt cannot remember    Fluoxetine Unknown - Low Severity     Pt cannot remember    Gemfibrozil Unknown - Low Severity     Pt cannot remember    Hydroxychloroquine Unknown - Low Severity    Metformin Diarrhea    Nsaids GI Intolerance    Pravastatin Unknown - Low Severity     Pt cannot remember    Sulfamethoxazole-Trimethoprim Unknown - Low Severity     Pt cannot remember         Family History   Problem Relation Age of Onset    Kidney failure Mother     Kidney disease Mother     Cancer Father         prostate,lung,colon    Arthritis Father     Heart disease Sister     Arthritis Other     Diabetes Other     Cancer Other         lung    Breast cancer Neg Hx     Ovarian cancer Neg Hx        Social History     Socioeconomic History    Marital status: Single   Tobacco Use    Smoking status: Never     Passive exposure: Never    Smokeless tobacco: Never   Vaping Use    Vaping status: Never Used   Substance and Sexual Activity    Alcohol use: No    Drug use: No    Sexual activity: Not Currently     Birth control/protection: Post-menopausal       Review of Systems   Constitutional:  Negative for diaphoresis, unexpected weight gain and unexpected weight loss.   HENT:  Negative for hearing loss, trouble swallowing and voice change.    Eyes:  Negative for visual disturbance.   Respiratory:  Negative for apnea, cough, chest tightness, shortness of breath and wheezing.    Cardiovascular:  Negative for chest pain, palpitations and leg swelling.   Gastrointestinal:  Positive for abdominal pain, nausea and GERD. Negative for abdominal distention, anal bleeding, blood in stool, constipation, diarrhea, rectal pain, vomiting and indigestion.   Endocrine: Negative for cold intolerance and heat  "intolerance.   Genitourinary:  Negative for difficulty urinating, dysuria and flank pain.   Musculoskeletal:  Negative for back pain and gait problem.   Skin:  Negative for color change, rash, skin lesions and wound.   Neurological:  Negative for dizziness, syncope, speech difficulty, weakness, light-headedness and numbness.   Hematological:  Negative for adenopathy. Does not bruise/bleed easily.   Psychiatric/Behavioral:  The patient is not nervous/anxious.          Objective   /78   Pulse 61   Temp 97.5 °F (36.4 °C) (Temporal)   Ht 152.4 cm (60\")   Wt 87.4 kg (192 lb 9.6 oz)   SpO2 97%   BMI 37.61 kg/m²     Physical Exam  Constitutional:       Appearance: She is well-developed.   HENT:      Head: Normocephalic and atraumatic.   Eyes:      General: No scleral icterus.  Cardiovascular:      Rate and Rhythm: Regular rhythm.   Pulmonary:      Effort: Pulmonary effort is normal.   Abdominal:      General: There is no distension.      Palpations: Abdomen is soft.      Tenderness: There is no abdominal tenderness.   Musculoskeletal:      Cervical back: Neck supple.   Skin:     General: Skin is warm and dry.   Neurological:      Mental Status: She is alert and oriented to person, place, and time.   Psychiatric:         Behavior: Behavior normal.           Assessment & Plan   Diagnoses and all orders for this visit:    1. Gastroesophageal reflux disease without esophagitis (Primary)  -     Case Request; Standing  -     sodium chloride 0.9 % flush 10 mL  -     sodium chloride 0.9 % flush 10 mL  -     sodium chloride 0.9 % infusion 40 mL  -     lactated ringers infusion  -     Case Request    2. Dysphagia, unspecified type  -     Case Request; Standing  -     sodium chloride 0.9 % flush 10 mL  -     sodium chloride 0.9 % flush 10 mL  -     sodium chloride 0.9 % infusion 40 mL  -     lactated ringers infusion  -     Case Request    3. Colon cancer screening  -     Case Request; Standing  -     sodium chloride 0.9 " % flush 10 mL  -     sodium chloride 0.9 % flush 10 mL  -     sodium chloride 0.9 % infusion 40 mL  -     lactated ringers infusion  -     Case Request    4. Family history of colon cancer in father    Other orders  -     polyethylene glycol (MIRALAX) 17 GM/SCOOP powder; Take 238 g by mouth 1 (One) Time for 1 dose. Mix 238 grams of miralax with 64 oz garotade and drink at 4 pm day before colonoscopy  Dispense: 238 g; Refill: 0  -     bisacodyl (Dulcolax) 5 MG EC tablet; Take 4 tablets once by mouth at 1 pm on day before colonoscopy.  Dispense: 4 tablet; Refill: 0  -     Follow Anesthesia Guidelines / Protocol; Future  -     Follow Anesthesia Guidelines / Protocol; Standing  -     Obtain Informed Consent; Standing  -     Verify Bowel Prep Was Successful; Standing  -     Give Tap Water Enema If Bowel Prep Insufficient; Standing  -     Obtain Informed Consent; Future  -     Provide NPO Instructions to Patient; Future  -     Insert Peripheral IV; Standing  -     Saline Lock & Maintain IV Access; Standing    We discussed EGD for diagnostic purposes..  We discussed the indications for upper endoscopy as well as the risks, benefits and alternatives to this procedure.   The patient was given an opportunity to ask questions.  The patient verbalized understanding of these recommendations and the plan of care. The patient is willing to proceed with endoscopy and has signed informed consent in the office today.  My office will arrange scheduling for the EGD procedure and pre-admission testing.    We discussed colonoscopy for colon cancer screening purposes.  We discussed the indications for screening colonoscopy as well as the risks, benefits and alternatives to this procedure. Risks including but not limited to perforation, bleeding,need for blood transfusion or emergent surgery ,and missed neoplasm were reviewed in detail with the patient. The necessary bowel preparation and pre-procedure clear liquid diet was explained in  detail.  A written instructional handout was also provided.  Electronic prescriptions for miralax and dulcolax were sent to the patient's pharmacy.  The patient was given an opportunity to ask questions.  The patient verbalized understanding of these recommendations and the plan of care. The patient is willing to proceed with colonoscopy and has signed informed consent in the office today.  My office will arrange scheduling for the colonoscopy procedure and pre-admission testing.

## 2024-02-23 NOTE — H&P (VIEW-ONLY)
Subjective   Missy Mcgraw is a 73 y.o. female.   Chief Complaint   Patient presents with    Colonoscopy    Heartburn        History of Present Illness       Ms. Mcgraw is a 72 yo female patient referred for evaluation of a ongoing GERD symptoms with occasional dysphagia.  She also ius due for a routine colonoscopy.  She remains on PPI therapy.  She has a family history of colon cancer in her father.  Her last exam was in 2019.       The following portions of the patient's history were reviewed and updated as appropriate: allergies, current medications, past family history, past medical history, past social history, past surgical history, and problem list.      Patient Active Problem List   Diagnosis    Hay fever    Benign essential hypertension    Gastroesophageal reflux disease with esophagitis    Major depressive disorder, recurrent episode    Uncontrolled type 2 diabetes mellitus    Abnormal liver enzymes    Abnormal weight gain    Cervicalgia    Fibroid, uterine    Hyperthyroidism    Mixed hyperlipidemia    Movement disorder    Neuralgia    Ulnar nerve palsy    Vitamin B12 deficiency    Vitamin D deficiency    Osteoporosis    Restless legs syndrome    Shortness of breath    Coronary artery disease involving native coronary artery of native heart without angina pectoris    PONV (postoperative nausea and vomiting)    Unintentional weight loss    Gastroesophageal reflux disease    Epigastric abdominal pain    Gastritis determined by biopsy    Family history of colon cancer in father    Pre-operative cardiovascular examination    Dyslipidemia    Class 2 severe obesity due to excess calories with serious comorbidity and body mass index (BMI) of 37.0 to 37.9 in adult    Dysphagia    Colon cancer screening       Past Medical History:   Diagnosis Date    Acute maxillary sinusitis     Allergic     Arthritis     Cancer Skin    Cataract     Colon polyp     Depression     Diabetes mellitus     Esophagitis, reflux      "Fibromyositis     GERD (gastroesophageal reflux disease)     Gout     H/O colonoscopy     Herpes zoster     History of mammogram     HL (hearing loss)     Hyperlipidemia     Hypertension 1992    Injury of back     Low back pain     Migraine headache     Obesity     Osteopenia     Osteoporosis     Pap smear for cervical cancer screening     PONV (postoperative nausea and vomiting)     Restless legs syndrome     Screening     DEXA body composition study    TIA (transient ischemic attack)     \"at least 10 years ago\"    Transient cerebral ischemia     Urinary tract infection     Vertigo        Past Surgical History:   Procedure Laterality Date    APPENDECTOMY      CARDIAC CATHETERIZATION N/A 08/01/2018    Procedure: Coronary angiography;  Surgeon: Wali Núñez MD;  Location: [facility] CATH INVASIVE LOCATION;  Service: Cardiology    COLONOSCOPY  10/2016    Dr. Colmenares    COLONOSCOPY N/A 02/01/2019    Procedure: COLONOSCOPY;  Surgeon: Marie De Leon MD;  Location: [facility] ENDOSCOPY;  Service: Gastroenterology    ENDOSCOPY  10/2016    Dr. Colmenares    ENDOSCOPY N/A 02/01/2019    Procedure: ESOPHAGOGASTRODUODENOSCOPY WITH COLD FORCEP BIOPSY AND POLYPECTOMY;  Surgeon: Marie De eLon MD;  Location: [facility] ENDOSCOPY;  Service: Gastroenterology    JOINT REPLACEMENT  Oct 26, 2022    KNEE SURGERY Right     LAPAROSCOPY DIAGNOSTIC / BIOPSY / ASPIRATION / LYSIS Right     SHOULDER ARTHROSCOPY Bilateral        Medications:   No current facility-administered medications on file prior to visit.     Current Outpatient Medications on File Prior to Visit   Medication Sig    acetaminophen (TYLENOL) 500 MG tablet Take  by mouth As Needed.    allopurinol (ZYLOPRIM) 100 MG tablet Take 1 tablet by mouth Daily.    amLODIPine (NORVASC) 5 MG tablet Take 1 tablet by mouth Every Night.    aspirin (aspirin) 81 MG EC tablet Take 1 tablet by mouth Daily.    atenolol (TENORMIN) 25 MG tablet Take 1 tablet by mouth 2 (Two) Times a Day.    baclofen " (LIORESAL) 10 MG tablet Take 1 tablet by mouth At Night As Needed for Muscle Spasms.    carbidopa-levodopa CR (SINEMET CR)  MG per CR tablet TAKE 1 TABLET EVERY NIGHT    citalopram (CeleXA) 40 MG tablet Take 1 tablet by mouth Daily.    clobetasol (TEMOVATE) 0.05 % ointment Apply  topically to the appropriate area as directed 2 (Two) Times a Day. (Patient taking differently: Apply  topically to the appropriate area as directed As Needed.)    colchicine 0.6 MG tablet Take 1 tablet by mouth Daily.    Diclofenac Sodium (VOLTAREN) 1 % gel gel  (Patient not taking: Reported on 3/7/2024)    glimepiride (Amaryl) 2 MG tablet Take 1 tablet by mouth Every Morning Before Breakfast.    hydrocortisone (ANUSOL-HC) 25 MG suppository Insert 1 suppository into the rectum 2 (Two) Times a Day As Needed for Hemorrhoids. (Patient taking differently: Insert 1 suppository into the rectum As Needed for Hemorrhoids.)    KRILL OIL PO Take  by mouth.    lisinopril-hydrochlorothiazide (PRINZIDE,ZESTORETIC) 20-12.5 MG per tablet Take 1 tablet by mouth 2 (Two) Times a Day.    loratadine (CLARITIN) 10 MG tablet Take 1 tablet by mouth As Needed.    meclizine (ANTIVERT) 25 MG tablet Take 1 tablet by mouth 3 (Three) Times a Day As Needed (vertigo).    mupirocin (BACTROBAN) 2 % ointment Apply 1 application topically to the appropriate area as directed 3 (Three) Times a Day.    ondansetron (Zofran) 4 MG tablet Take 1 tablet by mouth Every 8 (Eight) Hours As Needed for Nausea or Vomiting.    pantoprazole (PROTONIX) 40 MG EC tablet Take 1 tablet by mouth Daily.    predniSONE (DELTASONE) 10 MG tablet Take 1 tablet by mouth As Needed (for flare ups of gout).    rosuvastatin (CRESTOR) 10 MG tablet Take 1 tablet by mouth Every Night.         Allergies:   Allergies   Allergen Reactions    Penicillins Unknown - Low Severity     Not sure of reaction (childhood)  Beta lactam allergy details  Antibiotic reaction: unknown  Age at reaction: child  Dose to  reaction time: unknown  Reason for antibiotic: unknown  Epinephrine required for reaction?: unknown  Tolerated antibiotics: amoxicillin       Atorvastatin Unknown - Low Severity     Pt cannot remember    Fenofibrate Unknown - Low Severity     Pt cannot remember    Fluoxetine Unknown - Low Severity     Pt cannot remember    Gemfibrozil Unknown - Low Severity     Pt cannot remember    Hydroxychloroquine Unknown - Low Severity    Metformin Diarrhea    Nsaids GI Intolerance    Pravastatin Unknown - Low Severity     Pt cannot remember    Sulfamethoxazole-Trimethoprim Unknown - Low Severity     Pt cannot remember         Family History   Problem Relation Age of Onset    Kidney failure Mother     Kidney disease Mother     Cancer Father         prostate,lung,colon    Arthritis Father     Heart disease Sister     Arthritis Other     Diabetes Other     Cancer Other         lung    Breast cancer Neg Hx     Ovarian cancer Neg Hx        Social History     Socioeconomic History    Marital status: Single   Tobacco Use    Smoking status: Never     Passive exposure: Never    Smokeless tobacco: Never   Vaping Use    Vaping status: Never Used   Substance and Sexual Activity    Alcohol use: No    Drug use: No    Sexual activity: Not Currently     Birth control/protection: Post-menopausal       Review of Systems   Constitutional:  Negative for diaphoresis, unexpected weight gain and unexpected weight loss.   HENT:  Negative for hearing loss, trouble swallowing and voice change.    Eyes:  Negative for visual disturbance.   Respiratory:  Negative for apnea, cough, chest tightness, shortness of breath and wheezing.    Cardiovascular:  Negative for chest pain, palpitations and leg swelling.   Gastrointestinal:  Positive for abdominal pain, nausea and GERD. Negative for abdominal distention, anal bleeding, blood in stool, constipation, diarrhea, rectal pain, vomiting and indigestion.   Endocrine: Negative for cold intolerance and heat  "intolerance.   Genitourinary:  Negative for difficulty urinating, dysuria and flank pain.   Musculoskeletal:  Negative for back pain and gait problem.   Skin:  Negative for color change, rash, skin lesions and wound.   Neurological:  Negative for dizziness, syncope, speech difficulty, weakness, light-headedness and numbness.   Hematological:  Negative for adenopathy. Does not bruise/bleed easily.   Psychiatric/Behavioral:  The patient is not nervous/anxious.          Objective   /78   Pulse 61   Temp 97.5 °F (36.4 °C) (Temporal)   Ht 152.4 cm (60\")   Wt 87.4 kg (192 lb 9.6 oz)   SpO2 97%   BMI 37.61 kg/m²     Physical Exam  Constitutional:       Appearance: She is well-developed.   HENT:      Head: Normocephalic and atraumatic.   Eyes:      General: No scleral icterus.  Cardiovascular:      Rate and Rhythm: Regular rhythm.   Pulmonary:      Effort: Pulmonary effort is normal.   Abdominal:      General: There is no distension.      Palpations: Abdomen is soft.      Tenderness: There is no abdominal tenderness.   Musculoskeletal:      Cervical back: Neck supple.   Skin:     General: Skin is warm and dry.   Neurological:      Mental Status: She is alert and oriented to person, place, and time.   Psychiatric:         Behavior: Behavior normal.           Assessment & Plan   Diagnoses and all orders for this visit:    1. Gastroesophageal reflux disease without esophagitis (Primary)  -     Case Request; Standing  -     sodium chloride 0.9 % flush 10 mL  -     sodium chloride 0.9 % flush 10 mL  -     sodium chloride 0.9 % infusion 40 mL  -     lactated ringers infusion  -     Case Request    2. Dysphagia, unspecified type  -     Case Request; Standing  -     sodium chloride 0.9 % flush 10 mL  -     sodium chloride 0.9 % flush 10 mL  -     sodium chloride 0.9 % infusion 40 mL  -     lactated ringers infusion  -     Case Request    3. Colon cancer screening  -     Case Request; Standing  -     sodium chloride 0.9 " % flush 10 mL  -     sodium chloride 0.9 % flush 10 mL  -     sodium chloride 0.9 % infusion 40 mL  -     lactated ringers infusion  -     Case Request    4. Family history of colon cancer in father    Other orders  -     polyethylene glycol (MIRALAX) 17 GM/SCOOP powder; Take 238 g by mouth 1 (One) Time for 1 dose. Mix 238 grams of miralax with 64 oz garotade and drink at 4 pm day before colonoscopy  Dispense: 238 g; Refill: 0  -     bisacodyl (Dulcolax) 5 MG EC tablet; Take 4 tablets once by mouth at 1 pm on day before colonoscopy.  Dispense: 4 tablet; Refill: 0  -     Follow Anesthesia Guidelines / Protocol; Future  -     Follow Anesthesia Guidelines / Protocol; Standing  -     Obtain Informed Consent; Standing  -     Verify Bowel Prep Was Successful; Standing  -     Give Tap Water Enema If Bowel Prep Insufficient; Standing  -     Obtain Informed Consent; Future  -     Provide NPO Instructions to Patient; Future  -     Insert Peripheral IV; Standing  -     Saline Lock & Maintain IV Access; Standing    We discussed EGD for diagnostic purposes..  We discussed the indications for upper endoscopy as well as the risks, benefits and alternatives to this procedure.   The patient was given an opportunity to ask questions.  The patient verbalized understanding of these recommendations and the plan of care. The patient is willing to proceed with endoscopy and has signed informed consent in the office today.  My office will arrange scheduling for the EGD procedure and pre-admission testing.    We discussed colonoscopy for colon cancer screening purposes.  We discussed the indications for screening colonoscopy as well as the risks, benefits and alternatives to this procedure. Risks including but not limited to perforation, bleeding,need for blood transfusion or emergent surgery ,and missed neoplasm were reviewed in detail with the patient. The necessary bowel preparation and pre-procedure clear liquid diet was explained in  detail.  A written instructional handout was also provided.  Electronic prescriptions for miralax and dulcolax were sent to the patient's pharmacy.  The patient was given an opportunity to ask questions.  The patient verbalized understanding of these recommendations and the plan of care. The patient is willing to proceed with colonoscopy and has signed informed consent in the office today.  My office will arrange scheduling for the colonoscopy procedure and pre-admission testing.

## 2024-02-28 ENCOUNTER — OFFICE VISIT (OUTPATIENT)
Dept: SURGERY | Facility: CLINIC | Age: 74
End: 2024-02-28
Payer: MEDICARE

## 2024-02-28 VITALS
OXYGEN SATURATION: 97 % | HEIGHT: 60 IN | SYSTOLIC BLOOD PRESSURE: 118 MMHG | WEIGHT: 192.6 LBS | DIASTOLIC BLOOD PRESSURE: 78 MMHG | TEMPERATURE: 97.5 F | BODY MASS INDEX: 37.81 KG/M2 | HEART RATE: 61 BPM

## 2024-02-28 DIAGNOSIS — R13.10 DYSPHAGIA, UNSPECIFIED TYPE: ICD-10-CM

## 2024-02-28 DIAGNOSIS — Z12.11 COLON CANCER SCREENING: ICD-10-CM

## 2024-02-28 DIAGNOSIS — K21.9 GASTROESOPHAGEAL REFLUX DISEASE WITHOUT ESOPHAGITIS: Primary | ICD-10-CM

## 2024-02-28 DIAGNOSIS — Z80.0 FAMILY HISTORY OF COLON CANCER IN FATHER: ICD-10-CM

## 2024-02-28 RX ORDER — POLYETHYLENE GLYCOL 3350 17 G/17G
238 POWDER, FOR SOLUTION ORAL ONCE
Qty: 238 G | Refills: 0 | Status: SHIPPED | OUTPATIENT
Start: 2024-02-28 | End: 2024-02-28

## 2024-02-28 RX ORDER — BISACODYL 5 MG/1
TABLET, DELAYED RELEASE ORAL
Qty: 4 TABLET | Refills: 0 | Status: SHIPPED | OUTPATIENT
Start: 2024-02-28 | End: 2024-03-08 | Stop reason: HOSPADM

## 2024-03-05 RX ORDER — SODIUM CHLORIDE, SODIUM LACTATE, POTASSIUM CHLORIDE, CALCIUM CHLORIDE 600; 310; 30; 20 MG/100ML; MG/100ML; MG/100ML; MG/100ML
50 INJECTION, SOLUTION INTRAVENOUS CONTINUOUS
Status: CANCELLED | OUTPATIENT
Start: 2024-03-05

## 2024-03-05 RX ORDER — SODIUM CHLORIDE 9 MG/ML
40 INJECTION, SOLUTION INTRAVENOUS AS NEEDED
Status: CANCELLED | OUTPATIENT
Start: 2024-03-05

## 2024-03-05 RX ORDER — SODIUM CHLORIDE 0.9 % (FLUSH) 0.9 %
10 SYRINGE (ML) INJECTION AS NEEDED
Status: CANCELLED | OUTPATIENT
Start: 2024-03-05

## 2024-03-05 RX ORDER — SODIUM CHLORIDE 0.9 % (FLUSH) 0.9 %
10 SYRINGE (ML) INJECTION EVERY 12 HOURS SCHEDULED
Status: CANCELLED | OUTPATIENT
Start: 2024-03-05

## 2024-03-06 ENCOUNTER — TELEPHONE (OUTPATIENT)
Dept: SURGERY | Facility: CLINIC | Age: 74
End: 2024-03-06
Payer: MEDICARE

## 2024-03-06 PROBLEM — Z12.11 COLON CANCER SCREENING: Status: ACTIVE | Noted: 2024-02-28

## 2024-03-06 PROBLEM — R13.10 DYSPHAGIA: Status: ACTIVE | Noted: 2024-02-28

## 2024-03-07 ENCOUNTER — ANESTHESIA EVENT (OUTPATIENT)
Dept: GASTROENTEROLOGY | Facility: HOSPITAL | Age: 74
End: 2024-03-07
Payer: MEDICARE

## 2024-03-07 NOTE — ANESTHESIA PREPROCEDURE EVALUATION
Anesthesia Evaluation     Patient summary reviewed and Nursing notes reviewed   history of anesthetic complications:  PONV  NPO Solid Status: > 8 hours  NPO Liquid Status: > 8 hours           Airway   Mallampati: II  TM distance: >3 FB  Neck ROM: full  No difficulty expected  Dental - normal exam     Pulmonary - normal exam   (+) ,shortness of breath  (-) not a smoker  Cardiovascular - normal exam  Exercise tolerance: good (4-7 METS)    ECG reviewed    (+) hypertension well controlled 2 medications or greater, CAD, angina with exertion, hyperlipidemia    ROS comment: EKG: NSR     Neuro/Psych  (+) TIA, headaches (migraines ), dizziness/light headedness (vertigo ), numbness (neuralgia), psychiatric history Depression  GI/Hepatic/Renal/Endo    (+) obesity, GERD poorly controlled, liver disease history of elevated LFT, diabetes mellitus type 2 well controlled, hyperthyroidism    Musculoskeletal     (+) myalgias, neck pain  Abdominal    Substance History      OB/GYN          Other   arthritis,     ROS/Med Hx Other: osteoporosis                 Anesthesia Plan    ASA 3     MAC     (Risks and benefits discussed including risk of aspiration, recall and dental damage. All patient questions answered. Will continue with POC.)  intravenous induction     Anesthetic plan, risks, benefits, and alternatives have been provided, discussed and informed consent has been obtained with: patient.

## 2024-03-07 NOTE — PRE-PROCEDURE INSTRUCTIONS
PAT phone history completed with patient for upcoming procedure on 3/8/24.    PAT PASS reviewed with patient and he/she verbalized understanding of the following:     Do not eat or drink anything after midnight the night before procedure unless otherwise instructed by physician/surgeon's office, this includes no gum, candy, mints, tobacco products or e-cigarettes.  Do not shave the area to be operated on at least 48 hours prior to procedure.  Do not wear makeup, lotion, hair products, or nail polish.  Do not wear any jewelry and remove all piercings.  Do not wear any adhesive if you wear dentures.  Do not wear contacts; bring in glasses if needed.  Only take medications on the morning of procedure as instructed by PAT nurse per anesthesia guidelines or as instructed by physician's office.   If you are on any blood thinners reach out to the physician/surgeon's office for instructions on when/if they will need to be stopped prior to procedure.   Bring in picture ID and insurance card, advanced directive copies if applicable, CPAP/BIPAP/Inhalers if indicated morning of procedure, leave any other valuables at home.  Ensure you have arranged for someone to drive you home the day of your procedure and someone to care for you at home afterwards. It is recommended that you do not drive, drink alcohol, or make any major legal decisions for at least 24 hours after your procedure is complete.    Instructions given on hospital entrance and registration location.

## 2024-03-08 ENCOUNTER — ANESTHESIA (OUTPATIENT)
Dept: GASTROENTEROLOGY | Facility: HOSPITAL | Age: 74
End: 2024-03-08
Payer: MEDICARE

## 2024-03-08 ENCOUNTER — HOSPITAL ENCOUNTER (OUTPATIENT)
Facility: HOSPITAL | Age: 74
Setting detail: HOSPITAL OUTPATIENT SURGERY
Discharge: HOME | End: 2024-03-08
Attending: SURGERY | Admitting: SURGERY
Payer: MEDICARE

## 2024-03-08 VITALS
OXYGEN SATURATION: 95 % | DIASTOLIC BLOOD PRESSURE: 57 MMHG | SYSTOLIC BLOOD PRESSURE: 114 MMHG | TEMPERATURE: 97.7 F | HEART RATE: 64 BPM | RESPIRATION RATE: 16 BRPM

## 2024-03-08 DIAGNOSIS — Z12.11 COLON CANCER SCREENING: ICD-10-CM

## 2024-03-08 DIAGNOSIS — R13.10 DYSPHAGIA, UNSPECIFIED TYPE: ICD-10-CM

## 2024-03-08 DIAGNOSIS — K21.9 GASTROESOPHAGEAL REFLUX DISEASE WITHOUT ESOPHAGITIS: ICD-10-CM

## 2024-03-08 LAB — GLUCOSE BLDC GLUCOMTR-MCNC: 162 MG/DL (ref 70–130)

## 2024-03-08 PROCEDURE — 25010000002 HALOPERIDOL LACTATE PER 5 MG: Performed by: NURSE ANESTHETIST, CERTIFIED REGISTERED

## 2024-03-08 PROCEDURE — 25010000002 PROPOFOL 200 MG/20ML EMULSION: Performed by: NURSE ANESTHETIST, CERTIFIED REGISTERED

## 2024-03-08 PROCEDURE — 82948 REAGENT STRIP/BLOOD GLUCOSE: CPT

## 2024-03-08 PROCEDURE — 25010000002 ONDANSETRON PER 1 MG: Performed by: NURSE ANESTHETIST, CERTIFIED REGISTERED

## 2024-03-08 PROCEDURE — 25810000003 LACTATED RINGERS PER 1000 ML: Performed by: SURGERY

## 2024-03-08 PROCEDURE — 88305 TISSUE EXAM BY PATHOLOGIST: CPT

## 2024-03-08 RX ORDER — SODIUM CHLORIDE, SODIUM LACTATE, POTASSIUM CHLORIDE, CALCIUM CHLORIDE 600; 310; 30; 20 MG/100ML; MG/100ML; MG/100ML; MG/100ML
50 INJECTION, SOLUTION INTRAVENOUS CONTINUOUS
Status: DISCONTINUED | OUTPATIENT
Start: 2024-03-08 | End: 2024-03-08 | Stop reason: HOSPADM

## 2024-03-08 RX ORDER — LIDOCAINE HCL/PF 100 MG/5ML
SYRINGE (ML) INJECTION AS NEEDED
Status: DISCONTINUED | OUTPATIENT
Start: 2024-03-08 | End: 2024-03-08 | Stop reason: SURG

## 2024-03-08 RX ORDER — SODIUM CHLORIDE 0.9 % (FLUSH) 0.9 %
10 SYRINGE (ML) INJECTION AS NEEDED
Status: DISCONTINUED | OUTPATIENT
Start: 2024-03-08 | End: 2024-03-08 | Stop reason: HOSPADM

## 2024-03-08 RX ORDER — SODIUM CHLORIDE 9 MG/ML
40 INJECTION, SOLUTION INTRAVENOUS AS NEEDED
Status: DISCONTINUED | OUTPATIENT
Start: 2024-03-08 | End: 2024-03-08 | Stop reason: HOSPADM

## 2024-03-08 RX ORDER — ONDANSETRON 2 MG/ML
4 INJECTION INTRAMUSCULAR; INTRAVENOUS ONCE AS NEEDED
Status: DISCONTINUED | OUTPATIENT
Start: 2024-03-08 | End: 2024-03-08 | Stop reason: HOSPADM

## 2024-03-08 RX ORDER — HALOPERIDOL 5 MG/ML
INJECTION INTRAMUSCULAR AS NEEDED
Status: DISCONTINUED | OUTPATIENT
Start: 2024-03-08 | End: 2024-03-08 | Stop reason: SURG

## 2024-03-08 RX ORDER — ONDANSETRON 2 MG/ML
INJECTION INTRAMUSCULAR; INTRAVENOUS AS NEEDED
Status: DISCONTINUED | OUTPATIENT
Start: 2024-03-08 | End: 2024-03-08 | Stop reason: SURG

## 2024-03-08 RX ORDER — SODIUM CHLORIDE, SODIUM LACTATE, POTASSIUM CHLORIDE, CALCIUM CHLORIDE 600; 310; 30; 20 MG/100ML; MG/100ML; MG/100ML; MG/100ML
1000 INJECTION, SOLUTION INTRAVENOUS CONTINUOUS
Status: DISCONTINUED | OUTPATIENT
Start: 2024-03-08 | End: 2024-03-08 | Stop reason: HOSPADM

## 2024-03-08 RX ORDER — PROPOFOL 10 MG/ML
INJECTION, EMULSION INTRAVENOUS AS NEEDED
Status: DISCONTINUED | OUTPATIENT
Start: 2024-03-08 | End: 2024-03-08 | Stop reason: SURG

## 2024-03-08 RX ORDER — MAGNESIUM HYDROXIDE 1200 MG/15ML
LIQUID ORAL AS NEEDED
Status: DISCONTINUED | OUTPATIENT
Start: 2024-03-08 | End: 2024-03-08 | Stop reason: HOSPADM

## 2024-03-08 RX ORDER — SODIUM CHLORIDE 0.9 % (FLUSH) 0.9 %
10 SYRINGE (ML) INJECTION EVERY 12 HOURS SCHEDULED
Status: DISCONTINUED | OUTPATIENT
Start: 2024-03-08 | End: 2024-03-08 | Stop reason: HOSPADM

## 2024-03-08 RX ADMIN — PROPOFOL 50 MG: 10 INJECTION, EMULSION INTRAVENOUS at 07:50

## 2024-03-08 RX ADMIN — SODIUM CHLORIDE, POTASSIUM CHLORIDE, SODIUM LACTATE AND CALCIUM CHLORIDE 1000 ML: 600; 310; 30; 20 INJECTION, SOLUTION INTRAVENOUS at 06:49

## 2024-03-08 RX ADMIN — PROPOFOL 50 MG: 10 INJECTION, EMULSION INTRAVENOUS at 07:56

## 2024-03-08 RX ADMIN — PROPOFOL 50 MG: 10 INJECTION, EMULSION INTRAVENOUS at 08:06

## 2024-03-08 RX ADMIN — PROPOFOL 100 MG: 10 INJECTION, EMULSION INTRAVENOUS at 07:36

## 2024-03-08 RX ADMIN — HALOPERIDOL LACTATE 0.5 MG: 5 INJECTION, SOLUTION INTRAMUSCULAR at 08:17

## 2024-03-08 RX ADMIN — PROPOFOL 50 MG: 10 INJECTION, EMULSION INTRAVENOUS at 07:46

## 2024-03-08 RX ADMIN — PROPOFOL 50 MG: 10 INJECTION, EMULSION INTRAVENOUS at 08:01

## 2024-03-08 RX ADMIN — PROPOFOL 50 MG: 10 INJECTION, EMULSION INTRAVENOUS at 08:12

## 2024-03-08 RX ADMIN — PROPOFOL 50 MG: 10 INJECTION, EMULSION INTRAVENOUS at 07:41

## 2024-03-08 RX ADMIN — Medication 50 MG: at 07:36

## 2024-03-08 RX ADMIN — ONDANSETRON 4 MG: 2 INJECTION INTRAMUSCULAR; INTRAVENOUS at 08:17

## 2024-03-08 NOTE — ANESTHESIA POSTPROCEDURE EVALUATION
Patient: Missy Mcgraw    Procedure Summary       Date: 03/08/24 Room / Location: [facility] ENDOSCOPY 3 / [facility] ENDOSCOPY    Anesthesia Start: 0730 Anesthesia Stop: 0823    Procedures:       COLONOSCOPY      ESOPHAGOGASTRODUODENOSCOPY WITH BIOPSY X3, POLYPECTOMY X3 (Esophagus) Diagnosis:       Gastroesophageal reflux disease without esophagitis      Dysphagia, unspecified type      Colon cancer screening      (Gastroesophageal reflux disease without esophagitis [K21.9])      (Dysphagia, unspecified type [R13.10])      (Colon cancer screening [Z12.11])    Surgeons: Marie De Leon MD Provider: Daisha Kim CRNA    Anesthesia Type: MAC ASA Status: 3            Anesthesia Type: MAC    Vitals  Vitals Value Taken Time   /57 03/08/24 0856   Temp 97.7 °F (36.5 °C) 03/08/24 0826   Pulse 64 03/08/24 0856   Resp 16 03/08/24 0856   SpO2 95 % 03/08/24 0856           Post Anesthesia Care and Evaluation    Patient location during evaluation: PHASE II  Patient participation: complete - patient participated  Level of consciousness: awake and alert  Pain score: 0  Pain management: satisfactory to patient    Airway patency: patent  Anesthetic complications: No anesthetic complications  PONV Status: none  Cardiovascular status: acceptable and stable  Respiratory status: acceptable  Hydration status: acceptable    Comments: Vitals signs as noted in nursing documentation as per protocol.

## 2024-03-11 LAB — REF LAB TEST METHOD: NORMAL

## 2024-03-14 LAB
ALBUMIN SERPL-MCNC: 4.3 G/DL (ref 3.5–5.2)
ALBUMIN/GLOB SERPL: 2 G/DL
ALP SERPL-CCNC: 93 U/L (ref 39–117)
ALT SERPL-CCNC: <5 U/L (ref 1–33)
AST SERPL-CCNC: 8 U/L (ref 1–32)
BASOPHILS # BLD AUTO: 0.06 10*3/MM3 (ref 0–0.2)
BASOPHILS NFR BLD AUTO: 1 % (ref 0–1.5)
BILIRUB SERPL-MCNC: 0.5 MG/DL (ref 0–1.2)
BUN SERPL-MCNC: 23 MG/DL (ref 8–23)
BUN/CREAT SERPL: 21.9 (ref 7–25)
CALCIUM SERPL-MCNC: 9.5 MG/DL (ref 8.6–10.5)
CHLORIDE SERPL-SCNC: 103 MMOL/L (ref 98–107)
CHOLEST SERPL-MCNC: 186 MG/DL (ref 0–200)
CO2 SERPL-SCNC: 26.9 MMOL/L (ref 22–29)
CREAT SERPL-MCNC: 1.05 MG/DL (ref 0.57–1)
EGFRCR SERPLBLD CKD-EPI 2021: 56.9 ML/MIN/1.73
EOSINOPHIL # BLD AUTO: 0.23 10*3/MM3 (ref 0–0.4)
EOSINOPHIL NFR BLD AUTO: 3.7 % (ref 0.3–6.2)
ERYTHROCYTE [DISTWIDTH] IN BLOOD BY AUTOMATED COUNT: 13.1 % (ref 12.3–15.4)
GLOBULIN SER CALC-MCNC: 2.1 GM/DL
GLUCOSE SERPL-MCNC: 163 MG/DL (ref 65–99)
HBA1C MFR BLD: 7 % (ref 4.8–5.6)
HCT VFR BLD AUTO: 39.8 % (ref 34–46.6)
HDLC SERPL-MCNC: 50 MG/DL (ref 40–60)
HGB BLD-MCNC: 13.2 G/DL (ref 12–15.9)
IMM GRANULOCYTES # BLD AUTO: 0.03 10*3/MM3 (ref 0–0.05)
IMM GRANULOCYTES NFR BLD AUTO: 0.5 % (ref 0–0.5)
LDLC SERPL CALC-MCNC: 95 MG/DL (ref 0–100)
LYMPHOCYTES # BLD AUTO: 1.52 10*3/MM3 (ref 0.7–3.1)
LYMPHOCYTES NFR BLD AUTO: 24.4 % (ref 19.6–45.3)
MCH RBC QN AUTO: 29.7 PG (ref 26.6–33)
MCHC RBC AUTO-ENTMCNC: 33.2 G/DL (ref 31.5–35.7)
MCV RBC AUTO: 89.6 FL (ref 79–97)
MONOCYTES # BLD AUTO: 0.52 10*3/MM3 (ref 0.1–0.9)
MONOCYTES NFR BLD AUTO: 8.3 % (ref 5–12)
NEUTROPHILS # BLD AUTO: 3.88 10*3/MM3 (ref 1.7–7)
NEUTROPHILS NFR BLD AUTO: 62.1 % (ref 42.7–76)
PLATELET # BLD AUTO: 189 10*3/MM3 (ref 140–450)
POTASSIUM SERPL-SCNC: 4.5 MMOL/L (ref 3.5–5.2)
PROT SERPL-MCNC: 6.4 G/DL (ref 6–8.5)
RBC # BLD AUTO: 4.44 10*6/MM3 (ref 3.77–5.28)
SODIUM SERPL-SCNC: 141 MMOL/L (ref 136–145)
TRIGL SERPL-MCNC: 244 MG/DL (ref 0–150)
UNABLE TO VOID: NORMAL
VLDLC SERPL CALC-MCNC: 41 MG/DL (ref 5–40)
WBC # BLD AUTO: 6.24 10*3/MM3 (ref 3.4–10.8)

## 2024-03-18 ENCOUNTER — OFFICE VISIT (OUTPATIENT)
Dept: INTERNAL MEDICINE | Facility: CLINIC | Age: 74
End: 2024-03-18
Payer: MEDICARE

## 2024-03-18 VITALS
SYSTOLIC BLOOD PRESSURE: 124 MMHG | HEIGHT: 60 IN | RESPIRATION RATE: 18 BRPM | TEMPERATURE: 97.8 F | DIASTOLIC BLOOD PRESSURE: 76 MMHG | BODY MASS INDEX: 37.11 KG/M2 | OXYGEN SATURATION: 98 % | HEART RATE: 59 BPM | WEIGHT: 189 LBS

## 2024-03-18 DIAGNOSIS — R10.31 RIGHT LOWER QUADRANT ABDOMINAL PAIN: ICD-10-CM

## 2024-03-18 DIAGNOSIS — E11.9 DIABETES MELLITUS WITHOUT COMPLICATION: ICD-10-CM

## 2024-03-18 DIAGNOSIS — I10 BENIGN ESSENTIAL HYPERTENSION: Primary | ICD-10-CM

## 2024-03-18 DIAGNOSIS — R51.9 CHRONIC DAILY HEADACHE: ICD-10-CM

## 2024-03-18 DIAGNOSIS — F33.0 MILD EPISODE OF RECURRENT MAJOR DEPRESSIVE DISORDER: ICD-10-CM

## 2024-03-18 DIAGNOSIS — E78.2 MIXED HYPERLIPIDEMIA: ICD-10-CM

## 2024-03-18 DIAGNOSIS — M62.838 MUSCLE SPASM: ICD-10-CM

## 2024-03-18 DIAGNOSIS — F41.9 ANXIETY: ICD-10-CM

## 2024-03-18 RX ORDER — CYCLOBENZAPRINE HCL 10 MG
10 TABLET ORAL NIGHTLY PRN
Qty: 30 TABLET | Refills: 3 | Status: SHIPPED | OUTPATIENT
Start: 2024-03-18

## 2024-03-18 RX ORDER — ATENOLOL 25 MG/1
25 TABLET ORAL 2 TIMES DAILY
Qty: 180 TABLET | Refills: 1 | Status: SHIPPED | OUTPATIENT
Start: 2024-03-18

## 2024-03-18 RX ORDER — LISINOPRIL AND HYDROCHLOROTHIAZIDE 20; 12.5 MG/1; MG/1
1 TABLET ORAL 2 TIMES DAILY
Qty: 180 TABLET | Refills: 1 | Status: SHIPPED | OUTPATIENT
Start: 2024-03-18

## 2024-03-18 RX ORDER — GLIMEPIRIDE 4 MG/1
4 TABLET ORAL
Qty: 90 TABLET | Refills: 1 | Status: SHIPPED | OUTPATIENT
Start: 2024-03-18

## 2024-03-18 RX ORDER — CITALOPRAM 40 MG/1
40 TABLET ORAL DAILY
Qty: 90 TABLET | Refills: 1 | Status: SHIPPED | OUTPATIENT
Start: 2024-03-18

## 2024-03-18 RX ORDER — ROSUVASTATIN CALCIUM 10 MG/1
10 TABLET, COATED ORAL NIGHTLY
Qty: 90 TABLET | Refills: 1 | Status: SHIPPED | OUTPATIENT
Start: 2024-03-18

## 2024-03-18 RX ORDER — AMLODIPINE BESYLATE 5 MG/1
5 TABLET ORAL NIGHTLY
Qty: 90 TABLET | Refills: 1 | Status: SHIPPED | OUTPATIENT
Start: 2024-03-18

## 2024-03-18 NOTE — PROGRESS NOTES
"Chief Complaint  Hypertension (Follow up on blood work), Hyperlipidemia, and Diabetes    Subjective        Missy Mcgraw presents to [facility]  HPI: Patient is here to follow up on the blood pressure  The patient is taking the blood pressure medications as prescribed and has had no side effects. The patient is also here to follow up on the cholesterol and is trying to follow a diet. The patient is  also here to follow up on thyroid and is  due to get lab work done .  The patient also  complains of anxiety and was prescribed cymbalta by rheumatology but could not tolerate it and is now back on celexa and needs refills on medications .  She complains of pain in the right side and into the right lower quadrant since the past few months, she has increased bowel movements , and the pain at times increases and wakes her up at night , she denies diarrhea or constipation, she denies fever or vomiting but gets nauseous at times, she had egd and colonoscopy 2 weeks ago and sees surgery  next week . She also complains of urinary incontinence, she also has chronic back pain with muscle pain ,   she also sees rheumatology for gout . she states she fell in December 2023 and had to go to the urgent care and er  and had ct done,  and  er records ,  Lab reports  and Radiology reports have been reviewed  Today and medications reconciled and updated . She complains of headaches which are  yet persistent  Hyperlipidemia   Pertinent negatives include no chest pain or shortness of breath.   Hypertension   Pertinent negatives include no chest pain, palpitations or shortness of breath.      Objective   Vital Signs:  /76   Pulse 59   Temp 97.8 °F (36.6 °C)   Resp 18   Ht 152.4 cm (60\")   Wt 85.7 kg (189 lb)   SpO2 98%   BMI 36.91 kg/m²   Estimated body mass index is 36.91 kg/m² as calculated from the following:    Height as of this encounter: 152.4 cm (60\").    Weight as of this encounter: " 85.7 kg (189 lb).               Physical Exam  Vitals and nursing note reviewed.   Constitutional:       General: She is not in acute distress.     Appearance: Normal appearance. She is obese. She is not diaphoretic.   HENT:      Head: Normocephalic and atraumatic.      Right Ear: External ear normal.      Left Ear: External ear normal.      Nose: Nose normal.   Eyes:      Extraocular Movements: Extraocular movements intact.      Conjunctiva/sclera: Conjunctivae normal.   Neck:      Trachea: Trachea normal.   Cardiovascular:      Rate and Rhythm: Normal rate and regular rhythm.      Heart sounds: Normal heart sounds.   Pulmonary:      Effort: Pulmonary effort is normal. No respiratory distress.      Breath sounds: Normal breath sounds.   Abdominal:      General: Abdomen is flat.   Musculoskeletal:         General: Deformity present.      Cervical back: Neck supple.      Comments: Moves all limbs   Skin:     General: Skin is warm.   Neurological:      Mental Status: She is alert and oriented to person, place, and time.      Comments: No gross motor or sensory deficits        Result Review :    The following data was reviewed by: Emely Sheldon MD on 03/18/2024:  Common labs          5/2/2023    07:51 11/21/2023    08:16 3/14/2024    11:06   Common Labs   Glucose 169  149  163    BUN 19  22  23    Creatinine 1.07  0.91  1.05    Sodium 140  139  141    Potassium 4.6  4.5  4.5    Chloride 102  102  103    Calcium 10.0  9.8  9.5    Total Protein 6.5  6.7  6.4    Albumin 4.4  4.6  4.3    Total Bilirubin 0.4  0.3  0.5    Alkaline Phosphatase 88  107  93    AST (SGOT) 10  14  8    ALT (SGPT) 7  6  <5    WBC 7.89  6.62  6.24    Hemoglobin 13.5  13.6  13.2    Hematocrit 40.0  40.7  39.8    Platelets 197  225  189    Total Cholesterol 201  215  186    Triglycerides 268  329  244    HDL Cholesterol 51  49  50    LDL Cholesterol  104  110  95    Hemoglobin A1C 6.30  6.50  7.00    Microalbumin, Urine  235.5         CT Head Without  Contrast (12/17/2023 14:02)  CT Cervical Spine Without Contrast (12/17/2023 14:01)  CT Facial Bones Without Contrast (12/17/2023 14:01)  Refill with Emely Sheldon MD (02/23/2024)  UC with Noy Rea APRN (12/17/2023)  ED with Kasey Witt MD (12/17/2023)  Admission (Discharged) with Marie De Leon MD (03/08/2024)   Surgery with Marie De Leon MD (03/08/2024)         Assessment and Plan     Diagnoses and all orders for this visit:    1. Benign essential hypertension (Primary)  -     amLODIPine (NORVASC) 5 MG tablet; Take 1 tablet by mouth Every Night.  Dispense: 90 tablet; Refill: 1  -     atenolol (TENORMIN) 25 MG tablet; Take 1 tablet by mouth 2 (Two) Times a Day.  Dispense: 180 tablet; Refill: 1  -     lisinopril-hydrochlorothiazide (PRINZIDE,ZESTORETIC) 20-12.5 MG per tablet; Take 1 tablet by mouth 2 (Two) Times a Day.  Dispense: 180 tablet; Refill: 1    2. Mixed hyperlipidemia  -     rosuvastatin (CRESTOR) 10 MG tablet; Take 1 tablet by mouth Every Night.  Dispense: 90 tablet; Refill: 1  -     CBC (No Diff)  -     Comprehensive Metabolic Panel  -     Lipid Panel    3. Diabetes mellitus without complication  -     glimepiride (Amaryl) 4 MG tablet; Take 1 tablet by mouth Daily With Lunch.  Dispense: 90 tablet; Refill: 1  -     Hemoglobin A1c  -     Microalbumin / Creatinine Urine Ratio - Urine, Clean Catch    4. Right lower quadrant abdominal pain  -     CT Abdomen Pelvis Without Contrast    5. Chronic daily headache  -     CT Head Without Contrast    6. Anxiety  -     citalopram (CeleXA) 40 MG tablet; Take 1 tablet by mouth Daily.  Dispense: 90 tablet; Refill: 1    7. Mild episode of recurrent major depressive disorder  -     citalopram (CeleXA) 40 MG tablet; Take 1 tablet by mouth Daily.  Dispense: 90 tablet; Refill: 1    8. Muscle spasm  -     cyclobenzaprine (FLEXERIL) 10 MG tablet; Take 1 tablet by mouth At Night As Needed for Muscle Spasms.  Dispense: 30 tablet; Refill:  3    Plan:  1.  Benign essential hypertension: Will continue current medication, low-sodium diet advised, Counseled to regularly check BP at home with goal averaging <130/80.   2.mixed hyperlipidemia:  reviewed  fasting CMP and lipid panel.  Diet and exercise counseled,  Will continue current medications  3. Diabetes  Mellitus  :   reviewed  fasting CMP  and hba1c 7.0 , diet and exercise counseled , Will continue increase to glimepiride 4 mg daily   4.   Right lower quadrant abdominal pain: Will obtain CAT scan and follow-up with surgery  5.  Chronic daily headache: Will obtain CAT scan of head  6.  Anxiety disorder: Refill Celexa 40 mg daily  7.  Major depression: Labs reviewed, refill Celexa 40 mg daily  8.Muscle spasm : prn flexeril - she prefers it /baclofen         Follow Up     Return in about 4 months (around 7/16/2024) for Medicare Wellness.  Patient was given instructions and counseling regarding her condition or for health maintenance advice. Please see specific information pulled into the AVS if appropriate.         Answers submitted by the patient for this visit:  Other (Submitted on 3/11/2024)  Please describe your symptoms.: Blood work followup and want her to examine my cheek under my right eye frim a previous fall.  Have you had these symptoms before?: Yes  How long have you been having these symptoms?: Greater than 2 weeks  Please describe any probable cause for these symptoms. : I had a bad fall Dec16, 2023 and still have issues with cheek.  Primary Reason for Visit (Submitted on 3/11/2024)  What is the primary reason for your visit?: Other

## 2024-03-25 NOTE — PROGRESS NOTES
Subjective   Missy Mcgraw is a 73 y.o. female.   Chief Complaint   Patient presents with    Follow-up     Colonoscopy        History of Present Illness       Ms. Mcgraw comes the office today for routine follow-up after recently undergoing an EGD and colonoscopy on 3/8/2024.  EGD was notable for multiple polyps within the gastric body, some of which were noted to have some superficial erythema.  Several of these were resected and sent for pathologic evaluation.  Mildly erythematous mucosa was seen in the antrum and was biopsied.  The esophagus and duodenum were unremarkable.  Biopsies of the distal esophagus were unremarkable.  Specifically, they were negative for Begrer's changes.  The excised polyps proved to be benign fundic gland polyps without evidence of malignancy.  Biopsies of the antrum demonstrated benign gastric mucosa without significant pathologic changes.  Testing for H. pylori was negative.  Duodenal biopsies were also negative.  Colonoscopy was normal throughout revealing only small internal hemorrhoids on retroflexed exam.    CT imaging was ordered by the patient's PCP about 10 days after her colonoscopy for further investigation of right-sided abdominal pain.  CT was reported to demonstrate a short segment of mildly dilated jejunum with air-fluid levels in the left upper quadrant suspected to represent mild ileus although the radiologist indicated that partial small bowel obstruction could not be excluded. Prior to having the CT performed, the patient complained to her PCP of a several month history of right lower quadrant abdominal pain with increased bowel movements, and pain that sometimes wakes her from sleep.  She also complained of urinary incontinence, chronic back pain, and muscle pain.  She tells me that the pain she was describing to her PCP was more pelvic in origin.  She does not feel that it is related to her GI tract.    The following portions of the patient's history were  "reviewed and updated as appropriate: allergies, current medications, past family history, past medical history, past social history, past surgical history, and problem list.    Review of Systems   Constitutional:  Negative for diaphoresis, unexpected weight gain and unexpected weight loss.   HENT:  Negative for hearing loss, trouble swallowing and voice change.    Eyes:  Negative for visual disturbance.   Respiratory:  Negative for apnea, cough, chest tightness, shortness of breath and wheezing.    Cardiovascular:  Negative for chest pain, palpitations and leg swelling.   Gastrointestinal:  Negative for abdominal distention, abdominal pain, anal bleeding, blood in stool, constipation, diarrhea, nausea, rectal pain, vomiting, GERD and indigestion.   Endocrine: Negative for cold intolerance and heat intolerance.   Genitourinary:  Negative for difficulty urinating, dysuria and flank pain.   Musculoskeletal:  Negative for back pain and gait problem.   Skin:  Negative for color change, rash, skin lesions and wound.   Neurological:  Negative for dizziness, syncope, speech difficulty, weakness, light-headedness and numbness.   Hematological:  Negative for adenopathy. Does not bruise/bleed easily.   Psychiatric/Behavioral:  The patient is not nervous/anxious.    I have reviewed and confirmed the accuracy of the ROS as documented by the MA/LPN/RN Marie De Leon MD      Objective   /82   Pulse 63   Temp 97.6 °F (36.4 °C) (Temporal)   Resp 12   Ht 152.4 cm (60\")   Wt 85.4 kg (188 lb 4.8 oz)   SpO2 97%   BMI 36.77 kg/m²     Physical Exam  Constitutional:       Appearance: She is well-developed.   HENT:      Head: Normocephalic and atraumatic.   Pulmonary:      Effort: Pulmonary effort is normal.   Skin:     General: Skin is warm and dry.   Neurological:      Mental Status: She is alert and oriented to person, place, and time.   Psychiatric:         Behavior: Behavior normal.         PROCEDURE: CT ABDOMEN PELVIS " WO CONTRAST-     HISTORY: right side and rlq pain; R10.31-Right lower quadrant pain     COMPARISON: February 2015.     PROCEDURE: Axial images were obtained from the lung bases through the  pubic symphysis without intravenous contrast.     FINDINGS:     ABDOMEN: The lung bases are clear. The heart size is at the upper limits  of normal. The there is a circumscribed hypodense lesion in the inferior  right lobe of the liver inferior to the gallbladder compatible with a  cyst. This appears similar to the prior exam. The gallbladder is  present. There is no CT evidence of gallstones. The spleen is normal. No  adrenal masses are seen.  The pancreas has an unremarkable unenhanced  appearance. The aorta is normal in caliber. There is no significant free  fluid or adenopathy.  There is no nephrolithiasis. There is no  hydronephrosis. A short segment of jejunum in the left upper quadrant is  mildly dilated with air-fluid levels. This may represent a focal ileus.  A very mild partial small bowel obstruction is within the differential  diagnosis.     PELVIS: The appendix is not identified. There is colonic diverticulosis.  The uterus is midline. The urinary bladder is collapsed. There is no  significant fluid or adenopathy.     IMPRESSION:  Short segment of mildly dilated jejunum with air-fluid level  in the left upper quadrant is suspected to represent mild ileus. Partial  small bowel obstruction cannot be excluded.        CTDI: 30.08 mGy  DLP: 514.04 mGy.cm        This study was performed with techniques to keep radiation doses as low  as reasonably achievable (ALARA). Individualized dose reduction  techniques using automated exposure control or adjustment of mA and/or  kV according to the patient size were employed.           Images were reviewed, interpreted, and dictated by Dr. María Elena West MD  Transcribed by Janine Shipman PA-C.        This report was signed and finalized on 3/27/2024 12:38 PM by María Elena West  MD.      Assessment & Plan   Diagnoses and all orders for this visit:    1. Family history of colon cancer in father (Primary)    2. Gastroesophageal reflux disease with esophagitis without hemorrhage  -     pantoprazole (PROTONIX) 40 MG EC tablet; Take 1 tablet by mouth Daily.  Dispense: 90 tablet; Refill: 3      I reviewed the EGD and colonoscopy findings in detail with the patient.  We reviewed the endoscopic photographs.  I have refilled her Protonix.  Although the patient does have a reported history of colon polyps, her last 2 examinations have been negative for colon polyps.  However, she does have a family history of colon cancer in her father.  A surveillance exam in 5 years could be considered, assuming that the patient's health status permits.  We reviewed her recent CT imaging, and on my personal review of the images, I do not see any worrisome findings regarding the GI tract.  Specifically, I do not see any evidence to suggest that she has an underlying bowel obstruction.  I also do not think that the findings represent an ongoing ileus.  Clinically, the patient does not have symptoms of either an ongoing ileus or partial small bowel obstruction.  She may continue her current medications.  She should continue diet as tolerated.  She may follow-up with me as needed.

## 2024-03-27 ENCOUNTER — HOSPITAL ENCOUNTER (OUTPATIENT)
Dept: CT IMAGING | Facility: HOSPITAL | Age: 74
Discharge: HOME | End: 2024-03-27
Payer: MEDICARE

## 2024-03-27 DIAGNOSIS — K56.7 ILEUS: Primary | ICD-10-CM

## 2024-03-27 PROCEDURE — 74176 CT ABD & PELVIS W/O CONTRAST: CPT

## 2024-03-27 PROCEDURE — 70450 CT HEAD/BRAIN W/O DYE: CPT

## 2024-03-28 ENCOUNTER — OFFICE VISIT (OUTPATIENT)
Dept: SURGERY | Facility: CLINIC | Age: 74
End: 2024-03-28
Payer: MEDICARE

## 2024-03-28 ENCOUNTER — TELEPHONE (OUTPATIENT)
Dept: INTERNAL MEDICINE | Facility: CLINIC | Age: 74
End: 2024-03-28
Payer: MEDICARE

## 2024-03-28 VITALS
RESPIRATION RATE: 12 BRPM | SYSTOLIC BLOOD PRESSURE: 128 MMHG | WEIGHT: 188.3 LBS | TEMPERATURE: 97.6 F | HEIGHT: 60 IN | DIASTOLIC BLOOD PRESSURE: 82 MMHG | BODY MASS INDEX: 36.97 KG/M2 | HEART RATE: 63 BPM | OXYGEN SATURATION: 97 %

## 2024-03-28 DIAGNOSIS — K21.00 GASTROESOPHAGEAL REFLUX DISEASE WITH ESOPHAGITIS WITHOUT HEMORRHAGE: ICD-10-CM

## 2024-03-28 DIAGNOSIS — Z80.0 FAMILY HISTORY OF COLON CANCER IN FATHER: Primary | ICD-10-CM

## 2024-03-28 PROCEDURE — 1159F MED LIST DOCD IN RCRD: CPT | Performed by: SURGERY

## 2024-03-28 PROCEDURE — 99213 OFFICE O/P EST LOW 20 MIN: CPT | Performed by: SURGERY

## 2024-03-28 PROCEDURE — 3074F SYST BP LT 130 MM HG: CPT | Performed by: SURGERY

## 2024-03-28 PROCEDURE — 3079F DIAST BP 80-89 MM HG: CPT | Performed by: SURGERY

## 2024-03-28 PROCEDURE — 1160F RVW MEDS BY RX/DR IN RCRD: CPT | Performed by: SURGERY

## 2024-03-28 RX ORDER — PANTOPRAZOLE SODIUM 40 MG/1
40 TABLET, DELAYED RELEASE ORAL DAILY
Qty: 90 TABLET | Refills: 3 | Status: SHIPPED | OUTPATIENT
Start: 2024-03-28

## 2024-03-28 NOTE — TELEPHONE ENCOUNTER
----- Message from Emely Sheldon MD sent at 3/27/2024  4:54 PM EDT -----  May have partial small bowel obstruction /ileus,referred to gi

## 2024-06-24 ENCOUNTER — OFFICE VISIT (OUTPATIENT)
Dept: GASTROENTEROLOGY | Facility: CLINIC | Age: 74
End: 2024-06-24
Payer: MEDICARE

## 2024-06-24 VITALS
HEART RATE: 59 BPM | SYSTOLIC BLOOD PRESSURE: 128 MMHG | WEIGHT: 193.2 LBS | OXYGEN SATURATION: 99 % | BODY MASS INDEX: 37.73 KG/M2 | DIASTOLIC BLOOD PRESSURE: 82 MMHG

## 2024-06-24 DIAGNOSIS — K76.89 HEPATIC CYST: ICD-10-CM

## 2024-06-24 DIAGNOSIS — R93.3 ABNORMAL FINDING ON GI TRACT IMAGING: Primary | ICD-10-CM

## 2024-06-24 PROCEDURE — 1160F RVW MEDS BY RX/DR IN RCRD: CPT | Performed by: INTERNAL MEDICINE

## 2024-06-24 PROCEDURE — 3074F SYST BP LT 130 MM HG: CPT | Performed by: INTERNAL MEDICINE

## 2024-06-24 PROCEDURE — 3079F DIAST BP 80-89 MM HG: CPT | Performed by: INTERNAL MEDICINE

## 2024-06-24 PROCEDURE — 99203 OFFICE O/P NEW LOW 30 MIN: CPT | Performed by: INTERNAL MEDICINE

## 2024-06-24 PROCEDURE — 1159F MED LIST DOCD IN RCRD: CPT | Performed by: INTERNAL MEDICINE

## 2024-06-24 NOTE — PROGRESS NOTES
"     New Patient Consult      Date: 2024   Patient Name: Missy Mcgraw  MRN: [id number]  : 1950     Referring Physician: Emely Sheldon MD    Chief Complaint   Patient presents with    Illeus       History of Present Illness: Missy Mcgraw is a 73 y.o. female who is here today to establish care with Gastroenterology.    She had undergone a CT of the abdomen and pelvis, which revealed a circumscribed hypodense lesion in the inferior right lobe of the liver compatible with a cyst, which was similar to prior exam.    A short segment of jejunum in the left upper quadrant was mildly dilated with air-fluid levels.  The radiology read mentioned the possibility of a focal ileus or mild partial small bowel obstruction to be within the differential.    She was seen subsequently by Dr. De Leon as well, and they did review the CT, and concluded that it was likely a artifactual finding.    She did have some right-sided abdominal pain.  She describes it as a dull ache.  No fatty liver disease seen on prior scans.    Subjective      Past Medical History:   Diagnosis Date    Acute maxillary sinusitis     Allergic     Arthritis     Cancer Skin    Cataract     Colon polyp     Depression     Diabetes mellitus     Esophagitis, reflux     Fibromyositis     GERD (gastroesophageal reflux disease)     Gout     H/O colonoscopy     Herpes zoster     History of mammogram     HL (hearing loss)     Hyperlipidemia     Hypertension     Injury of back     Low back pain     Migraine headache     Obesity     Osteopenia     Osteoporosis     Pap smear for cervical cancer screening     PONV (postoperative nausea and vomiting)     Restless legs syndrome     Screening     DEXA body composition study    TIA (transient ischemic attack)     \"at least 10 years ago\"    Transient cerebral ischemia     Urinary tract infection     Vertigo        Past Surgical History:   Procedure Laterality Date    APPENDECTOMY      CARDIAC " CATHETERIZATION N/A 08/01/2018    Procedure: Coronary angiography;  Surgeon: Wali Núñez MD;  Location: [facility] CATH INVASIVE LOCATION;  Service: Cardiology    COLONOSCOPY  10/2016    Dr. Colmenares    COLONOSCOPY N/A 02/01/2019    Procedure: COLONOSCOPY;  Surgeon: Marie De Leon MD;  Location: [facility] ENDOSCOPY;  Service: Gastroenterology    COLONOSCOPY N/A 03/08/2024    Procedure: COLONOSCOPY;  Surgeon: Marie De Leon MD;  Location: [facility] ENDOSCOPY;  Service: Gastroenterology;  Laterality: N/A;    ENDOSCOPY  10/2016    Dr. Colmenares    ENDOSCOPY N/A 02/01/2019    Procedure: ESOPHAGOGASTRODUODENOSCOPY WITH COLD FORCEP BIOPSY AND POLYPECTOMY;  Surgeon: Marie De Leon MD;  Location: [facility] ENDOSCOPY;  Service: Gastroenterology    ENDOSCOPY N/A 03/08/2024    Procedure: ESOPHAGOGASTRODUODENOSCOPY WITH BIOPSY X3, POLYPECTOMY X3;  Surgeon: Marie De Leon MD;  Location: [facility] ENDOSCOPY;  Service: Gastroenterology;  Laterality: N/A;    JOINT REPLACEMENT  Oct 26, 2022    KNEE SURGERY Right     LAPAROSCOPY DIAGNOSTIC / BIOPSY / ASPIRATION / LYSIS Right     SHOULDER ARTHROSCOPY Bilateral     UPPER GASTROINTESTINAL ENDOSCOPY  Feb 2024       Family History   Problem Relation Age of Onset    Kidney failure Mother     Kidney disease Mother     Cancer Father         prostate,lung,colon    Arthritis Father     Colon cancer Father     Heart disease Sister     Arthritis Other     Diabetes Other     Cancer Other         lung    Breast cancer Neg Hx     Ovarian cancer Neg Hx        Social History     Socioeconomic History    Marital status: Single   Tobacco Use    Smoking status: Never     Passive exposure: Never    Smokeless tobacco: Never   Vaping Use    Vaping status: Never Used   Substance and Sexual Activity    Alcohol use: No    Drug use: No    Sexual activity: Not Currently     Partners: Female     Birth control/protection: Post-menopausal         Current Outpatient Medications:     acetaminophen (TYLENOL) 500 MG  tablet, Take  by mouth As Needed., Disp: , Rfl:     allopurinol (ZYLOPRIM) 100 MG tablet, Take 1 tablet by mouth Daily., Disp: , Rfl:     amLODIPine (NORVASC) 5 MG tablet, Take 1 tablet by mouth Every Night., Disp: 90 tablet, Rfl: 1    aspirin (aspirin) 81 MG EC tablet, Take 1 tablet by mouth Daily., Disp: , Rfl:     atenolol (TENORMIN) 25 MG tablet, Take 1 tablet by mouth 2 (Two) Times a Day., Disp: 180 tablet, Rfl: 1    carbidopa-levodopa CR (SINEMET CR)  MG per CR tablet, TAKE 1 TABLET EVERY NIGHT, Disp: 90 tablet, Rfl: 3    citalopram (CeleXA) 40 MG tablet, Take 1 tablet by mouth Daily., Disp: 90 tablet, Rfl: 1    colchicine 0.6 MG tablet, Take 1 tablet by mouth Daily., Disp: , Rfl:     cyclobenzaprine (FLEXERIL) 10 MG tablet, Take 1 tablet by mouth At Night As Needed for Muscle Spasms., Disp: 30 tablet, Rfl: 3    Diclofenac Sodium (VOLTAREN) 1 % gel gel, , Disp: , Rfl:     glimepiride (Amaryl) 4 MG tablet, Take 1 tablet by mouth Daily With Lunch. (Patient taking differently: Take 1.5 tablets by mouth Daily With Lunch.), Disp: 90 tablet, Rfl: 1    hydrocortisone (ANUSOL-HC) 25 MG suppository, Insert 1 suppository into the rectum 2 (Two) Times a Day As Needed for Hemorrhoids. (Patient taking differently: Insert 1 suppository into the rectum As Needed for Hemorrhoids.), Disp: 24 each, Rfl: 1    KRILL OIL PO, Take  by mouth., Disp: , Rfl:     lisinopril-hydrochlorothiazide (PRINZIDE,ZESTORETIC) 20-12.5 MG per tablet, Take 1 tablet by mouth 2 (Two) Times a Day., Disp: 180 tablet, Rfl: 1    loratadine (CLARITIN) 10 MG tablet, Take 1 tablet by mouth As Needed., Disp: , Rfl:     meclizine (ANTIVERT) 25 MG tablet, Take 1 tablet by mouth 3 (Three) Times a Day As Needed (vertigo)., Disp: 90 tablet, Rfl: 2    ondansetron (Zofran) 4 MG tablet, Take 1 tablet by mouth Every 8 (Eight) Hours As Needed for Nausea or Vomiting., Disp: 20 tablet, Rfl: 0    pantoprazole (PROTONIX) 40 MG EC tablet, Take 1 tablet by mouth  Daily., Disp: 90 tablet, Rfl: 3    predniSONE (DELTASONE) 10 MG tablet, Take 1 tablet by mouth As Needed (for flare ups of gout)., Disp: , Rfl:     rosuvastatin (CRESTOR) 10 MG tablet, Take 1 tablet by mouth Every Night., Disp: 90 tablet, Rfl: 1    clobetasol (TEMOVATE) 0.05 % ointment, Apply  topically to the appropriate area as directed 2 (Two) Times a Day. (Patient not taking: Reported on 6/24/2024), Disp: 60 g, Rfl: 1    Allergies   Allergen Reactions    Penicillins Unknown - Low Severity     Not sure of reaction (childhood)  Beta lactam allergy details  Antibiotic reaction: unknown  Age at reaction: child  Dose to reaction time: unknown  Reason for antibiotic: unknown  Epinephrine required for reaction?: unknown  Tolerated antibiotics: amoxicillin       Atorvastatin Unknown - Low Severity     Pt cannot remember    Fenofibrate Unknown - Low Severity     Pt cannot remember    Fluoxetine Unknown - Low Severity     Pt cannot remember    Gemfibrozil Unknown - Low Severity     Pt cannot remember    Hydroxychloroquine Unknown - Low Severity    Metformin Diarrhea    Nsaids GI Intolerance    Pravastatin Unknown - Low Severity     Pt cannot remember    Sulfamethoxazole-Trimethoprim Unknown - Low Severity     Pt cannot remember       Review of Systems   Constitutional:  Negative for unexpected weight loss.   HENT:  Negative for trouble swallowing.    Gastrointestinal:  Positive for anal bleeding, constipation, diarrhea, nausea and vomiting. Negative for abdominal distention, abdominal pain, blood in stool, rectal pain, GERD and indigestion.       The following portions of the patient's history were reviewed and updated as appropriate: allergies, current medications, past family history, past medical history, past social history, past surgical history and problem list.    Objective     Physical Exam:  Vitals:    06/24/24 0935   BP: 128/82   Pulse: 59   SpO2: 99%   Weight: 87.6 kg (193 lb 3.2 oz)       Physical  Exam  Constitutional:       General: She is not in acute distress.     Appearance: Normal appearance.   HENT:      Head: Normocephalic and atraumatic.      Mouth/Throat:      Mouth: Mucous membranes are moist.   Eyes:      General: No scleral icterus.     Conjunctiva/sclera: Conjunctivae normal.   Cardiovascular:      Rate and Rhythm: Normal rate.   Pulmonary:      Effort: Pulmonary effort is normal. No respiratory distress.   Abdominal:      General: There is no distension.      Tenderness: There is no abdominal tenderness.   Musculoskeletal:         General: No deformity or signs of injury.   Skin:     Coloration: Skin is not jaundiced or pale.   Neurological:      General: No focal deficit present.      Mental Status: She is alert and oriented to person, place, and time.   Psychiatric:         Mood and Affect: Mood normal.         Behavior: Behavior normal.         Results Review:   I have reviewed the patient's new clinical and imaging results and agree with the interpretation.     No visits with results within 90 Day(s) from this visit.   Latest known visit with results is:   Admission on 2024, Discharged on 2024   Component Date Value Ref Range Status    Glucose 2024 162 (H)  70 - 130 mg/dL Final    Serial Number: [id number]:  [id number]    Reference Lab Report 2024    Final                    Value:[facility]  [address] [place] [address]  Phone: [phone] or [phone]  Fax: [phone]  Bert Avelar M.D., Medical Director    PATIENT NAME                           LABORATORY [address].  [address]                    [id number]  [id number]                         AGE              SEX  [facility]           CLIENT REF #  [place] [id number]      1950  F    [id number]   [id number]    [address]                REQUESTING M.D.     ATTENDING MDOMONIQUE     COPY TO.  [address]                        ROSALIA  "ELLIOT      [place] [address]                 DATE COLLECTED      DATE RECEIVED      DATE REPORTED  03/08/2024 03/08/2024 03/11/2024    DIAGNOSIS:  A.   DUODENUM, BIOPSY:  Benign duodenum, negative for significant architectural distortion,  inflammatory infiltrate, neoplasia, malignancy    B.   STOMACH, BODY, BIOPSY:  Benign gastric mucosa, negative for significant                           architectural distortion,  inflammatory infiltrate, neoplasia, malignancy  Negative for intestinal metaplasia  No Helicobacter-like organisms identified on H and E    C.   STOMACH, BODY, POLYP, X3:  Benign fundic gland polyp, multiple fragments  Negative for malignancy    D.   ESOPHAGUS, BIOPSY, DISTAL:  Benign squamous mucosa, negative for significant architectural  distortion, inflammatory infiltrate, neoplasia, malignancy  Negative for significant eosinophilia (none identified)    CLINICAL HISTORY:  Gastroesophageal reflux disease without esophagitis, dysphagia, colon cancer  screening    SPECIMENS RECEIVED:  A.  DUODENUM, BIOPSY  B.  STOMACH, BODY, BIOPSY  C.  STOMACH, BODY, POLYP, X3  D.  ESOPHAGUS, BIOPSY, DISTAL    MICROSCOPIC DESCRIPTION:  Tissue blocks are prepared and slides are examined microscopically on all  specimens. See diagnosis for details.    Professional interpretation rendered by Brigitte Negrete M.D., F.C.A.P. at  [employer], [address], [address] [place] [address].    GROSS DESCRIPTION:  A.  Labeled \"small intestine, duodenum\".  Consists of 2 pieces of tan soft  tissue measuring 0.4 x 0.3 x 0.2 cm in aggregate and submitted entirely in  1 block.  CASEY  B.  Labeled \"gastric, body\".  Consists of 1 piece of tan soft tissue measuring  0.3 x 0.2 x 0.2 cm and is submitted entirely in 1 block.  C.  Labeled \"polyp gastric body polypectomy x 3\".  Consists of 3 pieces of tan  soft tissue measuring 0.8 x 0.6 x 0.5 cm in aggregate and the largest piece  is " "trisected and the specimen is submitted entirely in 1 block.  D.  Labeled \"esophagus, distal\".  Consists of 2 pieces of tan soft tissue  measuring 0.3 x 0.2 x 0.1 cm in aggregate and submitted entirely in 1  block.    REVIEWED, DIAGNOSED AND ELECTRONICALLY  SIGNED BY:    Brigitte Negrete M.D., CHRIS  CPT CODES:  88305x4        CT Head Without Contrast    Result Date: 3/27/2024  Stable atrophy and chronic changes without acute intracranial process.     DLP: 514.04 mGy.cm CTDI: 30.08 mGy   This study was performed with techniques to keep radiation doses as low as reasonably achievable (ALARA). Individualized dose reduction techniques using automated exposure control or adjustment of mA and/or kV according to the patient size were employed.     Images were reviewed, interpreted, and dictated by Dr. María Elena West MD Transcribed by Janine Shipman PA-C.  This report was signed and finalized on 3/27/2024 1:57 PM by María Elena West MD.      CT Abdomen Pelvis Without Contrast    Result Date: 3/27/2024  Short segment of mildly dilated jejunum with air-fluid level in the left upper quadrant is suspected to represent mild ileus. Partial small bowel obstruction cannot be excluded.   CTDI: 30.08 mGy DLP: 514.04 mGy.cm   This study was performed with techniques to keep radiation doses as low as reasonably achievable (ALARA). Individualized dose reduction techniques using automated exposure control or adjustment of mA and/or kV according to the patient size were employed.    Images were reviewed, interpreted, and dictated by Dr. María Elena West MD Transcribed by Janine Shipman PA-C.   This report was signed and finalized on 3/27/2024 12:38 PM by María Elena West MD.       Assessment / Plan      Assessment & Plan:  Diagnoses and all orders for this visit:    1. Abnormal finding on GI tract imaging (Primary)  -     FL Small Bowel Follow Through Single-Contrast; Future    2. Hepatic cyst        Small hepatic cyst noted.  Stable with " prior scan as per the radiology read.  Does not warrant routine follow-up.  No concerning features described.    Small bowel follow series as above.  If there is any abnormality identified, will follow this up with the PillCam.    Follow Up:   Return if symptoms worsen or fail to improve.    Dilcia Lee MD  [facility]    6/24/2024  10:01 EDT    Part of this note may be an electronic transcription/translation of spoken language to printed text using the Dragon Dictation System.

## 2024-06-25 ENCOUNTER — PATIENT ROUNDING (BHMG ONLY) (OUTPATIENT)
Dept: GASTROENTEROLOGY | Facility: CLINIC | Age: 74
End: 2024-06-25
Payer: MEDICARE

## 2024-07-03 ENCOUNTER — HOSPITAL ENCOUNTER (OUTPATIENT)
Dept: GENERAL RADIOLOGY | Facility: HOSPITAL | Age: 74
Discharge: HOME | End: 2024-07-03
Admitting: INTERNAL MEDICINE
Payer: MEDICARE

## 2024-07-03 DIAGNOSIS — R93.3 ABNORMAL FINDING ON GI TRACT IMAGING: ICD-10-CM

## 2024-07-03 PROCEDURE — 74250 X-RAY XM SM INT 1CNTRST STD: CPT

## 2024-07-05 NOTE — PROGRESS NOTES
Please give the patient the following message:  ----- Results -----  Small bowel follow-through is negative.  Lets set her up for a patency cam to assess if we can safely do a PillCam

## 2024-07-16 LAB
ALBUMIN SERPL-MCNC: 4.2 G/DL (ref 3.5–5.2)
ALBUMIN/CREAT UR: 56 MG/G CREAT (ref 0–29)
ALBUMIN/GLOB SERPL: 2.2 G/DL
ALP SERPL-CCNC: 91 U/L (ref 39–117)
ALT SERPL-CCNC: <5 U/L (ref 1–33)
AST SERPL-CCNC: 11 U/L (ref 1–32)
BILIRUB SERPL-MCNC: 0.5 MG/DL (ref 0–1.2)
BUN SERPL-MCNC: 25 MG/DL (ref 8–23)
BUN/CREAT SERPL: 22.5 (ref 7–25)
CALCIUM SERPL-MCNC: 9.3 MG/DL (ref 8.6–10.5)
CHLORIDE SERPL-SCNC: 104 MMOL/L (ref 98–107)
CHOLEST SERPL-MCNC: 192 MG/DL (ref 0–200)
CO2 SERPL-SCNC: 26.1 MMOL/L (ref 22–29)
CREAT SERPL-MCNC: 1.11 MG/DL (ref 0.57–1)
CREAT UR-MCNC: 103 MG/DL
EGFRCR SERPLBLD CKD-EPI 2021: 53.3 ML/MIN/1.73
ERYTHROCYTE [DISTWIDTH] IN BLOOD BY AUTOMATED COUNT: 13.1 % (ref 12.3–15.4)
GLOBULIN SER CALC-MCNC: 1.9 GM/DL
GLUCOSE SERPL-MCNC: 144 MG/DL (ref 65–99)
HBA1C MFR BLD: 6.9 % (ref 4.8–5.6)
HCT VFR BLD AUTO: 38.6 % (ref 34–46.6)
HDLC SERPL-MCNC: 46 MG/DL (ref 40–60)
HGB BLD-MCNC: 12.7 G/DL (ref 12–15.9)
LDLC SERPL CALC-MCNC: 99 MG/DL (ref 0–100)
MCH RBC QN AUTO: 30.5 PG (ref 26.6–33)
MCHC RBC AUTO-ENTMCNC: 32.9 G/DL (ref 31.5–35.7)
MCV RBC AUTO: 92.8 FL (ref 79–97)
MICROALBUMIN UR-MCNC: 58 UG/ML
PLATELET # BLD AUTO: 181 10*3/MM3 (ref 140–450)
POTASSIUM SERPL-SCNC: 4.7 MMOL/L (ref 3.5–5.2)
PROT SERPL-MCNC: 6.1 G/DL (ref 6–8.5)
RBC # BLD AUTO: 4.16 10*6/MM3 (ref 3.77–5.28)
SODIUM SERPL-SCNC: 140 MMOL/L (ref 136–145)
TRIGL SERPL-MCNC: 279 MG/DL (ref 0–150)
VLDLC SERPL CALC-MCNC: 47 MG/DL (ref 5–40)
WBC # BLD AUTO: 6.73 10*3/MM3 (ref 3.4–10.8)

## 2024-07-18 ENCOUNTER — OFFICE VISIT (OUTPATIENT)
Dept: INTERNAL MEDICINE | Facility: CLINIC | Age: 74
End: 2024-07-18
Payer: MEDICARE

## 2024-07-18 VITALS
RESPIRATION RATE: 20 BRPM | DIASTOLIC BLOOD PRESSURE: 75 MMHG | OXYGEN SATURATION: 98 % | WEIGHT: 191 LBS | SYSTOLIC BLOOD PRESSURE: 122 MMHG | TEMPERATURE: 97.8 F | BODY MASS INDEX: 37.5 KG/M2 | HEART RATE: 63 BPM | HEIGHT: 60 IN

## 2024-07-18 DIAGNOSIS — F33.0 MILD EPISODE OF RECURRENT MAJOR DEPRESSIVE DISORDER: ICD-10-CM

## 2024-07-18 DIAGNOSIS — Z78.0 MENOPAUSE: ICD-10-CM

## 2024-07-18 DIAGNOSIS — F41.9 ANXIETY: ICD-10-CM

## 2024-07-18 DIAGNOSIS — Z00.00 MEDICARE ANNUAL WELLNESS VISIT, SUBSEQUENT: Primary | ICD-10-CM

## 2024-07-18 DIAGNOSIS — E11.65 TYPE 2 DIABETES MELLITUS WITH HYPERGLYCEMIA, WITHOUT LONG-TERM CURRENT USE OF INSULIN: ICD-10-CM

## 2024-07-18 DIAGNOSIS — Z13.820 ENCOUNTER FOR SCREENING FOR OSTEOPOROSIS: ICD-10-CM

## 2024-07-18 DIAGNOSIS — I10 BENIGN ESSENTIAL HYPERTENSION: ICD-10-CM

## 2024-07-18 DIAGNOSIS — E78.2 MIXED HYPERLIPIDEMIA: ICD-10-CM

## 2024-07-18 PROCEDURE — 1159F MED LIST DOCD IN RCRD: CPT | Performed by: INTERNAL MEDICINE

## 2024-07-18 PROCEDURE — 3074F SYST BP LT 130 MM HG: CPT | Performed by: INTERNAL MEDICINE

## 2024-07-18 PROCEDURE — 3078F DIAST BP <80 MM HG: CPT | Performed by: INTERNAL MEDICINE

## 2024-07-18 PROCEDURE — 1125F AMNT PAIN NOTED PAIN PRSNT: CPT | Performed by: INTERNAL MEDICINE

## 2024-07-18 PROCEDURE — 99214 OFFICE O/P EST MOD 30 MIN: CPT | Performed by: INTERNAL MEDICINE

## 2024-07-18 PROCEDURE — G0439 PPPS, SUBSEQ VISIT: HCPCS | Performed by: INTERNAL MEDICINE

## 2024-07-18 PROCEDURE — 1170F FXNL STATUS ASSESSED: CPT | Performed by: INTERNAL MEDICINE

## 2024-07-18 PROCEDURE — 1160F RVW MEDS BY RX/DR IN RCRD: CPT | Performed by: INTERNAL MEDICINE

## 2024-07-18 PROCEDURE — 99397 PER PM REEVAL EST PAT 65+ YR: CPT | Performed by: INTERNAL MEDICINE

## 2024-07-18 PROCEDURE — 3044F HG A1C LEVEL LT 7.0%: CPT | Performed by: INTERNAL MEDICINE

## 2024-07-18 RX ORDER — GLIMEPIRIDE 4 MG/1
4 TABLET ORAL
Qty: 90 TABLET | Refills: 1 | Status: SHIPPED | OUTPATIENT
Start: 2024-07-18

## 2024-07-18 RX ORDER — ATENOLOL 25 MG/1
25 TABLET ORAL 2 TIMES DAILY
Qty: 180 TABLET | Refills: 1 | Status: SHIPPED | OUTPATIENT
Start: 2024-07-18

## 2024-07-18 RX ORDER — CITALOPRAM 40 MG/1
40 TABLET ORAL DAILY
Qty: 90 TABLET | Refills: 1 | Status: SHIPPED | OUTPATIENT
Start: 2024-07-18

## 2024-07-18 RX ORDER — AMLODIPINE BESYLATE 5 MG/1
5 TABLET ORAL NIGHTLY
Qty: 90 TABLET | Refills: 1 | Status: SHIPPED | OUTPATIENT
Start: 2024-07-18

## 2024-07-18 RX ORDER — LISINOPRIL AND HYDROCHLOROTHIAZIDE 20; 12.5 MG/1; MG/1
1 TABLET ORAL 2 TIMES DAILY
Qty: 180 TABLET | Refills: 1 | Status: SHIPPED | OUTPATIENT
Start: 2024-07-18

## 2024-07-18 RX ORDER — ROSUVASTATIN CALCIUM 10 MG/1
10 TABLET, COATED ORAL NIGHTLY
Qty: 90 TABLET | Refills: 1 | Status: SHIPPED | OUTPATIENT
Start: 2024-07-18

## 2024-07-18 NOTE — PROGRESS NOTES
Subjective   The ABCs of the Annual Wellness Visit  Medicare Wellness Visit    Chief Complaint   Patient presents with    Medicare Wellness-subsequent    Hypertension    Hyperlipidemia    Diabetes         Missy Mcgraw is a 73 y.o. patient who presents for a Medicare Wellness Visit and physical .     The following portions of the patient's history were reviewed and   updated as appropriate: allergies, current medications, past family history, past medical history, past social history, past surgical history, and problem list.    Compared to one year ago, the patient's physical   health is the same.  Compared to one year ago, the patient's mental   health is the same.    Recent Hospitalizations:  She was not admitted to the hospital during the last year.     Current Medical Providers:  Patient Care Team:  Emely Sheldon MD as PCP - General (Internal Medicine)  Marie De Leon MD as Surgeon (General Surgery)  Kt Dey DO as Consulting Physician (Anesthesiology)  Dilcia Lee MD as Consulting Physician (Gastroenterology)  Wali Núñez MD as Consulting Physician (Cardiology)  JAIRO NO DO (Pain Medicine)    Outpatient Medications Prior to Visit   Medication Sig Dispense Refill    acetaminophen (TYLENOL) 500 MG tablet Take  by mouth As Needed.      allopurinol (ZYLOPRIM) 100 MG tablet Take 1 tablet by mouth Daily.      aspirin (aspirin) 81 MG EC tablet Take 1 tablet by mouth Daily.      carbidopa-levodopa CR (SINEMET CR)  MG per CR tablet TAKE 1 TABLET EVERY NIGHT 90 tablet 3    colchicine 0.6 MG tablet Take 1 tablet by mouth Daily.      cyclobenzaprine (FLEXERIL) 10 MG tablet Take 1 tablet by mouth At Night As Needed for Muscle Spasms. 30 tablet 3    Diclofenac Sodium (VOLTAREN) 1 % gel gel       hydrocortisone (ANUSOL-HC) 25 MG suppository Insert 1 suppository into the rectum 2 (Two) Times a Day As Needed for Hemorrhoids. (Patient taking differently: Insert 1 suppository into the  rectum As Needed for Hemorrhoids.) 24 each 1    KRILL OIL PO Take  by mouth.      loratadine (CLARITIN) 10 MG tablet Take 1 tablet by mouth As Needed.      meclizine (ANTIVERT) 25 MG tablet Take 1 tablet by mouth 3 (Three) Times a Day As Needed (vertigo). 90 tablet 2    ondansetron (Zofran) 4 MG tablet Take 1 tablet by mouth Every 8 (Eight) Hours As Needed for Nausea or Vomiting. 20 tablet 0    pantoprazole (PROTONIX) 40 MG EC tablet Take 1 tablet by mouth Daily. 90 tablet 3    predniSONE (DELTASONE) 10 MG tablet Take 1 tablet by mouth As Needed (for flare ups of gout).      amLODIPine (NORVASC) 5 MG tablet Take 1 tablet by mouth Every Night. 90 tablet 1    atenolol (TENORMIN) 25 MG tablet Take 1 tablet by mouth 2 (Two) Times a Day. 180 tablet 1    citalopram (CeleXA) 40 MG tablet Take 1 tablet by mouth Daily. 90 tablet 1    glimepiride (Amaryl) 4 MG tablet Take 1 tablet by mouth Daily With Lunch. (Patient taking differently: Take 1.5 tablets by mouth Daily With Lunch.) 90 tablet 1    lisinopril-hydrochlorothiazide (PRINZIDE,ZESTORETIC) 20-12.5 MG per tablet Take 1 tablet by mouth 2 (Two) Times a Day. 180 tablet 1    rosuvastatin (CRESTOR) 10 MG tablet Take 1 tablet by mouth Every Night. 90 tablet 1    clobetasol (TEMOVATE) 0.05 % ointment Apply  topically to the appropriate area as directed 2 (Two) Times a Day. (Patient not taking: Reported on 6/24/2024) 60 g 1     No facility-administered medications prior to visit.     No opioid medication identified on active medication list. I have reviewed chart for other potential  high risk medication/s and harmful drug interactions in the elderly.      Aspirin is on active medication list. Aspirin use is indicated based on review of current medical condition/s. Pros and cons of this therapy have been discussed today. Benefits of this medication outweigh potential harm.  Patient has been encouraged to continue taking this medication.  .      Patient Active Problem List  "  Diagnosis    Hay fever    Benign essential hypertension    Gastroesophageal reflux disease with esophagitis    Major depressive disorder, recurrent episode    Uncontrolled type 2 diabetes mellitus    Abnormal liver enzymes    Abnormal weight gain    Cervicalgia    Fibroid, uterine    Hyperthyroidism    Mixed hyperlipidemia    Movement disorder    Neuralgia    Ulnar nerve palsy    Vitamin B12 deficiency    Vitamin D deficiency    Osteoporosis    Restless legs syndrome    Shortness of breath    Coronary artery disease involving native coronary artery of native heart without angina pectoris    PONV (postoperative nausea and vomiting)    Unintentional weight loss    Gastroesophageal reflux disease    Epigastric abdominal pain    Gastritis determined by biopsy    Family history of colon cancer in father    Pre-operative cardiovascular examination    Dyslipidemia    Class 2 severe obesity due to excess calories with serious comorbidity and body mass index (BMI) of 37.0 to 37.9 in adult    Dysphagia    Colon cancer screening     Advance Care Planning (Click this link to access ACP Navigator)  Advance Directive is not on file.  ACP discussion was held with the patient during this visit. Patient has an advance directive (not in EMR), copy requested.        Objective   Vitals:    07/18/24 1511   BP: 122/75   Pulse: 63   Resp: 20   Temp: 97.8 °F (36.6 °C)   SpO2: 98%   Weight: 86.6 kg (191 lb)   Height: 152.4 cm (60\")   PainSc:   2       Estimated body mass index is 37.3 kg/m² as calculated from the following:    Height as of this encounter: 152.4 cm (60\").    Weight as of this encounter: 86.6 kg (191 lb).    Class 2 Severe Obesity (BMI >=35 and <=39.9). Obesity-related health conditions include the following: hypertension and dyslipidemias. Obesity is unchanged. BMI is is above average; BMI management plan is completed. We discussed low calorie, low carb based diet program, portion control, increasing exercise, and joining " a fitness center or start home based exercise program.        Does the patient have evidence of cognitive impairment? No  Lab Results   Component Value Date    CHLPL 192 07/15/2024    TRIG 279 (H) 07/15/2024    HDL 46 07/15/2024    LDL 99 07/15/2024    VLDL 47 (H) 07/15/2024    HGBA1C 6.90 (H) 07/15/2024                                                                                                Health  Risk Assessment    Smoking Status:  Social History     Tobacco Use   Smoking Status Never    Passive exposure: Never   Smokeless Tobacco Never     Alcohol Consumption:  Social History     Substance and Sexual Activity   Alcohol Use No     Fall Risk Screen:  STEADI Fall Risk Assessment was completed, and patient is at LOW risk for falls.Assessment completed on:2024    Depression Screenin/18/2024     3:34 PM   PHQ-2/PHQ-9 Depression Screening   Little Interest or Pleasure in Doing Things 0-->not at all   Feeling Down, Depressed or Hopeless 0-->not at all   PHQ-9: Brief Depression Severity Measure Score 0     Health Habits and Functional and Cognitive Screenin/18/2024     3:25 PM   Functional & Cognitive Status   Do you have difficulty preparing food and eating? No   Do you have difficulty bathing yourself, getting dressed or grooming yourself? No   Do you have difficulty using the toilet? No   Do you have difficulty moving around from place to place? No   Do you have trouble with steps or getting out of a bed or a chair? No   Current Diet Well Balanced Diet   Dental Exam Up to date   Eye Exam Up to date   Exercise (times per week) 5 times per week   Current Exercises Include Other;House Cleaning   Do you need help using the phone?  No   Are you deaf or do you have serious difficulty hearing?  Yes   Do you need help to go to places out of walking distance? No   Do you need help shopping? No   Do you need help preparing meals?  No   Do you need help with housework?  No   Do you need help with  laundry? No   Do you need help taking your medications? No   Do you need help managing money? No   Do you ever drive or ride in a car without wearing a seat belt? No   Have you felt unusual stress, anger or loneliness in the last month? No   Who do you live with? Alone   If you need help, do you have trouble finding someone available to you? No   Have you been bothered in the last four weeks by sexual problems? No   Do you have difficulty concentrating, remembering or making decisions? No             Age-appropriate Screening Schedule:  Refer to the list below for future screening recommendations based on patient's age, sex and/or medical conditions. Orders for these recommended tests are listed in the plan section. The patient has been provided with a written plan.    Health Maintenance List  Health Maintenance   Topic Date Due    DIABETIC FOOT EXAM  03/08/2018    DXA SCAN  12/18/2020    COVID-19 Vaccine (5 - 2023-24 season) 11/25/2024 (Originally 9/1/2023)    INFLUENZA VACCINE  08/01/2024    DIABETIC EYE EXAM  11/29/2024    PAP SMEAR  12/09/2024    HEMOGLOBIN A1C  01/15/2025    LIPID PANEL  07/15/2025    URINE MICROALBUMIN  07/15/2025    ANNUAL WELLNESS VISIT  07/18/2025    BMI FOLLOWUP  07/18/2025    MAMMOGRAM  10/19/2025    TDAP/TD VACCINES (2 - Td or Tdap) 12/11/2030    COLORECTAL CANCER SCREENING  03/08/2034    HEPATITIS C SCREENING  Completed    Pneumococcal Vaccine 65+  Completed    ZOSTER VACCINE  Completed                                                                                                                                                CMS Preventative Services Quick Reference  Risk Factors Identified During Encounter  Fall Risk-High or Moderate: Discussed Fall Prevention in the home, Information on Fall Prevention Shared in After Visit Summary, and Sit to Stand Exercise Information Shared in After Visit Summary    The above risks/problems have been discussed with the patient.  Pertinent  "information has been shared with the patient in the After Visit Summary.  An After Visit Summary and PPPS were made available to the patient.    Follow Up:   Next Medicare Wellness visit to be scheduled in 1 year.         Additional E&M Note during same encounter follows:  Patient has additional, significant, and separately identifiable condition(s)/problem(s) that require work above and beyond the Medicare Wellness Visit     Chief Complaint  Medicare Wellness-subsequent, Hypertension, Hyperlipidemia, and Diabetes    Subjective   HPI  CARL is also being seen today for an annual adult preventative physical exam.  and CARL is also being seen today for additional medical problem/s.  Patient is here to follow up on the blood pressure  The patient is taking the blood pressure medications as prescribed and has had no side effects. The patient is also here to follow up on the cholesterol and   sugar and  had lab work done .  She has been taking glimepiride 2 mg p.o. daily, the patient also needs refills on medications .   Hypertension   Pertinent negatives include no chest pain, palpitations or shortness of breath.   Hyperlipidemia   Pertinent negatives include no chest pain or shortness of breath.   Diabetes   Pertinent negatives for hypoglycemia include no dizziness, nervousness/anxiousness or pallor. Pertinent negatives for diabetes include no chest pain, no shortness of breath  Patient is on acei/arb                 Objective   Vital Signs:  /75   Pulse 63   Temp 97.8 °F (36.6 °C)   Resp 20   Ht 152.4 cm (60\")   Wt 86.6 kg (191 lb)   SpO2 98%   BMI 37.30 kg/m²   Physical Exam  Vitals and nursing note reviewed.   Constitutional:       General: She is not in acute distress.     Appearance: Normal appearance. She is not diaphoretic.   HENT:      Head: Normocephalic and atraumatic.      Right Ear: External ear normal.      Left Ear: External ear normal.      Nose: Nose normal.   Eyes:      Extraocular " Movements: Extraocular movements intact.      Conjunctiva/sclera: Conjunctivae normal.   Neck:      Trachea: Trachea normal.   Cardiovascular:      Rate and Rhythm: Normal rate and regular rhythm.      Heart sounds: Normal heart sounds.   Pulmonary:      Effort: Pulmonary effort is normal. No respiratory distress.      Breath sounds: Normal breath sounds.   Abdominal:      General: Abdomen is flat.   Musculoskeletal:      Cervical back: Neck supple.      Comments: Moves all limbs   Skin:     General: Skin is warm.   Neurological:      Mental Status: She is alert and oriented to person, place, and time.      Comments: No gross motor or sensory deficits         The following data was reviewed by: Emely Sheldon MD on 07/18/2024:        Assessment and Plan Additional age appropriate preventative wellness advice topics were discussed during today's preventative wellness exam(some topics already addressed during AWV portion of the note above):    Physical Activity: Advised cardiovascular activity 150 minutes per week as tolerated. (example brisk walk for 30 minutes, 5 days a week).     Nutrition: Discussed nutrition plan with patient. Information shared in after visit summary. Goal is for a well balanced diet to enhance overall health.     Healthy Weight: Discussed current and goal BMI with patient. Steps to attain this goal discussed. Information shared in after visit summary.              Medicare annual wellness visit, subsequent  Plan:  1.physical: Physical exam conducted today, reviewed fasting lab work,   Impression: healthy adult Patient. Currently, patient eats a healthy diet. Screening lab work includes glucose, lipid profile and complete blood count . The risks and benefits of immunizations were discussed and immunizations are up to date. Advice and education were given regarding nutrition and aerobic exercise. Patient discussion: discussed with the patient.  Annual Wellness Visit  , physical  with preventive  exam as well as age and risk appropriate counseling completed.   Advance Directive Planning: paperwork and instructions were given to the patient.   Patient Discussion: plan discussed with the patient, follow-up visit needed in one year. Medication Review and medication list updated    Encounter for screening for osteoporosis  Will obtain DEXA scan, follow-up with rheumatology  Menopause  Will obtain DEXA scan, follow-up with rheumatology  Benign essential hypertension  Will continue current medication, low-sodium diet advised, Counseled to regularly check BP at home with goal averaging <130/80.   Hypertension is stable and controlled  Continue current treatment regimen.  Dietary sodium restriction.  Weight loss.  Regular aerobic exercise.  Blood pressure will be reassessed in 6 months.  Mixed hyperlipidemia   Lipid abnormalities are improving with treatment  reviewed  fasting CMP and lipid panel.  Diet and exercise counseled,  Will continue current medications  Plan:  Continue same medication/s without change.    Discussed medication dosage, use, side effects, and goals of treatment in detail.    Counseled patient on lifestyle modifications to help control hyperlipidemia.   Cholesterol lowering dietary information shared with patient.  Patient Treatment Goals:   LDL goal is less than 70  Followup in 6 months.  Type 2 diabetes mellitus with hyperglycemia, without long-term current use of insulin   reviewed  fasting CMP  and hba1c  , diet and exercise counseled, patient advised to start glimepiride 4 mg p.o. daily  Diabetes is improving with treatment.   Continue current treatment regimen.  Recommended an ADA diet.  Recommended a Mediterranean style of eating  Regular aerobic exercise.  Discussed foot care.  Reminded to get yearly retinal exam.  Diabetes will be reassessed in 6 months  Patient sees ophthalmology and podiatry  Anxiety  Refill Celexa 40 mg daily, labs reviewed    Mild episode of recurrent major  depressive disorder  Refill Celexa 40 mg daily, labs reviewed  Patient's depression is a recurrent episode that is mild without psychosis. Depression is active and improving with treatment.  Plan:   Continue current medication therapy   Followup in 6 months.     Orders Placed This Encounter   Procedures    DEXA Bone Density Axial     Order Specific Question:   Release to patient     Answer:   Routine Release [[id number]]     Order Specific Question:   Reason for Exam:     Answer:   screening     Order Specific Question:   Does this patient have a diabetic monitoring/medication delivering device on?     Answer:   No     Order Specific Question:   Is patient taking or have taken long term Glucocorticoid (steroids)?     Answer:   No     Order Specific Question:   Does the patient have rheumatoid arthritis?     Answer:   No     Order Specific Question:   Does the patient have secondary osteoporosis?     Answer:   No     New Medications Ordered This Visit   Medications    amLODIPine (NORVASC) 5 MG tablet     Sig: Take 1 tablet by mouth Every Night.     Dispense:  90 tablet     Refill:  1    atenolol (TENORMIN) 25 MG tablet     Sig: Take 1 tablet by mouth 2 (Two) Times a Day.     Dispense:  180 tablet     Refill:  1    lisinopril-hydrochlorothiazide (PRINZIDE,ZESTORETIC) 20-12.5 MG per tablet     Sig: Take 1 tablet by mouth 2 (Two) Times a Day.     Dispense:  180 tablet     Refill:  1    rosuvastatin (CRESTOR) 10 MG tablet     Sig: Take 1 tablet by mouth Every Night.     Dispense:  90 tablet     Refill:  1    glimepiride (Amaryl) 4 MG tablet     Sig: Take 1 tablet by mouth Daily With Lunch.     Dispense:  90 tablet     Refill:  1    citalopram (CeleXA) 40 MG tablet     Sig: Take 1 tablet by mouth Daily.     Dispense:  90 tablet     Refill:  1             Follow Up   Return in about 1 year (around 7/18/2025) for Medicare Wellness.  Patient was given instructions and counseling regarding her condition or for health  maintenance advice. Please see specific information pulled into the AVS if appropriate.

## 2024-07-18 NOTE — ASSESSMENT & PLAN NOTE
Will continue current medication, low-sodium diet advised, Counseled to regularly check BP at home with goal averaging <130/80.   Hypertension is stable and controlled  Continue current treatment regimen.  Dietary sodium restriction.  Weight loss.  Regular aerobic exercise.  Blood pressure will be reassessed in 6 months.

## 2024-07-18 NOTE — ASSESSMENT & PLAN NOTE
Refill Celexa 40 mg daily, labs reviewed  Patient's depression is a recurrent episode that is mild without psychosis. Depression is active and improving with treatment.  Plan:   Continue current medication therapy   Followup in 6 months.

## 2024-07-18 NOTE — PATIENT INSTRUCTIONS
Advance Care Planning and Advance Directives     You make decisions on a daily basis - decisions about where you want to live, your career, your home, your life. Perhaps one of the most important decisions you face is your choice for future medical care. Take time to talk with your family and your healthcare team and start planning today.  Advance Care Planning is a process that can help you:  Understand possible future healthcare decisions in light of your own experiences  Reflect on those decision in light of your goals and values  Discuss your decisions with those closest to you and the healthcare professionals that care for you  Make a plan by creating a document that reflects your wishes    Surrogate Decision Maker  In the event of a medical emergency, which has left you unable to communicate or to make your own decisions, you would need someone to make decisions for you.  It is important to discuss your preferences for medical treatment with this person while you are in good health.     Qualities of a surrogate decision maker:  Willing to take on this role and responsibility  Knows what you want for future medical care  Willing to follow your wishes even if they don't agree with them  Able to make difficult medical decisions under stressful circumstances    Advance Directives  These are legal documents you can create that will guide your healthcare team and decision maker(s) when needed. These documents can be stored in the electronic medical record.    Living Will - a legal document to guide your care if you have a terminal condition or a serious illness and are unable to communicate. States vary by statute in document names/types, but most forms may include one or more of the following:        -  Directions regarding life-prolonging treatments        -  Directions regarding artificially provided nutrition/hydration        -  Choosing a healthcare decision maker        -  Direction regarding organ/tissue  donation    Durable Power of  for Healthcare - this document names an -in-fact to make medical decisions for you, but it may also allow this person to make personal and financial decisions for you. Please seek the advice of an  if you need this type of document.    **Advance Directives are not required and no one may discriminate against you if you do not sign one.    Medical Orders  Many states allow specific forms/orders signed by your physician to record your wishes for medical treatment in your current state of health. This form, signed in personal communication with your physician, addresses resuscitation and other medical interventions that you may or may not want.      For more information or to schedule a time with a [facility] Advance Care Planning Facilitator contact: [facility] or call [phone] and someone will contact you directly.    Medicare Wellness  Personal Prevention Plan of Service     Date of Office Visit:    Encounter Provider:  Emely Sheldon MD  Place of Service:  [facility]  Patient Name: Missy Mcgraw  :  1950    As part of the Medicare Wellness portion of your visit today, we are providing you with this personalized preventive plan of services (PPPS). This plan is based upon recommendations of the [place] Preventive Services Task Force (USPSTF) and the Advisory Committee on Immunization Practices (ACIP).    This lists the preventive care services that should be considered, and provides dates of when you are due. Items listed as completed are up-to-date and do not require any further intervention.    Health Maintenance   Topic Date Due   • DIABETIC FOOT EXAM  2018   • DXA SCAN  2020   • ANNUAL WELLNESS VISIT  2024   • BMI FOLLOWUP  2024   • COVID-19 Vaccine ( season) 2024 (Originally 2023)   • INFLUENZA VACCINE  2024   • DIABETIC EYE EXAM  2024    • PAP SMEAR  12/09/2024   • HEMOGLOBIN A1C  01/15/2025   • LIPID PANEL  07/15/2025   • URINE MICROALBUMIN  07/15/2025   • MAMMOGRAM  10/19/2025   • TDAP/TD VACCINES (2 - Td or Tdap) 12/11/2030   • COLORECTAL CANCER SCREENING  03/08/2034   • HEPATITIS C SCREENING  Completed   • Pneumococcal Vaccine 65+  Completed   • ZOSTER VACCINE  Completed       No orders of the defined types were placed in this encounter.      No follow-ups on file.

## 2024-07-18 NOTE — ASSESSMENT & PLAN NOTE
Lipid abnormalities are improving with treatment  reviewed  fasting CMP and lipid panel.  Diet and exercise counseled,  Will continue current medications  Plan:  Continue same medication/s without change.    Discussed medication dosage, use, side effects, and goals of treatment in detail.    Counseled patient on lifestyle modifications to help control hyperlipidemia.   Cholesterol lowering dietary information shared with patient.  Patient Treatment Goals:   LDL goal is less than 70  Followup in 6 months.

## 2024-07-29 ENCOUNTER — PROCEDURE VISIT (OUTPATIENT)
Dept: GASTROENTEROLOGY | Facility: CLINIC | Age: 74
End: 2024-07-29
Payer: MEDICARE

## 2024-07-29 VITALS — HEART RATE: 62 BPM | DIASTOLIC BLOOD PRESSURE: 70 MMHG | OXYGEN SATURATION: 98 % | SYSTOLIC BLOOD PRESSURE: 122 MMHG

## 2024-07-29 DIAGNOSIS — R93.3 ABNORMAL FINDING ON GI TRACT IMAGING: Primary | ICD-10-CM

## 2024-07-29 NOTE — PROGRESS NOTES
Patient had patency capsule endoscopy. She swallowed capsule without difficulty at 8:15 am    Lot number: [id number]  Expiration date: 3/7/25

## 2024-07-30 ENCOUNTER — HOSPITAL ENCOUNTER (OUTPATIENT)
Dept: GENERAL RADIOLOGY | Facility: HOSPITAL | Age: 74
Discharge: HOME | End: 2024-07-30
Admitting: INTERNAL MEDICINE
Payer: MEDICARE

## 2024-07-30 DIAGNOSIS — R93.3 ABNORMAL FINDING ON GI TRACT IMAGING: ICD-10-CM

## 2024-07-30 PROCEDURE — 74018 RADEX ABDOMEN 1 VIEW: CPT

## 2024-07-31 DIAGNOSIS — R93.3 ABNORMAL FINDING ON GI TRACT IMAGING: ICD-10-CM

## 2024-07-31 DIAGNOSIS — R93.3 ABNORMAL FINDING ON GI TRACT IMAGING: Primary | ICD-10-CM

## 2024-07-31 RX ORDER — POLYETHYLENE GLYCOL 3350 17 G/17G
POWDER, FOR SOLUTION ORAL
Qty: 7 PACKET | Refills: 1 | Status: SHIPPED | OUTPATIENT
Start: 2024-07-31

## 2024-07-31 NOTE — PROGRESS NOTES
Please give the patient the following message:  ----- Results -----  Patency cam appears to have passed.  Not identified on x-ray.  Okay to schedule PillCam.  Prep and orders placed.

## 2024-08-15 NOTE — PROGRESS NOTES
June 25, 2024    Hello, may I speak with Missy Mcgraw?    My name is Noy RAMIREZ    I am  with [facility]  [address]  [facility] [address] [phone].    Before we get started may I verify your date of birth? 1950    I am calling to officially welcome you to our practice and ask about your recent visit. Is this a good time to talk? no    Tell me about your visit with us. What things went well?  Left message       We're always looking for ways to make our patients' experiences even better. Do you have recommendations on ways we may improve?  no    Overall were you satisfied with your first visit to our practice? yes       I appreciate you taking the time to speak with me today. Is there anything else I can do for you? no      Thank you, and have a great day.       PROGRESS  NOTE      HISTORY    Rolando Fair is a 45 year old male who presents for follow up while on anticoagulation with Xarelto for DVT     HEMATOLOGICAL HISTORY     Diagnosed with RLE deep venous thrombosis along with saddle embolus in October 2018. Episode likely provoked due to strenuous work.  IVC filter was placed, started on treatment with Warfarin (Coumadin).   Experienced difficulty maintaining therapeutic INR, following which treatment was switched to Rivaroxaban in Dec, 2018 by Dr. Garza who saw the patient in consultation during his prior admission  IVC filter removed 2/4/2019.  Admitted through ED on 3/5/2019 with US Doppler revealing acute occlusive thrombus from mid femoral vein to the peroneal trunk the extent of which is similar from November 2018. Images also revealed enlarged bilateral inguinal lymph nodes measuring up to 4.5 centimeters.  US Right Lower Extremity on 4/10/2019 showed interval improvement in right lower extremity DVT, the right femoral and popliteal veins are now recanalized with a small amount of chronic residual thrombus present. There is also some residual chronic thrombus present in the tibial peroneal vein. US Pelvis was negative for inguinal lymphadenopathy. US Abdomen showed stable right adrenal gland adenoma measuring 4.3 x 3.6 cm. 1 cm hyperechoic focus lower pole left kidney, although, this was discussed with Radiology who believed that this could be a kidney stone.  Transitioned back to Xarelto 20mg on 4/11/2019 07/27/2021 patient continued on Xarelto 20 mg orally q.day        INTERVAL HISTORY    Patient Active Problem List    Diagnosis Date Noted    Anticoagulant long-term use 09/27/2019     Priority: Medium    Adrenal nodule  (CMD) 04/23/2024     Priority: Low     Overview Note:     Patient has to follow up with endocrinology.(Noted 7/25/24)  CT feb 2024:  IMPRESSION:  -4.3 cm right adrenal nodule, previously characterized as an adenoma  increased by 3 mm since  2018. Given its size, endocrinology consultation  and continued surveillance is recommended.         Recurrent major depressive disorder, in full remission (CMD) 01/25/2024     Priority: Low     Overview Note:     Paxil 20 mg po daily.      Status post total right knee replacement 12/13/2023     Priority: Low    Recurrent acute deep vein thrombosis (DVT) of both lower extremities  (CMD) 01/06/2023     Priority: Low     Overview Note:     On eliquis 5 mg po bid.  Was on rivaroxaban 20 mg po daily.  Following with hem-onc.      Type 2 diabetes mellitus with hyperglycemia, without long-term current use of insulin  (CMD) 10/06/2022     Priority: Low     Overview Note:     Jardiance 25 mg po daily, Mounjaro 15 mg.  Advised to take metformin 500 mg po bid.(07/25/24)      Diabetic peripheral neuropathy associated with type 2 diabetes mellitus  (CMD) 07/21/2022     Priority: Low    Primary hypertension 07/22/2021     Priority: Low     Overview Note:     Telmisartan 80 mg , coreg 3.125 mg po bid.  Also on torsemide 5 mg po daily.      History of substance abuse  (CMD) 02/26/2021     Priority: Low     Overview Note:     Sober for 5 years. He quit in 2019, July.  Was on methamphetamine. [facility]s with [facility]      Gastroesophageal reflux disease without esophagitis 11/27/2020     Priority: Low    RLS (restless legs syndrome) 11/27/2020     Priority: Low    Congestive cardiac failure  (CMD)      Priority: Low    Current moderate episode of major depressive disorder without prior episode  (CMD)      Priority: Low    Chronic back pain      Priority: Low     Overview Note:     Not eligible for controlled substances from [facility]      NICM (nonischemic cardiomyopathy)  (CMD) 12/03/2018     Priority: Low     Overview Note:     On coreg 3.125 mg po bid. Jardiance 25 mg po daily.(Patient to discuss whether he need to continue the Jardiance, with cardiology)  Likely secondary to myocarditis / pneumonia . Cardiac cath negative.        Obstructive sleep apnea      Priority: Low     Overview Note:     Uses CPAP      Anxiety 06/16/2015     Priority: Low     Overview Note:     Sees Yohana Ca, yaya castillo.      ADHD (attention deficit hyperactivity disorder) 06/16/2015     Priority: Low     Overview Note:     Was on Vyvanse 60 mg po daily, Adderall 10 mg but not anymore.  Sees Ashley Baltazar at [facility].      Bipolar 1 disorder  (CMD) 06/16/2015     Priority: Low     Overview Note:     On Aristada 1064mg/3.9 ml injection monthly, Vraylar 3 mg po keila, lamotrigine 200 mg po daily.  Following with [facility].             I have reviewed the past medical, family and social history sections including the medications and allergies listed in the above medical record as well as nursing notes.   ALLERGIES:   Allergen Reactions    Penicillins HIVES       Current Outpatient Medications   Medication Sig Dispense Refill    HYDROcodone-acetaminophen (NORCO) 5-325 MG per tablet Take 1 tablet by mouth 2 times daily as needed for pain for 1 week, then 1 tablet once daily as needed for pain for 1 week, then stop 21 tablet 0    metoPROLOL succinate (TOPROL-XL) 50 MG 24 hr tablet [None received]      azelastine (ASTELIN) 0.1 % nasal spray Spray 1 spray in each nostril in the morning and 1 spray in the evening. Use in each nostril as directed 30 mL 1    metoPROLOL succinate (TOPROL-XL) 100 MG 24 hr tablet Take 1.5 tablets by mouth daily. 135 tablet 1    apixaBAN (Eliquis) 5 MG Tab Take 1 tablet by mouth every 12 hours. 180 tablet 1    fluticasone (Flonase Allergy Relief) 50 MCG/ACT nasal spray Spray 1 spray in each nostril daily. Try it for at least 3 to 4 weeks. 16 g 3    tirzepatide (Mounjaro) 15 MG/0.5ML Solution Pen-injector Inject 15 mg into the skin 1 day a week. Indications: Type 2 Diabetes 6 mL 1    blood glucose meter Test blood sugar once daily as directed. Diagnosis: diabets. Meter: Insurance  preferred 1 kit 0    blood glucose lancets Test blood sugar daily . Diagnosis: diabetes. Meter: insurance preferred 200 each 3    blood glucose test strip Test blood sugar once daily as directed. Insurance preferred 100 each 3    magnesium oxide (MAG-OX) 400 (240 Mg) MG tablet Take 1 tablet by mouth daily. 90 tablet 1    ondansetron (ZOFRAN ODT) 4 MG disintegrating tablet Place 1 tablet onto the tongue every 8 hours as needed for Nausea. 30 tablet 11    rOPINIRole (REQUIP) 1 MG tablet Take 1 tablet by mouth nightly. 90 tablet 4    empagliflozin (Jardiance) 25 MG tablet Take 1 tablet by mouth daily (before breakfast). 90 tablet 4    nicotine polacrilex (NICORETTE) 4 MG gum Take 1 each by mouth as needed for Smoking cessation. 100 each 3    metFORMIN (GLUCOPHAGE-XR) 500 MG 24 hr tablet Take 2 tablets by mouth in the morning and 2 tablets in the evening. Take with meals. 360 tablet 3    torsemide (DEMADEX) 5 MG tablet Take 1 tablet by mouth daily. 90 tablet 3    atorvastatin (LIPITOR) 20 MG tablet Take 1 tablet by mouth daily. 90 tablet 3    telmisartan (MICARDIS) 80 MG tablet Take 1 tablet by mouth daily. 90 tablet 3    Aristada 1064 MG/3.9ML injection Inject 1,064 mg into the muscle every 30 days.      PARoxetine (PAXIL) 20 MG tablet Take 20 mg by mouth every morning.      buPROPion XL (WELLBUTRIN XL) 150 MG 24 hr tablet Take 150 mg by mouth daily.      cariprazine (VRAYLAR) 3 MG capsule Take 3 mg by mouth daily.      lamotrigine (LAMICTAL) 200 MG tablet Take 200 mg by mouth every evening.       Multiple Vitamins-Minerals (VITAMIN - THERAPEUTIC MULTIVITAMINS W/MINERALS) Tab Take 1 tablet by mouth daily.      Cholecalciferol (VITAMIN D3) 5000 units Tab Take 1 tablet by mouth daily.       No current facility-administered medications for this visit.           REVIEW OF SYSTEMS    Review of Systems    {ROS CHOICES:2006604}  PHYSICAL EXAM    There were no vitals taken for this visit.  ECOG: {ONC ECO}     CONSTITUTIONAL:  Alert, cooperative, oriented.  Mood and affect appropriate.    Physical Exam     Labs:  No results found for this or any previous visit (from the past 24 hour(s)).  Recent Labs   Lab 07/23/24  0659 02/19/24 0447 02/18/24 1817 02/06/24  0818   WBC 6.5 7.5 9.3 7.5   RBC 5.40 4.90 5.60 4.85   HGB 15.7 13.8 15.6 13.5   HCT 48.8 43.3 48.6 43.4   MCV 90.4 88.4 86.8 89.5    229 275 195   Absolute Neutrophils  --  3.7 6.4 4.3   Absolute Lymphocytes  --  2.7 1.9 2.2   Absolute Monocytes  --  0.8 0.9 0.6   Absolute Eosinophils   --  0.2 0.1 0.3   Absolute Basophils  --  0.0 0.0 0.0     Recent Labs   Lab 07/23/24  0659 03/29/24  0931 02/19/24  1226 02/19/24 0447 02/18/24 1817   Glucose 85  --   --  107* 121*   Sodium 139  --   --  140 140   Potassium 4.6  --  4.1 3.4 4.2   Chloride 103  --   --  108 108   BUN 14  --   --  22* 19   Creatinine 0.80  --   --  0.76 0.86   Calcium 9.4  --   --  9.0 9.4   Albumin 4.3  --   --  3.7 4.4   GOT/AST 12  --   --  58* 57*   Alkaline Phosphatase 64  --   --  62 73   GPT 49  --   --  38 41   Magnesium  --  2.1  --   --  2.2     Recent Labs   Lab 07/23/24 0659 02/19/24 0447 02/18/24 1817   Anion Gap 12 6* 9   Globulin 3.0 3.4 3.9       Imaging:      ASSESSMENT/PLAN:  1)RECURRENT DVT  -Recurrent deep vein thrombosis. Initially diagnosed October 2018. Difficulty maintaining therapeutic INR on Warfarin. Recurred while on Xarelto. Was on Lovenox 1 mg/kg q 12 hours with good response.   -US Right Lower Extremity on 4/10/2019 showed interval improvement in right lower extremity DVT, the right femoral and popliteal veins are now recanalized with a small amount of chronic residual thrombus present.   -on 03/06/2019 patient had lupus anticoagulation testing which was negative for any circulating lupus anticoagulant.  On 11/13/2018 patient had APC resistance which was normal at 2.48 and therefore factor 5 laden testing was not carried out.  Furthermore patient was  negative for prothrombin genotype on 11/13/2018.  Hypercoagulable workup was essentially negative  -There is also some residual chronic thrombus present in the tibial peroneal vein. Transition back to Xarelto 20mg. Patient has been compliant with Xarelto and notes no complications.  -PLAN TO CONTINUE XARELTO FOR 1 YEAR and patient has completed  Epic Calculated HASBLED Score = 0  -financial implication of anticoagulation:  Patient reports he may have to spend less than 20 dollars a month for systemic anticoagulation     -on 07/27/2021 discussed with patient with recurrent episodes of DVT 1st episode which was provoked and 2nd which was unprovoked in absence of negative active hypercoagulable workup and has bled score of 0 with minimal financial implication with cost of drug and intact renal function plan was made to continue long-term anticoagulation with the patient currently especially in light of new inguinal lymphadenopathy and follow-up in 6 months time but with repeat D-dimer values.  However in unexplained deep vein thrombosis in individual plan would be likely to continue long-term anticoagulation with Xarelto 20 mg orally q.day which is currently well tolerated.  Refill was provided to the patient on 07/27/2021 starting 08/27/2021 as requested by the patient  -7/27/22 Epic Calculated HASBLED Score = 1 .  Lab on 07/19/2022 indicative of normal chemistries with elevated glucose, renal function intact with GFR greater than 90 and mild transaminitis with AST of 43 and ALT of 195 in setting of known fatty liver changes and B12 level normal at 572, folate greater than 24 WBC count of 6.8 hemoglobin of 15.6 and platelet count of 223 all within normal limits.  Patient does not report any bleeding currently and is tolerating anticoagulation therapy well without any modification.    Reviewed with the patient his risk of fatal bleeding and patient agreeable to continue with anticoagulation and script was renewed all  questions and concerns were answered  -02/03/2023 after obtaining labs indicative of normal CMP normal electrolytes and CBC indicative of white cell count of 8.7 hemoglobin of 16.8 and platelet count of 295.  His D-dimer continues to remain low less than 0.19.    -08/03/2023 patient continued on Xarelto 20 mg p.o. q.day no concerns for DVT currently. Epic Calculated HASBLED Score = 1.    -02/06/2024 patient continued on Xarelto 20 mg p.o. q.day no concerns for DVT currently.  Epic Calculated HASBLED Score = 1 .  Patient tolerating anticoagulation well without any bleeding issues recently underwent knee surgery wherein anticoagulation was held for 48 hours and thereafter resumed post operatively without any bleeding complications or DVT complications.  Patient agreeable to continuing long-term anticoagulation currently and will follow up in 6 months time        2)INGUINAL LYMPHADENOPATHY  Bilateral Inguinal Lymphadenopathy. US Pelvis on 4/10/2019 showed persistent lymphadenopathy.At that time  the study was reviewed with radiologist who feels that the lymph nodes noted are within the normal range and that the hilum of the lymph node is preserved suggesting a normal lymph node.   -Ultrasound abdomen was negative other than a hypoechoic left lower pole of the kidney lesion that the radiologist feels might be a kidney stone. IHe wasoffered the patient an Xray for further evaluation but he would like to hold off.  -oral and discharge from the 1 lymph node on the left inguinal region about a week ago.  Will obtain ultrasound of liver lymphadenopathy to rule out abscess and the need for incision and drainage   -usg 4./10/2021:2 right and 3 left inguinal lymph nodes are normal in size by  ultrasound criteria.  -01/27/2022 labs are within normal limit and patient continued on Xarelto 20 mg p.o. q.day no dose adjustment   -07/27/2022 patient reports he no longer lymphadenopathy  -no inguinal adenopathy reported on  2/3/2023  -no inguinal lympnodes reported on 8/3/23.    -02/06/2024 no lymphadenopathy reported     3)DM  -Currently on Metformin. Followed by PCP.  and is on trulicity and jarjosé antonio eckertjaro is not in supply so patient on hold for it     4) AGE APPROPRIATE CANCER SCREENING  -colonoscopy at age 47  -report any testicular abnormality   -quit smoking 10 years ago.before that he smokes 2 ppd  For 25 years.atient does not meet age criteria for lung cancer screening.   -2/6/24 non smoking status     5) KNEE PAIN  -has appointment on 8/15/23  -was taking tramadol 50 mg q day   -s/p 12/13/23:Right Total Knee Arthroplasty (Right: Knee) press fit.Removal of right patella exostosis.    -02/06/2024 patient reports pain in the knee and difficulty while participating in physiotherapy was recently on tramadol discussed with the patient short-term usage of opiate only 10 pills provided on 02/06/2024 to be used sparingly if Tylenol does not work for the patient while participating in physical therapy               PLAN:  CONTINUE XARELTO.20 mg po q day.please call office 2 weeks before you run out of scripts  -follow up in 6 months with cbc,cmp,d dimer, with MD  There are no diagnoses linked to this encounter.       Rich Welsh MD 8/14/2024

## 2024-08-20 NOTE — PATIENT INSTRUCTIONS
Follow up one year or as needed.  
Initiate Treatment: triamcinolone acetonide 0.1 % topical ointment Apply AA L arm bid x 2 weeks
Detail Level: Zone
Render In Strict Bullet Format?: No

## 2024-08-30 ENCOUNTER — PROCEDURE VISIT (OUTPATIENT)
Dept: GASTROENTEROLOGY | Facility: CLINIC | Age: 74
End: 2024-08-30
Payer: MEDICARE

## 2024-08-30 VITALS — DIASTOLIC BLOOD PRESSURE: 70 MMHG | HEART RATE: 96 BPM | SYSTOLIC BLOOD PRESSURE: 128 MMHG | OXYGEN SATURATION: 99 %

## 2024-08-30 DIAGNOSIS — R93.3 ABNORMAL FINDING ON GI TRACT IMAGING: Primary | ICD-10-CM

## 2024-09-04 ENCOUNTER — TELEPHONE (OUTPATIENT)
Dept: GASTROENTEROLOGY | Facility: CLINIC | Age: 74
End: 2024-09-04
Payer: MEDICARE

## 2024-09-04 NOTE — TELEPHONE ENCOUNTER
Per Dr. Lee: Please give patient the following pill cam results    A small possible aphthous ulcer is identified in the terminal ileum. Unclear clinical significance. Avoid NSAIDs. Otherwise, there is no obvious abnormality identified on PillCam. In the second portion of the duodenum there is a prominence noted which is likely the ampulla. Clinical correlation suggested. Follow-up in the GI clinic to discuss findings.

## 2024-09-24 ENCOUNTER — OFFICE VISIT (OUTPATIENT)
Dept: GASTROENTEROLOGY | Facility: CLINIC | Age: 74
End: 2024-09-24
Payer: MEDICARE

## 2024-09-24 VITALS
WEIGHT: 192.4 LBS | HEART RATE: 68 BPM | OXYGEN SATURATION: 98 % | DIASTOLIC BLOOD PRESSURE: 70 MMHG | BODY MASS INDEX: 37.58 KG/M2 | SYSTOLIC BLOOD PRESSURE: 124 MMHG

## 2024-09-24 DIAGNOSIS — R93.3 ABNORMAL FINDING ON GI TRACT IMAGING: Primary | ICD-10-CM

## 2024-09-24 DIAGNOSIS — K31.7 GASTRIC POLYPS: ICD-10-CM

## 2024-10-01 ENCOUNTER — APPOINTMENT (OUTPATIENT)
Dept: BONE DENSITY | Facility: HOSPITAL | Age: 74
End: 2024-10-01
Payer: MEDICARE

## 2024-10-01 PROCEDURE — 77080 DXA BONE DENSITY AXIAL: CPT

## 2024-11-04 DIAGNOSIS — E11.65 TYPE 2 DIABETES MELLITUS WITH HYPERGLYCEMIA, WITHOUT LONG-TERM CURRENT USE OF INSULIN: ICD-10-CM

## 2024-11-04 DIAGNOSIS — E78.2 MIXED HYPERLIPIDEMIA: Primary | ICD-10-CM

## 2024-11-07 LAB
ALBUMIN SERPL-MCNC: 4.2 G/DL (ref 3.5–5.2)
ALBUMIN/GLOB SERPL: 1.9 G/DL
ALP SERPL-CCNC: 106 U/L (ref 39–117)
ALT SERPL-CCNC: 5 U/L (ref 1–33)
AST SERPL-CCNC: 12 U/L (ref 1–32)
BILIRUB SERPL-MCNC: 0.3 MG/DL (ref 0–1.2)
BUN SERPL-MCNC: 24 MG/DL (ref 8–23)
BUN/CREAT SERPL: 21.6 (ref 7–25)
CALCIUM SERPL-MCNC: 9.6 MG/DL (ref 8.6–10.5)
CHLORIDE SERPL-SCNC: 103 MMOL/L (ref 98–107)
CHOLEST SERPL-MCNC: 196 MG/DL (ref 0–200)
CO2 SERPL-SCNC: 24.8 MMOL/L (ref 22–29)
CREAT SERPL-MCNC: 1.11 MG/DL (ref 0.57–1)
EGFRCR SERPLBLD CKD-EPI 2021: 53.3 ML/MIN/1.73
ERYTHROCYTE [DISTWIDTH] IN BLOOD BY AUTOMATED COUNT: 12.7 % (ref 12.3–15.4)
GLOBULIN SER CALC-MCNC: 2.2 GM/DL
GLUCOSE SERPL-MCNC: 142 MG/DL (ref 65–99)
HBA1C MFR BLD: 7 % (ref 4.8–5.6)
HCT VFR BLD AUTO: 38.3 % (ref 34–46.6)
HDLC SERPL-MCNC: 45 MG/DL (ref 40–60)
HGB BLD-MCNC: 13 G/DL (ref 12–15.9)
LDLC SERPL CALC-MCNC: 99 MG/DL (ref 0–100)
MCH RBC QN AUTO: 30.7 PG (ref 26.6–33)
MCHC RBC AUTO-ENTMCNC: 33.9 G/DL (ref 31.5–35.7)
MCV RBC AUTO: 90.3 FL (ref 79–97)
PLATELET # BLD AUTO: 200 10*3/MM3 (ref 140–450)
POTASSIUM SERPL-SCNC: 4.6 MMOL/L (ref 3.5–5.2)
PROT SERPL-MCNC: 6.4 G/DL (ref 6–8.5)
RBC # BLD AUTO: 4.24 10*6/MM3 (ref 3.77–5.28)
SODIUM SERPL-SCNC: 139 MMOL/L (ref 136–145)
TRIGL SERPL-MCNC: 309 MG/DL (ref 0–150)
UNABLE TO VOID: NORMAL
VLDLC SERPL CALC-MCNC: 52 MG/DL (ref 5–40)
WBC # BLD AUTO: 7.14 10*3/MM3 (ref 3.4–10.8)

## 2024-11-12 ENCOUNTER — OFFICE VISIT (OUTPATIENT)
Dept: INTERNAL MEDICINE | Facility: CLINIC | Age: 74
End: 2024-11-12
Payer: MEDICARE

## 2024-11-12 VITALS
OXYGEN SATURATION: 98 % | SYSTOLIC BLOOD PRESSURE: 136 MMHG | HEIGHT: 60 IN | TEMPERATURE: 98.4 F | BODY MASS INDEX: 37.89 KG/M2 | DIASTOLIC BLOOD PRESSURE: 74 MMHG | WEIGHT: 193 LBS | HEART RATE: 67 BPM | RESPIRATION RATE: 17 BRPM

## 2024-11-12 DIAGNOSIS — E78.2 MIXED HYPERLIPIDEMIA: ICD-10-CM

## 2024-11-12 DIAGNOSIS — Z23 NEED FOR INFLUENZA VACCINATION: ICD-10-CM

## 2024-11-12 DIAGNOSIS — I10 BENIGN ESSENTIAL HYPERTENSION: Primary | ICD-10-CM

## 2024-11-12 DIAGNOSIS — E11.65 TYPE 2 DIABETES MELLITUS WITH HYPERGLYCEMIA, WITHOUT LONG-TERM CURRENT USE OF INSULIN: ICD-10-CM

## 2024-11-12 PROCEDURE — 1159F MED LIST DOCD IN RCRD: CPT | Performed by: INTERNAL MEDICINE

## 2024-11-12 PROCEDURE — 3075F SYST BP GE 130 - 139MM HG: CPT | Performed by: INTERNAL MEDICINE

## 2024-11-12 PROCEDURE — 3051F HG A1C>EQUAL 7.0%<8.0%: CPT | Performed by: INTERNAL MEDICINE

## 2024-11-12 PROCEDURE — 3078F DIAST BP <80 MM HG: CPT | Performed by: INTERNAL MEDICINE

## 2024-11-12 PROCEDURE — 1160F RVW MEDS BY RX/DR IN RCRD: CPT | Performed by: INTERNAL MEDICINE

## 2024-11-12 PROCEDURE — 99214 OFFICE O/P EST MOD 30 MIN: CPT | Performed by: INTERNAL MEDICINE

## 2024-11-12 PROCEDURE — 1125F AMNT PAIN NOTED PAIN PRSNT: CPT | Performed by: INTERNAL MEDICINE

## 2024-11-12 PROCEDURE — G0008 ADMIN INFLUENZA VIRUS VAC: HCPCS | Performed by: INTERNAL MEDICINE

## 2024-11-12 PROCEDURE — 90662 IIV NO PRSV INCREASED AG IM: CPT | Performed by: INTERNAL MEDICINE

## 2024-11-12 RX ORDER — ROSUVASTATIN CALCIUM 10 MG/1
10 TABLET, COATED ORAL NIGHTLY
Qty: 90 TABLET | Refills: 1 | Status: SHIPPED | OUTPATIENT
Start: 2024-11-12

## 2024-11-12 RX ORDER — GLIMEPIRIDE 4 MG/1
4 TABLET ORAL 2 TIMES DAILY WITH MEALS
Qty: 180 TABLET | Refills: 1 | Status: SHIPPED | OUTPATIENT
Start: 2024-11-12

## 2024-11-12 RX ORDER — LISINOPRIL AND HYDROCHLOROTHIAZIDE 12.5; 2 MG/1; MG/1
1 TABLET ORAL 2 TIMES DAILY
Qty: 180 TABLET | Refills: 1 | Status: SHIPPED | OUTPATIENT
Start: 2024-11-12

## 2024-11-12 RX ORDER — AMLODIPINE BESYLATE 5 MG/1
5 TABLET ORAL NIGHTLY
Qty: 90 TABLET | Refills: 1 | Status: SHIPPED | OUTPATIENT
Start: 2024-11-12

## 2024-11-12 RX ORDER — ATENOLOL 25 MG/1
25 TABLET ORAL 2 TIMES DAILY
Qty: 180 TABLET | Refills: 1 | Status: SHIPPED | OUTPATIENT
Start: 2024-11-12

## 2024-11-12 NOTE — PROGRESS NOTES
"Chief Complaint  Hypertension (Follow up), Hyperlipidemia, and Diabetes    Subjective        Missy Mcgraw presents to [facility]  HPI: Patient is here to follow up on the blood pressure  The patient is taking the blood pressure medications as prescribed and has had no side effects. The patient is also here to follow up on the cholesterol and is trying to follow a diet. The patient is also here to follow on sugar and had  lab work done . The patient also needs refills on medications and flu  .   Hypertension   Pertinent negatives include no chest pain, palpitations or shortness of breath.   Hyperlipidemia   Pertinent negatives include no chest pain or shortness of breath.   Diabetes   Pertinent negatives for hypoglycemia include no dizziness, nervousness/anxiousness or pallor. Pertinent negatives for diabetes include no chest pain, no shortness of breath  Patient is on acei/arb       Objective   Vital Signs:  /74   Pulse 67   Temp 98.4 °F (36.9 °C) (Infrared)   Resp 17   Ht 152.4 cm (60\")   Wt 87.5 kg (193 lb)   SpO2 98%   BMI 37.69 kg/m²   Estimated body mass index is 37.69 kg/m² as calculated from the following:    Height as of this encounter: 152.4 cm (60\").    Weight as of this encounter: 87.5 kg (193 lb).            Physical Exam  Vitals and nursing note reviewed.   Constitutional:       General: She is not in acute distress.     Appearance: Normal appearance. She is obese. She is not diaphoretic.   HENT:      Head: Normocephalic and atraumatic.      Right Ear: External ear normal.      Left Ear: External ear normal.      Nose: Nose normal.   Eyes:      Extraocular Movements: Extraocular movements intact.      Conjunctiva/sclera: Conjunctivae normal.   Neck:      Trachea: Trachea normal.   Cardiovascular:      Rate and Rhythm: Normal rate and regular rhythm.      Heart sounds: Normal heart sounds.   Pulmonary:      Effort: Pulmonary effort is normal. No " respiratory distress.      Breath sounds: Normal breath sounds.   Abdominal:      General: Abdomen is flat.   Musculoskeletal:         General: Deformity present.      Cervical back: Neck supple.      Comments: Moves all limbs   Skin:     General: Skin is warm.   Neurological:      Mental Status: She is alert and oriented to person, place, and time.      Comments: No gross motor or sensory deficits        Result Review :  The following data was reviewed by: Emely Sheldon MD on 11/12/2024:  Common labs          3/14/2024    11:06 7/15/2024    07:55 11/7/2024    09:01   Common Labs   Glucose 163  144  142    BUN 23  25  24    Creatinine 1.05  1.11  1.11    Sodium 141  140  139    Potassium 4.5  4.7  4.6    Chloride 103  104  103    Calcium 9.5  9.3  9.6    Total Protein 6.4  6.1  6.4    Albumin 4.3  4.2  4.2    Total Bilirubin 0.5  0.5  0.3    Alkaline Phosphatase 93  91  106    AST (SGOT) 8  11  12    ALT (SGPT) <5  <5  5    WBC 6.24  6.73  7.14    Hemoglobin 13.2  12.7  13.0    Hematocrit 39.8  38.6  38.3    Platelets 189  181  200    Total Cholesterol 186  192  196    Triglycerides 244  279  309    HDL Cholesterol 50  46  45    LDL Cholesterol  95  99  99    Hemoglobin A1C 7.00  6.90  7.00    Microalbumin, Urine  58.0                 Assessment and Plan   Diagnoses and all orders for this visit:    1. Benign essential hypertension (Primary)  -     amLODIPine (NORVASC) 5 MG tablet; Take 1 tablet by mouth Every Night.  Dispense: 90 tablet; Refill: 1  -     atenolol (TENORMIN) 25 MG tablet; Take 1 tablet by mouth 2 (Two) Times a Day.  Dispense: 180 tablet; Refill: 1  -     lisinopril-hydrochlorothiazide (PRINZIDE,ZESTORETIC) 20-12.5 MG per tablet; Take 1 tablet by mouth 2 (Two) Times a Day.  Dispense: 180 tablet; Refill: 1    2. Mixed hyperlipidemia  -     rosuvastatin (CRESTOR) 10 MG tablet; Take 1 tablet by mouth Every Night.  Dispense: 90 tablet; Refill: 1  -     CBC (No Diff)  -     Comprehensive Metabolic  Panel  -     Lipid Panel    3. Type 2 diabetes mellitus with hyperglycemia, without long-term current use of insulin  -     glimepiride (Amaryl) 4 MG tablet; Take 1 tablet by mouth 2 (Two) Times a Day With Meals.  Dispense: 180 tablet; Refill: 1  -     Hemoglobin A1c  -     Microalbumin / Creatinine Urine Ratio - Urine, Clean Catch    4. Need for influenza vaccination  -     Fluzone High-Dose 65+yrs (5264-0350)    Plan:  1.  Benign essential hypertension: Will continue current medication, low-sodium diet advised, Counseled to regularly check BP at home with goal averaging <130/80.   2.mixed hyperlipidemia:  reviewed   fasting CMP and lipid panel.  Diet and exercise counseled,  Will continue current medications  3. Diabetes  Mellitus  :  reviewed  fasting CMP  and hba1c  7.0 , diet and exercise counseled , Will  increase to amaryl 4mg po bid  4. Need for flu vaccine : given today and well tolerated             Follow Up   Return in about 4 months (around 3/20/2025).  Patient was given instructions and counseling regarding her condition or for health maintenance advice. Please see specific information pulled into the AVS if appropriate.             Answers submitted by the patient for this visit:  Primary Reason for Visit (Submitted on 11/11/2024)  What is the primary reason for your visit?: Diabetes  Diabetes Questionnaire (Submitted on 11/11/2024)  Chief Complaint: Diabetes problem  Diabetes type: type 2  MedicAlert ID: No  Disease duration: 5 Years  Treatment compliance: all of the time  Symptom course: stable  blurred vision: No  foot paresthesias: No  foot ulcerations: No  polydipsia: Yes  polyuria: Yes  weight loss: No  blackouts: No  hospitalization: No  nocturnal hypoglycemia: No  required assistance: No  required glucagon: No  confusion: No  headaches: No  speech difficulty: No  sweats: No  tremors: Yes  Current diet: generally unhealthy  Meal planning: avoidance of concentrated sweets  Exercise:  intermittently  Eye exam current: Yes  Sees podiatrist: Yes

## 2024-11-12 NOTE — PATIENT INSTRUCTIONS
MyPlate from USDA    MyPlate is an outline of a general healthy diet based on the Dietary Guidelines for Americans, 4012-5327, from the U.[place]. Department of Agriculture (USDA). It sets guidelines for how much food you should eat from each food group based on your age, sex, and level of physical activity.  What are tips for following MyPlate?  To follow MyPlate recommendations:  Eat a wide variety of fruits and vegetables, grains, and protein foods.  Serve smaller portions and eat less food throughout the day.  Limit portion sizes to avoid overeating.  Enjoy your food.  Get at least 150 minutes of exercise every week. This is about 30 minutes each day, 5 or more days per week.  It can be difficult to have every meal look like MyPlate. Think about MyPlate as eating guidelines for an entire day, rather than each individual meal.  Fruits and vegetables  Make one half of your plate fruits and vegetables.  Eat many different colors of fruits and vegetables each day.  For a 2,000-calorie daily food plan, eat:  2½ cups of vegetables every day.  2 cups of fruit every day.  1 cup is equal to:  1 cup raw or cooked vegetables.  1 cup raw fruit.  1 medium-sized orange, apple, or banana.  1 cup 100% fruit or vegetable juice.  2 cups raw leafy greens, such as lettuce, spinach, or kale.  ½ cup dried fruit.  Grains  One fourth of your plate should be grains.  Make at least half of the grains you eat each day whole grains.  For a 2,000-calorie daily food plan, eat 6 oz of grains every day.  1 oz is equal to:  1 slice bread.  1 cup cereal.  ½ cup cooked rice, cereal, or pasta.  Protein  One fourth of your plate should be protein.  Eat a wide variety of protein foods, including meat, poultry, fish, eggs, beans, nuts, and tofu.  For a 2,000-calorie daily food plan, eat 5½ oz of protein every day.  1 oz is equal to:  1 oz meat, poultry, or fish.  ¼ cup cooked beans.  1 egg.  ½ oz nuts or seeds.  1 Tbsp peanut butter.  Dairy  Drink fat-free  or low-fat (1%) milk.  Eat or drink dairy as a side to meals.  For a 2,000-calorie daily food plan, eat or drink 3 cups of dairy every day.  1 cup is equal to:  1 cup milk, yogurt, cottage cheese, or soy milk (soy beverage).  2 oz processed cheese.  1½ oz natural cheese.  Fats, oils, salt, and sugars  Only small amounts of oils are recommended.  Avoid foods that are high in calories and low in nutritional value (empty calories), like foods high in fat or added sugars.  Choose foods that are low in salt (sodium). Choose foods that have less than 140 milligrams (mg) of sodium per serving.  Drink water instead of sugary drinks. Drink enough fluid to keep your urine pale yellow.  Where to find support  Work with your health care provider or a dietitian to develop a customized eating plan that is right for you.  Download an danny (mobile application) to help you track your daily food intake.  Where to find more information  USDA: ChooseMyPlate.gov  Summary  MyPlate is a general guideline for healthy eating from the USDA. It is based on the Dietary Guidelines for Americans, 5846-6824.  In general, fruits and vegetables should take up one half of your plate, grains should take up one fourth of your plate, and protein should take up one fourth of your plate.  This information is not intended to replace advice given to you by your health care provider. Make sure you discuss any questions you have with your health care provider.  Document Revised: 11/08/2021 Document Reviewed: 11/08/2021  [employer] Patient Education © 2024 [employer]

## 2024-11-26 ENCOUNTER — OFFICE VISIT (OUTPATIENT)
Dept: CARDIOLOGY | Facility: CLINIC | Age: 74
End: 2024-11-26
Payer: MEDICARE

## 2024-11-26 VITALS
OXYGEN SATURATION: 97 % | BODY MASS INDEX: 37.54 KG/M2 | WEIGHT: 191.2 LBS | DIASTOLIC BLOOD PRESSURE: 68 MMHG | HEART RATE: 64 BPM | SYSTOLIC BLOOD PRESSURE: 124 MMHG | HEIGHT: 60 IN

## 2024-11-26 DIAGNOSIS — E78.5 DYSLIPIDEMIA: ICD-10-CM

## 2024-11-26 DIAGNOSIS — I25.10 CORONARY ARTERY DISEASE INVOLVING NATIVE CORONARY ARTERY OF NATIVE HEART WITHOUT ANGINA PECTORIS: Primary | ICD-10-CM

## 2024-11-26 DIAGNOSIS — E66.812 CLASS 2 SEVERE OBESITY DUE TO EXCESS CALORIES WITH SERIOUS COMORBIDITY AND BODY MASS INDEX (BMI) OF 37.0 TO 37.9 IN ADULT: ICD-10-CM

## 2024-11-26 DIAGNOSIS — E66.01 CLASS 2 SEVERE OBESITY DUE TO EXCESS CALORIES WITH SERIOUS COMORBIDITY AND BODY MASS INDEX (BMI) OF 37.0 TO 37.9 IN ADULT: ICD-10-CM

## 2024-11-26 DIAGNOSIS — I10 BENIGN ESSENTIAL HYPERTENSION: ICD-10-CM

## 2024-11-26 PROCEDURE — 1159F MED LIST DOCD IN RCRD: CPT | Performed by: INTERNAL MEDICINE

## 2024-11-26 PROCEDURE — 3074F SYST BP LT 130 MM HG: CPT | Performed by: INTERNAL MEDICINE

## 2024-11-26 PROCEDURE — 1160F RVW MEDS BY RX/DR IN RCRD: CPT | Performed by: INTERNAL MEDICINE

## 2024-11-26 PROCEDURE — 3078F DIAST BP <80 MM HG: CPT | Performed by: INTERNAL MEDICINE

## 2024-11-26 PROCEDURE — 99213 OFFICE O/P EST LOW 20 MIN: CPT | Performed by: INTERNAL MEDICINE

## 2024-11-26 NOTE — PROGRESS NOTES
Subjective:     Encounter Date:11/26/2024      Patient ID: Missy Mcgraw is a 73 y.o. female.    Chief Complaint: Hypertension  HPI  This is a 73-year-old female patient who presents to cardiology clinic for routine follow-up.  Since her last visit she has had no active cardiovascular issues, symptoms or hospitalizations.  She remains a non-smoker.  She reports compliance with her medications with no perceived side effects.  The following portions of the patient's history were reviewed and updated as appropriate: allergies, current medications, past family history, past medical history, past social history, past surgical history and problem  Review of Systems   Constitutional: Negative for chills, diaphoresis, fever, malaise/fatigue, weight gain and weight loss.   HENT:  Negative for ear discharge, hearing loss, hoarse voice and nosebleeds.    Eyes:  Negative for discharge, double vision, pain and photophobia.   Cardiovascular:  Negative for chest pain, claudication, cyanosis, dyspnea on exertion, irregular heartbeat, leg swelling, near-syncope, orthopnea, palpitations, paroxysmal nocturnal dyspnea and syncope.   Respiratory:  Negative for cough, hemoptysis, sputum production and wheezing.    Endocrine: Negative for cold intolerance, heat intolerance, polydipsia, polyphagia and polyuria.   Hematologic/Lymphatic: Negative for adenopathy and bleeding problem. Does not bruise/bleed easily.   Skin:  Negative for color change, flushing, itching and rash.   Musculoskeletal:  Negative for muscle cramps, muscle weakness, myalgias and stiffness.   Gastrointestinal:  Negative for abdominal pain, diarrhea, hematemesis, hematochezia, nausea and vomiting.   Genitourinary:  Negative for dysuria, frequency and nocturia.   Neurological:  Negative for focal weakness, loss of balance, numbness, paresthesias and seizures.   Psychiatric/Behavioral:  Negative for altered mental status, hallucinations and suicidal ideas.     Allergic/Immunologic: Negative for HIV exposure, hives and persistent infections.           Current Outpatient Medications:     acetaminophen (TYLENOL) 500 MG tablet, Take  by mouth As Needed., Disp: , Rfl:     allopurinol (ZYLOPRIM) 100 MG tablet, Take 1 tablet by mouth Daily., Disp: , Rfl:     amLODIPine (NORVASC) 5 MG tablet, Take 1 tablet by mouth Every Night., Disp: 90 tablet, Rfl: 1    aspirin (aspirin) 81 MG EC tablet, Take 1 tablet by mouth Daily., Disp: , Rfl:     atenolol (TENORMIN) 25 MG tablet, Take 1 tablet by mouth 2 (Two) Times a Day., Disp: 180 tablet, Rfl: 1    carbidopa-levodopa CR (SINEMET CR)  MG per CR tablet, TAKE 1 TABLET EVERY NIGHT, Disp: 90 tablet, Rfl: 3    citalopram (CeleXA) 40 MG tablet, Take 1 tablet by mouth Daily., Disp: 90 tablet, Rfl: 1    clobetasol (TEMOVATE) 0.05 % ointment, Apply  topically to the appropriate area as directed 2 (Two) Times a Day., Disp: 60 g, Rfl: 1    colchicine 0.6 MG tablet, Take 1 tablet by mouth Daily. (Patient taking differently: Take 1 tablet by mouth Daily As Needed for Muscle / Joint Pain.), Disp: , Rfl:     cyclobenzaprine (FLEXERIL) 10 MG tablet, Take 1 tablet by mouth At Night As Needed for Muscle Spasms., Disp: 30 tablet, Rfl: 3    Diclofenac Sodium (VOLTAREN) 1 % gel gel, , Disp: , Rfl:     glimepiride (Amaryl) 4 MG tablet, Take 1 tablet by mouth 2 (Two) Times a Day With Meals., Disp: 180 tablet, Rfl: 1    KRILL OIL PO, Take  by mouth., Disp: , Rfl:     lisinopril-hydrochlorothiazide (PRINZIDE,ZESTORETIC) 20-12.5 MG per tablet, Take 1 tablet by mouth 2 (Two) Times a Day., Disp: 180 tablet, Rfl: 1    loratadine (CLARITIN) 10 MG tablet, Take 1 tablet by mouth As Needed., Disp: , Rfl:     meclizine (ANTIVERT) 25 MG tablet, Take 1 tablet by mouth 3 (Three) Times a Day As Needed (vertigo)., Disp: 90 tablet, Rfl: 2    ondansetron (Zofran) 4 MG tablet, Take 1 tablet by mouth Every 8 (Eight) Hours As Needed for Nausea or Vomiting., Disp: 20  "tablet, Rfl: 0    pantoprazole (PROTONIX) 40 MG EC tablet, Take 1 tablet by mouth Daily., Disp: 90 tablet, Rfl: 3    predniSONE (DELTASONE) 10 MG tablet, Take 1 tablet by mouth As Needed (for flare ups of gout)., Disp: , Rfl:     rosuvastatin (CRESTOR) 10 MG tablet, Take 1 tablet by mouth Every Night., Disp: 90 tablet, Rfl: 1    Objective:   Vitals and nursing note reviewed.   Constitutional:       Appearance: Healthy appearance. Not in distress.   Neck:      Vascular: No JVR. JVD normal.   Pulmonary:      Effort: Pulmonary effort is normal.      Breath sounds: Normal breath sounds. No wheezing. No rhonchi. No rales.   Chest:      Chest wall: Not tender to palpatation.   Cardiovascular:      PMI at left midclavicular line. Normal rate. Regular rhythm. Normal S1. Normal S2.       Murmurs: There is no murmur.      No gallop.  No click. No rub.   Pulses:     Intact distal pulses.   Edema:     Peripheral edema absent.   Abdominal:      General: Bowel sounds are normal.      Palpations: Abdomen is soft.      Tenderness: There is no abdominal tenderness.   Musculoskeletal: Normal range of motion.         General: No tenderness. Skin:     General: Skin is warm and dry.   Neurological:      General: No focal deficit present.      Mental Status: Alert and oriented to person, place and time.       Blood pressure 124/68, pulse 64, height 152.4 cm (60\"), weight 86.7 kg (191 lb 3.2 oz), SpO2 97%, not currently breastfeeding.   Lab Review:     Assessment:       1. Coronary artery disease involving native coronary artery of native heart without angina pectoris      2. Benign essential hypertension  Acceptable blood pressure control.    3. Dyslipidemia  Tolerating statin based cholesterol-lowering therapy.    4. Class 2 severe obesity due to excess calories with serious comorbidity and body mass index (BMI) of 37.0 to 37.9 in adult  Metabolic syndrome is present.  Nonalcoholic fatty liver disease is " suspected.    Procedures    Plan:     Advance Care Planning   ACP discussion was held with the patient during this visit. Patient has an advance directive (not in EMR), copy requested.     No changes in medications made at today's visit.    No cardiovascular testing warranted at this time.    The patient has been counseled regarding the essential need for diet exercise and weight management.  She is instructed to engage her primary care provider and a discussion aimed towards starting GLP-1 receptor agonist therapy.  The patient has been advised that as little as a 10% reduction in body weight could lead to significant improvement.

## 2024-12-02 ENCOUNTER — TRANSCRIBE ORDERS (OUTPATIENT)
Dept: INTERNAL MEDICINE | Facility: CLINIC | Age: 74
End: 2024-12-02
Payer: MEDICARE

## 2024-12-02 DIAGNOSIS — Z12.31 ENCOUNTER FOR SCREENING MAMMOGRAM FOR BREAST CANCER: Primary | ICD-10-CM

## 2024-12-03 LAB
NCCN CRITERIA FLAG: NORMAL
TYRER CUZICK SCORE: 5.5

## 2024-12-10 ENCOUNTER — HOSPITAL ENCOUNTER (OUTPATIENT)
Facility: HOSPITAL | Age: 74
Discharge: HOME | End: 2024-12-10
Admitting: INTERNAL MEDICINE
Payer: MEDICARE

## 2024-12-10 DIAGNOSIS — Z12.31 ENCOUNTER FOR SCREENING MAMMOGRAM FOR BREAST CANCER: ICD-10-CM

## 2024-12-10 PROCEDURE — 77067 SCR MAMMO BI INCL CAD: CPT

## 2024-12-10 PROCEDURE — 77063 BREAST TOMOSYNTHESIS BI: CPT

## 2025-01-13 ENCOUNTER — TELEPHONE (OUTPATIENT)
Dept: GASTROENTEROLOGY | Facility: CLINIC | Age: 75
End: 2025-01-13
Payer: MEDICARE

## 2025-01-13 NOTE — TELEPHONE ENCOUNTER
----- Message from Lisa HOLLINGSWORTH sent at 9/24/2024 10:59 AM EDT -----  Per Dr. Lee: call patient and get update and schedule for EGD.

## 2025-01-15 ENCOUNTER — TELEPHONE (OUTPATIENT)
Dept: GASTROENTEROLOGY | Facility: CLINIC | Age: 75
End: 2025-01-15
Payer: MEDICARE

## 2025-01-23 ENCOUNTER — OFFICE VISIT (OUTPATIENT)
Age: 75
End: 2025-01-23
Payer: MEDICARE

## 2025-01-23 VITALS
BODY MASS INDEX: 36.91 KG/M2 | DIASTOLIC BLOOD PRESSURE: 71 MMHG | SYSTOLIC BLOOD PRESSURE: 137 MMHG | HEIGHT: 60 IN | HEART RATE: 66 BPM | TEMPERATURE: 98.6 F | OXYGEN SATURATION: 98 % | WEIGHT: 188 LBS

## 2025-01-23 DIAGNOSIS — B96.89 BACTERIAL SINUSITIS: Primary | ICD-10-CM

## 2025-01-23 DIAGNOSIS — H66.002 NON-RECURRENT ACUTE SUPPURATIVE OTITIS MEDIA OF LEFT EAR WITHOUT SPONTANEOUS RUPTURE OF TYMPANIC MEMBRANE: ICD-10-CM

## 2025-01-23 DIAGNOSIS — J32.9 BACTERIAL SINUSITIS: Primary | ICD-10-CM

## 2025-01-23 PROCEDURE — 99214 OFFICE O/P EST MOD 30 MIN: CPT | Performed by: STUDENT IN AN ORGANIZED HEALTH CARE EDUCATION/TRAINING PROGRAM

## 2025-01-23 PROCEDURE — 1160F RVW MEDS BY RX/DR IN RCRD: CPT | Performed by: STUDENT IN AN ORGANIZED HEALTH CARE EDUCATION/TRAINING PROGRAM

## 2025-01-23 PROCEDURE — 1125F AMNT PAIN NOTED PAIN PRSNT: CPT | Performed by: STUDENT IN AN ORGANIZED HEALTH CARE EDUCATION/TRAINING PROGRAM

## 2025-01-23 PROCEDURE — 3078F DIAST BP <80 MM HG: CPT | Performed by: STUDENT IN AN ORGANIZED HEALTH CARE EDUCATION/TRAINING PROGRAM

## 2025-01-23 PROCEDURE — 3075F SYST BP GE 130 - 139MM HG: CPT | Performed by: STUDENT IN AN ORGANIZED HEALTH CARE EDUCATION/TRAINING PROGRAM

## 2025-01-23 PROCEDURE — 1159F MED LIST DOCD IN RCRD: CPT | Performed by: STUDENT IN AN ORGANIZED HEALTH CARE EDUCATION/TRAINING PROGRAM

## 2025-01-23 RX ORDER — DOXYCYCLINE 100 MG/1
100 CAPSULE ORAL 2 TIMES DAILY
Qty: 10 CAPSULE | Refills: 0 | Status: SHIPPED | OUTPATIENT
Start: 2025-01-23

## 2025-01-27 DIAGNOSIS — F41.9 ANXIETY: ICD-10-CM

## 2025-01-27 DIAGNOSIS — F33.0 MILD EPISODE OF RECURRENT MAJOR DEPRESSIVE DISORDER: ICD-10-CM

## 2025-01-27 RX ORDER — CITALOPRAM HYDROBROMIDE 40 MG/1
40 TABLET ORAL DAILY
Qty: 90 TABLET | Refills: 0 | Status: SHIPPED | OUTPATIENT
Start: 2025-01-27

## 2025-01-29 NOTE — PROGRESS NOTES
Acute Office Visit      Date: 2025   Patient Name: Missy Mcgraw  : 1950   MRN: [id number]     Chief Complaint:    Chief Complaint   Patient presents with    Earache     Ear pain with headache     History of Present Illness  The patient presents for evaluation of headache, left ear pain, and cough.    She has been experiencing a persistent headache localized to the posterior region of her head for over a week. Additionally, she reports occasional headaches across her forehead, which are sometimes accompanied by soreness upon palpation. She has not monitored her temperature but does not believe she has had a fever. She also reports a mild hacking cough and a general feeling of malaise.    She has been utilizing AirPods due to suboptimal volume on her phone, but this has resulted in significant discomfort in her left ear.    Supplemental Information  She has gout and recently completed a course of steroids.    Subjective      Review of Systems:   Review of Systems   Constitutional:  Negative for chills and fever.   HENT:  Positive for ear pain. Negative for congestion, sneezing and sore throat.    Respiratory:  Positive for cough. Negative for shortness of breath.    Cardiovascular:  Negative for chest pain.   Gastrointestinal:  Negative for nausea.   Genitourinary:  Negative for dysuria.   Skin:  Negative for rash.   Neurological:  Positive for headaches.   Psychiatric/Behavioral:  The patient is not nervous/anxious.         I have reviewed the patients family history, social history, past medical history, past surgical history and have updated it as appropriate.     Medications:     Current Outpatient Medications:     acetaminophen (TYLENOL) 500 MG tablet, Take  by mouth As Needed., Disp: , Rfl:     allopurinol (ZYLOPRIM) 100 MG tablet, Take 1 tablet by mouth Daily., Disp: , Rfl:     amLODIPine (NORVASC) 5 MG tablet, Take 1 tablet by mouth Every Night., Disp: 90 tablet, Rfl: 1    aspirin  (aspirin) 81 MG EC tablet, Take 1 tablet by mouth Daily., Disp: , Rfl:     atenolol (TENORMIN) 25 MG tablet, Take 1 tablet by mouth 2 (Two) Times a Day., Disp: 180 tablet, Rfl: 1    carbidopa-levodopa CR (SINEMET CR)  MG per CR tablet, TAKE 1 TABLET EVERY NIGHT, Disp: 90 tablet, Rfl: 3    clobetasol (TEMOVATE) 0.05 % ointment, Apply  topically to the appropriate area as directed 2 (Two) Times a Day., Disp: 60 g, Rfl: 1    colchicine 0.6 MG tablet, Take 1 tablet by mouth Daily. (Patient taking differently: Take 1 tablet by mouth Daily As Needed for Muscle / Joint Pain.), Disp: , Rfl:     cyclobenzaprine (FLEXERIL) 10 MG tablet, Take 1 tablet by mouth At Night As Needed for Muscle Spasms., Disp: 30 tablet, Rfl: 3    Diclofenac Sodium (VOLTAREN) 1 % gel gel, , Disp: , Rfl:     glimepiride (Amaryl) 4 MG tablet, Take 1 tablet by mouth 2 (Two) Times a Day With Meals., Disp: 180 tablet, Rfl: 1    KRILL OIL PO, Take  by mouth., Disp: , Rfl:     lisinopril-hydrochlorothiazide (PRINZIDE,ZESTORETIC) 20-12.5 MG per tablet, Take 1 tablet by mouth 2 (Two) Times a Day., Disp: 180 tablet, Rfl: 1    loratadine (CLARITIN) 10 MG tablet, Take 1 tablet by mouth As Needed., Disp: , Rfl:     meclizine (ANTIVERT) 25 MG tablet, Take 1 tablet by mouth 3 (Three) Times a Day As Needed (vertigo)., Disp: 90 tablet, Rfl: 2    ondansetron (Zofran) 4 MG tablet, Take 1 tablet by mouth Every 8 (Eight) Hours As Needed for Nausea or Vomiting., Disp: 20 tablet, Rfl: 0    pantoprazole (PROTONIX) 40 MG EC tablet, Take 1 tablet by mouth Daily., Disp: 90 tablet, Rfl: 3    predniSONE (DELTASONE) 10 MG tablet, Take 1 tablet by mouth As Needed (for flare ups of gout)., Disp: , Rfl:     rosuvastatin (CRESTOR) 10 MG tablet, Take 1 tablet by mouth Every Night., Disp: 90 tablet, Rfl: 1    citalopram (CeleXA) 40 MG tablet, TAKE 1 TABLET DAILY, Disp: 90 tablet, Rfl: 0    doxycycline (VIBRAMYCIN) 100 MG capsule, Take 1 capsule by mouth 2 (Two) Times a Day.,  "Disp: 10 capsule, Rfl: 0    Allergies:   Allergies   Allergen Reactions    Penicillins Unknown - Low Severity     Not sure of reaction (childhood)  Beta lactam allergy details  Antibiotic reaction: unknown  Age at reaction: child  Dose to reaction time: unknown  Reason for antibiotic: unknown  Epinephrine required for reaction?: unknown  Tolerated antibiotics: amoxicillin       Atorvastatin Unknown - Low Severity     Pt cannot remember    Fenofibrate Unknown - Low Severity     Pt cannot remember    Fluoxetine Unknown - Low Severity     Pt cannot remember    Gemfibrozil Unknown - Low Severity     Pt cannot remember    Hydroxychloroquine Unknown - Low Severity    Metformin Diarrhea    Nsaids GI Intolerance    Pravastatin Unknown - Low Severity     Pt cannot remember    Sulfamethoxazole-Trimethoprim Unknown - Low Severity     Pt cannot remember       Objective     Physical Exam: Please see above  Vital Signs:   Vitals:    01/23/25 1258   BP: 137/71   Pulse: 66   Temp: 98.6 °F (37 °C)   SpO2: 98%   Weight: 85.3 kg (188 lb)   Height: 152.4 cm (60\")     Body mass index is 36.72 kg/m².          Physical Exam  Vitals and nursing note reviewed.   Constitutional:       Appearance: Normal appearance.   HENT:      Head: Atraumatic.   Eyes:      Pupils: Pupils are equal, round, and reactive to light.   Cardiovascular:      Rate and Rhythm: Normal rate and regular rhythm.      Heart sounds: No murmur heard.  Pulmonary:      Effort: Pulmonary effort is normal.      Breath sounds: Normal breath sounds.   Musculoskeletal:         General: Normal range of motion.      Cervical back: Normal range of motion.      Right lower leg: No edema.      Left lower leg: No edema.   Skin:     General: Skin is warm and dry.   Neurological:      General: No focal deficit present.      Mental Status: She is alert and oriented to person, place, and time.      Gait: Gait is intact.   Psychiatric:         Mood and Affect: Mood normal.         Behavior: " Behavior normal.       Physical Exam  The eardrum appears slightly fluid-filled, erythematous and opaque. The right ear also shows a bit of fluid behind the eardrum, but it is not severe. Oropharynx erythematous.     Procedures    Results:   Labs:   Hemoglobin A1C   Date Value Ref Range Status   11/07/2024 7.00 (H) 4.80 - 5.60 % Final     Comment:     Hemoglobin A1C Ranges:  Increased Risk for Diabetes  5.7% to 6.4%  Diabetes                     >= 6.5%  Diabetic Goal                < 7.0%     12/31/2022 6.30 (H) 4.80 - 5.60 % Final     TSH   Date Value Ref Range Status   03/16/2018 1.380 0.470 - 4.680 mIU/mL Final        POCT Results (if applicable):   Results for orders placed or performed in visit on 12/03/24   [facility] GENETIC RISK ASSESSMENT QUESTIONNAIRE - ,    Collection Time: 12/03/24  8:30 AM   Result Value Ref Range    [place] 5.5     NCCN NCCN not met        Imaging:   No valid procedures specified.     Measures:   Smoking Cessation:   Never smoker.      Assessment / Plan      Assessment/Plan:   Diagnoses and all orders for this visit:    1. Bacterial sinusitis (Primary)  -     doxycycline (VIBRAMYCIN) 100 MG capsule; Take 1 capsule by mouth 2 (Two) Times a Day.  Dispense: 10 capsule; Refill: 0    2. Non-recurrent acute suppurative otitis media of left ear without spontaneous rupture of tympanic membrane  -     doxycycline (VIBRAMYCIN) 100 MG capsule; Take 1 capsule by mouth 2 (Two) Times a Day.  Dispense: 10 capsule; Refill: 0      Assessment & Plan  1. Bacterial sinus infection.  Symptoms have persisted for over a week, including a headache localized behind the ear and across the forehead, with occasional soreness. There is no reported fever, sore throat, or significant cough.     2. Left Otitis Media   Physical exam consistent with otitis media of left ear. Given allergies will treat with doxycycline.       Follow Up:   Return if symptoms worsen or fail to improve..    Patient or patient  representative verbalized consent for the use of Ambient Listening during the visit with  Justyna Parks MD for chart documentation. 1/29/2025  11:41 EST    Justyna Parks MD  [place] [id number]

## 2025-02-14 RX ORDER — CARBIDOPA AND LEVODOPA 50; 200 MG/1; MG/1
TABLET, EXTENDED RELEASE ORAL
Qty: 90 TABLET | Refills: 3 | Status: SHIPPED | OUTPATIENT
Start: 2025-02-14

## 2025-03-18 LAB
ALBUMIN SERPL-MCNC: 4.2 G/DL (ref 3.5–5.2)
ALBUMIN/CREAT UR: 573 MG/G CREAT (ref 0–29)
ALBUMIN/GLOB SERPL: 2.1 G/DL
ALP SERPL-CCNC: 95 U/L (ref 39–117)
ALT SERPL-CCNC: <5 U/L (ref 1–33)
AST SERPL-CCNC: 14 U/L (ref 1–32)
BILIRUB SERPL-MCNC: 0.4 MG/DL (ref 0–1.2)
BUN SERPL-MCNC: 17 MG/DL (ref 8–23)
BUN/CREAT SERPL: 18.9 (ref 7–25)
CALCIUM SERPL-MCNC: 10.3 MG/DL (ref 8.6–10.5)
CHLORIDE SERPL-SCNC: 102 MMOL/L (ref 98–107)
CHOLEST SERPL-MCNC: 208 MG/DL (ref 0–200)
CO2 SERPL-SCNC: 25.8 MMOL/L (ref 22–29)
CREAT SERPL-MCNC: 0.9 MG/DL (ref 0.57–1)
CREAT UR-MCNC: 110.2 MG/DL
EGFRCR SERPLBLD CKD-EPI 2021: 68.1 ML/MIN/1.73
ERYTHROCYTE [DISTWIDTH] IN BLOOD BY AUTOMATED COUNT: 12.8 % (ref 12.3–15.4)
GLOBULIN SER CALC-MCNC: 2 GM/DL
GLUCOSE SERPL-MCNC: 155 MG/DL (ref 65–99)
HBA1C MFR BLD: 7.3 % (ref 4.8–5.6)
HCT VFR BLD AUTO: 41.4 % (ref 34–46.6)
HDLC SERPL-MCNC: 47 MG/DL (ref 40–60)
HGB BLD-MCNC: 13.8 G/DL (ref 12–15.9)
LDLC SERPL CALC-MCNC: 109 MG/DL (ref 0–100)
MCH RBC QN AUTO: 30.3 PG (ref 26.6–33)
MCHC RBC AUTO-ENTMCNC: 33.3 G/DL (ref 31.5–35.7)
MCV RBC AUTO: 90.8 FL (ref 79–97)
MICROALBUMIN UR-MCNC: 631 UG/ML
PLATELET # BLD AUTO: 205 10*3/MM3 (ref 140–450)
POTASSIUM SERPL-SCNC: 4.3 MMOL/L (ref 3.5–5.2)
PROT SERPL-MCNC: 6.2 G/DL (ref 6–8.5)
RBC # BLD AUTO: 4.56 10*6/MM3 (ref 3.77–5.28)
SODIUM SERPL-SCNC: 139 MMOL/L (ref 136–145)
TRIGL SERPL-MCNC: 303 MG/DL (ref 0–150)
VLDLC SERPL CALC-MCNC: 52 MG/DL (ref 5–40)
WBC # BLD AUTO: 6.06 10*3/MM3 (ref 3.4–10.8)

## 2025-03-20 ENCOUNTER — OFFICE VISIT (OUTPATIENT)
Dept: INTERNAL MEDICINE | Facility: CLINIC | Age: 75
End: 2025-03-20
Payer: MEDICARE

## 2025-03-20 VITALS
DIASTOLIC BLOOD PRESSURE: 55 MMHG | HEIGHT: 60 IN | OXYGEN SATURATION: 99 % | WEIGHT: 191.8 LBS | TEMPERATURE: 98.7 F | HEART RATE: 66 BPM | BODY MASS INDEX: 37.66 KG/M2 | RESPIRATION RATE: 20 BRPM | SYSTOLIC BLOOD PRESSURE: 147 MMHG

## 2025-03-20 DIAGNOSIS — E78.2 MIXED HYPERLIPIDEMIA: ICD-10-CM

## 2025-03-20 DIAGNOSIS — F33.0 MILD EPISODE OF RECURRENT MAJOR DEPRESSIVE DISORDER: ICD-10-CM

## 2025-03-20 DIAGNOSIS — I10 BENIGN ESSENTIAL HYPERTENSION: Primary | ICD-10-CM

## 2025-03-20 DIAGNOSIS — E11.65 TYPE 2 DIABETES MELLITUS WITH HYPERGLYCEMIA, WITHOUT LONG-TERM CURRENT USE OF INSULIN: ICD-10-CM

## 2025-03-20 DIAGNOSIS — R21 RASH AND NONSPECIFIC SKIN ERUPTION: ICD-10-CM

## 2025-03-20 DIAGNOSIS — F41.9 ANXIETY: ICD-10-CM

## 2025-03-20 RX ORDER — CITALOPRAM HYDROBROMIDE 40 MG/1
40 TABLET ORAL DAILY
Qty: 90 TABLET | Refills: 1 | Status: SHIPPED | OUTPATIENT
Start: 2025-03-20

## 2025-03-20 RX ORDER — LISINOPRIL AND HYDROCHLOROTHIAZIDE 12.5; 2 MG/1; MG/1
1 TABLET ORAL 2 TIMES DAILY
Qty: 180 TABLET | Refills: 1 | Status: SHIPPED | OUTPATIENT
Start: 2025-03-20

## 2025-03-20 RX ORDER — ATENOLOL 25 MG/1
25 TABLET ORAL 2 TIMES DAILY
Qty: 180 TABLET | Refills: 1 | Status: SHIPPED | OUTPATIENT
Start: 2025-03-20

## 2025-03-20 RX ORDER — GLIMEPIRIDE 4 MG/1
4 TABLET ORAL 2 TIMES DAILY WITH MEALS
Qty: 180 TABLET | Refills: 1 | Status: SHIPPED | OUTPATIENT
Start: 2025-03-20

## 2025-03-20 RX ORDER — ROSUVASTATIN CALCIUM 10 MG/1
10 TABLET, COATED ORAL NIGHTLY
Qty: 90 TABLET | Refills: 1 | Status: SHIPPED | OUTPATIENT
Start: 2025-03-20

## 2025-03-20 RX ORDER — AMLODIPINE BESYLATE 5 MG/1
5 TABLET ORAL NIGHTLY
Qty: 90 TABLET | Refills: 1 | Status: SHIPPED | OUTPATIENT
Start: 2025-03-20

## 2025-03-20 NOTE — PROGRESS NOTES
"Chief Complaint  Hypertension, Hyperlipidemia, Rash (On chest. Started about 3 weeks ago.), and Diabetes    Subjective        Missy Mcgraw presents to [facility]    HPI: Patient is here to follow up on the blood pressure  The patient is taking the blood pressure medications as prescribed and has had no side effects. The patient is also here to follow up on the cholesterol and   sugar and  had lab work done . The patient also needs refills on medications . Patient complains of rash on upper chest and was seen by dermatology  and it started 3 weeks  and was advised to go back and see them as the rash has yet persisted   Hypertension   Pertinent negatives include no chest pain, palpitations or shortness of breath.   Hyperlipidemia   Pertinent negatives include no chest pain or shortness of breath.   Diabetes   Pertinent negatives for hypoglycemia include no dizziness, nervousness/anxiousness or pallor. Pertinent negatives for diabetes include no chest pain, no shortness of breath  Patient is on acei/arb   Answers submitted by the patient for this visit:  Problem not listed (Submitted on 3/17/2025)  Chief Complaint: Other medical problem  Reason for appointment: Blood work  Onset: 1 to 6 months  Chronicity: chronic  Frequency: daily    Objective   Vital Signs:  /55   Pulse 66   Temp 98.7 °F (37.1 °C) (Temporal)   Resp 20   Ht 152.4 cm (60\")   Wt 87 kg (191 lb 12.8 oz)   SpO2 99%   BMI 37.46 kg/m²   Estimated body mass index is 37.46 kg/m² as calculated from the following:    Height as of this encounter: 152.4 cm (60\").    Weight as of this encounter: 87 kg (191 lb 12.8 oz).            Physical Exam  Vitals and nursing note reviewed.   Constitutional:       General: She is not in acute distress.     Appearance: Normal appearance. She is obese. She is not diaphoretic.   HENT:      Head: Normocephalic and atraumatic.      Right Ear: External ear normal.      Left Ear: " External ear normal.      Nose: Nose normal.   Eyes:      Extraocular Movements: Extraocular movements intact.      Conjunctiva/sclera: Conjunctivae normal.   Neck:      Trachea: Trachea normal.   Cardiovascular:      Rate and Rhythm: Normal rate and regular rhythm.      Heart sounds: Normal heart sounds.   Pulmonary:      Effort: Pulmonary effort is normal. No respiratory distress.      Breath sounds: Normal breath sounds.   Abdominal:      General: Abdomen is flat.   Musculoskeletal:      Cervical back: Neck supple.      Comments: Moves all limbs   Skin:     General: Skin is warm.      Findings: Rash present.      Comments: Upper rash   Neurological:      Mental Status: She is alert and oriented to person, place, and time.      Comments: No gross motor or sensory deficits        Result Review :  The following data was reviewed by: Emely Sheldon MD on 03/20/2025:  Common labs          7/15/2024    07:55 11/7/2024    09:01 3/17/2025    08:10   Common Labs   Glucose 144  142  155    BUN 25  24  17    Creatinine 1.11  1.11  0.90    Sodium 140  139  139    Potassium 4.7  4.6  4.3    Chloride 104  103  102    Calcium 9.3  9.6  10.3    Albumin 4.2  4.2  4.2    Total Bilirubin 0.5  0.3  0.4    Alkaline Phosphatase 91  106  95    AST (SGOT) 11  12  14    ALT (SGPT) <5  5  <5    WBC 6.73  7.14  6.06    Hemoglobin 12.7  13.0  13.8    Hematocrit 38.6  38.3  41.4    Platelets 181  200  205    Total Cholesterol 192  196  208    Triglycerides 279  309  303    HDL Cholesterol 46  45  47    LDL Cholesterol  99  99  109    Hemoglobin A1C 6.90  7.00  7.30    Microalbumin, Urine 58.0   631.0                Assessment and Plan   Diagnoses and all orders for this visit:    1. Benign essential hypertension (Primary)  -     lisinopril-hydrochlorothiazide (PRINZIDE,ZESTORETIC) 20-12.5 MG per tablet; Take 1 tablet by mouth 2 (Two) Times a Day.  Dispense: 180 tablet; Refill: 1  -     atenolol (TENORMIN) 25 MG tablet; Take 1 tablet by mouth  2 (Two) Times a Day.  Dispense: 180 tablet; Refill: 1  -     amLODIPine (NORVASC) 5 MG tablet; Take 1 tablet by mouth Every Night.  Dispense: 90 tablet; Refill: 1    2. Mixed hyperlipidemia  -     rosuvastatin (CRESTOR) 10 MG tablet; Take 1 tablet by mouth Every Night.  Dispense: 90 tablet; Refill: 1  -     CBC (No Diff)  -     Comprehensive Metabolic Panel  -     Lipid Panel    3. Type 2 diabetes mellitus with hyperglycemia, without long-term current use of insulin  -     glimepiride (Amaryl) 4 MG tablet; Take 1 tablet by mouth 2 (Two) Times a Day With Meals.  Dispense: 180 tablet; Refill: 1  -     Hemoglobin A1c  -     Microalbumin / Creatinine Urine Ratio - Urine, Clean Catch  -     empagliflozin (Jardiance) 25 MG tablet tablet; Take 1 tablet by mouth Daily.  Dispense: 90 tablet; Refill: 1  -     Ambulatory Referral to Nutrition Services    4. Anxiety  -     citalopram (CeleXA) 40 MG tablet; Take 1 tablet by mouth Daily.  Dispense: 90 tablet; Refill: 1    5. Mild episode of recurrent major depressive disorder  -     citalopram (CeleXA) 40 MG tablet; Take 1 tablet by mouth Daily.  Dispense: 90 tablet; Refill: 1    6. Rash and nonspecific skin eruption      Plan:  1.  Benign essential hypertension: Will continue current medication, low-sodium diet advised, Counseled to regularly check BP at home with goal averaging <130/80.   2.mixed hyperlipidemia:  reviewed   fasting CMP and lipid panel.  Diet and exercise counseled,  Will continue current medications  3. Diabetes  Mellitus  :  reviewed   fasting CMP  and hba1c  7.3  , diet and exercise counseled , Will continue current medications and add Jardiance 25 mg daily, refer to dietician  4.  Anxiety: Refill Celexa 40 mg daily, labs reviewed  5.  Major depression: Refill Celexa 40 mg daily, labs reviewed  6. Rash : advised to follow up with dermatology        Follow Up   No follow-ups on file.  Patient was given instructions and counseling regarding her condition or for  health maintenance advice. Please see specific information pulled into the AVS if appropriate.

## 2025-04-28 DIAGNOSIS — K21.00 GASTROESOPHAGEAL REFLUX DISEASE WITH ESOPHAGITIS WITHOUT HEMORRHAGE: ICD-10-CM

## 2025-04-28 RX ORDER — PANTOPRAZOLE SODIUM 40 MG/1
40 TABLET, DELAYED RELEASE ORAL DAILY
Qty: 90 TABLET | Refills: 3 | Status: SHIPPED | OUTPATIENT
Start: 2025-04-28

## 2025-06-20 ENCOUNTER — HOSPITAL ENCOUNTER (OUTPATIENT)
Dept: NUTRITION | Facility: HOSPITAL | Age: 75
Discharge: HOME | End: 2025-06-20
Payer: MEDICARE

## 2025-06-20 NOTE — PROGRESS NOTES
Adult Outpatient Nutrition  Assessment    Patient Name:  Missy Mcgraw  YOB: 1950  MRN: [id number]    Assessment Date:  6/20/2025    Comments:      Patient with type 2 diabetes seen for nutrition counseling. Patient reports difficulty with physical activity due to back and hip pain, limiting her ability to walk long distances. She has made efforts to cut back on portion sizes. Diet history reveals a preference for traditional country cooking and frequent intake of fried foods. Patient reports limited vegetable intake but has been eating a fresh cantaloupe and watermelon regularly. She drinks mostly water and one soda daily. Nutrition education provided on balanced meals for blood sugar management, portion control, and strategies to gradually increase non-starchy vegetable intake. Patient agreeable and notes goals are feasible. Patient scheduled for follow-up August 20th @ 2:30pm. Patient denies any nutrition-related questions/concerns at this time. RD available PRN.     Electronically signed by:  Mariam Devi RD  06/20/25 12:20 EDT

## 2025-06-27 DIAGNOSIS — E11.65 TYPE 2 DIABETES MELLITUS WITH HYPERGLYCEMIA, WITHOUT LONG-TERM CURRENT USE OF INSULIN: ICD-10-CM

## 2025-06-27 NOTE — TELEPHONE ENCOUNTER
Rx Refill Note  Requested Prescriptions     Signed Prescriptions Disp Refills    empagliflozin (Jardiance) 25 MG tablet tablet 30 tablet 0     Sig: Take 1 tablet by mouth Daily.     Authorizing Provider: RAN PATINO     Ordering User: HOANG YI      Last office visit with prescribing clinician: 3/20/2025   Last telemedicine visit with prescribing clinician: Visit date not found   Next office visit with prescribing clinician: 7/23/2025                         Would you like a call back once the refill request has been completed: [] Yes [] No    If the office needs to give you a call back, can they leave a voicemail: [] Yes [] No    Hoang Yi MA  06/27/25, 15:31 EDT

## 2025-06-27 NOTE — TELEPHONE ENCOUNTER
"Caller: Missy Mcgraw \"CARL\"    Relationship: Self    Best call back number: [phone]     Requested Prescriptions:   Requested Prescriptions     Pending Prescriptions Disp Refills    empagliflozin (Jardiance) 25 MG tablet tablet 90 tablet 1     Sig: Take 1 tablet by mouth Daily.        Pharmacy where request should be sent: [facility] [address] [phone] [facility] [phone]      Last office visit with prescribing clinician: 3/20/2025   Last telemedicine visit with prescribing clinician: Visit date not found   Next office visit with prescribing clinician: 7/23/2025     Additional details provided by patient: OUT OF MEDICATION. PLEASE SEND A SMALL SUPPLY TO [facility] AND THE 90 DAY REFLL TO [facility]   "

## 2025-07-01 ENCOUNTER — PREP FOR SURGERY (OUTPATIENT)
Dept: OTHER | Facility: HOSPITAL | Age: 75
End: 2025-07-01
Payer: MEDICARE

## 2025-07-01 ENCOUNTER — OFFICE VISIT (OUTPATIENT)
Dept: OBSTETRICS AND GYNECOLOGY | Facility: CLINIC | Age: 75
End: 2025-07-01
Payer: MEDICARE

## 2025-07-01 VITALS
SYSTOLIC BLOOD PRESSURE: 122 MMHG | BODY MASS INDEX: 36.48 KG/M2 | DIASTOLIC BLOOD PRESSURE: 64 MMHG | HEIGHT: 60 IN | WEIGHT: 185.8 LBS

## 2025-07-01 DIAGNOSIS — L29.2 VULVAR ITCHING: ICD-10-CM

## 2025-07-01 DIAGNOSIS — N95.2 VAGINAL ATROPHY: ICD-10-CM

## 2025-07-01 DIAGNOSIS — N90.4 VULVAR LEUKOPLAKIA: ICD-10-CM

## 2025-07-01 DIAGNOSIS — N39.46 MIXED INCONTINENCE: Primary | ICD-10-CM

## 2025-07-01 DIAGNOSIS — N90.4 VULVAR LEUKOPLAKIA: Primary | ICD-10-CM

## 2025-07-01 PROCEDURE — 1159F MED LIST DOCD IN RCRD: CPT | Performed by: PHYSICIAN ASSISTANT

## 2025-07-01 PROCEDURE — 99204 OFFICE O/P NEW MOD 45 MIN: CPT | Performed by: PHYSICIAN ASSISTANT

## 2025-07-01 PROCEDURE — 1160F RVW MEDS BY RX/DR IN RCRD: CPT | Performed by: PHYSICIAN ASSISTANT

## 2025-07-01 PROCEDURE — 3074F SYST BP LT 130 MM HG: CPT | Performed by: PHYSICIAN ASSISTANT

## 2025-07-01 PROCEDURE — 3078F DIAST BP <80 MM HG: CPT | Performed by: PHYSICIAN ASSISTANT

## 2025-07-01 RX ORDER — SODIUM CHLORIDE 0.9 % (FLUSH) 0.9 %
3 SYRINGE (ML) INJECTION EVERY 12 HOURS SCHEDULED
OUTPATIENT
Start: 2025-07-01

## 2025-07-01 RX ORDER — SODIUM CHLORIDE 0.9 % (FLUSH) 0.9 %
10 SYRINGE (ML) INJECTION AS NEEDED
OUTPATIENT
Start: 2025-07-01

## 2025-07-01 RX ORDER — SODIUM CHLORIDE 9 MG/ML
40 INJECTION, SOLUTION INTRAVENOUS AS NEEDED
OUTPATIENT
Start: 2025-07-01

## 2025-07-01 RX ORDER — ESTRADIOL 0.1 MG/G
CREAM VAGINAL
Qty: 42.5 G | Refills: 4 | Status: SHIPPED | OUTPATIENT
Start: 2025-07-01

## 2025-07-01 RX ORDER — MIRABEGRON 25 MG/1
25 TABLET, FILM COATED, EXTENDED RELEASE ORAL DAILY
Qty: 90 TABLET | Refills: 1 | Status: SHIPPED | OUTPATIENT
Start: 2025-07-01 | End: 2026-07-01

## 2025-07-01 NOTE — PROGRESS NOTES
"Subjective   Chief Complaint   Patient presents with    Urinary Incontinence     Patient is here today C/O  urinary incontinence       Missy Mcgraw is a 74 y.o. year old  presenting to be seen for urinary incontinence.  Patient has been experiencing incontinence issues for a few years.  She has issues with urine leakage when coughing sneezing etc.  Also leaks urine sometimes when her bladder is full and she is moving around or getting up from a sitting position.  She wears pads daily.  She has not had any issues with urinary tract infection that she recalls.  Upon review of prior visit a few years ago she had been prescribed Myrbetriq which was helpful for her symptoms but she is unsure why she stopped using this.  She is not using estrogen vaginal cream.  She reports issues with external vaginal itching which has been off and on for a few years also.  Itching is extreme at times.    Past Medical History:   Diagnosis Date    Acute maxillary sinusitis     Allergic     Ankle sprain     Arthritis     Arthritis of back     Cancer Skin    Cataract     Colon polyp     Depression     Diabetes mellitus     Difficulty walking     Esophagitis, reflux     Fibromyositis     Fracture of ankle     Fracture, foot     GERD (gastroesophageal reflux disease)     Gout     H/O colonoscopy     Headache, tension-type     Herpes zoster     Hip arthrosis     History of mammogram     HL (hearing loss)     Hyperlipidemia     Hypertension     Injury of back     Knee swelling     Low back pain     Memory loss     Migraine headache     Obesity     Osteopenia     Osteoporosis     Pap smear for cervical cancer screening     PONV (postoperative nausea and vomiting)     Restless legs syndrome     Rotator cuff syndrome     Screening     DEXA body composition study    Stroke 2007?    Tear of meniscus of knee     Tennis elbow     TIA (transient ischemic attack)     \"at least 10 years ago\"    Transient cerebral ischemia     Urinary " tract infection     Vertigo     Wrist sprain         Current Outpatient Medications:     acetaminophen (TYLENOL) 500 MG tablet, Take  by mouth As Needed., Disp: , Rfl:     allopurinol (ZYLOPRIM) 100 MG tablet, Take 1 tablet by mouth Daily., Disp: , Rfl:     amLODIPine (NORVASC) 5 MG tablet, Take 1 tablet by mouth Every Night., Disp: 90 tablet, Rfl: 1    aspirin (aspirin) 81 MG EC tablet, Take 1 tablet by mouth Daily., Disp: , Rfl:     atenolol (TENORMIN) 25 MG tablet, Take 1 tablet by mouth 2 (Two) Times a Day., Disp: 180 tablet, Rfl: 1    carbidopa-levodopa CR (SINEMET CR)  MG per CR tablet, TAKE 1 TABLET EVERY NIGHT, Disp: 90 tablet, Rfl: 3    citalopram (CeleXA) 40 MG tablet, Take 1 tablet by mouth Daily., Disp: 90 tablet, Rfl: 1    colchicine 0.6 MG tablet, Take 1 tablet by mouth Daily. (Patient taking differently: Take 1 tablet by mouth Daily As Needed for Muscle / Joint Pain.), Disp: , Rfl:     empagliflozin (Jardiance) 25 MG tablet tablet, Take 1 tablet by mouth Daily., Disp: 30 tablet, Rfl: 0    glimepiride (Amaryl) 4 MG tablet, Take 1 tablet by mouth 2 (Two) Times a Day With Meals., Disp: 180 tablet, Rfl: 1    KRILL OIL PO, Take  by mouth., Disp: , Rfl:     lisinopril-hydrochlorothiazide (PRINZIDE,ZESTORETIC) 20-12.5 MG per tablet, Take 1 tablet by mouth 2 (Two) Times a Day., Disp: 180 tablet, Rfl: 1    loratadine (CLARITIN) 10 MG tablet, Take 1 tablet by mouth As Needed., Disp: , Rfl:     meclizine (ANTIVERT) 25 MG tablet, Take 1 tablet by mouth 3 (Three) Times a Day As Needed (vertigo)., Disp: 90 tablet, Rfl: 2    pantoprazole (PROTONIX) 40 MG EC tablet, TAKE 1 TABLET DAILY, Disp: 90 tablet, Rfl: 3    predniSONE (DELTASONE) 10 MG tablet, Take 1 tablet by mouth As Needed (for flare ups of gout)., Disp: , Rfl:     rosuvastatin (CRESTOR) 10 MG tablet, Take 1 tablet by mouth Every Night., Disp: 90 tablet, Rfl: 1    estradiol (ESTRACE VAGINAL) 0.1 MG/GM vaginal cream, Insert 1 gm intravaginally 2 times  each week, Disp: 42.5 g, Rfl: 4    Mirabegron ER (Myrbetriq) 25 MG tablet sustained-release 24 hour 24 hr tablet, Take 1 tablet by mouth Daily., Disp: 90 tablet, Rfl: 1   Allergies   Allergen Reactions    Penicillins Unknown - Low Severity and Other (See Comments)     Not sure of reaction (childhood)    Beta lactam allergy details    Antibiotic reaction: unknown    Age at reaction: child    Dose to reaction time: unknown    Reason for antibiotic: unknown    Epinephrine required for reaction?: unknown    Tolerated antibiotics: amoxicillin    Atorvastatin Unknown - Low Severity     Pt cannot remember    Fenofibrate Unknown - Low Severity     Pt cannot remember    Fluoxetine Unknown - Low Severity     Pt cannot remember    Gemfibrozil Unknown - Low Severity     Pt cannot remember    Hydroxychloroquine Unknown - Low Severity    Metformin Diarrhea    Nsaids GI Intolerance    Pravastatin Unknown - Low Severity     Pt cannot remember    Sulfamethoxazole-Trimethoprim Unknown - Low Severity     Pt cannot remember      Past Surgical History:   Procedure Laterality Date    APPENDECTOMY      CARDIAC CATHETERIZATION N/A 08/01/2018    Procedure: Coronary angiography;  Surgeon: Wali Núñez MD;  Location: [facility] CATH INVASIVE LOCATION;  Service: Cardiology    COLONOSCOPY  10/2016    Dr. Colmenares    COLONOSCOPY N/A 02/01/2019    Procedure: COLONOSCOPY;  Surgeon: Marie De Leon MD;  Location: [facility] ENDOSCOPY;  Service: Gastroenterology    COLONOSCOPY N/A 03/08/2024    Procedure: COLONOSCOPY;  Surgeon: Marie De Leon MD;  Location: [facility] ENDOSCOPY;  Service: Gastroenterology;  Laterality: N/A;    ELBOW PROCEDURE  Ulnar nerve    ENDOSCOPY  10/2016    Dr. Colmenares    ENDOSCOPY N/A 02/01/2019    Procedure: ESOPHAGOGASTRODUODENOSCOPY WITH COLD FORCEP BIOPSY AND POLYPECTOMY;  Surgeon: Marie De Leon MD;  Location: [facility] ENDOSCOPY;  Service: Gastroenterology    ENDOSCOPY N/A 03/08/2024    Procedure: ESOPHAGOGASTRODUODENOSCOPY  "WITH BIOPSY X3, POLYPECTOMY X3;  Surgeon: Marie De Leon MD;  Location: [facility] END[facility]COPY;  Service: Gastroenterology;  Laterality: N/A;    JOINT REPLACEMENT  Oct 26, 2022    KNEE SURGERY Right     LAPAROSCOPY DIAGNOSTIC / BIOPSY / ASPIRATION / LYSIS Right     SHOULDER ARTHROSCOPY Bilateral     SHOULDER SURGERY  Rotator cuff    UPPER GASTROINTESTINAL ENDOSCOPY  Feb 2024      Social History     Socioeconomic History    Marital status: Single   Tobacco Use    Smoking status: Never     Passive exposure: Never    Smokeless tobacco: Never   Vaping Use    Vaping status: Never Used   Substance and Sexual Activity    Alcohol use: No    Drug use: No    Sexual activity: Not Currently     Partners: Male     Birth control/protection: Post-menopausal      Family History   Problem Relation Age of Onset    Kidney failure Mother     Kidney disease Mother     Hypertension Mother     Cancer Father         prostate,lung,colon    Arthritis Father     Colon cancer Father     Hypertension Father     Heart disease Sister     Cancer Sister         Bladder    Arthritis Other     Diabetes Other     Cancer Other         lung    Breast cancer Neg Hx     Ovarian cancer Neg Hx        Review of Systems   Constitutional: Negative.    Gastrointestinal: Negative.    Genitourinary:  Positive for enuresis. Negative for difficulty urinating, dysuria, frequency, urgency and vaginal discharge.           Objective   /64   Ht 152.4 cm (60\")   Wt 84.3 kg (185 lb 12.8 oz)   BMI 36.29 kg/m²     Physical Exam  Exam conducted with a chaperone present.   Constitutional:       Appearance: Normal appearance. She is well-developed and well-groomed.   Eyes:      General: Lids are normal.      Extraocular Movements: Extraocular movements intact.      Conjunctiva/sclera: Conjunctivae normal.   Genitourinary:     Labia:         Right: Lesion present.         Left: Lesion present.       Urethra: No prolapse, urethral pain, urethral swelling or urethral " lesion.          Comments: Thickened white patch of tissue right upper labia minora 1.5 x 2 cm and also thick patchy white tissue left labia minora.     No leaking of urine observed with cough/straining  No cystocele or rectocele appreciated  Vaginal tissue atrophic    Neurological:      General: No focal deficit present.      Mental Status: She is alert and oriented to person, place, and time.   Psychiatric:         Attention and Perception: Attention normal.         Mood and Affect: Mood normal.         Speech: Speech normal.         Behavior: Behavior is cooperative.            Result Review :           Pap IG, Rfx HPV ASCU (12/09/2021)         Assessment and Plan  Diagnoses and all orders for this visit:    1. Mixed incontinence (Primary)    2. Vaginal atrophy    3. Vulvar leukoplakia    4. Vulvar itching    Other orders  -     Mirabegron ER (Myrbetriq) 25 MG tablet sustained-release 24 hour 24 hr tablet; Take 1 tablet by mouth Daily.  Dispense: 90 tablet; Refill: 1  -     estradiol (ESTRACE VAGINAL) 0.1 MG/GM vaginal cream; Insert 1 gm intravaginally 2 times each week  Dispense: 42.5 g; Refill: 4      Patient Instructions   Patient is counseled guarding the findings of the change in her vulvar tissue with thickened white patches.  Although this could be an extreme case of lichen sclerosis, I have some concern about possible vulvar dysplasia and recommend vulvar biopsies.  Patient is offered the option of biopsy in office under local anesthesia versus outpatient vulvar biopsies under sedation.  She request to schedule as an outpatient procedure and requests Dr. Dow.           This note was electronically signed.    Brittany Calvin PA-C   July 1, 2025

## 2025-07-01 NOTE — PATIENT INSTRUCTIONS
Patient is counseled guarding the findings of the change in her vulvar tissue with thickened white patches.  Although this could be an extreme case of lichen sclerosis, I have some concern about possible vulvar dysplasia and recommend vulvar biopsies.  Patient is offered the option of biopsy in office under local anesthesia versus outpatient vulvar biopsies under sedation.  She request to schedule as an outpatient procedure and requests Dr. Dow.

## 2025-07-02 ENCOUNTER — TRANSCRIBE ORDERS (OUTPATIENT)
Dept: ADMINISTRATIVE | Facility: HOSPITAL | Age: 75
End: 2025-07-02
Payer: MEDICARE

## 2025-07-02 DIAGNOSIS — Z12.31 VISIT FOR SCREENING MAMMOGRAM: Primary | ICD-10-CM

## 2025-07-03 NOTE — PRE-PROCEDURE INSTRUCTIONS
Pre-Admission testing appointment completed today for patient's upcoming procedure here at [facility].    PAT PASS reviewed with patient and they verbalize understanding of the following:     Do not eat or drink anything after midnight the night before procedure unless otherwise instructed by physician/surgeon's office, this includes no gum, candy, mints, tobacco products or e-cigarettes.  Do not shave the area to be operated on at least 48 hours prior to procedure.  Do not wear makeup, lotion, hair products, or nail polish.  Do not wear any jewelry and remove all piercings.  Do not wear any adhesive if you wear dentures.  Do not wear contacts; bring in glasses if needed.  Only take medications on the morning of procedure as instructed by PAT nurse per anesthesia guidelines or as instructed by physician's office.  If you are on any blood thinners reach out to the physician/surgeon's office for instructions on when/if they will need to be stopped prior to procedure.  Bring in picture ID and insurance card, advanced directive copies if applicable, CPAP/BIPAP/Inhalers if indicated morning of procedure, leave any other valuables at home.  Ensure you have arranged for someone to drive you home the day of your procedure and someone to care for you at home afterwards. It is recommended that you do not drive, drink alcohol, or make any major legal decisions for at least 24 hours after your procedure is complete.  Chlorhexadine sponge along with instruction/information sheet given to patient. Readybath wipes given in lieu of CHG if patient has CHG allergy. Instructed patient to date, time, and initial the verification sheet once skin prep has been completed, and to return to [facility] the day of the procedure.  ERAS instructions given to patient unless otherwise instructed per surgeon's orders.     Anesthesia FAQ tip sheet given to patient.  Instructions and information given to patient about parking, hospital  entrance, and registration location.

## 2025-07-07 ENCOUNTER — LAB (OUTPATIENT)
Dept: LAB | Facility: HOSPITAL | Age: 75
End: 2025-07-07
Payer: MEDICARE

## 2025-07-07 DIAGNOSIS — N90.4 VULVAR LEUKOPLAKIA: ICD-10-CM

## 2025-07-07 DIAGNOSIS — L29.2 VULVAR ITCHING: ICD-10-CM

## 2025-07-07 LAB
ALBUMIN SERPL-MCNC: 4.2 G/DL (ref 3.5–5.2)
ALBUMIN UR-MCNC: 3.7 MG/DL
ALBUMIN/GLOB SERPL: 1.6 G/DL
ALP SERPL-CCNC: 85 U/L (ref 39–117)
ALT SERPL W P-5'-P-CCNC: <5 U/L (ref 1–33)
ANION GAP SERPL CALCULATED.3IONS-SCNC: 13 MMOL/L (ref 5–15)
AST SERPL-CCNC: 14 U/L (ref 1–32)
BACTERIA UR QL AUTO: ABNORMAL /HPF
BASOPHILS # BLD AUTO: 0.05 10*3/MM3 (ref 0–0.2)
BASOPHILS NFR BLD AUTO: 0.6 % (ref 0–1.5)
BILIRUB SERPL-MCNC: 0.4 MG/DL (ref 0–1.2)
BILIRUB UR QL STRIP: NEGATIVE
BUN SERPL-MCNC: 33 MG/DL (ref 8–23)
BUN/CREAT SERPL: 24.3 (ref 7–25)
CALCIUM SPEC-SCNC: 9.8 MG/DL (ref 8.6–10.5)
CHLORIDE SERPL-SCNC: 101 MMOL/L (ref 98–107)
CHOLEST SERPL-MCNC: 217 MG/DL (ref 0–200)
CLARITY UR: ABNORMAL
CO2 SERPL-SCNC: 23 MMOL/L (ref 22–29)
COLOR UR: YELLOW
CREAT SERPL-MCNC: 1.36 MG/DL (ref 0.57–1)
CREAT UR-MCNC: 92.4 MG/DL
DEPRECATED RDW RBC AUTO: 46.3 FL (ref 37–54)
EGFRCR SERPLBLD CKD-EPI 2021: 41.5 ML/MIN/1.73
EOSINOPHIL # BLD AUTO: 0.23 10*3/MM3 (ref 0–0.4)
EOSINOPHIL NFR BLD AUTO: 3 % (ref 0.3–6.2)
ERYTHROCYTE [DISTWIDTH] IN BLOOD BY AUTOMATED COUNT: 13.4 % (ref 12.3–15.4)
GLOBULIN UR ELPH-MCNC: 2.7 GM/DL
GLUCOSE SERPL-MCNC: 165 MG/DL (ref 65–99)
GLUCOSE UR STRIP-MCNC: ABNORMAL MG/DL
HBA1C MFR BLD: 6.8 % (ref 4.8–5.6)
HCT VFR BLD AUTO: 41.8 % (ref 34–46.6)
HDLC SERPL-MCNC: 42 MG/DL (ref 40–60)
HGB BLD-MCNC: 13.5 G/DL (ref 12–15.9)
HGB UR QL STRIP.AUTO: NEGATIVE
HYALINE CASTS UR QL AUTO: ABNORMAL /LPF
IMM GRANULOCYTES # BLD AUTO: 0.04 10*3/MM3 (ref 0–0.05)
IMM GRANULOCYTES NFR BLD AUTO: 0.5 % (ref 0–0.5)
KETONES UR QL STRIP: NEGATIVE
LDLC SERPL CALC-MCNC: 109 MG/DL (ref 0–100)
LDLC/HDLC SERPL: 2.33 {RATIO}
LEUKOCYTE ESTERASE UR QL STRIP.AUTO: ABNORMAL
LYMPHOCYTES # BLD AUTO: 1.48 10*3/MM3 (ref 0.7–3.1)
LYMPHOCYTES NFR BLD AUTO: 19.1 % (ref 19.6–45.3)
MCH RBC QN AUTO: 30.5 PG (ref 26.6–33)
MCHC RBC AUTO-ENTMCNC: 32.3 G/DL (ref 31.5–35.7)
MCV RBC AUTO: 94.6 FL (ref 79–97)
MICROALBUMIN/CREAT UR: 40 MG/G (ref 0–29)
MONOCYTES # BLD AUTO: 0.7 10*3/MM3 (ref 0.1–0.9)
MONOCYTES NFR BLD AUTO: 9 % (ref 5–12)
NEUTROPHILS NFR BLD AUTO: 5.24 10*3/MM3 (ref 1.7–7)
NEUTROPHILS NFR BLD AUTO: 67.8 % (ref 42.7–76)
NITRITE UR QL STRIP: NEGATIVE
NRBC BLD AUTO-RTO: 0 /100 WBC (ref 0–0.2)
PH UR STRIP.AUTO: 5.5 [PH] (ref 5–8)
PLATELET # BLD AUTO: 207 10*3/MM3 (ref 140–450)
PMV BLD AUTO: 12 FL (ref 6–12)
POTASSIUM SERPL-SCNC: 4.3 MMOL/L (ref 3.5–5.2)
PROT SERPL-MCNC: 6.9 G/DL (ref 6–8.5)
PROT UR QL STRIP: NEGATIVE
RBC # BLD AUTO: 4.42 10*6/MM3 (ref 3.77–5.28)
RBC # UR STRIP: ABNORMAL /HPF
REF LAB TEST METHOD: ABNORMAL
SODIUM SERPL-SCNC: 137 MMOL/L (ref 136–145)
SP GR UR STRIP: 1.03 (ref 1–1.03)
SQUAMOUS #/AREA URNS HPF: ABNORMAL /HPF
TRIGL SERPL-MCNC: 385 MG/DL (ref 0–150)
UROBILINOGEN UR QL STRIP: ABNORMAL
VLDLC SERPL-MCNC: 66 MG/DL (ref 5–40)
WBC # UR STRIP: ABNORMAL /HPF
WBC NRBC COR # BLD AUTO: 7.74 10*3/MM3 (ref 3.4–10.8)

## 2025-07-07 PROCEDURE — 36415 COLL VENOUS BLD VENIPUNCTURE: CPT

## 2025-07-07 PROCEDURE — 80061 LIPID PANEL: CPT | Performed by: INTERNAL MEDICINE

## 2025-07-07 PROCEDURE — 81001 URINALYSIS AUTO W/SCOPE: CPT

## 2025-07-07 PROCEDURE — 82570 ASSAY OF URINE CREATININE: CPT | Performed by: INTERNAL MEDICINE

## 2025-07-07 PROCEDURE — 85025 COMPLETE CBC W/AUTO DIFF WBC: CPT

## 2025-07-07 PROCEDURE — 83036 HEMOGLOBIN GLYCOSYLATED A1C: CPT | Performed by: INTERNAL MEDICINE

## 2025-07-07 PROCEDURE — 87086 URINE CULTURE/COLONY COUNT: CPT

## 2025-07-07 PROCEDURE — 82043 UR ALBUMIN QUANTITATIVE: CPT | Performed by: INTERNAL MEDICINE

## 2025-07-07 PROCEDURE — 80053 COMPREHEN METABOLIC PANEL: CPT | Performed by: INTERNAL MEDICINE

## 2025-07-08 LAB — BACTERIA SPEC AEROBE CULT: NORMAL

## 2025-07-10 ENCOUNTER — HOSPITAL ENCOUNTER (OUTPATIENT)
Facility: HOSPITAL | Age: 75
Setting detail: HOSPITAL OUTPATIENT SURGERY
Discharge: HOME | End: 2025-07-10
Attending: STUDENT IN AN ORGANIZED HEALTH CARE EDUCATION/TRAINING PROGRAM | Admitting: STUDENT IN AN ORGANIZED HEALTH CARE EDUCATION/TRAINING PROGRAM
Payer: MEDICARE

## 2025-07-10 ENCOUNTER — ANESTHESIA EVENT (OUTPATIENT)
Dept: PERIOP | Facility: HOSPITAL | Age: 75
End: 2025-07-10
Payer: MEDICARE

## 2025-07-10 ENCOUNTER — ANESTHESIA (OUTPATIENT)
Dept: PERIOP | Facility: HOSPITAL | Age: 75
End: 2025-07-10
Payer: MEDICARE

## 2025-07-10 VITALS
TEMPERATURE: 98.2 F | HEIGHT: 60 IN | RESPIRATION RATE: 16 BRPM | BODY MASS INDEX: 36.32 KG/M2 | WEIGHT: 185 LBS | OXYGEN SATURATION: 96 % | HEART RATE: 66 BPM | SYSTOLIC BLOOD PRESSURE: 148 MMHG | DIASTOLIC BLOOD PRESSURE: 71 MMHG

## 2025-07-10 DIAGNOSIS — N90.4 VULVAR LEUKOPLAKIA: ICD-10-CM

## 2025-07-10 DIAGNOSIS — L29.2 VULVAR ITCHING: ICD-10-CM

## 2025-07-10 LAB — GLUCOSE BLDC GLUCOMTR-MCNC: 151 MG/DL (ref 70–130)

## 2025-07-10 PROCEDURE — 25010000002 LIDOCAINE 1% - EPINEPHRINE 1:100000 1 %-1:100000 SOLUTION: Performed by: STUDENT IN AN ORGANIZED HEALTH CARE EDUCATION/TRAINING PROGRAM

## 2025-07-10 PROCEDURE — 25810000003 LACTATED RINGERS PER 1000 ML: Performed by: STUDENT IN AN ORGANIZED HEALTH CARE EDUCATION/TRAINING PROGRAM

## 2025-07-10 PROCEDURE — 25010000002 FENTANYL CITRATE (PF) 50 MCG/ML SOLUTION PREFILLED SYRINGE: Performed by: NURSE ANESTHETIST, CERTIFIED REGISTERED

## 2025-07-10 PROCEDURE — 25010000002 ONDANSETRON PER 1 MG: Performed by: NURSE ANESTHETIST, CERTIFIED REGISTERED

## 2025-07-10 PROCEDURE — 25010000002 DEXAMETHASONE PER 1 MG: Performed by: NURSE ANESTHETIST, CERTIFIED REGISTERED

## 2025-07-10 PROCEDURE — 25010000002 PROPOFOL 10 MG/ML EMULSION: Performed by: NURSE ANESTHETIST, CERTIFIED REGISTERED

## 2025-07-10 PROCEDURE — 25010000002 LIDOCAINE (CARDIAC): Performed by: NURSE ANESTHETIST, CERTIFIED REGISTERED

## 2025-07-10 PROCEDURE — 56606 BIOPSY OF VULVA/PERINEUM: CPT | Performed by: STUDENT IN AN ORGANIZED HEALTH CARE EDUCATION/TRAINING PROGRAM

## 2025-07-10 PROCEDURE — 56605 BIOPSY OF VULVA/PERINEUM: CPT | Performed by: STUDENT IN AN ORGANIZED HEALTH CARE EDUCATION/TRAINING PROGRAM

## 2025-07-10 PROCEDURE — 82948 REAGENT STRIP/BLOOD GLUCOSE: CPT

## 2025-07-10 PROCEDURE — 25810000003 LACTATED RINGERS PER 1000 ML: Performed by: NURSE ANESTHETIST, CERTIFIED REGISTERED

## 2025-07-10 PROCEDURE — 88305 TISSUE EXAM BY PATHOLOGIST: CPT

## 2025-07-10 PROCEDURE — S0260 H&P FOR SURGERY: HCPCS | Performed by: STUDENT IN AN ORGANIZED HEALTH CARE EDUCATION/TRAINING PROGRAM

## 2025-07-10 RX ORDER — IPRATROPIUM BROMIDE AND ALBUTEROL SULFATE 2.5; .5 MG/3ML; MG/3ML
3 SOLUTION RESPIRATORY (INHALATION) ONCE AS NEEDED
Status: DISCONTINUED | OUTPATIENT
Start: 2025-07-10 | End: 2025-07-10 | Stop reason: HOSPADM

## 2025-07-10 RX ORDER — PROPOFOL 10 MG/ML
VIAL (ML) INTRAVENOUS AS NEEDED
Status: DISCONTINUED | OUTPATIENT
Start: 2025-07-10 | End: 2025-07-10 | Stop reason: SURG

## 2025-07-10 RX ORDER — SODIUM CHLORIDE 0.9 % (FLUSH) 0.9 %
10 SYRINGE (ML) INJECTION AS NEEDED
Status: DISCONTINUED | OUTPATIENT
Start: 2025-07-10 | End: 2025-07-10 | Stop reason: HOSPADM

## 2025-07-10 RX ORDER — ONDANSETRON 2 MG/ML
INJECTION INTRAMUSCULAR; INTRAVENOUS AS NEEDED
Status: DISCONTINUED | OUTPATIENT
Start: 2025-07-10 | End: 2025-07-10 | Stop reason: SURG

## 2025-07-10 RX ORDER — DEXAMETHASONE SODIUM PHOSPHATE 4 MG/ML
INJECTION, SOLUTION INTRA-ARTICULAR; INTRALESIONAL; INTRAMUSCULAR; INTRAVENOUS; SOFT TISSUE AS NEEDED
Status: DISCONTINUED | OUTPATIENT
Start: 2025-07-10 | End: 2025-07-10 | Stop reason: SURG

## 2025-07-10 RX ORDER — OXYCODONE HYDROCHLORIDE 5 MG/1
5 TABLET ORAL EVERY 4 HOURS PRN
Qty: 5 TABLET | Refills: 0 | Status: SHIPPED | OUTPATIENT
Start: 2025-07-10

## 2025-07-10 RX ORDER — LIDOCAINE HYDROCHLORIDE AND EPINEPHRINE 10; 10 MG/ML; UG/ML
INJECTION, SOLUTION INFILTRATION; PERINEURAL AS NEEDED
Status: DISCONTINUED | OUTPATIENT
Start: 2025-07-10 | End: 2025-07-10 | Stop reason: HOSPADM

## 2025-07-10 RX ORDER — SODIUM CHLORIDE, SODIUM LACTATE, POTASSIUM CHLORIDE, CALCIUM CHLORIDE 600; 310; 30; 20 MG/100ML; MG/100ML; MG/100ML; MG/100ML
50 INJECTION, SOLUTION INTRAVENOUS CONTINUOUS
Status: DISCONTINUED | OUTPATIENT
Start: 2025-07-10 | End: 2025-07-10 | Stop reason: HOSPADM

## 2025-07-10 RX ORDER — ACETAMINOPHEN 500 MG
1000 TABLET ORAL EVERY 6 HOURS PRN
Qty: 60 TABLET | Refills: 0 | Status: SHIPPED | OUTPATIENT
Start: 2025-07-10

## 2025-07-10 RX ORDER — KETAMINE HCL IN NACL, ISO-OSM 100MG/10ML
SYRINGE (ML) INJECTION AS NEEDED
Status: DISCONTINUED | OUTPATIENT
Start: 2025-07-10 | End: 2025-07-10 | Stop reason: SURG

## 2025-07-10 RX ORDER — SODIUM CHLORIDE 0.9 % (FLUSH) 0.9 %
3 SYRINGE (ML) INJECTION EVERY 12 HOURS SCHEDULED
Status: DISCONTINUED | OUTPATIENT
Start: 2025-07-10 | End: 2025-07-10 | Stop reason: HOSPADM

## 2025-07-10 RX ORDER — FENTANYL CITRATE 50 UG/ML
INJECTION, SOLUTION INTRAMUSCULAR; INTRAVENOUS AS NEEDED
Status: DISCONTINUED | OUTPATIENT
Start: 2025-07-10 | End: 2025-07-10 | Stop reason: SURG

## 2025-07-10 RX ORDER — SODIUM CHLORIDE, SODIUM LACTATE, POTASSIUM CHLORIDE, CALCIUM CHLORIDE 600; 310; 30; 20 MG/100ML; MG/100ML; MG/100ML; MG/100ML
INJECTION, SOLUTION INTRAVENOUS CONTINUOUS PRN
Status: DISCONTINUED | OUTPATIENT
Start: 2025-07-10 | End: 2025-07-10 | Stop reason: SURG

## 2025-07-10 RX ORDER — SODIUM CHLORIDE 9 MG/ML
40 INJECTION, SOLUTION INTRAVENOUS AS NEEDED
Status: DISCONTINUED | OUTPATIENT
Start: 2025-07-10 | End: 2025-07-10 | Stop reason: HOSPADM

## 2025-07-10 RX ADMIN — FENTANYL CITRATE 25 MCG: 50 INJECTION, SOLUTION INTRAMUSCULAR; INTRAVENOUS at 10:12

## 2025-07-10 RX ADMIN — PROPOFOL 100 MCG/KG/MIN: 10 INJECTION, EMULSION INTRAVENOUS at 09:48

## 2025-07-10 RX ADMIN — DEXAMETHASONE SODIUM PHOSPHATE 8 MG: 4 INJECTION, SOLUTION INTRA-ARTICULAR; INTRALESIONAL; INTRAMUSCULAR; INTRAVENOUS; SOFT TISSUE at 09:55

## 2025-07-10 RX ADMIN — Medication 10 MG: at 09:56

## 2025-07-10 RX ADMIN — SODIUM CHLORIDE, POTASSIUM CHLORIDE, SODIUM LACTATE AND CALCIUM CHLORIDE: 600; 310; 30; 20 INJECTION, SOLUTION INTRAVENOUS at 09:41

## 2025-07-10 RX ADMIN — PROPOFOL 80 MG: 10 INJECTION, EMULSION INTRAVENOUS at 09:47

## 2025-07-10 RX ADMIN — ONDANSETRON 4 MG: 2 INJECTION INTRAMUSCULAR; INTRAVENOUS at 09:55

## 2025-07-10 RX ADMIN — Medication 10 MG: at 09:47

## 2025-07-10 RX ADMIN — LIDOCAINE HYDROCHLORIDE 60 MG: 20 INJECTION, SOLUTION INTRAVENOUS at 09:47

## 2025-07-10 RX ADMIN — SODIUM CHLORIDE, POTASSIUM CHLORIDE, SODIUM LACTATE AND CALCIUM CHLORIDE 50 ML/HR: 600; 310; 30; 20 INJECTION, SOLUTION INTRAVENOUS at 08:23

## 2025-07-10 RX ADMIN — FENTANYL CITRATE 25 MCG: 50 INJECTION, SOLUTION INTRAMUSCULAR; INTRAVENOUS at 09:55

## 2025-07-10 NOTE — ANESTHESIA POSTPROCEDURE EVALUATION
Patient: Missy Mcgraw    Procedure Summary       Date: 07/10/25 Room / Location: [facility] OR 1 /  [facility]    Anesthesia Start: 0941 Anesthesia Stop: 1024    Procedure: BILATERAL VULVA BIOPSY (Vulva) Diagnosis:       Vulvar leukoplakia      Vulvar itching      (Vulvar leukoplakia [N90.4])      (Vulvar itching [L29.2])    Surgeons: Jennifer Rothman MD Provider: Kenyatta Nj CRNA    Anesthesia Type: MAC, general ASA Status: 3            Anesthesia Type: MAC, general    Vitals  Vitals Value Taken Time   /71 07/10/25 10:53   Temp 98.2 °F (36.8 °C) 07/10/25 10:26   Pulse 66 07/10/25 10:53   Resp 16 07/10/25 10:53   SpO2 96 % 07/10/25 10:53           Post Anesthesia Care and Evaluation    Patient location during evaluation: PHASE II  Level of consciousness: sleepy but conscious  Pain score: 0  Pain management: satisfactory to patient    Airway patency: patent  Anesthetic complications: No anesthetic complications  PONV Status: none  Cardiovascular status: acceptable and stable  Respiratory status: acceptable  Hydration status: acceptable    Comments: Vitals signs as noted in nursing documentation as per protocol.

## 2025-07-10 NOTE — ANESTHESIA PREPROCEDURE EVALUATION
Anesthesia Evaluation     Patient summary reviewed and Nursing notes reviewed   history of anesthetic complications:  PONV  NPO Solid Status: > 8 hours  NPO Liquid Status: > 4 hours           Airway   Mallampati: II  TM distance: >3 FB  Neck ROM: full  No difficulty expected and Possible difficult intubation  Dental - normal exam     Pulmonary - normal exam   (+) ,shortness of breath  Cardiovascular - normal exam    ECG reviewed  PT is on anticoagulation therapy  Patient on routine beta blocker  Rhythm: regular  Rate: normal    (+) hypertension well controlled 2 medications or greater, CAD, hyperlipidemia    ROS comment: Past Cardiac Testin. Last Coronary Angio: 2018              1.  Angiographically near normal coronary arteries  2. Prior Stress Testin2018              1.  Abnormal Lexiscan  3. Last Echo: 2018              1. Calculated right ventricular systolic pressure from tricuspid regurgitation is 36 mmHg.           2. Left ventricular systolic function is normal. Estimated EF = 60%.   4. Prior Holter Monitor: None      ECG 12 Lead     Date/Time: 2022 12:07 PM  Performed by: Farnaz Valente APRN  Authorized by: Farnaz Valente APRN   Comparison: compared with previous ECG from 2/10/2020  Rhythm: sinus rhythm  Rate: normal  BPM: 63  QRS axis: normal    Clinical impression: normal ECG     Coronary artery disease involving native coronary artery of native heart without angina pectoris (Primary)  --Currently stable and angina free  --Continue daily low-dose aspirin and statin  --Follow-up with Dr. Núñez in 1 year or sooner if needed     2. Benign essential hypertension  --Currently well controlled     3. Mixed hyperlipidemia  --Continue high intensity statin     4. Pre-operative cardiovascular examination  --ECG today shows NSR  --Revised Cardiac Risk Index, Very low risk              Neuro/Psych  (+) TIA (10 years ago), headaches, dizziness/light headedness, psychiatric  history Depression  GI/Hepatic/Renal/Endo    (+) obesity (bmi 36.1), GERD, liver disease history of elevated LFT, diabetes mellitus type 2 well controlled, thyroid problem hyperthyroidism    ROS Comment: Dysphagia  Epigastric abdominal pain  Family history of colon cancer in father    Gastritis determined by biopsy  Gastroesophageal reflux disease  Gastroesophageal reflux disease with esophagitis        Musculoskeletal     (+) arthralgias, back pain, myalgias, neck pain      ROS comment: Ulnar nerve palsy  Abdominal   (+) obese   Substance History      OB/GYN          Other   arthritis,   history of cancer    ROS/Med Hx Other: RLS                    Anesthesia Plan    ASA 3     MAC and general   total IV anesthesia  (Risks and benefits discussed including risk of aspiration, recall and dental damage. All patient questions answered.    Will continue with plan of care.)  intravenous induction     Anesthetic plan, risks, benefits, and alternatives have been provided, discussed and informed consent has been obtained with: patient.  Pre-procedure education provided      CODE STATUS:

## 2025-07-10 NOTE — OP NOTE
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 Thickened, hypopigmented and slightly raised lesions noted to the bilateral anterior labia minora. Lesions measure approximately 3 x 1.5 cm on the right and 2.5 x 1.5 cm on the left, meeting in the anterior midline.     Description of Procedure:  After the appropriate time out and after adequate dosing of her anesthesia, the patient was prepped and draped in the usual sterile fashion. She was placed in the dorsal lithotomy position using Hossein stirrups. The bladder was drained with a red rubber catheter per nursing. The bilateral labia minora were injected using 10 cc total of 1% lidocaine with epinephrine. Using a punch biopsy, biopsies were obtained of the bilateral lesions. Each biopsy was performed on the edge of the lesion, incorporating normal and abnormal tissue. The base of each biopsy was cauterized with the Bovie. An interrupted suture of 3-0 Vicryl was used to close each biopsy site. Hemostasis was confirmed. The patient tolerated the procedure well. There were no complications. All instruments and sponge counts were correct at the end of the procedure. The patient was taken to postoperative recovery room in stable condition.    Jennifer Rothman MD   7/10/2025  10:21 EDT       History of BPH    Lung cancer    Seizure     AAA (abdominal aortic aneurysm)    History of BPH    Lung cancer    Seizure

## 2025-07-10 NOTE — H&P
GYNECOLOGY HISTORY AND PHYSICAL    CHIEF COMPLAINT: Scheduled surgery    DIAGNOSIS:  Vulvar lesions    ASSESSMENT/PLAN     74 y.o.  female who presents for scheduled vulvar biopsies.    - Proceed to the OR for scheduled surgery  - Risks for the procedure reviewed  - SCDs for DVT ppx  - Antibiotics: none indicated    HISTORY OF PRESENT ILLNESS     74 y.o.  female who presents for the scheduled procedure listed above. She presented to clinic recently for evaluation of urinary incontinence and vulvar itching. On exam, she was found to have thickened, hypopigmented patches of skin on the vulva, possibly consistent with lichen sclerosus vs vulvar dysplasia. Biopsies were recommended and offered under local anesthesia in the office vs under sedation, and she elected to proceed under sedation.    She has no complaints today and there are no interval changes to the history.    REVIEW OF SYSTEMS: A complete review of systems was performed and was specifically negative for headache, changes in vision, RUQ pain, shortness of breath, chest pain, lower extremity edema and dysuria.     HISTORY:  Obstetrical History:  OB History    Para Term  AB Living   0 0 0 0 0 0   SAB IAB Ectopic Molar Multiple Live Births   0 0 0 0 0 0     Past Medical History:  Past Medical History:   Diagnosis Date    Acute maxillary sinusitis     Allergic     Ankle sprain     Arthritis     Arthritis of back     Cancer Skin    Cataract     Colon polyp     Depression     Diabetes mellitus     Difficulty walking     Esophagitis, reflux     Fibromyositis     Fracture of ankle     Fracture, foot     GERD (gastroesophageal reflux disease)     Gout     H/O colonoscopy     Headache, tension-type     Herpes zoster     Hip arthrosis     History of mammogram     HL (hearing loss)     Hyperlipidemia     Hypertension 1992    Injury of back     Knee swelling     Low back pain     Memory loss     Migraine headache     Obesity     Osteopenia   "   Osteoporosis     Pap smear for cervical cancer screening     PONV (postoperative nausea and vomiting)     Restless legs syndrome     Rotator cuff syndrome     Screening     DEXA body composition study    Stress incontinence     Tear of meniscus of knee     Tennis elbow     TIA (transient ischemic attack)     \"at least 10 years ago\"    Transient cerebral ischemia     Urinary tract infection     Vertigo     Wrist sprain      Past Surgical History:  Past Surgical History:   Procedure Laterality Date    APPENDECTOMY      CARDIAC CATHETERIZATION N/A 08/01/2018    Procedure: Coronary angiography;  Surgeon: Wali Núñez MD;  Location: [facility] CATH INVASIVE LOCATION;  Service: Cardiology    COLONOSCOPY  10/2016    Dr. Colmenares    COLONOSCOPY N/A 02/01/2019    Procedure: COLONOSCOPY;  Surgeon: Marie De Leon MD;  Location: [facility] ENDOSCOPY;  Service: Gastroenterology    COLONOSCOPY N/A 03/08/2024    Procedure: COLONOSCOPY;  Surgeon: Marie De Leon MD;  Location: [facility] ENDOSCOPY;  Service: Gastroenterology;  Laterality: N/A;    ELBOW PROCEDURE  Ulnar nerve    ENDOSCOPY  10/2016    Dr. Colmenares    ENDOSCOPY N/A 02/01/2019    Procedure: ESOPHAGOGASTRODUODENOSCOPY WITH COLD FORCEP BIOPSY AND POLYPECTOMY;  Surgeon: Marie De Leon MD;  Location: [facility] ENDOSCOPY;  Service: Gastroenterology    ENDOSCOPY N/A 03/08/2024    Procedure: ESOPHAGOGASTRODUODENOSCOPY WITH BIOPSY X3, POLYPECTOMY X3;  Surgeon: Marie De Leon MD;  Location: [facility] ENDOSCOPY;  Service: Gastroenterology;  Laterality: N/A;    JOINT REPLACEMENT Right Oct 26, 2022    right total knee replacement and partial on the left at a seperate time.    KNEE SURGERY Right     OOPHORECTOMY Right     SHOULDER ARTHROSCOPY Bilateral     SHOULDER SURGERY  Rotator cuff    UPPER GASTROINTESTINAL ENDOSCOPY  Feb 2024     Social History:  Social History     Tobacco Use    Smoking status: Never     Passive exposure: Never    Smokeless tobacco: Never   Vaping Use    Vaping " status: Never Used   Substance Use Topics    Alcohol use: No    Drug use: No     Family History  Family History   Problem Relation Age of Onset    Kidney failure Mother     Kidney disease Mother     Hypertension Mother     Cancer Father         prostate,lung,colon    Arthritis Father     Colon cancer Father     Hypertension Father     Heart disease Sister     Cancer Sister         Bladder    Arthritis Other     Diabetes Other     Cancer Other         lung    Breast cancer Neg Hx     Ovarian cancer Neg Hx       Allergies:   Allergies   Allergen Reactions    Penicillins Unknown - Low Severity and Other (See Comments)     Not sure of reaction (childhood)    Beta lactam allergy details    Antibiotic reaction: unknown    Age at reaction: child    Dose to reaction time: unknown    Reason for antibiotic: unknown    Epinephrine required for reaction?: unknown    Tolerated antibiotics: amoxicillin    Atorvastatin Unknown - Low Severity     Pt cannot remember    Fenofibrate Unknown - Low Severity     Pt cannot remember    Fluoxetine Unknown - Low Severity     Pt cannot remember    Gemfibrozil Unknown - Low Severity     Pt cannot remember    Hydroxychloroquine Unknown - Low Severity    Metformin Diarrhea    Nsaids GI Intolerance    Pravastatin Unknown - Low Severity     Pt cannot remember    Sulfamethoxazole-Trimethoprim Unknown - Low Severity     Pt cannot remember     OBJECTIVE   VITALS:   Temp:  [97.4 °F (36.3 °C)] 97.4 °F (36.3 °C)  Heart Rate:  [92] 92  Resp:  [18] 18  BP: (136)/(77) 136/77    PHYSICAL EXAM:  GENERAL: NAD, alert  CHEST: No increased work of breathing  CV: normal rate, WWP  ABDOMEN: Soft, NTTP  EXTREMITIES:  Warm and well-perfused, nontender, nonedematous  NEURO: AAO x 3, CN II-XII grossly intact    No current facility-administered medications on file prior to encounter.     Current Outpatient Medications on File Prior to Encounter   Medication Sig Dispense Refill    acetaminophen (TYLENOL) 500 MG tablet  Take  by mouth As Needed.      allopurinol (ZYLOPRIM) 100 MG tablet Take 1 tablet by mouth Daily.      amLODIPine (NORVASC) 5 MG tablet Take 1 tablet by mouth Every Night. 90 tablet 1    aspirin (aspirin) 81 MG EC tablet Take 1 tablet by mouth Daily.      atenolol (TENORMIN) 25 MG tablet Take 1 tablet by mouth 2 (Two) Times a Day. 180 tablet 1    carbidopa-levodopa CR (SINEMET CR)  MG per CR tablet TAKE 1 TABLET EVERY NIGHT 90 tablet 3    citalopram (CeleXA) 40 MG tablet Take 1 tablet by mouth Daily. 90 tablet 1    colchicine 0.6 MG tablet Take 1 tablet by mouth Daily. (Patient taking differently: Take 1 tablet by mouth Daily As Needed for Muscle / Joint Pain.)      empagliflozin (Jardiance) 25 MG tablet tablet Take 1 tablet by mouth Daily. 30 tablet 0    estradiol (ESTRACE VAGINAL) 0.1 MG/GM vaginal cream Insert 1 gm intravaginally 2 times each week 42.5 g 4    glimepiride (Amaryl) 4 MG tablet Take 1 tablet by mouth 2 (Two) Times a Day With Meals. (Patient taking differently: Take 2 tablets by mouth 2 (Two) Times a Day With Meals.) 180 tablet 1    KRILL OIL PO Take  by mouth.      lisinopril-hydrochlorothiazide (PRINZIDE,ZESTORETIC) 20-12.5 MG per tablet Take 1 tablet by mouth 2 (Two) Times a Day. 180 tablet 1    loratadine (CLARITIN) 10 MG tablet Take 1 tablet by mouth As Needed.      meclizine (ANTIVERT) 25 MG tablet Take 1 tablet by mouth 3 (Three) Times a Day As Needed (vertigo). 90 tablet 2    Mirabegron ER (Myrbetriq) 25 MG tablet sustained-release 24 hour 24 hr tablet Take 1 tablet by mouth Daily. 90 tablet 1    pantoprazole (PROTONIX) 40 MG EC tablet TAKE 1 TABLET DAILY 90 tablet 3    predniSONE (DELTASONE) 10 MG tablet Take 1 tablet by mouth As Needed (for flare ups of gout).      rosuvastatin (CRESTOR) 10 MG tablet Take 1 tablet by mouth Every Night. 90 tablet 1     DIAGNOSTIC STUDIES:  Results from last 7 days   Lab Units 07/07/25  0834   WBC 10*3/mm3 7.74   HEMOGLOBIN g/dL 13.5   HEMATOCRIT % 41.8    PLATELETS 10*3/mm3 207   SODIUM mmol/L 137   POTASSIUM mmol/L 4.3   CHLORIDE mmol/L 101   CO2 mmol/L 23.0   BUN mg/dL 33.0*   CREATININE mg/dL 1.36*   GLUCOSE mg/dL 165*   CALCIUM mg/dL 9.8   AST (SGOT) U/L 14   ALT (SGPT) U/L <5     Recent Results (from the past 24 hours)   POC Glucose Once    Collection Time: 07/10/25  8:02 AM    Specimen: Blood   Result Value Ref Range    Glucose 151 (H) 70 - 130 mg/dL     Jennifer Rothman MD   Obstetrics and Gynecology  [facility]

## 2025-07-10 NOTE — DISCHARGE INSTRUCTIONS
Rest today  No pushing,pulling,tugging,heavy lifting, or strenuous activity   No major decision making,driving,or drinking alcoholic beverages for 24 hours due to the sedation you received  Always use good hand hygiene/washing technique  No driving on pain medication.    To assist you in voiding:  Drink plenty of fluids  Listen to running water while attempting to void.    If you are unable to urinate and you have an uncomfortable urge to void or it has been   6 hours since you were discharged, return to the Emergency Room    Pelvic rest is best described as not putting anything in your vagina. This includes tampons, douching, tub bathing or sexual activity.

## 2025-07-11 LAB — REF LAB TEST METHOD: NORMAL

## 2025-07-22 ENCOUNTER — OFFICE VISIT (OUTPATIENT)
Dept: OBSTETRICS AND GYNECOLOGY | Facility: CLINIC | Age: 75
End: 2025-07-22
Payer: MEDICARE

## 2025-07-22 VITALS
SYSTOLIC BLOOD PRESSURE: 140 MMHG | WEIGHT: 184.2 LBS | DIASTOLIC BLOOD PRESSURE: 70 MMHG | HEIGHT: 60 IN | BODY MASS INDEX: 36.16 KG/M2

## 2025-07-22 DIAGNOSIS — Z09 POSTOPERATIVE FOLLOW-UP: Primary | ICD-10-CM

## 2025-07-22 DIAGNOSIS — N90.4 LICHEN SCLEROSUS OF FEMALE GENITALIA: ICD-10-CM

## 2025-07-22 PROCEDURE — 99024 POSTOP FOLLOW-UP VISIT: CPT | Performed by: PHYSICIAN ASSISTANT

## 2025-07-22 NOTE — PROGRESS NOTES
"Subjective   Chief Complaint   Patient presents with    Post-op     12 days post-op vulvar biopsy       Missy Mcgraw is a 74 y.o. year old  presenting to be seen for post op check.  Patient reports doing well post procedure  No significant post op pain and feels a little better now than before surgery  Pathology notes changes suggestive of lichen sclerosis       Past Medical History:   Diagnosis Date    Acute maxillary sinusitis     Allergic     Ankle sprain     Arthritis     Arthritis of back     Cancer Skin    Cataract     Colon polyp     ?    Depression     Diabetes mellitus     Difficulty walking     Esophagitis, reflux     Fibromyositis     Fracture of ankle     Fracture, foot     GERD (gastroesophageal reflux disease)     About 5 years ago    Gout     H/O colonoscopy     Headache, tension-type     Herpes zoster     Hip arthrosis     History of mammogram     HL (hearing loss)     Hyperlipidemia     Hypertension 1992    Injury of back     Knee swelling     Low back pain     Memory loss     Migraine headache     Obesity     Osteopenia     Osteoporosis     Pap smear for cervical cancer screening     PONV (postoperative nausea and vomiting)     Restless legs syndrome     Rotator cuff syndrome     Screening     DEXA body composition study    Stress incontinence     Tear of meniscus of knee     Tennis elbow     TIA (transient ischemic attack)     \"at least 10 years ago\"    Transient cerebral ischemia     Urinary tract infection     Vertigo     Wrist sprain         Current Outpatient Medications:     acetaminophen (TYLENOL) 500 MG tablet, Take 2 tablets by mouth Every 6 (Six) Hours As Needed for Mild Pain., Disp: 60 tablet, Rfl: 0    allopurinol (ZYLOPRIM) 100 MG tablet, Take 1 tablet by mouth Daily., Disp: , Rfl:     amLODIPine (NORVASC) 5 MG tablet, Take 1 tablet by mouth Every Night., Disp: 90 tablet, Rfl: 1    aspirin (aspirin) 81 MG EC tablet, Take 1 tablet by mouth Daily., Disp: , Rfl:     atenolol " (TENORMIN) 25 MG tablet, Take 1 tablet by mouth 2 (Two) Times a Day., Disp: 180 tablet, Rfl: 1    carbidopa-levodopa CR (SINEMET CR)  MG per CR tablet, TAKE 1 TABLET EVERY NIGHT, Disp: 90 tablet, Rfl: 3    citalopram (CeleXA) 40 MG tablet, Take 1 tablet by mouth Daily., Disp: 90 tablet, Rfl: 1    colchicine 0.6 MG tablet, Take 1 tablet by mouth Daily. (Patient taking differently: Take 1 tablet by mouth Daily As Needed for Muscle / Joint Pain.), Disp: , Rfl:     empagliflozin (Jardiance) 25 MG tablet tablet, Take 1 tablet by mouth Daily., Disp: 30 tablet, Rfl: 0    estradiol (ESTRACE VAGINAL) 0.1 MG/GM vaginal cream, Insert 1 gm intravaginally 2 times each week, Disp: 42.5 g, Rfl: 4    glimepiride (Amaryl) 4 MG tablet, Take 1 tablet by mouth 2 (Two) Times a Day With Meals. (Patient taking differently: Take 2 tablets by mouth 2 (Two) Times a Day With Meals.), Disp: 180 tablet, Rfl: 1    KRILL OIL PO, Take  by mouth., Disp: , Rfl:     lisinopril-hydrochlorothiazide (PRINZIDE,ZESTORETIC) 20-12.5 MG per tablet, Take 1 tablet by mouth 2 (Two) Times a Day., Disp: 180 tablet, Rfl: 1    loratadine (CLARITIN) 10 MG tablet, Take 1 tablet by mouth As Needed., Disp: , Rfl:     meclizine (ANTIVERT) 25 MG tablet, Take 1 tablet by mouth 3 (Three) Times a Day As Needed (vertigo)., Disp: 90 tablet, Rfl: 2    Mirabegron ER (Myrbetriq) 25 MG tablet sustained-release 24 hour 24 hr tablet, Take 1 tablet by mouth Daily., Disp: 90 tablet, Rfl: 1    pantoprazole (PROTONIX) 40 MG EC tablet, TAKE 1 TABLET DAILY, Disp: 90 tablet, Rfl: 3    predniSONE (DELTASONE) 10 MG tablet, Take 1 tablet by mouth As Needed (for flare ups of gout)., Disp: , Rfl:     rosuvastatin (CRESTOR) 10 MG tablet, Take 1 tablet by mouth Every Night., Disp: 90 tablet, Rfl: 1   Allergies   Allergen Reactions    Penicillins Unknown - Low Severity and Other (See Comments)     Not sure of reaction (childhood)    Beta lactam allergy details    Antibiotic reaction:  unknown    Age at reaction: child    Dose to reaction time: unknown    Reason for antibiotic: unknown    Epinephrine required for reaction?: unknown    Tolerated antibiotics: amoxicillin    Atorvastatin Unknown - Low Severity     Pt cannot remember    Fenofibrate Unknown - Low Severity     Pt cannot remember    Fluoxetine Unknown - Low Severity     Pt cannot remember    Gemfibrozil Unknown - Low Severity     Pt cannot remember    Hydroxychloroquine Unknown - Low Severity    Metformin Diarrhea    Nsaids GI Intolerance    Pravastatin Unknown - Low Severity     Pt cannot remember    Sulfamethoxazole-Trimethoprim Unknown - Low Severity     Pt cannot remember      Past Surgical History:   Procedure Laterality Date    APPENDECTOMY      CARDIAC CATHETERIZATION N/A 08/01/2018    Procedure: Coronary angiography;  Surgeon: Wali Núñez MD;  Location: [facility] CATH INVASIVE LOCATION;  Service: Cardiology    COLONOSCOPY  10/2016    Dr. Colmenares    COLONOSCOPY N/A 02/01/2019    Procedure: COLONOSCOPY;  Surgeon: Marie De Leon MD;  Location: [facility] ENDOSCOPY;  Service: Gastroenterology    COLONOSCOPY N/A 03/08/2024    Procedure: COLONOSCOPY;  Surgeon: Marie De Leon MD;  Location: [facility] ENDOSCOPY;  Service: Gastroenterology;  Laterality: N/A;    ELBOW PROCEDURE  Ulnar nerve    ENDOSCOPY  10/2016    Dr. Colmenares    ENDOSCOPY N/A 02/01/2019    Procedure: ESOPHAGOGASTRODUODENOSCOPY WITH COLD FORCEP BIOPSY AND POLYPECTOMY;  Surgeon: Marie De Leon MD;  Location: [facility] ENDOSCOPY;  Service: Gastroenterology    ENDOSCOPY N/A 03/08/2024    Procedure: ESOPHAGOGASTRODUODENOSCOPY WITH BIOPSY X3, POLYPECTOMY X3;  Surgeon: Marie De Leon MD;  Location: [facility] ENDOSCOPY;  Service: Gastroenterology;  Laterality: N/A;    JOINT REPLACEMENT Right Oct 26, 2022    right total knee replacement and partial on the left at a seperate time.    KNEE SURGERY Right     OOPHORECTOMY Right     SHOULDER ARTHROSCOPY Bilateral     SHOULDER SURGERY   "Rotator cuff    UPPER GASTROINTESTINAL ENDOSCOPY  Feb 2024    VULVA BIOPSY N/A 7/10/2025    Procedure: BILATERAL VULVA BIOPSY;  Surgeon: Jennifer Rothman MD;  Location: [facility];  Service: Obstetrics/Gynecology;  Laterality: N/A;      Social History     Socioeconomic History    Marital status: Single   Tobacco Use    Smoking status: Never     Passive exposure: Never    Smokeless tobacco: Never   Vaping Use    Vaping status: Never Used   Substance and Sexual Activity    Alcohol use: No    Drug use: No    Sexual activity: Not Currently     Partners: Male     Birth control/protection: Post-menopausal      Family History   Problem Relation Age of Onset    Kidney failure Mother     Kidney disease Mother     Hypertension Mother     Cancer Father         prostate,lung,colon    Arthritis Father     Colon cancer Father     Hypertension Father     Heart disease Sister     Cancer Sister         Bladder    Arthritis Other     Diabetes Other     Cancer Other         lung    Breast cancer Neg Hx     Ovarian cancer Neg Hx        Review of Systems   Constitutional: Negative.    Gastrointestinal: Negative.    Genitourinary: Negative.            Objective   /70   Ht 152.4 cm (60\")   Wt 83.6 kg (184 lb 3.2 oz)   BMI 35.97 kg/m²     Physical Exam  Exam conducted with a chaperone present.   Constitutional:       Appearance: Normal appearance. She is well-developed and well-groomed.   Genitourinary:         Comments: Biopsied areas healing well. Suture still present  bilaterally  Neurological:      General: No focal deficit present.      Mental Status: She is alert and oriented to person, place, and time.   Psychiatric:         Attention and Perception: Attention normal.         Mood and Affect: Mood normal.         Speech: Speech normal.         Behavior: Behavior is cooperative.            Result Review :           TISSUE EXAM, [facility] ([facility]) (07/10/2025 10:07)         Assessment and Plan  Diagnoses and all orders " for this visit:    1. Postoperative follow-up (Primary)    2. Lichen sclerosus of female genitalia      Patient Instructions   RTO 4 months or prn  Clobetasol prn flare up of itching/lichen sclerosis            This note was electronically signed.    Brittany Calvin PA-C   July 22, 2025

## 2025-07-23 ENCOUNTER — OFFICE VISIT (OUTPATIENT)
Dept: INTERNAL MEDICINE | Facility: CLINIC | Age: 75
End: 2025-07-23
Payer: MEDICARE

## 2025-07-23 VITALS
RESPIRATION RATE: 18 BRPM | SYSTOLIC BLOOD PRESSURE: 133 MMHG | TEMPERATURE: 97.2 F | HEART RATE: 59 BPM | HEIGHT: 60 IN | DIASTOLIC BLOOD PRESSURE: 65 MMHG | WEIGHT: 184.8 LBS | BODY MASS INDEX: 36.28 KG/M2 | OXYGEN SATURATION: 100 %

## 2025-07-23 DIAGNOSIS — N18.32 STAGE 3B CHRONIC KIDNEY DISEASE: ICD-10-CM

## 2025-07-23 DIAGNOSIS — F33.0 MILD EPISODE OF RECURRENT MAJOR DEPRESSIVE DISORDER: ICD-10-CM

## 2025-07-23 DIAGNOSIS — E78.2 MIXED HYPERLIPIDEMIA: ICD-10-CM

## 2025-07-23 DIAGNOSIS — E11.65 TYPE 2 DIABETES MELLITUS WITH HYPERGLYCEMIA, WITHOUT LONG-TERM CURRENT USE OF INSULIN: ICD-10-CM

## 2025-07-23 DIAGNOSIS — R42 VERTIGO: ICD-10-CM

## 2025-07-23 DIAGNOSIS — I10 BENIGN ESSENTIAL HYPERTENSION: ICD-10-CM

## 2025-07-23 DIAGNOSIS — Z00.00 MEDICARE ANNUAL WELLNESS VISIT, SUBSEQUENT: Primary | ICD-10-CM

## 2025-07-23 DIAGNOSIS — F41.9 ANXIETY: ICD-10-CM

## 2025-07-23 DIAGNOSIS — Z91.81 AT MODERATE RISK FOR FALL: ICD-10-CM

## 2025-07-23 RX ORDER — MECLIZINE HYDROCHLORIDE 25 MG/1
25 TABLET ORAL 3 TIMES DAILY PRN
Qty: 90 TABLET | Refills: 1 | Status: SHIPPED | OUTPATIENT
Start: 2025-07-23

## 2025-07-23 RX ORDER — ATENOLOL 25 MG/1
25 TABLET ORAL 2 TIMES DAILY
Qty: 180 TABLET | Refills: 1 | Status: SHIPPED | OUTPATIENT
Start: 2025-07-23

## 2025-07-23 RX ORDER — CITALOPRAM HYDROBROMIDE 40 MG/1
40 TABLET ORAL DAILY
Qty: 90 TABLET | Refills: 1 | Status: SHIPPED | OUTPATIENT
Start: 2025-07-23

## 2025-07-23 RX ORDER — AMLODIPINE BESYLATE 5 MG/1
5 TABLET ORAL NIGHTLY
Qty: 90 TABLET | Refills: 1 | Status: SHIPPED | OUTPATIENT
Start: 2025-07-23

## 2025-07-23 RX ORDER — ROSUVASTATIN CALCIUM 10 MG/1
10 TABLET, COATED ORAL NIGHTLY
Qty: 90 TABLET | Refills: 1 | Status: SHIPPED | OUTPATIENT
Start: 2025-07-23

## 2025-07-23 RX ORDER — GLIMEPIRIDE 4 MG/1
4 TABLET ORAL
Qty: 90 TABLET | Refills: 1 | Status: SHIPPED | OUTPATIENT
Start: 2025-07-23

## 2025-07-23 NOTE — ASSESSMENT & PLAN NOTE
Lipid abnormalities are stable    Plan:  Continue same medication/s without change.      Discussed medication dosage, use, side effects, and goals of treatment in detail.    Counseled patient on lifestyle modifications to help control hyperlipidemia.   Cholesterol lowering dietary information shared with patient.    Patient Treatment Goals:   LDL goal is less than 70    Followup in 6 months.    Orders:    rosuvastatin (CRESTOR) 10 MG tablet; Take 1 tablet by mouth Every Night.    CBC (No Diff)    Comprehensive Metabolic Panel    Lipid Panel

## 2025-07-23 NOTE — PROGRESS NOTES
Subjective   The ABCs of the Annual Wellness Visit  Medicare Wellness Visit    Chief Complaint   Patient presents with    Medicare Wellness-subsequent    DME     Would like a new glucometer       Missy Mcgraw is a 74 y.o. patient who presents for a Medicare Wellness Visit and physical.    The following portions of the patient's history were reviewed and   updated as appropriate: allergies, current medications, past family history, past medical history, past social history, past surgical history, and problem list.    Compared to one year ago, the patient's physical   health is the same.  Compared to one year ago, the patient's mental   health is the same.    Recent Hospitalizations:  She was not admitted to the hospital during the last year.     Current Medical Providers:  Patient Care Team:  Emely Sheldon MD as PCP - General (Internal Medicine)  Marie De Leon MD as Surgeon (General Surgery)  Kt Dey DO as Consulting Physician (Anesthesiology)  Dilcia Lee MD as Consulting Physician (Gastroenterology)  Wali Núñez MD as Consulting Physician (Cardiology)  JAIRO NO DO (Pain Medicine)    Outpatient Medications Prior to Visit   Medication Sig Dispense Refill    acetaminophen (TYLENOL) 500 MG tablet Take 2 tablets by mouth Every 6 (Six) Hours As Needed for Mild Pain. 60 tablet 0    allopurinol (ZYLOPRIM) 100 MG tablet Take 2 tablets by mouth Daily.      aspirin (aspirin) 81 MG EC tablet Take 1 tablet by mouth Daily.      carbidopa-levodopa CR (SINEMET CR)  MG per CR tablet TAKE 1 TABLET EVERY NIGHT 90 tablet 3    colchicine 0.6 MG tablet Take 1 tablet by mouth Daily. (Patient taking differently: Take 1 tablet by mouth Daily As Needed for Muscle / Joint Pain.)      estradiol (ESTRACE VAGINAL) 0.1 MG/GM vaginal cream Insert 1 gm intravaginally 2 times each week 42.5 g 4    KRILL OIL PO Take  by mouth.      lisinopril-hydrochlorothiazide (PRINZIDE,ZESTORETIC) 20-12.5 MG per  tablet Take 1 tablet by mouth 2 (Two) Times a Day. 180 tablet 1    loratadine (CLARITIN) 10 MG tablet Take 1 tablet by mouth As Needed.      Mirabegron ER (Myrbetriq) 25 MG tablet sustained-release 24 hour 24 hr tablet Take 1 tablet by mouth Daily. 90 tablet 1    predniSONE (DELTASONE) 10 MG tablet Take 1 tablet by mouth As Needed (for flare ups of gout).      amLODIPine (NORVASC) 5 MG tablet Take 1 tablet by mouth Every Night. 90 tablet 1    atenolol (TENORMIN) 25 MG tablet Take 1 tablet by mouth 2 (Two) Times a Day. 180 tablet 1    citalopram (CeleXA) 40 MG tablet Take 1 tablet by mouth Daily. 90 tablet 1    empagliflozin (Jardiance) 25 MG tablet tablet Take 1 tablet by mouth Daily. 30 tablet 0    glimepiride (Amaryl) 4 MG tablet Take 1 tablet by mouth 2 (Two) Times a Day With Meals. (Patient taking differently: Take 2 tablets by mouth 2 (Two) Times a Day With Meals.) 180 tablet 1    meclizine (ANTIVERT) 25 MG tablet Take 1 tablet by mouth 3 (Three) Times a Day As Needed (vertigo). 90 tablet 2    pantoprazole (PROTONIX) 40 MG EC tablet TAKE 1 TABLET DAILY 90 tablet 3    rosuvastatin (CRESTOR) 10 MG tablet Take 1 tablet by mouth Every Night. 90 tablet 1     No facility-administered medications prior to visit.     No opioid medication identified on active medication list. I have reviewed chart for other potential  high risk medication/s and harmful drug interactions in the elderly.      Aspirin is on active medication list. Aspirin use is indicated based on review of current medical condition/s. Pros and cons of this therapy have been discussed today. Benefits of this medication outweigh potential harm.  Patient has been encouraged to continue taking this medication.  .      Patient Active Problem List   Diagnosis    Hay fever    Benign essential hypertension    Gastroesophageal reflux disease with esophagitis    Major depressive disorder, recurrent episode    Uncontrolled type 2 diabetes mellitus    Abnormal liver  "enzymes    Abnormal weight gain    Cervicalgia    Fibroid, uterine    Hyperthyroidism    Mixed hyperlipidemia    Movement disorder    Neuralgia    Ulnar nerve palsy    Vitamin B12 deficiency    Vitamin D deficiency    Osteoporosis    Restless legs syndrome    Shortness of breath    Coronary artery disease involving native coronary artery of native heart without angina pectoris    PONV (postoperative nausea and vomiting)    Unintentional weight loss    Gastroesophageal reflux disease    Epigastric abdominal pain    Gastritis determined by biopsy    Family history of colon cancer in father    Pre-operative cardiovascular examination    Dyslipidemia    Class 2 severe obesity due to excess calories with serious comorbidity and body mass index (BMI) of 37.0 to 37.9 in adult    Dysphagia    Colon cancer screening    Vulvar leukoplakia    Vulvar itching     Advance Care Planning Advance Directive is not on file.  ACP discussion was declined by the patient. Patient has an advance directive (not in EMR), copy requested.            Objective   Vitals:    07/23/25 1511   BP: 133/65   Pulse: 59   Resp: 18   Temp: 97.2 °F (36.2 °C)   TempSrc: Temporal   SpO2: 100%   Weight: 83.8 kg (184 lb 12.8 oz)   Height: 152.4 cm (60\")   PainSc: 0-No pain       Estimated body mass index is 36.09 kg/m² as calculated from the following:    Height as of this encounter: 152.4 cm (60\").    Weight as of this encounter: 83.8 kg (184 lb 12.8 oz).          Physical Examination  Vitals and nursing note reviewed  General appearance: Normocephalic and nontraumatic , obese  HEENT: External inspection of eyes, ears and nose appears benign, hearing appears intact  Neck: Neck appears supple, trachea in midline, thyroid appears not enlarged  Respiratory: Respiratory effort appears normal  Musculoskeletal: Moving all limbs , deformities  Range of motion of visible joints appears within normal  CNS: No gross motor or sensory deficits  No gross cranial nerve " deficits  No tremors  Psychiatry: Alert and oriented x3  Memory appears intact  Mood and affect appears normal  Insight appears normal           Does the patient have evidence of cognitive impairment? No  Lab Results   Component Value Date    TRIG 385 (H) 2025    HDL 42 2025     (H) 2025    VLDL 66 (H) 2025    HGBA1C 6.80 (H) 2025                                                                                                Health  Risk Assessment    Smoking Status:  Social History     Tobacco Use   Smoking Status Never    Passive exposure: Never   Smokeless Tobacco Never     Alcohol Consumption:  Social History     Substance and Sexual Activity   Alcohol Use No       Fall Risk Screen  STEADI Fall Risk Assessment was completed, and patient is at MODERATE risk for falls. Assessment completed on:2025    Depression Screening   Little interest or pleasure in doing things? Not at all   Feeling down, depressed, or hopeless? Not at all   PHQ-2 Total Score 0      Health Habits and Functional and Cognitive Screenin/23/2025     3:15 PM   Functional & Cognitive Status   Do you have difficulty preparing food and eating? No   Do you have difficulty bathing yourself, getting dressed or grooming yourself? No   Do you have difficulty using the toilet? Yes   Do you have difficulty moving around from place to place? Yes   Do you have trouble with steps or getting out of a bed or a chair? Yes   Current Diet Unhealthy Diet   Dental Exam Up to date   Eye Exam Up to date   Exercise (times per week) 0 times per week   Current Exercises Include No Regular Exercise   Do you need help using the phone?  No   Are you deaf or do you have serious difficulty hearing?  Yes   Do you need help to go to places out of walking distance? No   Do you need help shopping? No   Do you need help preparing meals?  No   Do you need help with housework?  No   Do you need help with laundry? No   Do you need help  taking your medications? No   Do you need help managing money? No   Do you ever drive or ride in a car without wearing a seat belt? No   Have you felt unusual fatigue (could be tiredness), stress, anger or loneliness in the last month? No   Who do you live with? Alone   If you need help, do you have trouble finding someone available to you? No   Have you been bothered in the last four weeks by sexual problems? No   Do you have difficulty concentrating, remembering or making decisions? No           Age-appropriate Screening Schedule:  Refer to the list below for future screening recommendations based on patient's age, sex and/or medical conditions. Orders for these recommended tests are listed in the plan section. The patient has been provided with a written plan.    Health Maintenance List  Health Maintenance   Topic Date Due    DIABETIC FOOT EXAM  03/08/2018    COVID-19 Vaccine (5 - 2024-25 season) 11/12/2025 (Originally 9/1/2024)    INFLUENZA VACCINE  10/01/2025    HEMOGLOBIN A1C  01/07/2026    DIABETIC EYE EXAM  01/29/2026    LIPID PANEL  07/07/2026    URINE MICROALBUMIN-CREATININE RATIO (uACR)  07/07/2026    ANNUAL WELLNESS VISIT  07/23/2026    DXA SCAN  10/01/2026    MAMMOGRAM  12/10/2026    TDAP/TD VACCINES (2 - Td or Tdap) 12/11/2030    COLORECTAL CANCER SCREENING  03/08/2034    HEPATITIS C SCREENING  Completed    Pneumococcal Vaccine 50+  Completed    ZOSTER VACCINE  Completed                                                                                                                                                CMS Preventative Services Quick Reference  Risk Factors Identified During Encounter  Dental Screening Recommended  Vision Screening Recommended    The above risks/problems have been discussed with the patient.  Pertinent information has been shared with the patient in the After Visit Summary.  An After Visit Summary and PPPS were made available to the patient.    Follow Up:   Next Medicare  "Wellness visit to be scheduled in 1 year.         Additional E&M Note during same encounter follows:  Patient has additional, significant, and separately identifiable condition(s)/problem(s) that require work above and beyond the Medicare Wellness Visit     Chief Complaint  Medicare Wellness-subsequent and DME (Would like a new glucometer)    Subjective   HPI  CARL is also being seen today for an annual adult preventative physical exam.  and CARL is also being seen today for additional medical problem/s.   Patient is here to follow up on the blood pressure  The patient is taking the blood pressure medications as prescribed and has had no side effects. The patient is also here to follow up on the cholesterol and   on sugar and thyroid and had  lab work done . The patient also following up on anxiety and needs refills on medications and needs a new glucometer.   Hypertension   Pertinent negatives include no chest pain, palpitations or shortness of breath.   Hyperlipidemia   Pertinent negatives include no chest pain or shortness of breath.   Diabetes   Pertinent negatives for hypoglycemia include no dizziness, nervousness/anxiousness or pallor. Pertinent negatives for diabetes include no chest pain, no shortness of breath  Patient is on acei/arb                   Objective   Vital Signs:  /65   Pulse 59   Temp 97.2 °F (36.2 °C) (Temporal)   Resp 18   Ht 152.4 cm (60\")   Wt 83.8 kg (184 lb 12.8 oz)   SpO2 100%   BMI 36.09 kg/m²   Physical Exam  Vitals and nursing note reviewed.   Constitutional:       General: She is not in acute distress.     Appearance: Normal appearance. She is obese. She is not diaphoretic.   HENT:      Head: Normocephalic and atraumatic.      Right Ear: External ear normal.      Left Ear: External ear normal.      Nose: Nose normal.   Eyes:      Extraocular Movements: Extraocular movements intact.      Conjunctiva/sclera: Conjunctivae normal.   Neck:      Trachea: Trachea normal. "   Cardiovascular:      Rate and Rhythm: Normal rate and regular rhythm.      Heart sounds: Normal heart sounds.   Pulmonary:      Effort: Pulmonary effort is normal. No respiratory distress.      Breath sounds: Normal breath sounds.   Abdominal:      General: Abdomen is flat.   Musculoskeletal:         General: Deformity present.      Cervical back: Neck supple.      Comments: Moves all limbs   Skin:     General: Skin is warm.   Neurological:      Mental Status: She is alert and oriented to person, place, and time.      Comments: No gross motor or sensory deficits         The following data was reviewed by: Emely Sheldon MD on 07/23/2025:    Common labs          11/7/2024    09:01 3/17/2025    08:10 7/7/2025    08:34   Common Labs   Glucose 142  155  165    BUN 24  17  33.0    Creatinine 1.11  0.90  1.36    Sodium 139  139  137    Potassium 4.6  4.3  4.3    Chloride 103  102  101    Calcium 9.6  10.3  9.8    Albumin 4.2  4.2  4.2    Total Bilirubin 0.3  0.4  0.4    Alkaline Phosphatase 106  95  85    AST (SGOT) 12  14  14    ALT (SGPT) 5  <5  <5    WBC 7.14  6.06  7.74    Hemoglobin 13.0  13.8  13.5    Hematocrit 38.3  41.4  41.8    Platelets 200  205  207    Total Cholesterol   217    Total Cholesterol 196  208     Triglycerides 309  303  385    HDL Cholesterol 45  47  42    LDL Cholesterol  99  109  109    Hemoglobin A1C 7.00  7.30  6.80    Microalbumin, Urine  631.0  3.7           Assessment and Plan Additional age appropriate preventative wellness advice topics were discussed during today's preventative wellness exam(some topics already addressed during AWV portion of the note above):    Physical Activity: Advised cardiovascular activity 150 minutes per week as tolerated. (example brisk walk for 30 minutes, 5 days a week).     Nutrition: Discussed nutrition plan with patient. Information shared in after visit summary. Goal is for a well balanced diet to enhance overall health.     Healthy Weight: Discussed  current and goal BMI with patient. Steps to attain this goal discussed. Information shared in after visit summary.     Vertigo  Intermittent symptoms , oral hydration   Orders:    meclizine (ANTIVERT) 25 MG tablet; Take 1 tablet by mouth 3 (Three) Times a Day As Needed (vertigo).    Medicare annual wellness visit, subsequent  Plan:  1.physical: Physical exam conducted today, reviewed fasting lab work,   Impression: healthy adult Patient. Currently, patient eats a healthy diet. Screening lab work includes glucose, lipid profile and complete blood count . The risks and benefits of immunizations were discussed and immunizations are up to date. Advice and education were given regarding nutrition and aerobic exercise. Patient discussion: discussed with the patient.  Annual Wellness Visit  ,physical with preventive exam as well as age and risk appropriate counseling completed.   Advance Directive Planning: paperwork and instructions were given to the patient.   Patient Discussion: plan discussed with the patient, follow-up visit needed in one year. Medication Review and medication list updated         At moderate risk for fall  Fall precautions advised       Benign essential hypertension  Hypertension is stable and controlled  Continue current treatment regimen.  Blood pressure will be reassessed in 6 months.    Orders:    amLODIPine (NORVASC) 5 MG tablet; Take 1 tablet by mouth Every Night.    atenolol (TENORMIN) 25 MG tablet; Take 1 tablet by mouth 2 (Two) Times a Day.    Mixed hyperlipidemia   Lipid abnormalities are stable    Plan:  Continue same medication/s without change.      Discussed medication dosage, use, side effects, and goals of treatment in detail.    Counseled patient on lifestyle modifications to help control hyperlipidemia.   Cholesterol lowering dietary information shared with patient.    Patient Treatment Goals:   LDL goal is less than 70    Followup in 6 months.    Orders:    rosuvastatin (CRESTOR)  10 MG tablet; Take 1 tablet by mouth Every Night.    CBC (No Diff)    Comprehensive Metabolic Panel    Lipid Panel    Type 2 diabetes mellitus with hyperglycemia, without long-term current use of insulin  Labs reviewed, hba1c 6.8 , diet and exercise counseled    Orders:    Miscellaneous DME    empagliflozin (Jardiance) 25 MG tablet tablet; Take 1 tablet by mouth Daily.    glimepiride (Amaryl) 4 MG tablet; Take 1 tablet by mouth Daily With Lunch.    Hemoglobin A1c    Microalbumin / Creatinine Urine Ratio - Urine, Clean Catch    Anxiety  stable  Orders:    citalopram (CeleXA) 40 MG tablet; Take 1 tablet by mouth Daily.    Mild episode of recurrent major depressive disorder  stable    Orders:    citalopram (CeleXA) 40 MG tablet; Take 1 tablet by mouth Daily.    Stage 3b chronic kidney disease  Oral hydration , labs reviewed, monitor bmp ,low sodium diet     Orders:    Basic Metabolic Panel    Comprehensive Metabolic Panel            Follow Up   Return in about 1 year (around 7/23/2026) for Medicare Wellness.  Patient was given instructions and counseling regarding her condition or for health maintenance advice. Please see specific information pulled into the AVS if appropriate.

## 2025-07-23 NOTE — ASSESSMENT & PLAN NOTE
Hypertension is stable and controlled  Continue current treatment regimen.  Blood pressure will be reassessed in 6 months.    Orders:    amLODIPine (NORVASC) 5 MG tablet; Take 1 tablet by mouth Every Night.    atenolol (TENORMIN) 25 MG tablet; Take 1 tablet by mouth 2 (Two) Times a Day.

## 2025-07-23 NOTE — PATIENT INSTRUCTIONS
Advance Care Planning and Advance Directives     You make decisions on a daily basis - decisions about where you want to live, your career, your home, your life. Perhaps one of the most important decisions you face is your choice for future medical care. Take time to talk with your family and your healthcare team and start planning today.  Advance Care Planning is a process that can help you:  Understand possible future healthcare decisions in light of your own experiences  Reflect on those decision in light of your goals and values  Discuss your decisions with those closest to you and the healthcare professionals that care for you  Make a plan by creating a document that reflects your wishes    Surrogate Decision Maker  In the event of a medical emergency, which has left you unable to communicate or to make your own decisions, you would need someone to make decisions for you.  It is important to discuss your preferences for medical treatment with this person while you are in good health.     Qualities of a surrogate decision maker:  Willing to take on this role and responsibility  Knows what you want for future medical care  Willing to follow your wishes even if they don't agree with them  Able to make difficult medical decisions under stressful circumstances    Advance Directives  These are legal documents you can create that will guide your healthcare team and decision maker(s) when needed. These documents can be stored in the electronic medical record.    Living Will - a legal document to guide your care if you have a terminal condition or a serious illness and are unable to communicate. States vary by statute in document names/types, but most forms may include one or more of the following:        -  Directions regarding life-prolonging treatments        -  Directions regarding artificially provided nutrition/hydration        -  Choosing a healthcare decision maker        -  Direction regarding organ/tissue  donation    Durable Power of  for Healthcare - this document names an -in-fact to make medical decisions for you, but it may also allow this person to make personal and financial decisions for you. Please seek the advice of an  if you need this type of document.    **Advance Directives are not required and no one may discriminate against you if you do not sign one.    Medical Orders  Many states allow specific forms/orders signed by your physician to record your wishes for medical treatment in your current state of health. This form, signed in personal communication with your physician, addresses resuscitation and other medical interventions that you may or may not want.      For more information or to schedule a time with a [facility] Advance Care Planning Facilitator contact: [facility] or call [phone] and someone will contact you directly.  Fall Prevention in the Home, Adult  Falls can cause injuries and affect people of all ages. There are many simple things that you can do to make your home safe and to help prevent falls.  If you need it, ask for help making these changes.  What actions can I take to prevent falls?  General information  Use good lighting in all rooms. Make sure to:  Replace any light bulbs that burn out.  Turn on lights if it is dark and use night-lights.  Keep items that you use often in easy-to-reach places. Lower the shelves around your home if needed.  Move furniture so that there are clear paths around it.  Do not keep throw rugs or other things on the floor that can make you trip.  If any of your floors are uneven, fix them.  Add color or contrast paint or tape to clearly morelia and help you see:  Grab bars or handrails.  First and last steps of staircases.  Where the edge of each step is.  If you use a ladder or stepladder:  Make sure that it is fully opened. Do not climb a closed ladder.  Make sure the sides of the ladder are locked in  place.  Have someone hold the ladder while you use it.  Know where your pets are as you move through your home.  What can I do in the bathroom?         Keep the floor dry. Clean up any water that is on the floor right away.  Remove soap buildup in the bathtub or shower. Buildup makes bathtubs and showers slippery.  Use non-skid mats or decals on the floor of the bathtub or shower.  Attach bath mats securely with double-sided, non-slip rug tape.  If you need to sit down while you are in the shower, use a non-slip stool.  Install grab bars by the toilet and in the bathtub and shower. Do not use towel bars as grab bars.  What can I do in the bedroom?  Make sure that you have a light by your bed that is easy to reach.  Do not use any sheets or blankets on your bed that hang to the floor.  Have a firm bench or chair with side arms that you can use for support when you get dressed.  What can I do in the kitchen?  Clean up any spills right away.  If you need to reach something above you, use a sturdy step stool that has a grab bar.  Keep electrical cables out of the way.  Do not use floor polish or wax that makes floors slippery.  What can I do with my stairs?  Do not leave anything on the stairs.  Make sure that you have a light switch at the top and the bottom of the stairs. Have them installed if you do not have them.  Make sure that there are handrails on both sides of the stairs. Fix handrails that are broken or loose. Make sure that handrails are as long as the staircases.  Install non-slip stair treads on all stairs in your home if they do not have carpet.  Avoid having throw rugs at the top or bottom of stairs, or secure the rugs with carpet tape to prevent them from moving.  Choose a carpet design that does not hide the edge of steps on the stairs. Make sure that carpet is firmly attached to the stairs. Fix any carpet that is loose or worn.  What can I do on the outside of my home?  Use bright outdoor  lighting.  Repair the edges of walkways and driveways and fix any cracks. Clear paths of anything that can make you trip, such as tools or rocks.  Add color or contrast paint or tape to clearly morelia and help you see high doorway thresholds.  Trim any bushes or trees on the main path into your home.  Check that handrails are securely fastened and in good repair. Both sides of all steps should have handrails.  Install guardrails along the edges of any raised decks or porches.  Have leaves, snow, and ice cleared regularly. Use sand, salt, or ice melt on walkways during winter months if you live where there is ice and snow.  In the garage, clean up any spills right away, including grease or oil spills.  What other actions can I take?  Review your medicines with your health care provider. Some medicines can make you confused or feel dizzy. This can increase your chance of falling.  Wear closed-toe shoes that fit well and support your feet. Wear shoes that have rubber soles and low heels.  Use a cane, walker, scooter, or crutches that help you move around if needed.  Talk with your provider about other ways that you can decrease your risk of falls. This may include seeing a physical therapist to learn to do exercises to improve movement and strength.  Where to find more information  Centers for Disease Control and Prevention[place]: cdc.gov  National [place] on Aging: neel.nih.gov  National [place] on Aging: neel.nih.gov  Contact a health care provider if:  You are afraid of falling at home.  You feel weak, drowsy, or dizzy at home.  You fall at home.  Get help right away if you:  Lose consciousness or have trouble moving after a fall.  Have a fall that causes a head injury.  These symptoms may be an emergency. Get help right away. Call 911.  Do not wait to see if the symptoms will go away.  Do not drive yourself to the hospital.  This information is not intended to replace advice given to you by your health care  provider. Make sure you discuss any questions you have with your health care provider.  Document Revised: 08/21/2023 Document Reviewed: 08/21/2023  Elsevier Patient Education © 2024 [employer]  Sit-to-Stand Exercise    The sit-to-stand exercise (also known as the chair stand or chair rise exercise) strengthens your lower body and helps you maintain or improve your mobility and independence. The end goal is to do the sit-to-stand exercise without using your hands. This will be easier as you become stronger. You should always talk with your health care provider before starting any exercise program, especially if you have had recent surgery.  Do the exercise exactly as told by your health care provider and adjust it as directed. It is normal to feel mild stretching, pulling, tightness, or discomfort as you do this exercise, but you should stop right away if you feel sudden pain or your pain gets worse. Do not begin doing this exercise until told by your health care provider.  What the sit-to-stand exercise does  The sit-to-stand exercise helps to strengthen the muscles in your thighs and the muscles in the center of your body that give you stability (core muscles). This exercise is especially helpful if:  You have had knee or hip surgery.  You have trouble getting up from a chair, out of a car, or off the toilet due to muscle weakness.  How to do the sit-to-stand exercise  Sit toward the front edge of a sturdy chair without armrests. Your knees should be bent and your feet should be flat on the floor and shoulder-width apart and underneath your hips.  Place your hands lightly on each side of the seat. Keep your back and neck as straight as possible, with your chest slightly forward.  Breathe in slowly. Lean forward and slightly shift your weight to the front of your feet.  Breathe out as you slowly stand up. Try not to support any weight with your hands.  Stand and pause for a full breath in and out.  Breathe in as  you sit down slowly. Tighten your core and abdominal muscles to control your lowering as much as possible. You should lower yourself back to the chair slowly, not just drop back into the seat.  Breathe out slowly.  Do this exercise 10-15 times. If needed, do it fewer times until you build up strength.  Rest for 1 minute, then do another set of 10-15 repetitions.  To change the difficulty of the sit-to-stand exercise  If the exercise is too difficult, use a chair with sturdy armrests, and push off the armrests to help you come to the standing position. You can also use the armrests to help slowly lower yourself back to sitting. As this gets easier, try to use your arms less. You can also place a firm cushion or pillow on the chair to make the surface higher.  If this exercise is too easy, do not use your arms to help raise or lower yourself. You can also wear a weighted vest, use hand weights, increase your repetitions, or try a lower chair.  General tips  You may feel tired when starting an exercise routine. This is normal.  You may have muscle soreness that lasts a few days. This is normal. As you get stronger, you may not feel muscle soreness.  Use smooth, steady movements.  Do not  hold your breath during strength exercises. This can cause unsafe changes in your blood pressure.  Breathe in slowly through your nose, and breathe out slowly through your mouth.  Summary  Strengthening your lower body is an important step to help you move safely and independently.  The sit-to-stand exercise helps strengthen the muscles in your thighs and core.  You should always talk with your health care provider before starting any exercise program, especially if you have had recent surgery.  This information is not intended to replace advice given to you by your health care provider. Make sure you discuss any questions you have with your health care provider.  Document Revised: 04/10/2022 Document Reviewed: 04/10/2022  [place]  Patient Education ©  [employer]vier Inc.    Medicare Wellness  Personal Prevention Plan of Service     Date of Office Visit:    Encounter Provider:  Emely Sheldon MD  Place of Service:  [facility]  Patient Name: Missy Mcgraw  :  1950    As part of the Medicare Wellness portion of your visit today, we are providing you with this personalized preventive plan of services (PPPS). This plan is based upon recommendations of the [place] Preventive Services Task Force (USPSTF) and the Advisory Committee on Immunization Practices (ACIP).    This lists the preventive care services that should be considered, and provides dates of when you are due. Items listed as completed are up-to-date and do not require any further intervention.    Health Maintenance   Topic Date Due   • DIABETIC FOOT EXAM  2018   • ANNUAL WELLNESS VISIT  2025   • COVID-19 Vaccine ( season) 2025 (Originally 2024)   • INFLUENZA VACCINE  10/01/2025   • HEMOGLOBIN A1C  2026   • DIABETIC EYE EXAM  2026   • LIPID PANEL  2026   • URINE MICROALBUMIN-CREATININE RATIO (uACR)  2026   • DXA SCAN  10/01/2026   • MAMMOGRAM  12/10/2026   • TDAP/TD VACCINES (2 - Td or Tdap) 2030   • COLORECTAL CANCER SCREENING  2034   • HEPATITIS C SCREENING  Completed   • Pneumococcal Vaccine 50+  Completed   • ZOSTER VACCINE  Completed       No orders of the defined types were placed in this encounter.      No follow-ups on file.

## 2025-08-27 DIAGNOSIS — E11.65 TYPE 2 DIABETES MELLITUS WITH HYPERGLYCEMIA, WITHOUT LONG-TERM CURRENT USE OF INSULIN: Primary | ICD-10-CM

## (undated) DEVICE — LUBE JELLY PK/2.75GM STRL BX/144

## (undated) DEVICE — NDL HYPO ECLPS SFTY 22G 1 1/2IN

## (undated) DEVICE — RADIFOCUS OPTITORQUE ANGIOGRAPHIC CATHETER: Brand: OPTITORQUE

## (undated) DEVICE — CONMED SCOPE SAVER BITE BLOCK, 20X27 MM: Brand: SCOPE SAVER

## (undated) DEVICE — GLV SURG SENSICARE W/ALOE PF LF 6.5 STRL

## (undated) DEVICE — RICH MINOR LITHOTOMY: Brand: MEDLINE INDUSTRIES, INC.

## (undated) DEVICE — MEDI-VAC NON-CONDUCTIVE SUCTION TUBING: Brand: CARDINAL HEALTH

## (undated) DEVICE — INTENDED FOR TISSUE SEPARATION, AND OTHER PROCEDURES THAT REQUIRE A SHARP SURGICAL BLADE TO PUNCTURE OR CUT.: Brand: BARD-PARKER ® CARBON RIB-BACK BLADES

## (undated) DEVICE — TR BAND RADIAL ARTERY COMPRESSION DEVICE: Brand: TR BAND

## (undated) DEVICE — Device

## (undated) DEVICE — QUICK CATCH IN-LINE SUCTION POLYP TRAP IS USED FOR SUCTION RETRIEVAL OF ENDOSCOPICALLY REMOVED POLYPS.

## (undated) DEVICE — FRCP BX RADJAW4 NDL 2.8 240 STD OG

## (undated) DEVICE — GLV SURG SENSICARE W/ALOE PF LF SZ6 STRL

## (undated) DEVICE — SYR LUER SLPTP 50ML

## (undated) DEVICE — SUT GUT CHRM 3/0 SH 27IN G122H

## (undated) DEVICE — GW INQWIRE FC PTFE STD J/1.5 .035 260

## (undated) DEVICE — 2000CC GUARDIAN II: Brand: GUARDIAN

## (undated) DEVICE — GLV SURG ULTRATOUCH BIOGEL/COAT PF LF SZ6 STRL

## (undated) DEVICE — JELLY,LUBE,STERILE,FLIP TOP,TUBE,2-OZ: Brand: MEDLINE

## (undated) DEVICE — GLV SURG SENSICARE POLYISPRN W/ALOE PF LF 6.5 GRN STRL

## (undated) DEVICE — GLIDESHEATH SLENDER STAINLESS STEEL KIT: Brand: GLIDESHEATH SLENDER

## (undated) DEVICE — SOL IRR NACL 0.9PCT BO 1000ML

## (undated) DEVICE — GLOVE,SURG,SENSICARE,ALOE,LF,PF,6: Brand: MEDLINE

## (undated) DEVICE — YANKAUER,BULB TIP, NO VENT: Brand: ARGYLE

## (undated) DEVICE — VLV SXN AIR/H2O ORCAPOD3 1P/U STRL

## (undated) DEVICE — FRCP BIOP COLD ENDOJAW ALLGTR W/NDL 2.8X2300MM BLU

## (undated) DEVICE — ELECTRD PAD DEFIB A/

## (undated) DEVICE — KT ORCA VLV SXN AIR/H2O W/SEAL 1P/U STRL

## (undated) DEVICE — PENCL SMOKE/EVAC MEGADYNE TELESCP 10FT

## (undated) DEVICE — SUCTION CANISTER, 1500CC, RIGID: Brand: DEROYAL

## (undated) DEVICE — SINGLE-USE POLYPECTOMY SNARE: Brand: CAPTIVATOR II